# Patient Record
Sex: FEMALE | Race: WHITE | NOT HISPANIC OR LATINO | Employment: FULL TIME | URBAN - METROPOLITAN AREA
[De-identification: names, ages, dates, MRNs, and addresses within clinical notes are randomized per-mention and may not be internally consistent; named-entity substitution may affect disease eponyms.]

---

## 2017-01-16 ENCOUNTER — OFFICE VISIT (OUTPATIENT)
Dept: INTERNAL MEDICINE | Facility: CLINIC | Age: 52
End: 2017-01-16

## 2017-01-16 ENCOUNTER — TELEPHONE (OUTPATIENT)
Dept: INTERNAL MEDICINE | Facility: CLINIC | Age: 52
End: 2017-01-16

## 2017-01-16 VITALS
BODY MASS INDEX: 47.62 KG/M2 | WEIGHT: 293 LBS | DIASTOLIC BLOOD PRESSURE: 86 MMHG | SYSTOLIC BLOOD PRESSURE: 145 MMHG | HEART RATE: 83 BPM

## 2017-01-16 DIAGNOSIS — R20.0 LEFT FACIAL NUMBNESS: Primary | ICD-10-CM

## 2017-01-16 ASSESSMENT — PAIN SCALES - GENERAL: PAINLEVEL: NO PAIN (0)

## 2017-01-16 NOTE — MR AVS SNAPSHOT
After Visit Summary   1/16/2017    Elizabeth Rosenthal    MRN: [id number]           Patient Information     Date Of Birth          1965        Visit Information        Provider Department      1/16/2017 11:30 AM Katty Villa MD [facility]        Care Instructions    [facility] Care Center Medication Refill Request Information:  * Please contact your pharmacy regarding ANY request for medication refills.  ** [facility] Prescription Fax = [phone]  * Please allow 3 business days for routine medication refills.  * Please allow 5 business days for controlled substance medication refills.     [facility] Center Test Result notification information:  *You will be notified with in 7-10 days of your appointment day regarding the results of your test.  If you are on MyChart you will be notified as soon as the provider has reviewed the results and signed off on them.          Follow-ups after your visit        Your next 10 appointments already scheduled     Feb 22, 2017  6:30 AM   Lab with  LAB   [facility] ([facility])    [address]  [address] [facility] [address] [phone]            Feb 22, 2017  7:00 AM   (Arrive by 6:45 AM)   Return Visit with Tyson Ribeiro MD   [facility] Rheumatology ([facility])    [address]  [address] [facility] [address] [phone]              Who to contact     Please call your clinic at [phone] to:    Ask questions about your health    Make or cancel appointments    Discuss your medicines    Learn about your test results    Speak to your doctor   If you have compliments or concerns about an experience at your clinic, or if you wish to file a complaint, please contact [facility] Patient Relations at [phone] or email us at [email or web address]         Additional Information About Your Visit        MyChart Information      3Leaf is an electronic gateway that provides easy, online access to your medical records. With 3Leaf, you can request a clinic appointment, read your test results, renew a prescription or communicate with your care team.     To sign up for 3Leaf visit the website at [email or web address]   You will be asked to enter the access code listed below, as well as some personal information. Please follow the directions to create your username and password.     Your access code is: [email or web address]  Expires: 2017 12:03 PM     Your access code will  in 90 days. If you need help or a new code, please contact your [facility] or call [phone] for assistance.        Care EveryWhere ID     This is your Care EveryWhere ID. This could be used by other organizations to access your [place] medical records  [id number]        Your Vitals Were     Pulse Breastfeeding?                83 No           Blood Pressure from Last 3 Encounters:   17 145/86   16 138/90   16 139/82    Weight from Last 3 Encounters:   17 137.939 kg (304 lb 1.6 oz)   16 136.079 kg (300 lb)   16 133.811 kg (295 lb)              Today, you had the following     No orders found for display       Primary Care Provider Office Phone # Fax #    Eveline Berry [phone]        [facility] [address]  [facility] [address]        Thank you!     Thank you for choosing [facility]  for your care. Our goal is always to provide you with excellent care. Hearing back from our patients is one way we can continue to improve our services. Please take a few minutes to complete the written survey that you may receive in the mail after your visit with us. Thank you!             Your Updated Medication List - Protect others around you: Learn how to safely use, store and throw away your medicines at www.disposemymeds.org.          This list  is accurate as of: 1/16/17 12:03 PM.  Always use your most recent med list.                   Brand Name Dispense Instructions for use    albuterol 108 (90 BASE) MCG/ACT Inhaler    PROAIR HFA/PROVENTIL HFA/VENTOLIN HFA    3.7 g    Inhale 1 puff into the lungs every 6 hours as needed for shortness of breath / dyspnea or wheezing       buPROPion 150 MG 24 hr tablet    WELLBUTRIN XL    30 tablet    Take 1 tablet (150 mg) by mouth every morning       escitalopram 20 MG tablet    LEXAPRO    90 tablet    Take 1 tablet (20 mg) by mouth daily       fluticasone 110 MCG/ACT Inhaler    FLOVENT HFA    1 Inhaler    Inhale 2 puffs into the lungs 2 times daily       folic acid 1 MG tablet    FOLVITE    90 tablet    Take 1 tablet (1 mg) by mouth daily       gabapentin 300 MG capsule    NEURONTIN    90 capsule    Take 1 capsule (300 mg) by mouth 3 times daily Take 1 Cap by mouth three times a day.       lidocaine 5 % Patch    LIDODERM    30 patch    Place 1-3 patches onto the skin every 24 hours       lisinopril 10 MG tablet    PRINIVIL/ZESTRIL    90 tablet    Take 1 tablet (10 mg) by mouth daily       methotrexate 2.5 MG tablet CHEMO     30 tablet    Take 6 tablets (15 mg) by mouth once a week 6 tabs by mouth once a week       methylPREDNISolone 4 MG tablet    MEDROL    21 tablet    Take 1 tablet (4 mg) by mouth See Admin Instructions follow package directions

## 2017-01-16 NOTE — PATIENT INSTRUCTIONS
Primary Care Center Medication Refill Request Information:  * Please contact your pharmacy regarding ANY request for medication refills.  ** [facility] Prescription Fax = [phone]  * Please allow 3 business days for routine medication refills.  * Please allow 5 business days for controlled substance medication refills.     [facility] Test Result notification information:  *You will be notified with in 7-10 days of your appointment day regarding the results of your test.  If you are on MyChart you will be notified as soon as the provider has reviewed the results and signed off on them.

## 2017-01-16 NOTE — PROGRESS NOTES
S: Elizabeth is a 51 yoF with a PMHx of TIA, vertebral artery dissection (5 years ago), and a hx of migraines who is here with facial numbness. She has had more migraines in the past couple weeks b/c she bumped the back of her head. She had a migraine yesterday and treated it with her usual benadryl and rest. She has no headache now. However, she does have numbness on the L side of her face.  She has no changes in vision, no photo/phonophobia, no change in speech, no other numbness, No weakness. NEck is stiff and sore and head hurts a bit in the R frontal area. When she had the vertebral arterial dissection 5 years ago, her symptom was horrendous dizziness, which she does not have now. She was monitored, not treated with surgery. After 4 mos had a clear MRI. She also had a TIA 10 years ago.     PMHx, PRob list reviewed.     ROS as above.     /86 mmHg  Pulse 83  Wt 137.939 kg (304 lb 1.6 oz)  Breastfeeding? No  General: Pleasant female, in NAD  ENT: TMs normal, oropharynx clear, MMM, palate elevated bilaterally  Neck: no carotid bruits  Resp: lungs CAB  CV: Heart RRR, ,no MRG  Neuro: AOx3, CN II-XII intact, with perhaps slight decreased in sensation at V3 branch of CN V. No numbness on hands/legs. 5/5 strength in BUE/BLE. No pronator drift, normal rapid hand movements, normal finger-nose    A/P:   Elizabeth was seen today for numbness and headache.    Diagnoses and all orders for this visit:    Left facial numbness. Suspect benign in etiology. Normal neurological exam, so not concerning for dissection, stroke, atypical migraine, etc. Should resolve on own.   RTC if fails to improve or worsens.     Katty Kelley MD  01/16/2017

## 2017-01-16 NOTE — TELEPHONE ENCOUNTER
Call from patient. Yesterday she had a migraine, and today she is having numbness on the left side of her face. She has a history of TIA and vertebral artery dissection. Does not want to go to the ER. Appointment scheduled for patient to see Dr. Tuttle today.

## 2017-01-16 NOTE — NURSING NOTE
Chief Complaint   Patient presents with     Numbness     Patient here for numbness just today.      Headache     Patient here for headaches.       Leonela Olson CMA at 11:48 AM on 1/16/2017.

## 2017-01-19 ENCOUNTER — TELEPHONE (OUTPATIENT)
Dept: INTERNAL MEDICINE | Facility: CLINIC | Age: 52
End: 2017-01-19

## 2017-01-19 NOTE — TELEPHONE ENCOUNTER
Telephone Encounter    Caller: Patient.     Reason for Call/Caller's Request: 1) Patient wanted to know how long should she wait to follow-up with regards to the facial numbness. Still experiencing symptoms. And 2) should she be worried about the fact that moving her neck side to side triggers the symptoms.     Nursing Action or Plan: Routed to Dr. Tuttle who last saw the patient.

## 2017-01-20 NOTE — TELEPHONE ENCOUNTER
Perhaps she should come back and be seen. She may have a pinched nerve in her neck that's causing this. Perhaps PT is in order.     01/20/2017 2:05 PM  Message relayed.  Orin QUINTEROS

## 2017-04-03 DIAGNOSIS — I10 BENIGN ESSENTIAL HYPERTENSION: ICD-10-CM

## 2017-04-03 RX ORDER — LISINOPRIL 10 MG/1
10 TABLET ORAL DAILY
Qty: 90 TABLET | Refills: 0 | Status: SHIPPED | OUTPATIENT
Start: 2017-04-03 | End: 2017-06-01

## 2017-04-03 NOTE — TELEPHONE ENCOUNTER
lisinopril (PRINIVIL/ZESTRIL) 10 MG tablet      Last Written Prescription Date:  12/1/2016  Last Fill Quantity: 90,   # refills: 0  Last Office Visit : 1/16/2017  Future Office visit:  0    BP Readings from Last 1 Encounters:   01/16/17 145/86       Routing refill request to provider for review/approval because:  BP only slightly above parameters.  Per protocol, refill for 3 months and message routed to provider for follow up.    --------------------------  Message left for pt to schedule an appt for f/u BP .  Le Love RN

## 2017-04-05 DIAGNOSIS — M79.7 FIBROMYALGIA: ICD-10-CM

## 2017-04-05 RX ORDER — GABAPENTIN 300 MG/1
300 CAPSULE ORAL 3 TIMES DAILY
Qty: 90 CAPSULE | Refills: 11 | Status: SHIPPED | OUTPATIENT
Start: 2017-04-05 | End: 2017-12-07

## 2017-04-05 NOTE — TELEPHONE ENCOUNTER
gabapentin (NEURONTIN) 300 MG       Last Written Prescription Date:  5/25/16  Last Fill Quantity: 90,   # refills: 11  Last Office Visit : 11/23/16  Future Office visit:  none  Routing refill request to provider for review/approval because:  Drug not on  refill protocol

## 2017-05-17 DIAGNOSIS — F32.A DEPRESSION: ICD-10-CM

## 2017-05-17 RX ORDER — ESCITALOPRAM OXALATE 20 MG/1
20 TABLET ORAL DAILY
Qty: 90 TABLET | Refills: 2 | Status: SHIPPED | OUTPATIENT
Start: 2017-05-17 | End: 2018-02-19

## 2017-05-17 NOTE — TELEPHONE ENCOUNTER
escitalopram       Last Written Prescription Date:  3/22/16  Last Fill Quantity: 90,   # refills: 3  Last Office Visit : 1/16/17  Future Office visit:  NONE

## 2017-05-23 DIAGNOSIS — M06.09 RHEUMATOID ARTHRITIS OF MULTIPLE SITES WITHOUT RHEUMATOID FACTOR (H): ICD-10-CM

## 2017-05-23 DIAGNOSIS — Z79.899 ENCOUNTER FOR LONG-TERM (CURRENT) USE OF MEDICATIONS: ICD-10-CM

## 2017-05-23 LAB
ALBUMIN SERPL-MCNC: 3.2 G/DL (ref 3.4–5)
ALT SERPL W P-5'-P-CCNC: 25 U/L (ref 0–50)
AST SERPL W P-5'-P-CCNC: 17 U/L (ref 0–45)
CREAT SERPL-MCNC: 1.05 MG/DL (ref 0.52–1.04)
CRP SERPL-MCNC: 12.1 MG/L (ref 0–8)
ERYTHROCYTE [DISTWIDTH] IN BLOOD BY AUTOMATED COUNT: 14.1 % (ref 10–15)
GFR SERPL CREATININE-BSD FRML MDRD: 55 ML/MIN/1.7M2
HCT VFR BLD AUTO: 40 % (ref 35–47)
HGB BLD-MCNC: 13.4 G/DL (ref 11.7–15.7)
MCH RBC QN AUTO: 30.8 PG (ref 26.5–33)
MCHC RBC AUTO-ENTMCNC: 33.5 G/DL (ref 31.5–36.5)
MCV RBC AUTO: 92 FL (ref 78–100)
PLATELET # BLD AUTO: 308 10E9/L (ref 150–450)
RBC # BLD AUTO: 4.35 10E12/L (ref 3.8–5.2)
WBC # BLD AUTO: 6.8 10E9/L (ref 4–11)

## 2017-05-24 DIAGNOSIS — M06.09 RHEUMATOID ARTHRITIS OF MULTIPLE SITES WITHOUT RHEUMATOID FACTOR (H): ICD-10-CM

## 2017-05-24 NOTE — TELEPHONE ENCOUNTER
----- Message from Venita Walker LPN sent at 5/22/2017  1:08 PM CDT -----  Regarding: FW: Pt would like an extention on her methotrexate  Contact: [phone]      ----- Message -----     From: Kaila Eastman     Sent: 5/22/2017  10:23 AM       To: Adult Rheum Triage[facility]  Subject: Pt would like an extention on her methotrexa#    Pt is out of her medication methotrexate, and is requesting an extention be sent to her pharmacy on file to last until the time of her scheduled appt with Dr Ribeiro on 7/28/17. Please call pt to let her know if this will work, thanks! Kaila FERGUSON

## 2017-05-24 NOTE — TELEPHONE ENCOUNTER
Methotrexate      Last Written Prescription Date:  11-23-17  Last Fill Quantity: 30,   # refills: 5  Last Office Visit: 11-23-17  Future Office visit:  7-27-17    CBC RESULTS:   Recent Labs   Lab Test  05/23/17   0738   WBC  6.8   RBC  4.35   HGB  13.4   HCT  40.0   MCV  92   MCH  30.8   MCHC  33.5   RDW  14.1   PLT  308       Creatinine   Date Value Ref Range Status   05/23/2017 1.05 (H) 0.52 - 1.04 mg/dL Final   ]    Liver Function Studies -   Recent Labs   Lab Test  05/23/17   0738   03/22/16   0921   PROTTOTAL   --    --   7.3   ALBUMIN  3.2*   < >  3.5   BILITOTAL   --    --   0.4   ALKPHOS   --    --   78   AST  17   < >  11   ALT  25   < >  22    < > = values in this interval not displayed.       Kathleen M Doege RN

## 2017-06-01 ENCOUNTER — OFFICE VISIT (OUTPATIENT)
Dept: INTERNAL MEDICINE | Facility: CLINIC | Age: 52
End: 2017-06-01

## 2017-06-01 VITALS
WEIGHT: 293 LBS | DIASTOLIC BLOOD PRESSURE: 81 MMHG | RESPIRATION RATE: 16 BRPM | BODY MASS INDEX: 47.72 KG/M2 | HEART RATE: 70 BPM | SYSTOLIC BLOOD PRESSURE: 133 MMHG

## 2017-06-01 DIAGNOSIS — F33.0 MAJOR DEPRESSIVE DISORDER, RECURRENT EPISODE, MILD (H): ICD-10-CM

## 2017-06-01 DIAGNOSIS — Z12.31 ENCOUNTER FOR SCREENING MAMMOGRAM FOR BREAST CANCER: ICD-10-CM

## 2017-06-01 DIAGNOSIS — I10 BENIGN ESSENTIAL HYPERTENSION: ICD-10-CM

## 2017-06-01 DIAGNOSIS — J45.20 MILD INTERMITTENT ASTHMA WITHOUT COMPLICATION: ICD-10-CM

## 2017-06-01 DIAGNOSIS — E78.5 HYPERLIPIDEMIA, UNSPECIFIED HYPERLIPIDEMIA TYPE: ICD-10-CM

## 2017-06-01 DIAGNOSIS — E66.01 MORBID OBESITY, UNSPECIFIED OBESITY TYPE (H): ICD-10-CM

## 2017-06-01 DIAGNOSIS — Z12.11 SCREEN FOR COLON CANCER: ICD-10-CM

## 2017-06-01 DIAGNOSIS — Z12.4 SCREENING FOR MALIGNANT NEOPLASM OF CERVIX: ICD-10-CM

## 2017-06-01 DIAGNOSIS — G47.33 OSA (OBSTRUCTIVE SLEEP APNEA): ICD-10-CM

## 2017-06-01 DIAGNOSIS — Z00.00 ROUTINE GENERAL MEDICAL EXAMINATION AT A HEALTH CARE FACILITY: Primary | ICD-10-CM

## 2017-06-01 DIAGNOSIS — R73.9 HYPERGLYCEMIA: ICD-10-CM

## 2017-06-01 DIAGNOSIS — J45.901 ASTHMA EXACERBATION: ICD-10-CM

## 2017-06-01 RX ORDER — BUPROPION HYDROCHLORIDE 150 MG/1
150 TABLET ORAL EVERY MORNING
Qty: 90 TABLET | Refills: 3 | Status: SHIPPED | OUTPATIENT
Start: 2017-06-01 | End: 2018-02-19

## 2017-06-01 RX ORDER — LISINOPRIL 10 MG/1
10 TABLET ORAL DAILY
Qty: 90 TABLET | Refills: 3 | Status: SHIPPED | OUTPATIENT
Start: 2017-06-01 | End: 2018-02-19

## 2017-06-01 RX ORDER — ALBUTEROL SULFATE 90 UG/1
1 AEROSOL, METERED RESPIRATORY (INHALATION) EVERY 6 HOURS PRN
Qty: 3 INHALER | Refills: 3 | Status: SHIPPED | OUTPATIENT
Start: 2017-06-01 | End: 2019-09-16

## 2017-06-01 RX ORDER — FLUTICASONE PROPIONATE 110 UG/1
2 AEROSOL, METERED RESPIRATORY (INHALATION) 2 TIMES DAILY
Qty: 3 INHALER | Refills: 3 | Status: SHIPPED | OUTPATIENT
Start: 2017-06-01 | End: 2020-08-20

## 2017-06-01 ASSESSMENT — PAIN SCALES - GENERAL: PAINLEVEL: MODERATE PAIN (5)

## 2017-06-01 NOTE — PATIENT INSTRUCTIONS
"Primary Care Center Medication Refill Request Information:  * Please contact your pharmacy regarding ANY request for medication refills.  ** [facility] Fax = [phone]  * Please allow 3 business days for routine medication refills.  * Please allow 5 business days for controlled substance medication refills.     [facility] Test Result notification information:  *You will be notified with in 7-10 days of your appointment day regarding the results of your test.  If you are on MyChart you will be notified as soon as the provider has reviewed the results and signed off on them.    [facility] Center [phone]       Here are recommendations and tips regarding exercise:      ** Research has shown that exercise is as effective as medication for prevention of medical conditions such as diabetes and repeat myocardial infarctions (heart attacks).      Ultimately your goal is to exercise vigorously for for at least 30 minutes, 5 days a week   -  Examples of \"vigorous\" exercise are jogging, swimming and singles tennis.   -  Examples of \"moderate\" exercise are brisk walking, bicycle riding at less than 10 miles per hour and water aerobics.   -  If you have been very sedentary, start with 1 day per week.  One day is better than no days.   -  Gradually work up to 5 days a week (total 2.5 hours per week)   -  Try to reach your exercise goal over a period of 3 months.  You are likely to    maintain a habit of exercising if you can exercise regularly for at    about 6 weeks.   -  Exercise can be done for 10 minutes at a time.   -  Do what you enjoy doing that is physically active.     -  Exercise with a partner.   -  If you use a pedometer as a motivator, purchase a reliable one that is unlikely    to break   -  Being on your feet and walking a lot at work does not count as your Exercise program   -  Don't let weather be a barrier to exercise.  Try, for example, go to a [place] in [place]   -  If you feel like " "you don't have enough time to exercise, consider, forexample, exercising while you watch T.V.   -  If health conditions such as back, hip, knee and foot pain are a barrier, choose low impact activities such as swimming, water-based activities,     Pilates or yoga   -  Don't give up if you fall below your goal.  Try to get work outs in whenever you    have a chance.    Here are some additional recommendations and tips regarding WEIGHT LOSS:     -  For weight loss, your goal is to exercise at least 30 minutes vigorously on a daily basis. Examples of \"vigorous\" exercise are jogging, swimming and singles tennis.  See general exercise recommendations above.     -  Keep a log of what you eat and drink every day     -  Weigh yourself daily     -  Consume fewer calories than you think, e.g. 800-1000 calories daily     -  Consider a weight management group such Wellness Program that will assist you in understanding appropriate dietary choices and portion control.      -  Avoid foods and beverages that are high in calories and low in nutrition value.  Choose snacks high in fiber and low in calories.  Keep colorful vegetables on hand.     -  Avoid fast food restaurants.  Consider sharing restaurant entrees or ordering healthy appetizers at restaurants.  Order salads with fat free salads.     -  Overeaters Anonymous may help with compulsions that drive you to eat.       -  Here in the Primary Care Center we have Health Psychologists.  The [facility] also has a Weight Management Clinic.  Let us know if you would like a referral. Check with your insurance to see if the consultation would be covered.                  "

## 2017-06-01 NOTE — MR AVS SNAPSHOT
"              After Visit Summary   6/1/2017    Elizabeth Rosenthal    MRN: [id number]           Patient Information     Date Of Birth          1965        Visit Information        Provider Department      6/1/2017 7:20 AM Eveline Berry MD [facility]        Today's Diagnoses     Routine general medical examination at a health care facility    -  1    Screening for malignant neoplasm of cervix        Morbid obesity, unspecified obesity type (H)        Hyperlipidemia, unspecified hyperlipidemia type        Benign essential hypertension        Asthma exacerbation        Major depressive disorder, recurrent episode, mild (H)        Mild intermittent asthma without complication        Hyperglycemia        Encounter for screening mammogram for breast cancer        Screen for colon cancer        SHERIE (obstructive sleep apnea)          Care Instructions    Primary Care Center Medication Refill Request Information:  * Please contact your pharmacy regarding ANY request for medication refills.  ** [facility] Prescription Fax = [phone]  * Please allow 3 business days for routine medication refills.  * Please allow 5 business days for controlled substance medication refills.     Primary Care Center Test Result notification information:  *You will be notified with in 7-10 days of your appointment day regarding the results of your test.  If you are on MyChart you will be notified as soon as the provider has reviewed the results and signed off on them.    Primary Care Center [phone]       Here are recommendations and tips regarding exercise:      ** Research has shown that exercise is as effective as medication for prevention of medical conditions such as diabetes and repeat myocardial infarctions (heart attacks).      Ultimately your goal is to exercise vigorously for for at least 30 minutes, 5 days a week   -  Examples of \"vigorous\" exercise are jogging, swimming and singles tennis.   -  Examples of " "\"moderate\" exercise are brisk walking, bicycle riding at less than 10 miles per hour and water aerobics.   -  If you have been very sedentary, start with 1 day per week.  One day is better than no days.   -  Gradually work up to 5 days a week (total 2.5 hours per week)   -  Try to reach your exercise goal over a period of 3 months.  You are likely to    maintain a habit of exercising if you can exercise regularly for at    about 6 weeks.   -  Exercise can be done for 10 minutes at a time.   -  Do what you enjoy doing that is physically active.     -  Exercise with a partner.   -  If you use a pedometer as a motivator, purchase a reliable one that is unlikely    to break   -  Being on your feet and walking a lot at work does not count as your Exercise program   -  Don't let weather be a barrier to exercise.  Try, for example, go to a mall in the [place]   -  If you feel like you don't have enough time to exercise, consider, forexample, exercising while you watch T.V.   -  If health conditions such as back, hip, knee and foot pain are a barrier, choose low impact activities such as swimming, water-based activities,     Pilates or yoga   -  Don't give up if you fall below your goal.  Try to get work outs in whenever you    have a chance.    Here are some additional recommendations and tips regarding WEIGHT LOSS:     -  For weight loss, your goal is to exercise at least 30 minutes vigorously on a daily basis. Examples of \"vigorous\" exercise are jogging, swimming and singles tennis.  See general exercise recommendations above.     -  Keep a log of what you eat and drink every day     -  Weigh yourself daily     -  Consume fewer calories than you think, e.g. 800-1000 calories daily     -  Consider a weight management group such Wellness Program that will assist you in understanding appropriate dietary choices and portion control.      -  Avoid foods and beverages that are high in calories and low in nutrition value.  " Choose snacks high in fiber and low in calories.  Keep colorful vegetables on hand.     -  Avoid fast food restaurants.  Consider sharing restaurant entrees or ordering healthy appetizers at restaurants.  Order salads with fat free salads.     -  Overeaters Anonymous may help with compulsions that drive you to eat.       -  Here in the Primary Care Center we have Health Psychologists.  The [facility] also has a Weight Management Clinic.  Let us know if you would like a referral. Check with your insurance to see if the consultation would be covered.                          Follow-ups after your visit        Additional Services     GI Procedure Referral       Last Lab Result: Creatinine (mg/dL)       Date                     Value                 05/23/2017               1.05 (H)         ----------  Body mass index is 47.72 kg/(m^2).     Needed:  No  Language:  English    Patient will be contacted to schedule procedure.     Please be aware that coverage of these services is subject to the terms and limitations of your health insurance plan.  Call member services at your health plan with any benefit or coverage questions.  Any procedures must be performed at a [facility] facility OR coordinated by your clinic's referral office.    Please bring the following with you to your appointment:    (1) Any X-Rays, CTs or MRIs which have been performed.  Contact the facility where they were done to arrange for  prior to your scheduled appointment.    (2) List of current medications   (3) This referral request   (4) Any documents/labs given to you for this referral            SLEEP EVALUATION & MANAGEMENT REFERRAL - ADULT       Please be aware that coverage of these services is subject to the terms and limitations of your health insurance plan.  Call member services at your health plan with any benefit or coverage questions.      Please bring the following to your appointment:    >>   List of current  medications   >>   This referral request   >>   Any documents/labs given to you for this referral    [facility]  Ph [phone] (Age 15 and up)                  Follow-up notes from your care team     Return in about 6 months (around 12/1/2017) for Routine Visit.      Your next 10 appointments already scheduled     Jul 27, 2017  6:00 PM CDT   (Arrive by 5:45 PM)   Return Visit with Tyson Ribeiro MD   [facility] ([facility])    [address] [place] [address]  3rd [facility] [address] [phone]              Future tests that were ordered for you today     Open Future Orders        Priority Expected Expires Ordered    SLEEP EVALUATION & MANAGEMENT REFERRAL - ADULT Routine  6/1/2018 6/1/2017    GI Procedure Referral Routine  6/1/2018 6/1/2017    Hemoglobin A1c Routine 6/1/2017 6/15/2017 6/1/2017    Lipid Profile Routine 6/1/2017 6/1/2018 6/1/2017    Comprehensive metabolic panel Routine 6/1/2017 6/15/2017 6/1/2017    Mammogram, screening w je (3D) Routine  6/1/2018 6/1/2017            Who to contact     Please call your clinic at [phone] to:    Ask questions about your health    Make or cancel appointments    Discuss your medicines    Learn about your test results    Speak to your doctor   If you have compliments or concerns about an experience at your clinic, or if you wish to file a complaint, please contact [facility] Patient Relations at [phone] or email us at [email or web address]         Additional Information About Your Visit        [employer] Information     [employer] is an electronic gateway that provides easy, online access to your medical records. With [employer], you can request a clinic appointment, read your test results, renew a prescription or communicate with your care team.     To sign up for [employer] visit the website at [email or web address]   You will be asked to enter the access  code listed below, as well as some personal information. Please follow the directions to create your username and password.     Your access code is: [email or web address]  Expires: 2017  6:30 AM     Your access code will  in 90 days. If you need help or a new code, please contact your [facility] or call [phone] for assistance.        Care EveryWhere ID     This is your Care EveryWhere ID. This could be used by other organizations to access your [facility] medical records  [id number]        Your Vitals Were     Pulse Respirations BMI (Body Mass Index)             70 16 47.72 kg/m2          Blood Pressure from Last 3 Encounters:   17 133/81   17 145/86   16 138/90    Weight from Last 3 Encounters:   17 (!) 138.2 kg (304 lb 11.2 oz)   17 (!) 137.9 kg (304 lb 1.6 oz)   16 136.1 kg (300 lb)              We Performed the Following     Albumin Random Urine Quantitative     Asthma Action Plan (AAP)     HPV High Risk Types DNA Cervical     Pap imaged thin layer screen with HPV - recommended age 30 - 65 years (select HPV order below)          Today's Medication Changes          These changes are accurate as of: 17  8:07 AM.  If you have any questions, ask your nurse or doctor.               Stop taking these medicines if you haven't already. Please contact your care team if you have questions.     methylPREDNISolone 4 MG tablet   Commonly known as:  MEDROL                Where to get your medicines      These medications were sent to [facility]/pharmacy #[id number] - [place] [id number] AT [facility] OF [address] [place] [address]     Phone:  [phone]     albuterol 108 (90 BASE) MCG/ACT Inhaler    buPROPion 150 MG 24 hr tablet    fluticasone 110 MCG/ACT Inhaler    lisinopril 10 MG tablet                Primary Care Provider Office Phone # Fax #    Eveline Berry [phone]        PHYSICIANS [address] [place] [address]  [facility] [address]  [facility] [address]        Thank you!     Thank you for choosing [facility]  for your care. Our goal is always to provide you with excellent care. Hearing back from our patients is one way we can continue to improve our services. Please take a few minutes to complete the written survey that you may receive in the mail after your visit with us. Thank you!             Your Updated Medication List - Protect others around you: Learn how to safely use, store and throw away your medicines at www.disposemymeds.org.          This list is accurate as of: 6/1/17  8:07 AM.  Always use your most recent med list.                   Brand Name Dispense Instructions for use    albuterol 108 (90 BASE) MCG/ACT Inhaler    PROAIR HFA/PROVENTIL HFA/VENTOLIN HFA    3 Inhaler    Inhale 1 puff into the lungs every 6 hours as needed for shortness of breath / dyspnea or wheezing       buPROPion 150 MG 24 hr tablet    WELLBUTRIN XL    90 tablet    Take 1 tablet (150 mg) by mouth every morning       escitalopram 20 MG tablet    LEXAPRO    90 tablet    Take 1 tablet (20 mg) by mouth daily       fluticasone 110 MCG/ACT Inhaler    FLOVENT HFA    3 Inhaler    Inhale 2 puffs into the lungs 2 times daily       folic acid 1 MG tablet    FOLVITE    90 tablet    Take 1 tablet (1 mg) by mouth daily       gabapentin 300 MG capsule    NEURONTIN    90 capsule    Take 1 capsule (300 mg) by mouth 3 times daily       lidocaine 5 % Patch    LIDODERM    30 patch    Place 1-3 patches onto the skin every 24 hours       lisinopril 10 MG tablet    PRINIVIL/ZESTRIL    90 tablet    Take 1 tablet (10 mg) by mouth daily       methotrexate 2.5 MG tablet CHEMO     30 tablet    Take 6 tablets (15 mg) by mouth once a week 6 tabs by mouth once a week

## 2017-06-01 NOTE — LETTER
Patient:  Elizabeth Locke  :   1965  MRN:     [id number]        Ms. Elizabeth Locke  [address] [place] [address]        2017    Dear Ms. Locke,    Thank you for choosing the [facility] for your healthcare needs.  We appreciate the opportunity to serve you.    The following are your recent test results.     Your pap smear result is normal.      Results for orders placed or performed in visit on 17   Pap imaged thin layer screen with HPV - recommended age 30 - 65 years (select HPV order below)   Result Value Ref Range    PAP NIL     Copath Report         Patient Name: ELIZABETH LOCKE  MR#: [id number]  Specimen #: [id number]  Collected: 2017  Received: 2017  Reported: 2017 09:12  Ordering Phy(s): WALLACE VIGIL    For improved result formatting, select 'View Enhanced Report Format'  under Linked Documents section.    SPECIMEN/STAIN PROCESS:  Pap imaged thin layer prep screening (Surepath, FocalPoint with guided  screening)       Pap-Cyto x 1, HPV ordered x 1    SOURCE: Cervical, endocervical  ----------------------------------------------------------------   Pap imaged thin layer prep screening (Surepath, FocalPoint with guided  screening)  SPECIMEN ADEQUACY:  Satisfactory for evaluation.  -Transformation zone component absent.    CYTOLOGIC INTERPRETATION:    Negative for intraepithelial lesion or malignancy    Electronically signed out by:  YUMIKO Moore ( ASCP)    Processed and screened at [facility],  [facility]    CLINICAL HISTORY:    Post Menopausal,    Papanicolaou Carina t Limitations:  Cervical cytology is a screening test  with limited sensitivity; regular screening is critical for cancer  prevention; Pap tests are primarily effective for the  diagnosis/prevention of squamous cell carcinoma, not adenocarcinomas or  other cancers.    TESTING LAB LOCATION:  [facility]  [facility], [facility] [address]  [address] [place]  [address]  [phone]    COLLECTION SITE:  Client:  [facility]  Location: [facility] (B)         Please contact your provider if you have any questions or concerns.  We look forward to serving your needs in the future.      Sincerely,    Dr. Berry.

## 2017-06-01 NOTE — PROGRESS NOTES
CC:  Physical.    HPI:    Elizabeth is here for a routine physical.      The following health care maintenance topics were addressed:  Cancer screening  Blood pressure  Lifestyle habits including exercise  Depression screening   STI testing --declines  Hyperlipidemia screening  Immunizations  Routine eye and dental exams  Weight management  Diabetes screening  Skin Cancer prevention  Asthma Action Plan  SHERIE--has, on CPAP, needs equipment replacement  Hepatitis C screening    Personal and family health history was updated    Patient Active Problem List   Diagnosis     Iliotibial band syndrome     Right knee pain     Lumbar radicular pain     Polyarthritis     Depression     Benign essential hypertension     Mild intermittent asthma without complication     Morbid obesity (H)     Rheumatoid arthritis of multiple sites without rheumatoid factor (H)     Fibromyalgia     Encounter for long-term (current) use of medications     SHERIE (obstructive sleep apnea)     Morbid obesity with BMI of 45.0-49.9, adult (H)     Low back pain     Hyperlipidemia     Arthritis of knee, left     Past Medical History:   Diagnosis Date     Asthma      Chronic pelvic pain in female      Depression      Hyperlipidemia      Low back pain      Morbid obesity with BMI of 45.0-49.9, adult (H)      SHERIE (obstructive sleep apnea)     has cpap     Polyarthritis      TIA (transient ischemic attack)      Vertebral artery dissection (H)      Past Surgical History:   Procedure Laterality Date     ARTHROSCOPY KNEE BILATERAL       BIOPSY LYMPH NODE CERVICAL       TUBAL LIGATION       Family History   Problem Relation Age of Onset     Breast Cancer Mother      50s     HEART DISEASE Father      Social History     Social History     Marital status: Single     Spouse name: N/A     Number of children: N/A     Years of education: N/A     Occupational History     Not on file.     Social History Main Topics     Smoking status: Never Smoker     Smokeless tobacco: Never  Used     Alcohol use 0.0 oz/week     0 Standard drinks or equivalent per week      Comment: Occasional     Drug use: No     Sexual activity: Not on file     Other Topics Concern     Not on file     Social History Narrative     Current Outpatient Prescriptions   Medication Sig Dispense Refill     methotrexate 2.5 MG tablet CHEMO Take 6 tablets (15 mg) by mouth once a week 6 tabs by mouth once a week 30 tablet 1     escitalopram (LEXAPRO) 20 MG tablet Take 1 tablet (20 mg) by mouth daily 90 tablet 2     gabapentin (NEURONTIN) 300 MG capsule Take 1 capsule (300 mg) by mouth 3 times daily 90 capsule 11     lisinopril (PRINIVIL/ZESTRIL) 10 MG tablet Take 1 tablet (10 mg) by mouth daily 90 tablet 0     fluticasone (FLOVENT HFA) 110 MCG/ACT inhaler Inhale 2 puffs into the lungs 2 times daily 1 Inhaler 1     folic acid (FOLVITE) 1 MG tablet Take 1 tablet (1 mg) by mouth daily 90 tablet 3     buPROPion (WELLBUTRIN XL) 150 MG 24 hr tablet Take 1 tablet (150 mg) by mouth every morning 30 tablet 1     albuterol (PROAIR HFA, PROVENTIL HFA, VENTOLIN HFA) 108 (90 BASE) MCG/ACT inhaler Inhale 1 puff into the lungs every 6 hours as needed for shortness of breath / dyspnea or wheezing 3.7 g 2     lidocaine (LIDODERM) 5 % patch Place 1-3 patches onto the skin every 24 hours 30 patch 1     Allergies   Allergen Reactions     Nuts Itching     Barley Grass GI Disturbance     Codeine Itching     Contrast Dye Itching     Oat Other (See Comments) and Nausea and Vomiting     abdominal pain       Penicillins Itching     Shellfish-Derived Products Nausea and Vomiting     Yeast Cramps, Diarrhea, Itching and Nausea and Vomiting     There were no vitals taken for this visit.      Medications and need for refills reviewed.    ROS:  14 point ROS negative for new symptoms except for left shoulder pain and increasing limitation in ROM.  Would like to make sure it is not in the breast.  We discussed the effects that her morbid obesity is having on her  health including weight bearing joint pains, hypertension, hyperlipidemia, elevated glucose, etc.  Elizabeth reports that nothing has helped her with weight loss including diet, exercise, OA.  States she lost around 60 pounds on Weight Watchers but she gained it back.  States she does not have time to try to lose weight.  We discussed strategies.  She is not interested in a Weight Management consult.    /81 (BP Location: Right arm)  Pulse 70  Resp 16  Wt (!) 138.2 kg (304 lb 11.2 oz)  BMI 47.72 kg/m2      Physical Examination:  General:  Pleasant, alert, no acute distress.  Tearful when talking about weight loss strategies  Eyes:  Pupils 2-3 mm, sclera white, EOM's full.    Ears:  TM's normal, EAC's patent, clear of cerumen  Nose:  Nasal passages clear, turbinates not swollen, no polyps visualized.  Throat/Mouth:  No pharyngeal erythema or exudate, oral mucosa and tongue moist, no suspicious oral lesions  Neck:  Full AROM, supple, thyroid smooth, symmetric, not enlarge, no nodules  Lungs:  Clear to auscultation throughout, no wheezes, rhonchi or rales.  C/V:  Regular rate and rhythm, no murmurs, rubs or gallops.  No JVD, no carotid bruits.  No peripheral edema.    Breast exam:  No suspicious masses, skin changes, nipple discharge or retraction.  She has some tenderness more along the lateral chest wall not in breast tissue.  Abdomen:  Not distended.  Bowel sounds active.  No tenderness, no hepatosplenomegaly or masses.  No CVA tenderness or masses.  Pelvic:  No suspicious vulvar lesions or masses, periurethral and vaginal os without lesions or erythema, no apparent prolapse, perineal and perianal areas without lesions or rashes, no external hemorrhoids noted.  Vaginal mucosa pink, moist.  No lesions, no discharge.  Cervix including os without lesions, masses, polyps.  Bimanual exam reveals normal size, non tender uterus without masses.  No ovarian enlargement or masses, no ovarian tenderness.  Lymph:  No  cervical, axillary or inguinal lymphadenopathy  Neuro:   Face symmetric. No tremor.  Gait steady.   M/S:  No joint deformities, no joint swelling noted in hands or feet.  Skin:  No suspicious lesions noted on face, neck, trunk, abdomen, hands, feet.  No rashes or jaundice in same areas.  Normal moisture, good skin turgor.   Psych:  Broad range affect.  Not psychomotor slowed.  No signs of anxiety or agitation.      Elizabeth was seen today for physical, numbness and refill request.    Diagnoses and all orders for this visit:    Routine general medical examination at a health care facility    Screening for malignant neoplasm of cervix  -     Pap imaged thin layer screen with HPV - recommended age 30 - 65 years (select HPV order below)  -     HPV High Risk Types DNA Cervical    Morbid obesity, unspecified obesity type (H)  -     Hemoglobin A1c; Future  -     Lipid Profile; Future  -     Encouraged exercise, decrease caloric intake, reduce weight, overeaters anonymous, referral to weight management program.  See patient instructions    Hyperlipidemia, unspecified hyperlipidemia type    Benign essential hypertension  -    Refill  lisinopril (PRINIVIL/ZESTRIL) 10 MG tablet; Take 1 tablet (10 mg) by mouth daily  -     Comprehensive metabolic panel; Future  -     Albumin Random Urine Quantitative    Major depressive disorder, recurrent episode, mild (H)  -    Refill  buPROPion (WELLBUTRIN XL) 150 MG 24 hr tablet; Take 1 tablet (150 mg) by mouth every morning    Mild intermittent asthma without complication    Refill  fluticasone (FLOVENT HFA) 110 MCG/ACT Inhaler; Inhale 2 puffs into the lungs 2 times daily  -    Refill  albuterol (PROAIR HFA/PROVENTIL HFA/VENTOLIN HFA) 108 (90 BASE) MCG/ACT Inhaler; Inhale 1 puff into the lungs every 6 hours as needed for shortness of breath / dyspnea or wheezing    Encounter for screening mammogram for breast cancer  -     Mammogram, screening w je (3D); Future    Screen for colon cancer  -      GI Procedure Referral; Future    SHERIE (obstructive sleep apnea)  -     SLEEP EVALUATION & MANAGEMENT REFERRAL - ADULT; Future    Follow up in 6 months and as needed.    Eveline Berry M.D.  Internal Medicine  Primary Care Center   pager [phone]

## 2017-06-01 NOTE — LETTER
My Asthma Action Plan  Name: Elizabeth Rosenthal   YOB: 1965  Date: 6/1/2017   My doctor: Eveline Berry MD   My clinic: [facility]        My Control Medicine: Fluticasone 2 puffs twice daily  My Rescue Medicine: Albuterol (Proair/Ventolin/Proventil) inhaler 2 puffs every 4 hours as needed   My Asthma Severity: intermittent  Avoid your asthma triggers: per patient               GREEN ZONE     Good Control    I feel good    No cough or wheeze    Can work, sleep and play without asthma symptoms       Take your asthma control medicine every day.     1. If exercise triggers your asthma, take your rescue medication    15 minutes before exercise or sports, and    During exercise if you have asthma symptoms  2. Spacer to use with inhaler: If you have a spacer, make sure to use it with your inhaler             YELLOW ZONE     Getting Worse  I have ANY of these:    I do not feel good    Cough or wheeze    Chest feels tight    Wake up at night   1. Keep taking your Green Zone medications  2. Start taking your rescue medicine:    every 20 minutes for up to 1 hour. Then every 4 hours for 24-48 hours.  3. If you stay in the Yellow Zone for more than 12-24 hours, contact your doctor.  4. If you do not return to the Green Zone in 12-24 hours or you get worse, start taking your oral steroid medicine if prescribed by your provider.           RED ZONE     Medical Alert - Get Help  I have ANY of these:    I feel awful    Medicine is not helping    Breathing getting harder    Trouble walking or talking    Nose opens wide to breathe       1. Take your rescue medicine NOW  2. If your provider has prescribed an oral steroid medicine, start taking it NOW  3. Call your doctor NOW  4. If you are still in the Red Zone after 20 minutes and you have not reached your doctor:    Take your rescue medicine again and    Call 911 or go to the emergency room right away    See your regular doctor within 2 weeks of an  Emergency Room or Urgent Care visit for follow-up treatment.        Electronically signed by: Eveline Berry, June 1, 2017    Annual Reminders:  Meet with Asthma Educator,  Flu Shot in the Fall, consider Pneumonia Vaccination for patients with asthma (aged 19 and older).    Pharmacy:    [id number] - [place], [place] - [address] AT [facility] [address]  [place]IE [address] @ . [facility] [place] - [address]  [id number] - [place], [place] - [address] AT [address]                    Asthma Triggers  How To Control Things That Make Your Asthma Worse    Triggers are things that make your asthma worse.  Look at the list below to help you find your triggers and what you can do about them.  You can help prevent asthma flare-ups by staying away from your triggers.      Trigger                                                          What you can do   Cigarette Smoke  Tobacco smoke can make asthma worse. Do not allow smoking in your home, car or around you.  Be sure no one smokes at a child s day care or school.  If you smoke, ask your health care provider for ways to help you quit.  Ask family members to quit too.  Ask your health care provider for a referral to Quit Plan to help you quit smoking, or call 4-162-685-PLAN.     Colds, Flu, Bronchitis  These are common triggers of asthma. Wash your hands often.  Don t touch your eyes, nose or mouth.  Get a flu shot every year.     Dust Mites  These are tiny bugs that live in cloth or carpet. They are too small to see. Wash sheets and blankets in hot water every week.   Encase pillows and mattress in dust mite proof covers.  Avoid having carpet if you can. If you have carpet, vacuum weekly.   Use a dust mask and HEPA vacuum.   Pollen and Outdoor Mold  Some people are allergic to trees, grass, or weed pollen, or molds. Try to keep your windows closed.  Limit time out doors when pollen count is high.   Ask you  health care provider about taking medicine during allergy season.     Animal Dander  Some people are allergic to skin flakes, urine or saliva from pets with fur or feathers. Keep pets with fur or feathers out of your home.    If you can t keep the pet outdoors, then keep the pet out of your bedroom.  Keep the bedroom door closed.  Keep pets off cloth furniture and away from stuffed toys.     Mice, Rats, and Cockroaches  Some people are allergic to the waste from these pests.   Cover food and garbage.  Clean up spills and food crumbs.  Store grease in the refrigerator.   Keep food out of the bedroom.   Indoor Mold  This can be a trigger if your home has high moisture. Fix leaking faucets, pipes, or other sources of water.   Clean moldy surfaces.  Dehumidify basement if it is damp and smelly.   Smoke, Strong Odors, and Sprays  These can reduce air quality. Stay away from strong odors and sprays, such as perfume, powder, hair spray, paints, smoke incense, paint, cleaning products, candles and new carpet.   Exercise or Sports  Some people with asthma have this trigger. Be active!  Ask your doctor about taking medicine before sports or exercise to prevent symptoms.    Warm up for 5-10 minutes before and after sports or exercise.     Other Triggers of Asthma  Cold air:  Cover your nose and mouth with a scarf.  Sometimes laughing or crying can be a trigger.  Some medicines and food can trigger asthma.

## 2017-06-01 NOTE — LETTER
Patient:  Elizabeth Locke  :   1965  MRN:     [id number]        Ms. Elizabeth Locke  [address] [place] [address]        2017    Dear Ms. Locke,    Thank you for choosing the [facility] for your healthcare needs.  We appreciate the opportunity to serve you.    The following are your recent test results.     The HPV test on your pap smear is negative.  Dr. Vigil     Results for orders placed or performed in visit on 17   Pap imaged thin layer screen with HPV - recommended age 30 - 65 years (select HPV order below)   Result Value Ref Range    PAP NIL     Copath Report         Patient Name: ELIZABETH LOCKE  MR#: [id number]  Specimen #: [id number]  Collected: 2017  Received: 2017  Reported: 2017 09:12  Ordering Phy(s): WALLACE VIGIL    For improved result formatting, select 'View Enhanced Report Format'  under Linked Documents section.    SPECIMEN/STAIN PROCESS:  Pap imaged thin layer prep screening (Surepath, FocalPoint with guided  screening)       Pap-Cyto x 1, HPV ordered x 1    SOURCE: Cervical, endocervical  ----------------------------------------------------------------   Pap imaged thin layer prep screening (Surepath, FocalPoint with guided  screening)  SPECIMEN ADEQUACY:  Satisfactory for evaluation.  -Transformation zone component absent.    CYTOLOGIC INTERPRETATION:    Negative for intraepithelial lesion or malignancy    Electronically signed out by:  YUMIKO Moore ( ASCP)    Processed and screened at [facility],  [facility]    CLINICAL HISTORY:    Post Menopausal,    Papanicolaou Carina t Limitations:  Cervical cytology is a screening test  with limited sensitivity; regular screening is critical for cancer  prevention; Pap tests are primarily effective for the  diagnosis/prevention of squamous cell carcinoma, not adenocarcinomas or  other cancers.    TESTING LAB  LOCATION:  [facility], [facility] [address]  [address] [place] [address]  [id number] [phone]    COLLECTION SITE:  Client:  [facility]  Location: [facility] (B)     HPV High Risk Types DNA Cervical   Result Value Ref Range    HPV 16 DNA Negative NEG    HPV 18 DNA Negative NEG    Other HR HPV Negative NEG    Final Diagnosis       This patient's sample is negative for HPV DNA.  (Note)  METHODOLOGY:  The Roche sapphire 4800 system uses automated extraction,  simultaneous amplification of HPV (L1 region) and beta-globin,  followed by  real time detection of fluorescent labeled HPV and beta  globin using specific oligonucleotide probes . The test specifically  identifies types HPV 16 DNA and HPV 18 DNA while concurrently  detecting the rest of the high risk types (31, 33, 35, 39, 45, 51,  52, 56, 58, 59, 66 or 68).    COMMENTS:  This test is not intended for use as a screening device  for women under age 30 with normal cervical cytology.  Results should  be correlated with cytologic and histologic findings. Close clinical  followup is recommended.    This test was developed and its performance characteristics  determined by the [facility]. It has not been cleared or approved by the  FDA. The laboratory is regulated under CLIA as qualified to perform  high- complexity testing. This test is used for clinical purposes. It  should not be regarded as investigational or for research.        Specimen Description Cervical Cells  [id number]          Please contact your provider if you have any questions or concerns.  We look forward to serving your needs in the future.

## 2017-06-01 NOTE — NURSING NOTE
Chief Complaint   Patient presents with     Physical     patient states increased tenderness in left breast through arm     Numbness     patient states numbness in face x 5 months     Refill Request     patient states she needs a refill on medication     YISEL ZAMBRANO at 7:14 AM on 6/1/2017.

## 2017-06-05 LAB
COPATH REPORT: NORMAL
PAP: NORMAL

## 2017-06-06 LAB
FINAL DIAGNOSIS: NORMAL
HPV HR 12 DNA CVX QL NAA+PROBE: NEGATIVE
HPV16 DNA SPEC QL NAA+PROBE: NEGATIVE
HPV18 DNA SPEC QL NAA+PROBE: NEGATIVE
SPECIMEN DESCRIPTION: NORMAL

## 2017-06-16 DIAGNOSIS — E66.01 MORBID OBESITY, UNSPECIFIED OBESITY TYPE (H): ICD-10-CM

## 2017-06-16 DIAGNOSIS — I10 BENIGN ESSENTIAL HYPERTENSION: ICD-10-CM

## 2017-06-16 LAB
ALBUMIN SERPL-MCNC: 3.4 G/DL (ref 3.4–5)
ALP SERPL-CCNC: 85 U/L (ref 40–150)
ALT SERPL W P-5'-P-CCNC: 22 U/L (ref 0–50)
ANION GAP SERPL CALCULATED.3IONS-SCNC: 6 MMOL/L (ref 3–14)
AST SERPL W P-5'-P-CCNC: 14 U/L (ref 0–45)
BILIRUB SERPL-MCNC: 0.4 MG/DL (ref 0.2–1.3)
BUN SERPL-MCNC: 17 MG/DL (ref 7–30)
CALCIUM SERPL-MCNC: 8.1 MG/DL (ref 8.5–10.1)
CHLORIDE SERPL-SCNC: 108 MMOL/L (ref 94–109)
CHOLEST SERPL-MCNC: 208 MG/DL
CO2 SERPL-SCNC: 27 MMOL/L (ref 20–32)
CREAT SERPL-MCNC: 1.01 MG/DL (ref 0.52–1.04)
CREAT UR-MCNC: 199 MG/DL
GFR SERPL CREATININE-BSD FRML MDRD: 57 ML/MIN/1.7M2
GLUCOSE SERPL-MCNC: 95 MG/DL (ref 70–99)
HBA1C MFR BLD: 5.8 % (ref 4.3–6)
HDLC SERPL-MCNC: 62 MG/DL
LDLC SERPL CALC-MCNC: 132 MG/DL
MICROALBUMIN UR-MCNC: 24 MG/L
MICROALBUMIN/CREAT UR: 12.21 MG/G CR (ref 0–25)
NONHDLC SERPL-MCNC: 146 MG/DL
POTASSIUM SERPL-SCNC: 4 MMOL/L (ref 3.4–5.3)
PROT SERPL-MCNC: 6.8 G/DL (ref 6.8–8.8)
SODIUM SERPL-SCNC: 141 MMOL/L (ref 133–144)
TRIGL SERPL-MCNC: 70 MG/DL

## 2017-06-27 ENCOUNTER — OFFICE VISIT (OUTPATIENT)
Dept: SLEEP MEDICINE | Facility: CLINIC | Age: 52
End: 2017-06-27
Attending: INTERNAL MEDICINE
Payer: COMMERCIAL

## 2017-06-27 VITALS
SYSTOLIC BLOOD PRESSURE: 124 MMHG | HEIGHT: 66 IN | BODY MASS INDEX: 47.09 KG/M2 | TEMPERATURE: 98.2 F | WEIGHT: 293 LBS | HEART RATE: 93 BPM | OXYGEN SATURATION: 96 % | DIASTOLIC BLOOD PRESSURE: 75 MMHG

## 2017-06-27 DIAGNOSIS — G47.33 OSA (OBSTRUCTIVE SLEEP APNEA): Primary | ICD-10-CM

## 2017-06-27 PROCEDURE — 99204 OFFICE O/P NEW MOD 45 MIN: CPT | Mod: GC

## 2017-06-27 NOTE — MR AVS SNAPSHOT
After Visit Summary   6/27/2017    Elizabeth Rosenthal    MRN: [id number]           Patient Information     Date Of Birth          1965        Visit Information        Provider Department      6/27/2017 8:00 AM Neftali López MD [facility]        Today's Diagnoses     SHERIE (obstructive sleep apnea)    -  1      Care Instructions      Your BMI is Body mass index is 48.42 kg/(m^2).  Weight management is a personal decision.  If you are interested in exploring weight loss strategies, the following discussion covers the approaches that may be successful. Body mass index (BMI) is one way to tell whether you are at a healthy weight, overweight, or obese. It measures your weight in relation to your height.  A BMI of 18.5 to 24.9 is in the healthy range. A person with a BMI of 25 to 29.9 is considered overweight, and someone with a BMI of 30 or greater is considered obese. More than two-thirds of American adults are considered overweight or obese.  Being overweight or obese increases the risk for further weight gain. Excess weight may lead to heart disease and diabetes.  Creating and following plans for healthy eating and physical activity may help you improve your health.  Weight control is part of healthy lifestyle and includes exercise, emotional health, and healthy eating habits. Careful eating habits lifelong are the mainstay of weight control. Though there are significant health benefits from weight loss, long-term weight loss with diet alone may be very difficult to achieve- studies show long-term success with dietary management in less than 10% of people. Attaining a healthy weight may be especially difficult to achieve in those with severe obesity. In some cases, medications, devices and surgical management might be considered.  What can you do?  If you are overweight or obese and are interested in methods for weight loss, you should discuss this with your provider.     Consider  reducing daily calorie intake by 500 calories.     Keep a food journal.     Avoiding skipping meals, consider cutting portions instead.    Diet combined with exercise helps maintain muscle while optimizing fat loss. Strength training is particularly important for building and maintaining muscle mass. Exercise helps reduce stress, increase energy, and improves fitness. Increasing exercise without diet control, however, may not burn enough calories to loose weight.       Start walking three days a week 10-20 minutes at a time    Work towards walking thirty minutes five days a week     Eventually, increase the speed of your walking for 1-2 minutes at time    In addition, we recommend that you review healthy lifestyles and methods for weight loss available through the National Institutes of Health patient information sites:  http://win.niddk.nih.gov/publications/index.htm    And look into health and wellness programs that may be available through your health insurance provider, employer, local community center, or luis e club.    Weight management plan: Patient was referred to their PCP to discuss a diet and exercise plan.              Follow-ups after your visit        Your next 10 appointments already scheduled     Jul 27, 2017  6:00 PM CDT   (Arrive by 5:45 PM)   Return Visit with Tyson Ribeiro MD   [facility] ([facility])    [address] [place] [id number] [phone]              Who to contact     If you have questions or need follow up information about today's clinic visit or your schedule please contact [facility] directly at [phone].  Normal or non-critical lab and imaging results will be communicated to you by MyChart, letter or phone within 4 business days after the clinic has received the results. If you do not hear from us within 7 days, please contact the clinic through MyChart or phone. If you have a critical or  "abnormal lab result, we will notify you by phone as soon as possible.  Submit refill requests through [employer] or call your pharmacy and they will forward the refill request to us. Please allow 3 business days for your refill to be completed.          Additional Information About Your Visit        MyChart Information     [employer] lets you send messages to your doctor, view your test results, renew your prescriptions, schedule appointments and more. To sign up, go to [email or web address] . Click on \"Log in\" on the left side of the screen, which will take you to the Welcome page. Then click on \"Sign up Now\" on the right side of the page.     You will be asked to enter the access code listed below, as well as some personal information. Please follow the directions to create your username and password.     Your access code is: [email or web address]  Expires: 2017  6:30 AM     Your access code will  in 90 days. If you need help or a new code, please call your [place] clinic or [phone].        Care EveryWhere ID     This is your Care EveryWhere ID. This could be used by other organizations to access your [place] medical records  [id number]        Your Vitals Were     Pulse Temperature Height Pulse Oximetry BMI (Body Mass Index)       93 98.2  F (36.8  C) (Oral) 1.676 m (5' 6\") 96% 48.42 kg/m2        Blood Pressure from Last 3 Encounters:   17 124/75   17 133/81   17 145/86    Weight from Last 3 Encounters:   17 136.1 kg (300 lb)   17 (!) 138.2 kg (304 lb 11.2 oz)   17 (!) 137.9 kg (304 lb 1.6 oz)              We Performed the Following     Comprehensive DME     SLEEP EVALUATION & MANAGEMENT REFERRAL - ADULT        Primary Care Provider Office Phone # Fax #    Eveline Berry [phone]        PHYSICIANS [address] [facility] [address]  [facility] [address]        Equal Access to Services     TIAGO LOMAX AH: dipak Hogan qaybta " betty lucasandi bravo ah. So [facility] [phone].    ATENCIÓN: Si zara turk, tiene a hightower disposición servicios gratuitos de asistencia lingüística. Ene al [phone].    We comply with applicable federal civil rights laws and [place] laws. We do not discriminate on the basis of race, color, national origin, age, disability sex, sexual orientation or gender identity.            Thank you!     Thank you for choosing [facility]  for your care. Our goal is always to provide you with excellent care. Hearing back from our patients is one way we can continue to improve our services. Please take a few minutes to complete the written survey that you may receive in the mail after your visit with us. Thank you!             Your Updated Medication List - Protect others around you: Learn how to safely use, store and throw away your medicines at www.disposemymeds.org.          This list is accurate as of: 6/27/17 11:59 PM.  Always use your most recent med list.                   Brand Name Dispense Instructions for use Diagnosis    albuterol 108 (90 BASE) MCG/ACT Inhaler    PROAIR HFA/PROVENTIL HFA/VENTOLIN HFA    3 Inhaler    Inhale 1 puff into the lungs every 6 hours as needed for shortness of breath / dyspnea or wheezing    Mild intermittent asthma without complication       buPROPion 150 MG 24 hr tablet    WELLBUTRIN XL    90 tablet    Take 1 tablet (150 mg) by mouth every morning    Major depressive disorder, recurrent episode, mild (H)       escitalopram 20 MG tablet    LEXAPRO    90 tablet    Take 1 tablet (20 mg) by mouth daily    Depression       fluticasone 110 MCG/ACT Inhaler    FLOVENT HFA    3 Inhaler    Inhale 2 puffs into the lungs 2 times daily    Mild intermittent asthma without complication       folic acid 1 MG tablet    FOLVITE    90 tablet    Take 1 tablet (1 mg) by mouth daily    Rheumatoid arthritis of multiple sites without rheumatoid factor  (H), Encounter for long-term (current) use of medications       gabapentin 300 MG capsule    NEURONTIN    90 capsule    Take 1 capsule (300 mg) by mouth 3 times daily    Fibromyalgia       lidocaine 5 % Patch    LIDODERM    30 patch    Place 1-3 patches onto the skin every 24 hours    Polyarthritis, Bilateral low back pain without sciatica       lisinopril 10 MG tablet    PRINIVIL/ZESTRIL    90 tablet    Take 1 tablet (10 mg) by mouth daily    Benign essential hypertension, Mild intermittent asthma without complication       methotrexate 2.5 MG tablet CHEMO     30 tablet    Take 6 tablets (15 mg) by mouth once a week 6 tabs by mouth once a week    Rheumatoid arthritis of multiple sites without rheumatoid factor (H)

## 2017-06-27 NOTE — NURSING NOTE
"Chief Complaint   Patient presents with     Sleep Problem     Establish care, garret, needs new supplies       Initial /75  Pulse 93  Temp 98.2  F (36.8  C) (Oral)  Ht 1.676 m (5' 6\")  Wt 136.1 kg (300 lb)  SpO2 96%  BMI 48.42 kg/m2 Estimated body mass index is 48.42 kg/(m^2) as calculated from the following:    Height as of this encounter: 1.676 m (5' 6\").    Weight as of this encounter: 136.1 kg (300 lb).  Medication Reconciliation: complete   Neck 42cm  ESS 9/24  Asha Chawla MA      "

## 2017-06-27 NOTE — PROGRESS NOTES
Sleep Consultation Note:    Date on this visit: 6/27/2017    Elizabeth Rosenthal is sent by Eveline Berry for a sleep consultation regarding obstructive sleep apnea.    Primary Physician: Eveline Berry     CC:  Here to establish care for SHERIE.    Elizabeth Rosenthal 52 year old with PMH asthma, depression, HTN, HLD, rheumatoid arthritis, fibromyalgia who is transitioning all of her care to the [facility]. She has had a previous sleep studies:  4/2016 - AHI 38.6, RDI 49.1, given CPAP trial 4 cwp  5/8/2016 - titrated to CPAP 7 cwp    She received a machine in 2012, which she is using. Denies any machine issues. She is using a nasal pillows mask and it has torn. She has been using tape to keep it together. However, she does notice a leak and the PAP therapy is not as effective as it was prior to the tear.    Used the device 30/30 days  More than 4 hours of use: 100%  Average daily use on the days used was 8 hours  Pressure 7 cm H2O  Residual AHI 7.5  Leak 4 min 6 sec    Sleep Disordered Breathing  Elizabeth does not complain of snoring, snort arousals, choking/gasping for air, witnessed apneas, dry mouth, morning headaches while using her CPAP machine. When she does not use her CPAP machine, she will have these symptoms. Currently, she has been waking up with a sore throat and believes she could be opening her mouth at night. She also does have chronic fatigue but believes that her diagnosis of rheumatoid arthritis contributes to this.    Sleep Schedule/Sleep Complaints  She does not complain of difficulty with falling asleep. Elizabeth goes to bed at 9 PM, and it usually takes 15 minutes to fall asleep. Elizabeth does not complain of restlessness feelings in the legs. She does not complain of spontaneous leg movements/jerks in the middle of the night. Patient does use electronics in bed - phone, TV on phone. Patient does not have a regular bed partner. Patient sleeps on her back and side. Patient does have any pets in the bedroom at  night during sleep. She does not use a sleep aid. Does use antihistamines at night.    She does not complain of difficulty with staying asleep. She wakes up 0-1 times throughout the night. Night time awakenings occurs due to disruptions from the dog.    She wakes up at 5:30 AM, without an alarm.    On non-work days, She falls asleep at 9PM and gets up 6:30AM.    Sleep Behaviors  She denies any cataplexy, sleep paralysis, sleep hallucinations. She denies any night time behaviors - sleep walking, sleep eating. She does talk in her sleep sometimes.   She does not complain acting out dreams. Patient denies any injury to oneself or others while sleeping.    Daytime Functioning  Maret naps 1-2 times per week for  minutes, feels refreshed after naps. She does doze off during the day - sometimes at work. She denies closing eyes, dozing and falling asleep while driving. Patient's [place] Sleepiness score 9/24 consistent with no daytime sleepiness.      Social History  Elizabeth currently works as a [employer].  Work schedule is as follows:  Flexible: 7:30am to 4:30pm.  She does not do shift work currently or in the past.  She does drink caffeine, about 3+ drinks/day. Last caffeine intake is not within 6 hours of bed time. She does not drink alcohol. Patient is a never smoker. Denies any secondhand exposure. Patient does not use drugs. No future surgeries planned in the near future. She is possibly planning on having knee surgery for her arthritis.    Allergies:    Allergies   Allergen Reactions     Nuts Itching     Barley Grass GI Disturbance     Codeine Itching     Contrast Dye Itching     Oat Other (See Comments) and Nausea and Vomiting     abdominal pain       Penicillins Itching     Shellfish-Derived Products Nausea and Vomiting     Yeast Cramps, Diarrhea, Itching and Nausea and Vomiting       Medications:    Current Outpatient Prescriptions   Medication Sig Dispense Refill     lisinopril  (PRINIVIL/ZESTRIL) 10 MG tablet Take 1 tablet (10 mg) by mouth daily 90 tablet 3     fluticasone (FLOVENT HFA) 110 MCG/ACT Inhaler Inhale 2 puffs into the lungs 2 times daily 3 Inhaler 3     buPROPion (WELLBUTRIN XL) 150 MG 24 hr tablet Take 1 tablet (150 mg) by mouth every morning 90 tablet 3     albuterol (PROAIR HFA/PROVENTIL HFA/VENTOLIN HFA) 108 (90 BASE) MCG/ACT Inhaler Inhale 1 puff into the lungs every 6 hours as needed for shortness of breath / dyspnea or wheezing 3 Inhaler 3     methotrexate 2.5 MG tablet CHEMO Take 6 tablets (15 mg) by mouth once a week 6 tabs by mouth once a week 30 tablet 1     escitalopram (LEXAPRO) 20 MG tablet Take 1 tablet (20 mg) by mouth daily 90 tablet 2     gabapentin (NEURONTIN) 300 MG capsule Take 1 capsule (300 mg) by mouth 3 times daily 90 capsule 11     folic acid (FOLVITE) 1 MG tablet Take 1 tablet (1 mg) by mouth daily 90 tablet 3     lidocaine (LIDODERM) 5 % patch Place 1-3 patches onto the skin every 24 hours 30 patch 1       Problem List:  Patient Active Problem List    Diagnosis Date Noted     Arthritis of knee, left 07/21/2016     Priority: Medium     Hyperlipidemia      Priority: Medium     SHERIE (obstructive sleep apnea)      Priority: Medium     has cpap       Morbid obesity with BMI of 45.0-49.9, adult (H)      Priority: Medium     Low back pain      Priority: Medium     Rheumatoid arthritis of multiple sites without rheumatoid factor (H) 05/25/2016     Priority: Medium     Fibromyalgia 05/25/2016     Priority: Medium     Encounter for long-term (current) use of medications 05/25/2016     Priority: Medium     Polyarthritis 03/22/2016     Priority: Medium     Depression 03/22/2016     Priority: Medium     Benign essential hypertension 03/22/2016     Priority: Medium     Mild intermittent asthma without complication 03/22/2016     Priority: Medium     Morbid obesity (H) 03/22/2016     Priority: Medium     Iliotibial band syndrome 01/31/2011     Right knee pain  01/31/2011     Lumbar radicular pain 01/31/2011        Past Medical/Surgical History:  Past Medical History:   Diagnosis Date     Asthma      Chronic pelvic pain in female      Depression      Hyperlipidemia      Low back pain      Morbid obesity with BMI of 45.0-49.9, adult (H)      SHERIE (obstructive sleep apnea)     has cpap     Polyarthritis      TIA (transient ischemic attack)      Vertebral artery dissection (H)      Past Surgical History:   Procedure Laterality Date     ARTHROSCOPY KNEE BILATERAL       BIOPSY LYMPH NODE CERVICAL       TUBAL LIGATION         Social History:  Social History     Social History     Marital status: Single     Spouse name: N/A     Number of children: N/A     Years of education: N/A     Occupational History     Not on file.     Social History Main Topics     Smoking status: Never Smoker     Smokeless tobacco: Never Used     Alcohol use 0.0 oz/week     0 Standard drinks or equivalent per week      Comment: Occasional     Drug use: No     Sexual activity: Not on file     Other Topics Concern     Not on file     Social History Narrative       Family History:  Family History   Problem Relation Age of Onset     Breast Cancer Mother      50s     HEART DISEASE Father    Son - SHERIE    Review of Systems:  A complete review of systems reviewed by me is negative with the exeption of what has been mentioned in the history of present illness.  CONSTITUTIONAL: NEGATIVE for weight gain, fever, chills, sweats or night sweats, drug allergies.  EYES: NEGATIVE for changes in vision, blind spots, double vision POS macular degeneration  ENT: NEGATIVE for ear pain, sore throat, sinus pain, post-nasal drip, runny nose, bloody nose  CARDIAC: NEGATIVE for fast heartbeats or fluttering in chest, chest pain or pressure, breathlessness when lying flat, swollen legs or swollen feet.  NEUROLOGIC: POS headaches, weakness arms NEG numbness in the arms or legs.  DERMATOLOGIC: NEGATIVE for rashes, new moles or change  "in mole(s)  PULMONARY: NEGATIVE SOB at rest, SOB with activity, dry cough, productive cough, coughing up blood, wheezing or whistling when breathing.    GASTROINTESTINAL: NEGATIVE for nausea or vomitting, loose or watery stools, fat or grease in stools, constipation, bowel movements black in color or blood noted POS abdominal pain  GENITOURINARY: NEGATIVE for pain during urination, blood in urine, urinating more frequently than usual  MUSCULOSKELETAL: NEGATIVE for muscle pain, POS bone or joint pain, swollen joints.  ENDOCRINE: NEGATIVE for increased thirst or urination, diabetes.  LYMPHATIC: NEGATIVE for swollen lymph nodes, lumps or bumps in the breasts or nipple discharge.  PSYCHE: POS for depression, NEGATIVE  anxiety    Physical Examination:  Vitals: /75  Pulse 93  Temp 98.2  F (36.8  C) (Oral)  Ht 1.676 m (5' 6\")  Wt 136.1 kg (300 lb)  SpO2 96%  BMI 48.42 kg/m2  BMI= Body mass index is 48.42 kg/(m^2).    [place] Total Score 6/27/2017   Total score - [place] 9     General: No apparent distress, appropriately groomed  Head: Normocephalic, atraumatic  Eyes: no icterus, PERRL  Nose: Nares patent  Mouth: op pink and moist, teeth: overbite  Orophraynx: Opening is narrowed   Mallampati Class: III.   Tonsillar Stage: 2  visible at pillars.  Neck: Supple  Cardiac: Regular rate and rhythm  Chest: Symmetric air movement, lungs clear to auscultation bilaterally  GI: Abdomen soft, non-tender, non-distended  Musculoskeletal: non-pitting edema noted  Skin: Warm, dry, intact  Psych: Mood pleasant, affect congruent  Neuro:  Mental status: Awake, alert, attentive, oriented.      Impression/Plan:    Severe Obstructive sleep apnea    Patient is here to establish care for SHERIE management. She has been on CPAP therapy since 2012. Denies any major issues with CPAP use. She is in need of new equipment. She was given a printed prescription for new equipment today. Data from her machine shows 100% compliance, 8 hours use. " Residual AHI is 7.5/hr. Due to the elevated AHI, her machine was changed to Auto-CPAP 7- 12cwp in the office. After 2-3 months use, data from the machine will be reviewed and any adjustments can be made if needed. Diagnosis and treatment for SHERIE have been discussed. Complications of untreated SHERIE have also been discussed.    Patient is a life long non-smoker and has been encouraged to continue to not smoke. Encourage weight loss, healthy diet, and exercise.    Patient was strongly advised to avoid driving, operating any heavy machinery or other hazardous situations while drowsy or sleepy.  Patient was counseled on the importance of driving while alert, to pull over if drowsy, or nap before getting into the vehicle if sleepy.        Follow up 2-3 months      CC: Eveline Berry      Seen and examined with Dr. Mu López DO  Clinical Sleep Medicine Fellow  Pager #[phone]    Physician Attestation   I, Rodrigo Dobbins MD, saw this patient with the fellow and agree with the fellow s findings and plan of care as documented in the fellow s note.      I personally reviewed vital signs.      Rodrigo Dobbins MD  Date of Service (when I saw the patient): Jun 27, 2017

## 2017-06-27 NOTE — PATIENT INSTRUCTIONS

## 2017-06-28 ENCOUNTER — TELEPHONE (OUTPATIENT)
Dept: SLEEP MEDICINE | Facility: CLINIC | Age: 52
End: 2017-06-28

## 2017-07-10 ENCOUNTER — TELEPHONE (OUTPATIENT)
Dept: SLEEP MEDICINE | Facility: CLINIC | Age: 52
End: 2017-07-10

## 2017-07-11 ENCOUNTER — TELEPHONE (OUTPATIENT)
Dept: INTERNAL MEDICINE | Facility: CLINIC | Age: 52
End: 2017-07-11

## 2017-07-11 DIAGNOSIS — R10.84 ABDOMINAL PAIN, GENERALIZED: Primary | ICD-10-CM

## 2017-07-11 NOTE — TELEPHONE ENCOUNTER
----- Message from Alton Webster sent at 7/10/2017  3:42 PM CDT -----  Regarding: Adbominal pain/ Referral   Contact: [phone]  Patient reports she has had severe abdominal pain for the last 2 days, took gabapentin without relief.    She states this has happened before.     Wants to know what she should do. Patient also states she would need help with setting up her colonoscopy referral and getting that scheduled.     Pt called and she states she has severe abd pain. Has nausea but no vomiting. No fever. Has been seen for abd pain and found to have ulcers. PMH rheumatoid arthritis. Pt also has a sore throat.  No fever. Pt will go to ER if symptoms get worse.  Carina Celeste RN 11:44 AM on 7/11/2017.

## 2017-07-20 ENCOUNTER — TELEPHONE (OUTPATIENT)
Dept: GASTROENTEROLOGY | Facility: CLINIC | Age: 52
End: 2017-07-20

## 2017-07-20 DIAGNOSIS — Z12.11 ENCOUNTER FOR SCREENING COLONOSCOPY: Primary | ICD-10-CM

## 2017-07-20 NOTE — TELEPHONE ENCOUNTER
Patient scheduled for Colonoscopy    Indication for procedure. Screen for colon cancer    Referring Provider. Eveline Berry MD    ? Not Needed    Arrival time verified? Yes, 1200    Facility location verified? Yes, [address]    Instructions given regarding prep and procedure    Prep Type Golytely    Are you taking any anticoagulants or blood thinners? No    Instructions given? N/A    Electronic implanted devices? No    Pre procedure teaching completed? Yes    Transportation from procedure? Friend    H&P / Pre op physical completed? N/A

## 2017-07-26 ENCOUNTER — HOSPITAL ENCOUNTER (OUTPATIENT)
Facility: CLINIC | Age: 52
Discharge: HOME | End: 2017-07-26
Attending: INTERNAL MEDICINE | Admitting: INTERNAL MEDICINE
Payer: COMMERCIAL

## 2017-07-26 VITALS
DIASTOLIC BLOOD PRESSURE: 86 MMHG | BODY MASS INDEX: 47.09 KG/M2 | HEIGHT: 66 IN | OXYGEN SATURATION: 96 % | RESPIRATION RATE: 14 BRPM | SYSTOLIC BLOOD PRESSURE: 138 MMHG | WEIGHT: 293 LBS

## 2017-07-26 LAB — COLONOSCOPY: NORMAL

## 2017-07-26 PROCEDURE — 99152 MOD SED SAME PHYS/QHP 5/>YRS: CPT | Performed by: INTERNAL MEDICINE

## 2017-07-26 PROCEDURE — 45380 COLONOSCOPY AND BIOPSY: CPT | Mod: PT | Performed by: INTERNAL MEDICINE

## 2017-07-26 PROCEDURE — 25000128 H RX IP 250 OP 636: Performed by: INTERNAL MEDICINE

## 2017-07-26 PROCEDURE — 99153 MOD SED SAME PHYS/QHP EA: CPT | Performed by: INTERNAL MEDICINE

## 2017-07-26 RX ORDER — ONDANSETRON 2 MG/ML
4 INJECTION INTRAMUSCULAR; INTRAVENOUS
Status: DISCONTINUED | OUTPATIENT
Start: 2017-07-26 | End: 2017-07-26 | Stop reason: HOSPADM

## 2017-07-26 NOTE — OR NURSING
Procedure: Colonoscopy with polypectomy - via hot snare with no polyp retrieved.  Sedation: None  O2: 2L  Tolerated Procedure: Well  Patient returned to recovery room in stable condition with RN  Other: Repeat colon in 7 years.  Other: Iv top was not secured when RN tried to administer versed, IV reinforced by Charge RN, pt did not want any sedation and tolerated procedure well after iv was secured in place.  Rea Reveles RN

## 2017-07-26 NOTE — IP AVS SNAPSHOT
[facility], [place], Endoscopy    [address] [address]    Phone:  [phone]                                       After Visit Summary   7/26/2017    Elizabeth Rosenthal    MRN: [id number]           After Visit Summary Signature Page     I have received my discharge instructions, and my questions have been answered. I have discussed any challenges I see with this plan with the nurse or doctor.    ..........................................................................................................................................  Patient/Patient Representative Signature      ..........................................................................................................................................  Patient Representative Print Name and Relationship to Patient    ..................................................               ................................................  Date                                            Time    ..........................................................................................................................................  Reviewed by Signature/Title    ...................................................              ..............................................  Date                                                            Time

## 2017-07-26 NOTE — IP AVS SNAPSHOT
"                  MRN:[id number]                      After Visit Summary   7/26/2017    Elizabeth Rosenthal    MRN: [id number]           Thank you!     Thank you for choosing [facility] for your care. Our goal is always to provide you with excellent care. Hearing back from our patients is one way we can continue to improve our services. Please take a few minutes to complete the written survey that you may receive in the mail after you visit with us. Thank you!        Patient Information     Date Of Birth          1965        About your hospital stay     You were admitted on:  July 26, 2017 You last received care in the:  [facility], Endoscopy    You were discharged on:  July 26, 2017       Who to Call     For medical emergencies, please call 911.  For non-urgent questions about your medical care, please call your primary care provider or clinic, [phone]  For questions related to your surgery, please call your surgery clinic        Attending Provider     Provider Specialty    Thang Saleh MD Gastroenterology       Primary Care Provider Office Phone # Fax #    Eveline OLIVER Berry [phone]      Your next 10 appointments already scheduled     Jul 27, 2017  6:00 PM CDT   (Arrive by 5:45 PM)   Return Visit with Tyson Ribeiro MD   [facility] Rheumatology ([facility])    [address] [place] [id number] [phone]              Pending Results     No orders found from 7/24/2017 to 7/27/2017.            Admission Information     Date & Time Provider Department Dept. Phone    7/26/2017 Thang Saleh MD [facility], Endoscopy [phone]      Your Vitals Were     Blood Pressure Respirations Height Weight Pulse Oximetry BMI (Body Mass Index)    135/84 17 1.676 m (5' 6\") 136.1 kg (300 lb) 97% 48.42 kg/m2      MyChart Information     [employer] lets you send messages to your doctor, view your test results, renew your prescriptions, schedule " "appointments and more. To sign up, go to [email or web address] . Click on \"Log in\" on the left side of the screen, which will take you to the Welcome page. Then click on \"Sign up Now\" on the right side of the page.     You will be asked to enter the access code listed below, as well as some personal information. Please follow the directions to create your username and password.     Your access code is: [email or web address]  Expires: 2017  6:30 AM     Your access code will  in 90 days. If you need help or a new code, please call your [place] clinic or [phone].        Care EveryWhere ID     This is your Care EveryWhere ID. This could be used by other organizations to access your [facility] medical records  [id number]        Equal Access to Services     TIAGO LOMAX : Garcia Copeland, dipak clarke, carlos pinto, betty aponte. So [facility] [phone].    ATENCIÓN: Si habla español, tiene a hightower disposición servicios gratuitos de asistencia lingüística. Ene al [phone].    We comply with applicable federal civil rights laws and [place] laws. We do not discriminate on the basis of race, color, national origin, age, disability sex, sexual orientation or gender identity.               Review of your medicines      UNREVIEWED medicines. Ask your doctor about these medicines        Dose / Directions    albuterol 108 (90 BASE) MCG/ACT Inhaler   Commonly known as:  PROAIR HFA/PROVENTIL HFA/VENTOLIN HFA   Used for:  Mild intermittent asthma without complication        Dose:  1 puff   Inhale 1 puff into the lungs every 6 hours as needed for shortness of breath / dyspnea or wheezing   Quantity:  3 Inhaler   Refills:  3       buPROPion 150 MG 24 hr tablet   Commonly known as:  WELLBUTRIN XL   Used for:  Major depressive disorder, recurrent episode, mild (H)        Dose:  150 mg   Take 1 tablet (150 mg) by mouth every morning   Quantity:  90 tablet   Refills:  " 3       escitalopram 20 MG tablet   Commonly known as:  LEXAPRO   Used for:  Depression        Dose:  20 mg   Take 1 tablet (20 mg) by mouth daily   Quantity:  90 tablet   Refills:  2       fluticasone 110 MCG/ACT Inhaler   Commonly known as:  FLOVENT HFA   Used for:  Mild intermittent asthma without complication        Dose:  2 puff   Inhale 2 puffs into the lungs 2 times daily   Quantity:  3 Inhaler   Refills:  3       folic acid 1 MG tablet   Commonly known as:  FOLVITE   Used for:  Rheumatoid arthritis of multiple sites without rheumatoid factor (H), Encounter for long-term (current) use of medications        Dose:  1 mg   Take 1 tablet (1 mg) by mouth daily   Quantity:  90 tablet   Refills:  3       gabapentin 300 MG capsule   Commonly known as:  NEURONTIN   Used for:  Fibromyalgia        Dose:  300 mg   Take 1 capsule (300 mg) by mouth 3 times daily   Quantity:  90 capsule   Refills:  11       lidocaine 5 % Patch   Commonly known as:  LIDODERM   Used for:  Polyarthritis, Bilateral low back pain without sciatica        Dose:  1-3 patch   Place 1-3 patches onto the skin every 24 hours   Quantity:  30 patch   Refills:  1       lisinopril 10 MG tablet   Commonly known as:  PRINIVIL/ZESTRIL   Used for:  Benign essential hypertension, Mild intermittent asthma without complication        Dose:  10 mg   Take 1 tablet (10 mg) by mouth daily   Quantity:  90 tablet   Refills:  3       methotrexate 2.5 MG tablet CHEMO   Used for:  Rheumatoid arthritis of multiple sites without rheumatoid factor (H)        Dose:  15 mg   Take 6 tablets (15 mg) by mouth once a week 6 tabs by mouth once a week   Quantity:  30 tablet   Refills:  1                Protect others around you: Learn how to safely use, store and throw away your medicines at www.disposemymeds.org.             Medication List: This is a list of all your medications and when to take them. Check marks below indicate your daily home schedule. Keep this list as a  reference.      Medications           Morning Afternoon Evening Bedtime As Needed    albuterol 108 (90 BASE) MCG/ACT Inhaler   Commonly known as:  PROAIR HFA/PROVENTIL HFA/VENTOLIN HFA   Inhale 1 puff into the lungs every 6 hours as needed for shortness of breath / dyspnea or wheezing                                buPROPion 150 MG 24 hr tablet   Commonly known as:  WELLBUTRIN XL   Take 1 tablet (150 mg) by mouth every morning                                escitalopram 20 MG tablet   Commonly known as:  LEXAPRO   Take 1 tablet (20 mg) by mouth daily                                fluticasone 110 MCG/ACT Inhaler   Commonly known as:  FLOVENT HFA   Inhale 2 puffs into the lungs 2 times daily                                folic acid 1 MG tablet   Commonly known as:  FOLVITE   Take 1 tablet (1 mg) by mouth daily                                gabapentin 300 MG capsule   Commonly known as:  NEURONTIN   Take 1 capsule (300 mg) by mouth 3 times daily                                lidocaine 5 % Patch   Commonly known as:  LIDODERM   Place 1-3 patches onto the skin every 24 hours                                lisinopril 10 MG tablet   Commonly known as:  PRINIVIL/ZESTRIL   Take 1 tablet (10 mg) by mouth daily                                methotrexate 2.5 MG tablet CHEMO   Take 6 tablets (15 mg) by mouth once a week 6 tabs by mouth once a week

## 2017-07-27 ENCOUNTER — OFFICE VISIT (OUTPATIENT)
Dept: RHEUMATOLOGY | Facility: CLINIC | Age: 52
End: 2017-07-27
Attending: INTERNAL MEDICINE
Payer: COMMERCIAL

## 2017-07-27 VITALS
WEIGHT: 293 LBS | HEART RATE: 81 BPM | SYSTOLIC BLOOD PRESSURE: 126 MMHG | DIASTOLIC BLOOD PRESSURE: 80 MMHG | HEIGHT: 66 IN | TEMPERATURE: 98.4 F | OXYGEN SATURATION: 97 % | RESPIRATION RATE: 16 BRPM | BODY MASS INDEX: 47.09 KG/M2

## 2017-07-27 DIAGNOSIS — M54.50 CHRONIC BILATERAL LOW BACK PAIN WITHOUT SCIATICA: ICD-10-CM

## 2017-07-27 DIAGNOSIS — M13.0 POLYARTHRITIS: ICD-10-CM

## 2017-07-27 DIAGNOSIS — G89.29 CHRONIC BILATERAL LOW BACK PAIN WITHOUT SCIATICA: ICD-10-CM

## 2017-07-27 DIAGNOSIS — Z79.899 ENCOUNTER FOR LONG-TERM (CURRENT) USE OF MEDICATIONS: ICD-10-CM

## 2017-07-27 DIAGNOSIS — M06.09 RHEUMATOID ARTHRITIS OF MULTIPLE SITES WITHOUT RHEUMATOID FACTOR (H): ICD-10-CM

## 2017-07-27 PROCEDURE — 99212 OFFICE O/P EST SF 10 MIN: CPT | Mod: ZF

## 2017-07-27 RX ORDER — LIDOCAINE 50 MG/G
1-3 PATCH TOPICAL EVERY 24 HOURS
Qty: 30 PATCH | Refills: 1 | Status: SHIPPED | OUTPATIENT
Start: 2017-07-27 | End: 2018-02-16

## 2017-07-27 RX ORDER — FOLIC ACID 1 MG/1
1 TABLET ORAL DAILY
Qty: 90 TABLET | Refills: 3 | Status: SHIPPED | OUTPATIENT
Start: 2017-07-27 | End: 2018-02-16

## 2017-07-27 ASSESSMENT — PAIN SCALES - GENERAL: PAINLEVEL: MODERATE PAIN (4)

## 2017-07-27 NOTE — LETTER
7/27/2017      RE: Elizabeth Rosenthal  [address] [facility] [address]       Rheumatology Visit     Elizabeth Rosenthal MRN# [id number]   YOB: 1965 Age: 52 year old     Date of Visit: July 27, 2017  Primary care provider: Eveline Berry          Assessment and Plan:   53 yo female with history consistent with seronegative RA.  She also has a component of fibromyalgia that contributes to symptoms, and well as known DJD of knees and spine.     She overall has done well with Methotrexate therapy without problems.  It was not clear from prior symptoms and exam that additional medication was needed from an inflammatory arthritis standpoint.  However she still has impact on ADLs from symptoms that sound at least partially inflammatory and not due to LBP or knee damage or fibromyalgia.     She did not improve with a short time on Sulfasalazine.  Respiratory side effects would be unusual, but she does not wish to continue. She also is not interested now in an alternative biologic.     Her current symptoms also relate to soft tissue pain and LBP with known DJD.  She is benefitting from resuming Gabapentin at 900 mg per day.     PLAN: discussed in detail with the patient.     1.  Lab is current with open orders  2. After discussion of risks and benefits, we will continue on Methotrexate monotherapy.  3. I renewed Methotrexate and Folic Acid  4. Rx for Gabapentin is current. I renewed lidocaine patches at her request  5. Return in 5 months; lab in September 6. Flu shot is current  7. She chooses to make own ENT appt.     Tyson Ribeiro MD            History of Present Illness:   52 year old female who presents for followup of polyarthritis and fibromyalgia.  Previously followed by Dr. Stokes [id number], Dr. SANDEE Reis in 2015.  [facility] and [facility] records reviewed.  I saw her last in November 2016.      HISTORY CARRIED FORWARD:      She has a several year history of joint pain diagnosed as seronegative  polyarthritis/seronegative RA.  She has had swelling and pain in hands and feet.  No deformities or erosive change. Prior RF and CCP negative.  She has generally by records had normal ESR and CRP.  She had a Vectra DA by Dr. Reis last year that was high at 53.     She was begun on Methotrexate by Dr. Stokes in 2012. She has been on 15 mg weekly since that time without side effects or lab abnl.  She takes Folic acid OTC.       When she saw Dr. Reis last year he wanted to add an anti TNF. Remicade was denied. She took Humira for two months and could not tell a difference overall although her hands probably were better.     Her major symptoms at initial appt had to do with soft tissue pain with her dx of fibromyalgia.  She has pain that waxes and wanes but worse over past two years, primarily in upper back and neck radiating to arms.  R side most bothersome now for months. She has had prior PT and OT and pool therapy.  She uses Lidoderm patches. She was on Gabapentin but stopped two years ago as sx were minimal.     We continued Methotrexate but also added Sulfasalazine.  She had called with an intercurrent URI on [place] and her medications were held for a time.  She has seen her PCP and had another round of antibiotics. However she still seems to have recurrent cough.     She decided to stop the SSZ as it didn't seem to be helping and she wondered if it was causing respiratory sx.    INTERVAL HISTORY:       Overall her symptoms are little changed since last fall.  She missed a spring appointment.  She denies side effects on the medication.  Her labs in May and June were normal/stable.  She will notice more symptoms even if just one or two days late for Methotrexate.     She has had some biceps tendon discomfort on the left.     She has more symptoms with more activity.     She notes again that in retrospect her Humira trial did help her hands but not her back or knees, which we discussed would not be surprising as  these would likely be due to her OA and fibromyalgia.  She stopped it as effect she felt was not worth the cost.  She still feels this way.  We discussed the hope that biosimilar Adalimumab may be available later this year, hopefully at significantly lower costs.     She has minor AM stiffness at this time.  No specific joint swelling but hands are puffy.    She notes a persistent sore throat despite various maneuvers.      She has known DJD/disc bulge in L spine; she has had two prior L Knee arthroscopies.     Rheumatologic ROS otherwise negative.       Review of Systems:   Review Of Systems  Skin: negative  Eyes: negative  Ears/Nose/Throat: see HPI  Respiratory: No shortness of breath, dyspnea on exertion, cough, or hemoptysis  Cardiovascular: negative  Gastrointestinal: negative  Genitourinary: negative  Musculoskeletal: see HPI  Neurologic: negative  Psychiatric: negative  Hematologic/Lymphatic/Immunologic: negative  Endocrine: negative          Past Medical History:     Past Medical History:   Diagnosis Date     Asthma      Chronic pelvic pain in female      Depression      Hyperlipidemia      Low back pain      Morbid obesity with BMI of 45.0-49.9, adult (H)      SHERIE (obstructive sleep apnea)     has cpap     Polyarthritis      TIA (transient ischemic attack)      Vertebral artery dissection (H)        Patient Active Problem List    Diagnosis Date Noted     Arthritis of knee, left 07/21/2016     Priority: Medium     Hyperlipidemia      Priority: Medium     SHERIE (obstructive sleep apnea)      Priority: Medium     has cpap       Morbid obesity with BMI of 45.0-49.9, adult (H)      Priority: Medium     Low back pain      Priority: Medium     Rheumatoid arthritis of multiple sites without rheumatoid factor (H) 05/25/2016     Priority: Medium     Fibromyalgia 05/25/2016     Priority: Medium     Encounter for long-term (current) use of medications 05/25/2016     Priority: Medium     Polyarthritis 03/22/2016      Priority: Medium     Depression 03/22/2016     Priority: Medium     Benign essential hypertension 03/22/2016     Priority: Medium     Mild intermittent asthma without complication 03/22/2016     Priority: Medium     Morbid obesity (H) 03/22/2016     Priority: Medium     Iliotibial band syndrome 01/31/2011     Priority: Medium     Right knee pain 01/31/2011     Priority: Medium     Lumbar radicular pain 01/31/2011     Priority: Medium             Past Surgical History:     Past Surgical History:   Procedure Laterality Date     ARTHROSCOPY KNEE BILATERAL       BIOPSY LYMPH NODE CERVICAL       COLONOSCOPY N/A 7/26/2017    Procedure: COMBINED COLONOSCOPY, SINGLE OR MULTIPLE BIOPSY/POLYPECTOMY BY BIOPSY;  colonoscopy;  Surgeon: Thang Saleh MD;  Location: [facility]     TUBAL LIGATION               Social History:     Social History   Substance Use Topics     Smoking status: Never Smoker     Smokeless tobacco: Never Used     Alcohol use 0.0 oz/week     0 Standard drinks or equivalent per week      Comment: Occasional             Family History:     Family History   Problem Relation Age of Onset     Breast Cancer Mother      50s     HEART DISEASE Father             Allergies:     Allergies   Allergen Reactions     Nuts Itching     Barley Grass GI Disturbance     Codeine Itching     Contrast Dye Itching     Oat Other (See Comments) and Nausea and Vomiting     abdominal pain       Penicillins Itching     Shellfish-Derived Products Nausea and Vomiting     Yeast Cramps, Diarrhea, Itching and Nausea and Vomiting             Medications:     Current Outpatient Prescriptions   Medication Sig Dispense Refill     lisinopril (PRINIVIL/ZESTRIL) 10 MG tablet Take 1 tablet (10 mg) by mouth daily 90 tablet 3     fluticasone (FLOVENT HFA) 110 MCG/ACT Inhaler Inhale 2 puffs into the lungs 2 times daily (Patient taking differently: Inhale 2 puffs into the lungs 2 times daily as needed ) 3 Inhaler 3     buPROPion (WELLBUTRIN XL) 150 MG 24  "hr tablet Take 1 tablet (150 mg) by mouth every morning 90 tablet 3     albuterol (PROAIR HFA/PROVENTIL HFA/VENTOLIN HFA) 108 (90 BASE) MCG/ACT Inhaler Inhale 1 puff into the lungs every 6 hours as needed for shortness of breath / dyspnea or wheezing 3 Inhaler 3     methotrexate 2.5 MG tablet CHEMO Take 6 tablets (15 mg) by mouth once a week 6 tabs by mouth once a week 30 tablet 1     escitalopram (LEXAPRO) 20 MG tablet Take 1 tablet (20 mg) by mouth daily 90 tablet 2     gabapentin (NEURONTIN) 300 MG capsule Take 1 capsule (300 mg) by mouth 3 times daily 90 capsule 11     folic acid (FOLVITE) 1 MG tablet Take 1 tablet (1 mg) by mouth daily 90 tablet 3     lidocaine (LIDODERM) 5 % patch Place 1-3 patches onto the skin every 24 hours 30 patch 1            Physical Exam:   Blood pressure 126/80, pulse 81, temperature 98.4  F (36.9  C), temperature source Oral, resp. rate 16, height 1.676 m (5' 5.98\"), weight (!) 137.1 kg (302 lb 3.2 oz), SpO2 97 %, not currently breastfeeding.    Constitutional: WD-WN-WG cooperative  Eyes: nl conjunctiva, sclera  ENT: nl external ears, nose, throat  No mucous membrane lesions, normal saliva pool  Neck: no mass or thyroid enlargement  GI: no ABD mass or tenderness, no HSM  : not tested  Lymph: no cervical, supraclavicular, inguinal or epitrochlear nodes  MS: All TMJ, neck, shoulder, elbow, wrist, MCP/PIP/DIP, spine, hip, knee, ankle, and foot MTP/IP joints were examined and  found without definite active synovitis or deformity. Full ROM.  Normal  strength. Minimal discomfort with finger curl, palpation of knees L>R, and both ankles.  Multiple tender points especially R trapezius and rhomboids, epicondyles. No dactylitis,  tenosynovitis, enthesopathy    Skin: no nail pitting, alopecia, rash, nodules or lesions  Neuro: nl cranial nerves, strength  Psych: nl affect.       Data:     Lab Results   Component Value Date    WBC 6.8 05/23/2017    WBC 6.1 11/23/2016    WBC 6.2 08/24/2016 "    HGB 13.4 05/23/2017    HGB 12.6 11/23/2016    HGB 13.0 08/24/2016    HCT 40.0 05/23/2017    HCT 38.5 11/23/2016    HCT 39.0 08/24/2016    MCV 92 05/23/2017    MCV 93 11/23/2016    MCV 93 08/24/2016     05/23/2017     11/23/2016     08/24/2016     Lab Results   Component Value Date    BUN 17 06/16/2017    BUN 19 03/22/2016     No components found for: SEDRATE  No results found for: TSH  Lab Results   Component Value Date    AST 14 06/16/2017    AST 17 05/23/2017    AST 18 11/23/2016    ALT 22 06/16/2017    ALT 25 05/23/2017    ALT 26 11/23/2016    ALKPHOS 85 06/16/2017    ALKPHOS 78 03/22/2016     Reviewed Rheumatology lab flowsheet      Tyson Ribeiro MD

## 2017-07-27 NOTE — PROGRESS NOTES
Rheumatology Visit     Elizabeth Rosenthal MRN# [id number]   YOB: 1965 Age: 52 year old     Date of Visit: July 27, 2017  Primary care provider: Eveline Berry          Assessment and Plan:   51 yo female with history consistent with seronegative RA.  She also has a component of fibromyalgia that contributes to symptoms, and well as known DJD of knees and spine.     She overall has done well with Methotrexate therapy without problems.  It was not clear from prior symptoms and exam that additional medication was needed from an inflammatory arthritis standpoint.  However she still has impact on ADLs from symptoms that sound at least partially inflammatory and not due to LBP or knee damage or fibromyalgia.     She did not improve with a short time on Sulfasalazine.  Respiratory side effects would be unusual, but she does not wish to continue. She also is not interested now in an alternative biologic.     Her current symptoms also relate to soft tissue pain and LBP with known DJD.  She is benefitting from resuming Gabapentin at 900 mg per day.     PLAN: discussed in detail with the patient.     1.  Lab is current with open orders  2. After discussion of risks and benefits, we will continue on Methotrexate monotherapy.  3. I renewed Methotrexate and Folic Acid  4. Rx for Gabapentin is current. I renewed lidocaine patches at her request  5. Return in 5 months; lab in September 6. Flu shot is current  7. She chooses to make own ENT appt.     Tyson Ribeiro MD            History of Present Illness:   52 year old female who presents for followup of polyarthritis and fibromyalgia.  Previously followed by Dr. Stokes [id number], Dr. SANDEE Reis in 2015.  [facility] and [facility] records reviewed.  I saw her last in November 2016.      HISTORY CARRIED FORWARD:      She has a several year history of joint pain diagnosed as seronegative polyarthritis/seronegative RA.  She has had swelling and pain in hands and feet.  No  deformities or erosive change. Prior RF and CCP negative.  She has generally by records had normal ESR and CRP.  She had a Vectra DA by Dr. Reis last year that was high at 53.     She was begun on Methotrexate by Dr. Stokes in 2012. She has been on 15 mg weekly since that time without side effects or lab abnl.  She takes Folic acid OTC.       When she saw Dr. Reis last year he wanted to add an anti TNF. Remicade was denied. She took Humira for two months and could not tell a difference overall although her hands probably were better.     Her major symptoms at initial appt had to do with soft tissue pain with her dx of fibromyalgia.  She has pain that waxes and wanes but worse over past two years, primarily in upper back and neck radiating to arms.  R side most bothersome now for months. She has had prior PT and OT and pool therapy.  She uses Lidoderm patches. She was on Gabapentin but stopped two years ago as sx were minimal.     We continued Methotrexate but also added Sulfasalazine.  She had called with an intercurrent URI on [place] and her medications were held for a time.  She has seen her PCP and had another round of antibiotics. However she still seems to have recurrent cough.     She decided to stop the SSZ as it didn't seem to be helping and she wondered if it was causing respiratory sx.    INTERVAL HISTORY:       Overall her symptoms are little changed since last fall.  She missed a spring appointment.  She denies side effects on the medication.  Her labs in May and June were normal/stable.  She will notice more symptoms even if just one or two days late for Methotrexate.     She has had some biceps tendon discomfort on the left.     She has more symptoms with more activity.     She notes again that in retrospect her Humira trial did help her hands but not her back or knees, which we discussed would not be surprising as these would likely be due to her OA and fibromyalgia.  She stopped it as effect she  felt was not worth the cost.  She still feels this way.  We discussed the hope that biosimilar Adalimumab may be available later this year, hopefully at significantly lower costs.     She has minor AM stiffness at this time.  No specific joint swelling but hands are puffy.    She notes a persistent sore throat despite various maneuvers.      She has known DJD/disc bulge in L spine; she has had two prior L Knee arthroscopies.     Rheumatologic ROS otherwise negative.       Review of Systems:   Review Of Systems  Skin: negative  Eyes: negative  Ears/Nose/Throat: see HPI  Respiratory: No shortness of breath, dyspnea on exertion, cough, or hemoptysis  Cardiovascular: negative  Gastrointestinal: negative  Genitourinary: negative  Musculoskeletal: see HPI  Neurologic: negative  Psychiatric: negative  Hematologic/Lymphatic/Immunologic: negative  Endocrine: negative          Past Medical History:     Past Medical History:   Diagnosis Date     Asthma      Chronic pelvic pain in female      Depression      Hyperlipidemia      Low back pain      Morbid obesity with BMI of 45.0-49.9, adult (H)      SHERIE (obstructive sleep apnea)     has cpap     Polyarthritis      TIA (transient ischemic attack)      Vertebral artery dissection (H)        Patient Active Problem List    Diagnosis Date Noted     Arthritis of knee, left 07/21/2016     Priority: Medium     Hyperlipidemia      Priority: Medium     SHERIE (obstructive sleep apnea)      Priority: Medium     has cpap       Morbid obesity with BMI of 45.0-49.9, adult (H)      Priority: Medium     Low back pain      Priority: Medium     Rheumatoid arthritis of multiple sites without rheumatoid factor (H) 05/25/2016     Priority: Medium     Fibromyalgia 05/25/2016     Priority: Medium     Encounter for long-term (current) use of medications 05/25/2016     Priority: Medium     Polyarthritis 03/22/2016     Priority: Medium     Depression 03/22/2016     Priority: Medium     Benign essential  hypertension 03/22/2016     Priority: Medium     Mild intermittent asthma without complication 03/22/2016     Priority: Medium     Morbid obesity (H) 03/22/2016     Priority: Medium     Iliotibial band syndrome 01/31/2011     Priority: Medium     Right knee pain 01/31/2011     Priority: Medium     Lumbar radicular pain 01/31/2011     Priority: Medium             Past Surgical History:     Past Surgical History:   Procedure Laterality Date     ARTHROSCOPY KNEE BILATERAL       BIOPSY LYMPH NODE CERVICAL       COLONOSCOPY N/A 7/26/2017    Procedure: COMBINED COLONOSCOPY, SINGLE OR MULTIPLE BIOPSY/POLYPECTOMY BY BIOPSY;  colonoscopy;  Surgeon: Thang Saleh MD;  Location:  GI     TUBAL LIGATION               Social History:     Social History   Substance Use Topics     Smoking status: Never Smoker     Smokeless tobacco: Never Used     Alcohol use 0.0 oz/week     0 Standard drinks or equivalent per week      Comment: Occasional             Family History:     Family History   Problem Relation Age of Onset     Breast Cancer Mother      50s     HEART DISEASE Father             Allergies:     Allergies   Allergen Reactions     Nuts Itching     Barley Grass GI Disturbance     Codeine Itching     Contrast Dye Itching     Oat Other (See Comments) and Nausea and Vomiting     abdominal pain       Penicillins Itching     Shellfish-Derived Products Nausea and Vomiting     Yeast Cramps, Diarrhea, Itching and Nausea and Vomiting             Medications:     Current Outpatient Prescriptions   Medication Sig Dispense Refill     lisinopril (PRINIVIL/ZESTRIL) 10 MG tablet Take 1 tablet (10 mg) by mouth daily 90 tablet 3     fluticasone (FLOVENT HFA) 110 MCG/ACT Inhaler Inhale 2 puffs into the lungs 2 times daily (Patient taking differently: Inhale 2 puffs into the lungs 2 times daily as needed ) 3 Inhaler 3     buPROPion (WELLBUTRIN XL) 150 MG 24 hr tablet Take 1 tablet (150 mg) by mouth every morning 90 tablet 3     albuterol  "(PROAIR HFA/PROVENTIL HFA/VENTOLIN HFA) 108 (90 BASE) MCG/ACT Inhaler Inhale 1 puff into the lungs every 6 hours as needed for shortness of breath / dyspnea or wheezing 3 Inhaler 3     methotrexate 2.5 MG tablet CHEMO Take 6 tablets (15 mg) by mouth once a week 6 tabs by mouth once a week 30 tablet 1     escitalopram (LEXAPRO) 20 MG tablet Take 1 tablet (20 mg) by mouth daily 90 tablet 2     gabapentin (NEURONTIN) 300 MG capsule Take 1 capsule (300 mg) by mouth 3 times daily 90 capsule 11     folic acid (FOLVITE) 1 MG tablet Take 1 tablet (1 mg) by mouth daily 90 tablet 3     lidocaine (LIDODERM) 5 % patch Place 1-3 patches onto the skin every 24 hours 30 patch 1            Physical Exam:   Blood pressure 126/80, pulse 81, temperature 98.4  F (36.9  C), temperature source Oral, resp. rate 16, height 1.676 m (5' 5.98\"), weight (!) 137.1 kg (302 lb 3.2 oz), SpO2 97 %, not currently breastfeeding.    Constitutional: WD-WN-WG cooperative  Eyes: nl conjunctiva, sclera  ENT: nl external ears, nose, throat  No mucous membrane lesions, normal saliva pool  Neck: no mass or thyroid enlargement  GI: no ABD mass or tenderness, no HSM  : not tested  Lymph: no cervical, supraclavicular, inguinal or epitrochlear nodes  MS: All TMJ, neck, shoulder, elbow, wrist, MCP/PIP/DIP, spine, hip, knee, ankle, and foot MTP/IP joints were examined and  found without definite active synovitis or deformity. Full ROM.  Normal  strength. Minimal discomfort with finger curl, palpation of knees L>R, and both ankles.  Multiple tender points especially R trapezius and rhomboids, epicondyles. No dactylitis,  tenosynovitis, enthesopathy    Skin: no nail pitting, alopecia, rash, nodules or lesions  Neuro: nl cranial nerves, strength  Psych: nl affect.       Data:     Lab Results   Component Value Date    WBC 6.8 05/23/2017    WBC 6.1 11/23/2016    WBC 6.2 08/24/2016    HGB 13.4 05/23/2017    HGB 12.6 11/23/2016    HGB 13.0 08/24/2016    HCT 40.0 " 05/23/2017    HCT 38.5 11/23/2016    HCT 39.0 08/24/2016    MCV 92 05/23/2017    MCV 93 11/23/2016    MCV 93 08/24/2016     05/23/2017     11/23/2016     08/24/2016     Lab Results   Component Value Date    BUN 17 06/16/2017    BUN 19 03/22/2016     No components found for: SEDRATE  No results found for: TSH  Lab Results   Component Value Date    AST 14 06/16/2017    AST 17 05/23/2017    AST 18 11/23/2016    ALT 22 06/16/2017    ALT 25 05/23/2017    ALT 26 11/23/2016    ALKPHOS 85 06/16/2017    ALKPHOS 78 03/22/2016     Reviewed Rheumatology lab flowsheet    Tyson Ribeiro

## 2017-07-27 NOTE — MR AVS SNAPSHOT
After Visit Summary   7/27/2017    Elizabeth Rosenthal    MRN: [id number]           Patient Information     Date Of Birth          1965        Visit Information        Provider Department      7/27/2017 6:00 PM Tyson Ribeiro MD [facility] Rheumatology        Today's Diagnoses     Rheumatoid arthritis of multiple sites without rheumatoid factor (H)        Encounter for long-term (current) use of medications        Polyarthritis        Chronic bilateral low back pain without sciatica           Follow-ups after your visit        Follow-up notes from your care team     Return in about 5 months (around 12/27/2017).      Your next 10 appointments already scheduled     Aug 10, 2017  8:00 AM CDT   (Arrive by 7:45 AM)   New Patient Visit with Eagle Baptiste MD   [facility] ([facility])    [address] [place] [id number] [phone]              Who to contact     If you have questions or need follow up information about today's clinic visit or your schedule please contact [facility] RHEUMATOLOGY directly at [phone].  Normal or non-critical lab and imaging results will be communicated to you by MyChart, letter or phone within 4 business days after the clinic has received the results. If you do not hear from us within 7 days, please contact the clinic through MyChart or phone. If you have a critical or abnormal lab result, we will notify you by phone as soon as possible.  Submit refill requests through [employer] or call your pharmacy and they will forward the refill request to us. Please allow 3 business days for your refill to be completed.          Additional Information About Your Visit        Care EveryWhere ID     This is your Care EveryWhere ID. This could be used by other organizations to access your [facility] medical records  [id number]        Your Vitals Were     Pulse Temperature Respirations Height Pulse Oximetry BMI  "(Body Mass Index)    81 98.4  F (36.9  C) (Oral) 16 1.676 m (5' 5.98\") 97% 48.8 kg/m2       Blood Pressure from Last 3 Encounters:   07/27/17 126/80   07/26/17 138/86   06/27/17 124/75    Weight from Last 3 Encounters:   07/27/17 (!) 137.1 kg (302 lb 3.2 oz)   07/26/17 136.1 kg (300 lb)   06/27/17 136.1 kg (300 lb)              Today, you had the following     No orders found for display         Today's Medication Changes          These changes are accurate as of: 7/27/17  6:29 PM.  If you have any questions, ask your nurse or doctor.               These medicines have changed or have updated prescriptions.        Dose/Directions    fluticasone 110 MCG/ACT Inhaler   Commonly known as:  FLOVENT HFA   This may have changed:    - when to take this  - reasons to take this   Used for:  Mild intermittent asthma without complication        Dose:  2 puff   Inhale 2 puffs into the lungs 2 times daily   Quantity:  3 Inhaler   Refills:  3       lidocaine 5 % Patch   Commonly known as:  LIDODERM   This may have changed:  additional instructions   Used for:  Polyarthritis, Chronic bilateral low back pain without sciatica   Changed by:  Tyson Ribeiro MD        Dose:  1-3 patch   Place 1-3 patches onto the skin every 24 hours Patient will call to fill   Quantity:  30 patch   Refills:  1            Where to get your medicines      These medications were sent to [facility]/pharmacy #[id number] - [address] AT CORNER OF [address] [place] [id number]     Phone:  [phone]     folic acid 1 MG tablet    methotrexate 2.5 MG tablet CHEMO         Call your pharmacy to confirm that your medication is ready for pickup. It may take up to 24 hours for them to receive the prescription. If the prescription is not ready within 3 business days, please contact your clinic or your provider.     We will let you know when these medications are ready. If you don't hear back within 3 business days, please contact us.     " lidocaine 5 % Patch                Primary Care Provider Office Phone # Fax #    Eveline Berry [phone]        [facility] [address] [facility] [address]  [facility] [address]        Equal Access to Services     TIAGO LOMAX : Garcia dwayne seth chhayao Dinorah, waaxda luqadaha, qaybta kaalmada adeegyada, betty moran shelly aponte. So [facility] [phone].    ATENCIÓN: Si habla español, tiene a hightower disposición servicios gratuitos de asistencia lingüística. [facility] [phone].    We comply with applicable federal civil rights laws and [place] laws. We do not discriminate on the basis of race, color, national origin, age, disability sex, sexual orientation or gender identity.            Thank you!     Thank you for choosing [facility] RHEUMATOLOGY  for your care. Our goal is always to provide you with excellent care. Hearing back from our patients is one way we can continue to improve our services. Please take a few minutes to complete the written survey that you may receive in the mail after your visit with us. Thank you!             Your Updated Medication List - Protect others around you: Learn how to safely use, store and throw away your medicines at www.disposemymeds.org.          This list is accurate as of: 7/27/17  6:29 PM.  Always use your most recent med list.                   Brand Name Dispense Instructions for use Diagnosis    albuterol 108 (90 BASE) MCG/ACT Inhaler    PROAIR HFA/PROVENTIL HFA/VENTOLIN HFA    3 Inhaler    Inhale 1 puff into the lungs every 6 hours as needed for shortness of breath / dyspnea or wheezing    Mild intermittent asthma without complication       buPROPion 150 MG 24 hr tablet    WELLBUTRIN XL    90 tablet    Take 1 tablet (150 mg) by mouth every morning    Major depressive disorder, recurrent episode, mild (H)       escitalopram 20 MG tablet    LEXAPRO    90 tablet    Take 1 tablet (20 mg) by mouth daily    Depression       fluticasone 110 MCG/ACT  Inhaler    FLOVENT HFA    3 Inhaler    Inhale 2 puffs into the lungs 2 times daily    Mild intermittent asthma without complication       folic acid 1 MG tablet    FOLVITE    90 tablet    Take 1 tablet (1 mg) by mouth daily    Rheumatoid arthritis of multiple sites without rheumatoid factor (H), Encounter for long-term (current) use of medications       gabapentin 300 MG capsule    NEURONTIN    90 capsule    Take 1 capsule (300 mg) by mouth 3 times daily    Fibromyalgia       lidocaine 5 % Patch    LIDODERM    30 patch    Place 1-3 patches onto the skin every 24 hours Patient will call to fill    Polyarthritis, Chronic bilateral low back pain without sciatica       lisinopril 10 MG tablet    PRINIVIL/ZESTRIL    90 tablet    Take 1 tablet (10 mg) by mouth daily    Benign essential hypertension, Mild intermittent asthma without complication       methotrexate 2.5 MG tablet CHEMO     30 tablet    Take 6 tablets (15 mg) by mouth once a week 6 tabs by mouth once a week    Rheumatoid arthritis of multiple sites without rheumatoid factor (H)

## 2017-07-27 NOTE — NURSING NOTE
"Chief Complaint   Patient presents with     RECHECK     polyarthritis       Initial /80 (BP Location: Right arm, Patient Position: Sitting, Cuff Size: Adult Large)  Pulse 81  Temp 98.4  F (36.9  C) (Oral)  Resp 16  Ht 1.676 m (5' 5.98\")  Wt (!) 137.1 kg (302 lb 3.2 oz)  SpO2 97%  BMI 48.8 kg/m2 Estimated body mass index is 48.8 kg/(m^2) as calculated from the following:    Height as of this encounter: 1.676 m (5' 5.98\").    Weight as of this encounter: 137.1 kg (302 lb 3.2 oz).  Medication Reconciliation: meño Lucas [facility]  7/27/2017 5:57 PM        "

## 2017-07-31 ENCOUNTER — PRE VISIT (OUTPATIENT)
Dept: OTOLARYNGOLOGY | Facility: CLINIC | Age: 52
End: 2017-07-31

## 2017-07-31 NOTE — TELEPHONE ENCOUNTER
1.  Date/reason for appt: 8/10/17 - Scratchy/Sore Throat for 3 months  2.  Referring provider: self  3.  Call to patient (Yes / No - short description): No, per  who spoke with patient, no outside records  4.  Previous care at: In [facility]

## 2017-08-10 ENCOUNTER — OFFICE VISIT (OUTPATIENT)
Dept: OTOLARYNGOLOGY | Facility: CLINIC | Age: 52
End: 2017-08-10

## 2017-08-10 VITALS — HEIGHT: 67 IN | BODY MASS INDEX: 45.99 KG/M2 | WEIGHT: 293 LBS

## 2017-08-10 DIAGNOSIS — R07.0 THROAT PAIN: ICD-10-CM

## 2017-08-10 DIAGNOSIS — K21.9 GASTROESOPHAGEAL REFLUX DISEASE WITHOUT ESOPHAGITIS: Primary | ICD-10-CM

## 2017-08-10 DIAGNOSIS — J31.0 CHRONIC RHINITIS, UNSPECIFIED TYPE: ICD-10-CM

## 2017-08-10 RX ORDER — CLINDAMYCIN HCL 300 MG
300 CAPSULE ORAL 3 TIMES DAILY
Qty: 21 CAPSULE | Refills: 0 | Status: SHIPPED | OUTPATIENT
Start: 2017-08-10 | End: 2017-08-17

## 2017-08-10 ASSESSMENT — PAIN SCALES - GENERAL: PAINLEVEL: SEVERE PAIN (7)

## 2017-08-10 NOTE — PROGRESS NOTES
The patient presents with a history of throat soreness for the past three months. The patient reports a history of environmental and food allergies. She also reports gastroesophageal reflux. The patient reports that her throat symptoms began after a severe respiratory infection. The patient denies sinusitis, rhinitis, facial pain, nasal obstruction or purulent nasal discharge. The patient denies chronic or recurrent tonsillitis, chronic or recurrent pharyngitis. The patient denies otalgia, otorrhea, eustachian tube dysfunction, ear infections, dizziness or tinnitus.     This patient is seen in consultation as a self referral to my clinic.    All other systems were reviewed and they are either negative or they are not directly pertinent to this Otolaryngology examination.      Past Medical History:    Past Medical History:   Diagnosis Date     Asthma      Chronic pelvic pain in female      Depression      Hyperlipidemia      Low back pain      Morbid obesity with BMI of 45.0-49.9, adult (H)      SHERIE (obstructive sleep apnea)     has cpap     Polyarthritis      TIA (transient ischemic attack)      Vertebral artery dissection (H)        Past Surgical History:    Past Surgical History:   Procedure Laterality Date     ARTHROSCOPY KNEE BILATERAL       BIOPSY LYMPH NODE CERVICAL       COLONOSCOPY N/A 7/26/2017    Procedure: COMBINED COLONOSCOPY, SINGLE OR MULTIPLE BIOPSY/POLYPECTOMY BY BIOPSY;  colonoscopy;  Surgeon: Thang Saleh MD;  Location:  GI     TUBAL LIGATION         Medications:      Current Outpatient Prescriptions:      folic acid (FOLVITE) 1 MG tablet, Take 1 tablet (1 mg) by mouth daily, Disp: 90 tablet, Rfl: 3     methotrexate 2.5 MG tablet CHEMO, Take 6 tablets (15 mg) by mouth once a week 6 tabs by mouth once a week, Disp: 30 tablet, Rfl: 5     lidocaine (LIDODERM) 5 % Patch, Place 1-3 patches onto the skin every 24 hours Patient will call to fill, Disp: 30 patch, Rfl: 1     lisinopril  (PRINIVIL/ZESTRIL) 10 MG tablet, Take 1 tablet (10 mg) by mouth daily, Disp: 90 tablet, Rfl: 3     fluticasone (FLOVENT HFA) 110 MCG/ACT Inhaler, Inhale 2 puffs into the lungs 2 times daily (Patient taking differently: Inhale 2 puffs into the lungs 2 times daily as needed ), Disp: 3 Inhaler, Rfl: 3     buPROPion (WELLBUTRIN XL) 150 MG 24 hr tablet, Take 1 tablet (150 mg) by mouth every morning, Disp: 90 tablet, Rfl: 3     albuterol (PROAIR HFA/PROVENTIL HFA/VENTOLIN HFA) 108 (90 BASE) MCG/ACT Inhaler, Inhale 1 puff into the lungs every 6 hours as needed for shortness of breath / dyspnea or wheezing, Disp: 3 Inhaler, Rfl: 3     escitalopram (LEXAPRO) 20 MG tablet, Take 1 tablet (20 mg) by mouth daily, Disp: 90 tablet, Rfl: 2     gabapentin (NEURONTIN) 300 MG capsule, Take 1 capsule (300 mg) by mouth 3 times daily, Disp: 90 capsule, Rfl: 11    Allergies:    Nuts; Barley grass; Codeine; Contrast dye; Oat; Penicillins; Shellfish-derived products; and Yeast    Physical Examination:    The patient is a well developed, well nourished female in no apparent distress.  She is normocephalic, atraumatic with pupils equally round and reactive to light.    Oral Cavity Examination:  Normal mucosa with no masses or lesions  Nasal Examination: Normal mucosa with no masses or lesions  Ear Examination: Ear canals clear, tympanic membranes and middle ear spaces normal  Neurological Examination: Facial nerve function intact and symmetric  Integumentary Examination: No lesions on the skin of the head and neck  Neck Examination: No masses or lesions, no lymphadenopathy  Endocrine Examination: Normal thyroid examination  Flexible Fiberoptic Laryngoscopy:  Normal nasopharynx, pyriform sinuses, epiglottis, valleculae, false vocal cords, true vocal cords, and larynx. Whitish coating on the base of tongue and enlarged lingual and palatine tonsils. Normal motion of the vocal cords with no lesions, masses, nodules, or polyps bilaterally.      Assessment and Plan:    The patient presents with a history of throat soreness x 3 months that developed after a respiratory infection. She will be treated with Cleocin, CREST mouth rinse gargles and zantac. She will be referred to Dr. Keon Manley for an allergy consultation. She will be seen again after this evaluation is completed.

## 2017-08-10 NOTE — LETTER
8/10/2017     RE: Elizabeth Rosenthal  [address] [facility] [address]     Dear Colleague,    Thank you for referring your patient, Elizabeth Rosenthal, to the [facility] E[facility] NOSE AND [facility]AT at [facility]. Please see a copy of my visit note below.    The patient presents with a history of throat soreness for the past three months. The patient reports a history of environmental and food allergies. She also reports gastroesophageal reflux. The patient reports that her throat symptoms began after a severe respiratory infection. The patient denies sinusitis, rhinitis, facial pain, nasal obstruction or purulent nasal discharge. The patient denies chronic or recurrent tonsillitis, chronic or recurrent pharyngitis. The patient denies otalgia, otorrhea, eustachian tube dysfunction, ear infections, dizziness or tinnitus.     This patient is seen in consultation as a self referral to my clinic.    All other systems were reviewed and they are either negative or they are not directly pertinent to this Otolaryngology examination.      Past Medical History:    Past Medical History:   Diagnosis Date     Asthma      Chronic pelvic pain in female      Depression      Hyperlipidemia      Low back pain      Morbid obesity with BMI of 45.0-49.9, adult (H)      SHERIE (obstructive sleep apnea)     has cpap     Polyarthritis      TIA (transient ischemic attack)      Vertebral artery dissection (H)        Past Surgical History:    Past Surgical History:   Procedure Laterality Date     ARTHROSCOPY KNEE BILATERAL       BIOPSY LYMPH NODE CERVICAL       COLONOSCOPY N/A 7/26/2017    Procedure: COMBINED COLONOSCOPY, SINGLE OR MULTIPLE BIOPSY/POLYPECTOMY BY BIOPSY;  colonoscopy;  Surgeon: Thang Saleh MD;  Location: [facility] GI     TUBAL LIGATION         Medications:      Current Outpatient Prescriptions:      folic acid (FOLVITE) 1 MG tablet, Take 1 tablet (1 mg) by mouth daily, Disp: 90 tablet, Rfl: 3      methotrexate 2.5 MG tablet CHEMO, Take 6 tablets (15 mg) by mouth once a week 6 tabs by mouth once a week, Disp: 30 tablet, Rfl: 5     lidocaine (LIDODERM) 5 % Patch, Place 1-3 patches onto the skin every 24 hours Patient will call to fill, Disp: 30 patch, Rfl: 1     lisinopril (PRINIVIL/ZESTRIL) 10 MG tablet, Take 1 tablet (10 mg) by mouth daily, Disp: 90 tablet, Rfl: 3     fluticasone (FLOVENT HFA) 110 MCG/ACT Inhaler, Inhale 2 puffs into the lungs 2 times daily (Patient taking differently: Inhale 2 puffs into the lungs 2 times daily as needed ), Disp: 3 Inhaler, Rfl: 3     buPROPion (WELLBUTRIN XL) 150 MG 24 hr tablet, Take 1 tablet (150 mg) by mouth every morning, Disp: 90 tablet, Rfl: 3     albuterol (PROAIR HFA/PROVENTIL HFA/VENTOLIN HFA) 108 (90 BASE) MCG/ACT Inhaler, Inhale 1 puff into the lungs every 6 hours as needed for shortness of breath / dyspnea or wheezing, Disp: 3 Inhaler, Rfl: 3     escitalopram (LEXAPRO) 20 MG tablet, Take 1 tablet (20 mg) by mouth daily, Disp: 90 tablet, Rfl: 2     gabapentin (NEURONTIN) 300 MG capsule, Take 1 capsule (300 mg) by mouth 3 times daily, Disp: 90 capsule, Rfl: 11    Allergies:    Nuts; Barley grass; Codeine; Contrast dye; Oat; Penicillins; Shellfish-derived products; and Yeast    Physical Examination:    The patient is a well developed, well nourished female in no apparent distress.  She is normocephalic, atraumatic with pupils equally round and reactive to light.    Oral Cavity Examination:  Normal mucosa with no masses or lesions  Nasal Examination: Normal mucosa with no masses or lesions  Ear Examination: Ear canals clear, tympanic membranes and middle ear spaces normal  Neurological Examination: Facial nerve function intact and symmetric  Integumentary Examination: No lesions on the skin of the head and neck  Neck Examination: No masses or lesions, no lymphadenopathy  Endocrine Examination: Normal thyroid examination  Flexible Fiberoptic Laryngoscopy:  Normal  nasopharynx, pyriform sinuses, epiglottis, valleculae, false vocal cords, true vocal cords, and larynx. Whitish coating on the base of tongue and enlarged lingual and palatine tonsils. Normal motion of the vocal cords with no lesions, masses, nodules, or polyps bilaterally.     Assessment and Plan:    The patient presents with a history of throat soreness x 3 months that developed after a respiratory infection. She will be treated with Cleocin, CREST mouth rinse gargles and zantac. She will be referred to Dr. Keon Manley for an allergy consultation. She will be seen again after this evaluation is completed.     Again, thank you for allowing me to participate in the care of your patient.      Sincerely,    Eagle Baptiste MD

## 2017-08-10 NOTE — NURSING NOTE
Chief Complaint   Patient presents with     Consult     scratchy sore throat     Ladarius Pa LPN

## 2017-08-10 NOTE — MR AVS SNAPSHOT
After Visit Summary   8/10/2017    Elizabeth Rosenthal    MRN: [id number]           Patient Information     Date Of Birth          1965        Visit Information        Provider Department      8/10/2017 8:00 AM Eagle Baptiste MD [facility]        Today's Diagnoses     Gastroesophageal reflux disease without esophagitis    -  1    Throat pain        Chronic rhinitis, unspecified type          Care Instructions    The patient presents with a history of throat soreness x 3 months that developed after a respiratory infection. She will be treated with cleocin, CREST mouth rinse gargles and zantac. She will be referred to Dr. Keon Manley for an allergy consultation. She will be seen again after this evaluation is completed.           Follow-ups after your visit        Your next 10 appointments already scheduled     Oct 23, 2017  7:55 AM CDT   (Arrive by 7:40 AM)   New Allergy with Keon Manley MD   [facility] ([facility])    [address] [place] [id number] [phone]           Do not take anti-histamines or Zantac for seven day prior to your appointment.            Oct 26, 2017  7:45 AM CDT   (Arrive by 7:30 AM)   Return Visit with Eagle Baptiste MD   [facility] ([facility])    [address] [place] [id number] [phone]              Who to contact     Please call your clinic at [phone] to:    Ask questions about your health    Make or cancel appointments    Discuss your medicines    Learn about your test results    Speak to your doctor   If you have compliments or concerns about an experience at your clinic, or if you wish to file a complaint, please contact [facility] Patient Relations at [phone] or email us at [email or web address]          "Additional Information About Your Visit        Care EveryWhere ID     This is your Care EveryWhere ID. This could be used by other organizations to access your [place] medical records  [id number]        Your Vitals Were     Height BMI (Body Mass Index)                1.69 m (5' 6.53\") 47.96 kg/m2           Blood Pressure from Last 3 Encounters:   07/27/17 126/80   07/26/17 138/86   06/27/17 124/75    Weight from Last 3 Encounters:   08/10/17 (!) 137 kg (302 lb)   07/27/17 (!) 137.1 kg (302 lb 3.2 oz)   07/26/17 136.1 kg (300 lb)              We Performed the Following     LARYNGOSCOPY FLEX FIBEROPTIC, DIAGNOSTIC     OFFICE CONSULTATION,LEVEL III          Today's Medication Changes          These changes are accurate as of: 8/10/17  8:13 AM.  If you have any questions, ask your nurse or doctor.               Start taking these medicines.        Dose/Directions    clindamycin 300 MG capsule   Commonly known as:  CLEOCIN   Used for:  Throat pain        Dose:  300 mg   Take 1 capsule (300 mg) by mouth 3 times daily for 7 days   Quantity:  21 capsule   Refills:  0       ranitidine 150 MG tablet   Commonly known as:  ZANTAC   Used for:  Gastroesophageal reflux disease without esophagitis        Dose:  150 mg   Take 1 tablet (150 mg) by mouth 2 times daily Take one tablet twice daily with second dose just prior to the night time sleep period   Quantity:  120 tablet   Refills:  3         These medicines have changed or have updated prescriptions.        Dose/Directions    fluticasone 110 MCG/ACT Inhaler   Commonly known as:  FLOVENT HFA   This may have changed:    - when to take this  - reasons to take this   Used for:  Mild intermittent asthma without complication        Dose:  2 puff   Inhale 2 puffs into the lungs 2 times daily   Quantity:  3 Inhaler   Refills:  3            Where to get your medicines      These medications were sent to [facility]/pharmacy #[id number] - [place], [place] - [address] [place] [address] [address] " [facility] [address]     Phone:  [phone]     clindamycin 300 MG capsule    ranitidine 150 MG tablet                Primary Care Provider Office Phone # Fax #    Eveline Berry [phone]       [address] [place] [address]  [facility] [address]        Equal Access to Services     TIAGO LOMAX : Hadii aad ku hadasho Soomaali, waaxda luqadaha, qaybta kaalmada adeegyada, waxay idiin hayaan adeeg kharash la'duanen . So [facility] [phone].    ATENCIÓN: Si habla español, tiene a hightower disposición servicios gratuitos de asistencia lingüística. Llame al [phone].    We comply with applicable federal civil rights laws and [place] laws. We do not discriminate on the basis of race, color, national origin, age, disability sex, sexual orientation or gender identity.            Thank you!     Thank you for choosing [facility]  for your care. Our goal is always to provide you with excellent care. Hearing back from our patients is one way we can continue to improve our services. Please take a few minutes to complete the written survey that you may receive in the mail after your visit with us. Thank you!             Your Updated Medication List - Protect others around you: Learn how to safely use, store and throw away your medicines at www.disposemymeds.org.          This list is accurate as of: 8/10/17  8:13 AM.  Always use your most recent med list.                   Brand Name Dispense Instructions for use Diagnosis    albuterol 108 (90 BASE) MCG/ACT Inhaler    PROAIR HFA/PROVENTIL HFA/VENTOLIN HFA    3 Inhaler    Inhale 1 puff into the lungs every 6 hours as needed for shortness of breath / dyspnea or wheezing    Mild intermittent asthma without complication       buPROPion 150 MG 24 hr tablet    WELLBUTRIN XL    90 tablet    Take 1 tablet (150 mg) by mouth every morning    Major depressive disorder, recurrent episode, mild (H)       clindamycin 300 MG capsule    CLEOCIN    21  capsule    Take 1 capsule (300 mg) by mouth 3 times daily for 7 days    Throat pain       escitalopram 20 MG tablet    LEXAPRO    90 tablet    Take 1 tablet (20 mg) by mouth daily    Depression       fluticasone 110 MCG/ACT Inhaler    FLOVENT HFA    3 Inhaler    Inhale 2 puffs into the lungs 2 times daily    Mild intermittent asthma without complication       folic acid 1 MG tablet    FOLVITE    90 tablet    Take 1 tablet (1 mg) by mouth daily    Rheumatoid arthritis of multiple sites without rheumatoid factor (H), Encounter for long-term (current) use of medications       gabapentin 300 MG capsule    NEURONTIN    90 capsule    Take 1 capsule (300 mg) by mouth 3 times daily    Fibromyalgia       lidocaine 5 % Patch    LIDODERM    30 patch    Place 1-3 patches onto the skin every 24 hours Patient will call to fill    Polyarthritis, Chronic bilateral low back pain without sciatica       lisinopril 10 MG tablet    PRINIVIL/ZESTRIL    90 tablet    Take 1 tablet (10 mg) by mouth daily    Benign essential hypertension, Mild intermittent asthma without complication       methotrexate 2.5 MG tablet CHEMO     30 tablet    Take 6 tablets (15 mg) by mouth once a week 6 tabs by mouth once a week    Rheumatoid arthritis of multiple sites without rheumatoid factor (H)       ranitidine 150 MG tablet    ZANTAC    120 tablet    Take 1 tablet (150 mg) by mouth 2 times daily Take one tablet twice daily with second dose just prior to the night time sleep period    Gastroesophageal reflux disease without esophagitis

## 2017-08-10 NOTE — PATIENT INSTRUCTIONS
The patient presents with a history of throat soreness x 3 months that developed after a respiratory infection. She will be treated with cleocin, CREST mouth rinse gargles and zantac. She will be referred to Dr. Keon Manley for an allergy consultation. She will be seen again after this evaluation is completed.

## 2017-08-12 ENCOUNTER — NURSE TRIAGE (OUTPATIENT)
Dept: NURSING | Facility: CLINIC | Age: 52
End: 2017-08-12

## 2017-08-12 NOTE — TELEPHONE ENCOUNTER
Elizabeth is currently taking Clindamycin for sore throat and now feels she has strep and is wanting to know if Clindamycin can be used for Strep.  FNA advised to contact Pharmacist/call back if needs a different medication.

## 2017-10-19 NOTE — TELEPHONE ENCOUNTER
"APPT INFO    Date /Time: 10/23/17, 7:55am    Reason for Appt: Allergy    Ref Provider/Clinic: [facility]   Patient Contact (Y/N) & Call Details: No, referred    Action:      RECORDS CLINIC NAME  (\"None\" if no records ) RECEIVED RECS & IMG? Y/N   (may include other helpful notes)   Internal Clinics: ENT         External Clinics: None              "

## 2017-10-23 ENCOUNTER — OFFICE VISIT (OUTPATIENT)
Dept: PULMONOLOGY | Facility: CLINIC | Age: 52
End: 2017-10-23
Attending: ALLERGY & IMMUNOLOGY
Payer: COMMERCIAL

## 2017-10-23 ENCOUNTER — PRE VISIT (OUTPATIENT)
Dept: PULMONOLOGY | Facility: CLINIC | Age: 52
End: 2017-10-23

## 2017-10-23 VITALS
RESPIRATION RATE: 16 BRPM | HEART RATE: 88 BPM | DIASTOLIC BLOOD PRESSURE: 81 MMHG | SYSTOLIC BLOOD PRESSURE: 125 MMHG | OXYGEN SATURATION: 96 %

## 2017-10-23 DIAGNOSIS — J30.1 CHRONIC SEASONAL ALLERGIC RHINITIS DUE TO POLLEN: Primary | ICD-10-CM

## 2017-10-23 PROCEDURE — 99212 OFFICE O/P EST SF 10 MIN: CPT | Mod: 25,ZF

## 2017-10-23 PROCEDURE — 95004 PERQ TESTS W/ALRGNC XTRCS: CPT | Performed by: ALLERGY & IMMUNOLOGY

## 2017-10-23 ASSESSMENT — PAIN SCALES - GENERAL: PAINLEVEL: MODERATE PAIN (5)

## 2017-10-23 NOTE — PATIENT INSTRUCTIONS
Positive to birch, peter, orchard and ragweed.  I would avoid the fresh products listed below, can try to peal and slightly cook them.    For the possible EoE (eosinophilic esophgitis) trial off dairy in any undercooked for at least 6 weeks.                      Oral Allergy Syndrome (Pollen-Food Allergy Syndrome)    The oral allergy syndrome (OAS) is a relatively common form of food allergy, particularly in adults. It occurs in people who have pollen allergy, although not all patients have obvious hay-fever or seasonal allergy symptoms. Patients typically report itching and/or mild swelling of the mouth and throat immediately following ingestion of certain uncooked fruits (including nuts) or raw vegetables. The symptoms result from contact urticaria in the oropharynx caused by pollen-related proteins in these foods. Only a small proportion of affected individuals experience systemic allergic reactions, although the disorder must be differentiated from more serious forms of food allergy.     The prevalence of pollen-food allergy syndrome (PFAS) in the [facility] has not been reported, although it is probably the most common food allergy in adult subjects and there is a general impression that it has become more prevalent as respiratory allergy to pollen has increased over the past two decades.     CLINICAL MANIFESTATIONS--In most patients, symptoms are limited to the oropharynx. However, 2 to 10 percent may experience systemic symptoms, and patients with concomitant atopic dermatitis may notice a worsening of their cutaneous symptoms.    Oropharyngeal symptoms--The most common signs and symptoms are pruritus, tingling, mild erythema, and subtle angioedema of the lips, oral mucosa, palate, and throat; sometimes accompanied by a sensation of throat swelling. Symptoms occur while or shortly after (within 5-10 minutes of) ingesting the culprit fruit, nut or vegetable. Oral papules or blisters are occasionally  reported, although blisters or vesicles in isolation are not typical. Symptoms resolve promptly when the food is swallowed due to disruption of the structure of the allergen by gastric acid and proteolytic digestive enzymes. Data indicates about 1-2% of people will develop severe reactions to these foods so if symptoms worsen it is advised to stop eating the offending food.     Particularly with fruits, patients may report that one variety of the food is more troublesome than another, or that the symptoms do not develop after every exposure.     Seasonal variation--PFAS is an immunoglobulin E (IgE)-mediated reaction, and symptoms may increase during or following the pollen season because of seasonal boosting of pollen IgE levels.    Impact of cooking--In most cases, symptoms only develop in response to eating the raw, uncooked food. Some patients react predominantly to the peel of the raw fruit or vegetable and tolerate pulp. Patients usually tolerate the culprit food in various cooked forms. Cooking, baking, or even briefly microwaving raw fruits and vegetables is usually sufficient to alter the allergens that are responsible for PFAS. Tree nuts and peanuts are an important exception to this generalization, as roasted nuts can cause PFAS.    Caution with anti-ulcer treatments/GERD treatments--Anti-ulcer drugs increase gastric pH and may impair digestion of food proteins.     WHAT CAN I DO ABOUT THIS?: Some studies indicate there may be a benefit when doing allergy shots to the offending pollen however data is not strong. One study looking at slowly increasing the amount eaten over months and then regularly ingesting the food decreased overall symptoms. However, this way only one study of 40 individuals. Taking antihistamines such as Benadryl, Claritin or Zyrtec should also provide some benefit. If the reactions are severe seek medical care immediately and it is advised at that point to carry an epinephrine device.  "As stated earlier the risk of severe reactions are low.        *All information has been reviewed, updated and approved by:  Dr. Keon Manley-Byron for [facility] at [facility] updated: 11/2016           Pollen Control Measures      * Keep windows and doors shut and run air conditioner at all times. This keeps outdoor air outside.    * Keep windows closed while in the car and air conditioner on the \"re-circulate\" mode.    * Wash your hair before going to bed at night to reduce exposure during sleep.    * Keep pets outdoors to prevent the pollen from being brought in on their hair.    * Avoid hanging clothes and linens outside as pollens may collect on the items.     * Don't mow the lawn if you are allergic to grass or weeds. If you must mow the grass, wear a face mask.       Spring: Tree Pollen    Summer: Grass Pollen and Mold    Fall: Weed Pollen and Mold      *All information has been reviewed, updated and approved by:  Dr. Keon Manley-Byron for [facility] at [facility] updated: 11/2016                "

## 2017-10-23 NOTE — LETTER
10/23/2017       RE: Elizabeth Rosenthal  [address] [facility] [facility] [address]     Dear Colleague,    Thank you for referring your patient, Elizabeth Rosenthal, to the [facility] at [facility]. Please see a copy of my visit note below.    Reason for Visit  Elizabeth Rosenthal is a 52 year old female who is referred by Eveline Berry for sore throat.    Allergy HPI  She has a chronic sore throat but she thinks comes when she eats something. She reports she was allergy tested about 15 years of age and positive to everything and 5 years ago tested again.  She also is having bad abdominal pain she now has chronic pain in her left upper abdomen. She also gets rashes on arms. She saw ENT for the sore throat recommended increased Zantac and Zyrtec would help somewhat but not completely. She does have an immune system that is compromised because of her RA treatment.  She was on allergy shots when she was in college. She has asthma and the fall with ragweed. Grasses also bother her. She does choke a lot with periods foods and ENT noted esophagus is swollen. She has a lot of heartburn. She never used any nasal sprays.  She does now that wheat and soy seem to be an issue, rice dairy and eggs may also be an issue.    Social history: she has a dog she does not smoke.  Family history: daughter with anaphylaxis    The patient was seen and examined by Keon Carrillo MD   Current Outpatient Prescriptions   Medication     folic acid (FOLVITE) 1 MG tablet     methotrexate 2.5 MG tablet CHEMO     lisinopril (PRINIVIL/ZESTRIL) 10 MG tablet     fluticasone (FLOVENT HFA) 110 MCG/ACT Inhaler     buPROPion (WELLBUTRIN XL) 150 MG 24 hr tablet     albuterol (PROAIR HFA/PROVENTIL HFA/VENTOLIN HFA) 108 (90 BASE) MCG/ACT Inhaler     escitalopram (LEXAPRO) 20 MG tablet     gabapentin (NEURONTIN) 300 MG capsule     lidocaine (LIDODERM) 5 % Patch     No current  facility-administered medications for this visit.      Allergies   Allergen Reactions     Nuts Itching     Barley Grass GI Disturbance     Codeine Itching     Contrast Dye Itching     Oat Other (See Comments) and Nausea and Vomiting     abdominal pain       Penicillins Itching     Shellfish-Derived Products Nausea and Vomiting     Yeast Cramps, Diarrhea, Itching and Nausea and Vomiting     Social History     Social History     Marital status: Single     Spouse name: N/A     Number of children: N/A     Years of education: N/A     Occupational History     Not on file.     Social History Main Topics     Smoking status: Never Smoker     Smokeless tobacco: Never Used     Alcohol use 0.0 oz/week     0 Standard drinks or equivalent per week      Comment: Occasional     Drug use: No     Sexual activity: Not on file     Other Topics Concern     Not on file     Social History Narrative     Past Medical History:   Diagnosis Date     Asthma      Chronic pelvic pain in female      Depression      Hyperlipidemia      Low back pain      Morbid obesity with BMI of 45.0-49.9, adult (H)      SHERIE (obstructive sleep apnea)     has cpap     Polyarthritis      TIA (transient ischemic attack)      Vertebral artery dissection (H)      Past Surgical History:   Procedure Laterality Date     ARTHROSCOPY KNEE BILATERAL       BIOPSY LYMPH NODE CERVICAL       COLONOSCOPY N/A 7/26/2017    Procedure: COMBINED COLONOSCOPY, SINGLE OR MULTIPLE BIOPSY/POLYPECTOMY BY BIOPSY;  colonoscopy;  Surgeon: Thang Saleh MD;  Location:  GI     TUBAL LIGATION       Family History   Problem Relation Age of Onset     Breast Cancer Mother      50s     HEART DISEASE Father          ROS   A complete ROS was otherwise negative except as noted in the HPI and the end of the note.  /81 (BP Location: Right arm, Patient Position: Chair, Cuff Size: Adult Large)  Pulse 88  Resp 16  SpO2 96%  Exam:   GENERAL APPEARANCE: Well developed, well nourished, alert,  and in no apparent distress.  EYES: PERRL, EOMI, conjunctiva clear non-injected  HENT: Nasal mucosa with no edema and no discharge. No nasal polyps.  No facial tenderness.  EARS: Canals clear, TMs normal  MOUTH: Oral mucosa is moist, without any lesions, no tonsillar enlargement, no oropharyngeal exudate.  RESP: Good air flow throughout.  No crackles. No rhonchi. No wheezes.  CV: Normal S1, S2, regular rhythm, normal rate. No murmur.  No rub. No gallop. No LE edema.   MS: Extremities normal. No clubbing. No cyanosis.  SKIN: No rashes noted  NEURO: Speech normal, normal strength and tone, normal gait and stance  PSYCH: Normal mentation, orientation to person, place, and time.  Results: 13 percutaneous environmental allergen (and 2 controls) extracts placed by MA and read by MD.  Positive to birch, peter, orchard and short ragweed.      Assessment and plan: Positive to birch, peter, orchard and ragweed.  I would avoid the fresh products listed in Oral Allergy Syndrome that cross react with these pollens, can try to peal and slightly cook them as well. (list given)  For the possible EoE (eosinophilic esophgitis) trial off dairy in any undercooked form for at least 6 weeks.        Again, thank you for allowing me to participate in the care of your patient.      Sincerely,    Keon Carrillo MD

## 2017-10-23 NOTE — PROGRESS NOTES
Reason for Visit  Elizabeth Rosenthal is a 52 year old female who is referred by Evelien Berry for sore throat.    Allergy HPI  She has a chronic sore throat but she thinks comes when she eats something. She reports she was allergy tested about 15 years of age and positive to everything and 5 years ago tested again.  She also is having bad abdominal pain she now has chronic pain in her left upper abdomen. She also gets rashes on arms. She saw ENT for the sore throat recommended increased Zantac and Zyrtec would help somewhat but not completely. She does have an immune system that is compromised because of her RA treatment.  She was on allergy shots when she was in college. She has asthma and the fall with ragweed. Grasses also bother her. She does choke a lot with periods foods and ENT noted esophagus is swollen. She has a lot of heartburn. She never used any nasal sprays.  She does now that wheat and soy seem to be an issue, rice dairy and eggs may also be an issue.    Social history: she has a dog she does not smoke.  Family history: daughter with anaphylaxis    The patient was seen and examined by Keon Carrillo MD   Current Outpatient Prescriptions   Medication     folic acid (FOLVITE) 1 MG tablet     methotrexate 2.5 MG tablet CHEMO     lisinopril (PRINIVIL/ZESTRIL) 10 MG tablet     fluticasone (FLOVENT HFA) 110 MCG/ACT Inhaler     buPROPion (WELLBUTRIN XL) 150 MG 24 hr tablet     albuterol (PROAIR HFA/PROVENTIL HFA/VENTOLIN HFA) 108 (90 BASE) MCG/ACT Inhaler     escitalopram (LEXAPRO) 20 MG tablet     gabapentin (NEURONTIN) 300 MG capsule     lidocaine (LIDODERM) 5 % Patch     No current facility-administered medications for this visit.      Allergies   Allergen Reactions     Nuts Itching     Barley Grass GI Disturbance     Codeine Itching     Contrast Dye Itching     Oat Other (See Comments) and Nausea and Vomiting     abdominal pain       Penicillins Itching     Shellfish-Derived Products Nausea and  Vomiting     Yeast Cramps, Diarrhea, Itching and Nausea and Vomiting     Social History     Social History     Marital status: Single     Spouse name: N/A     Number of children: N/A     Years of education: N/A     Occupational History     Not on file.     Social History Main Topics     Smoking status: Never Smoker     Smokeless tobacco: Never Used     Alcohol use 0.0 oz/week     0 Standard drinks or equivalent per week      Comment: Occasional     Drug use: No     Sexual activity: Not on file     Other Topics Concern     Not on file     Social History Narrative     Past Medical History:   Diagnosis Date     Asthma      Chronic pelvic pain in female      Depression      Hyperlipidemia      Low back pain      Morbid obesity with BMI of 45.0-49.9, adult (H)      SHERIE (obstructive sleep apnea)     has cpap     Polyarthritis      TIA (transient ischemic attack)      Vertebral artery dissection (H)      Past Surgical History:   Procedure Laterality Date     ARTHROSCOPY KNEE BILATERAL       BIOPSY LYMPH NODE CERVICAL       COLONOSCOPY N/A 7/26/2017    Procedure: COMBINED COLONOSCOPY, SINGLE OR MULTIPLE BIOPSY/POLYPECTOMY BY BIOPSY;  colonoscopy;  Surgeon: Thang Saleh MD;  Location:  GI     TUBAL LIGATION       Family History   Problem Relation Age of Onset     Breast Cancer Mother      50s     HEART DISEASE Father          ROS   A complete ROS was otherwise negative except as noted in the HPI and the end of the note.  /81 (BP Location: Right arm, Patient Position: Chair, Cuff Size: Adult Large)  Pulse 88  Resp 16  SpO2 96%  Exam:   GENERAL APPEARANCE: Well developed, well nourished, alert, and in no apparent distress.  EYES: PERRL, EOMI, conjunctiva clear non-injected  HENT: Nasal mucosa with no edema and no discharge. No nasal polyps.  No facial tenderness.  EARS: Canals clear, TMs normal  MOUTH: Oral mucosa is moist, without any lesions, no tonsillar enlargement, no oropharyngeal exudate.  RESP: Good air  flow throughout.  No crackles. No rhonchi. No wheezes.  CV: Normal S1, S2, regular rhythm, normal rate. No murmur.  No rub. No gallop. No LE edema.   MS: Extremities normal. No clubbing. No cyanosis.  SKIN: No rashes noted  NEURO: Speech normal, normal strength and tone, normal gait and stance  PSYCH: Normal mentation, orientation to person, place, and time.  Results: 13 percutaneous environmental allergen (and 2 controls) extracts placed by MA and read by MD.  Positive to birch, peter, orchard and short ragweed.      Assessment and plan: Positive to birch, peter, orchard and ragweed.  I would avoid the fresh products listed in Oral Allergy Syndrome that cross react with these pollens, can try to peal and slightly cook them as well. (list given)  For the possible EoE (eosinophilic esophgitis) trial off dairy in any undercooked form for at least 6 weeks.

## 2017-10-23 NOTE — NURSING NOTE
Chief Complaint   Patient presents with     Consult     Patient is being seen for consultation of allergies      Kirsty Armijo CMA at 7:35 AM on 10/23/2017

## 2017-10-23 NOTE — MR AVS SNAPSHOT
After Visit Summary   10/23/2017    Elizabeth Rosenthal    MRN: [id number]           Patient Information     Date Of Birth          1965        Visit Information        Provider Department      10/23/2017 7:55 AM Keon Manley MD [facility]        Care Instructions    Positive to birch, peter, orchard and ragweed.  I would avoid the fresh products listed below, can try to peal and slightly cook them.    For the possible EoE (eosinophilic esophgitis) trial off dairy in any undercooked for at least 6 weeks.                      Oral Allergy Syndrome (Pollen-Food Allergy Syndrome)    The oral allergy syndrome (OAS) is a relatively common form of food allergy, particularly in adults. It occurs in people who have pollen allergy, although not all patients have obvious hay-fever or seasonal allergy symptoms. Patients typically report itching and/or mild swelling of the mouth and throat immediately following ingestion of certain uncooked fruits (including nuts) or raw vegetables. The symptoms result from contact urticaria in the oropharynx caused by pollen-related proteins in these foods. Only a small proportion of affected individuals experience systemic allergic reactions, although the disorder must be differentiated from more serious forms of food allergy.     The prevalence of pollen-food allergy syndrome (PFAS) in the [facility] has not been reported, although it is probably the most common food allergy in adult subjects and there is a general impression that it has become more prevalent as respiratory allergy to pollen has increased over the past two decades.     CLINICAL MANIFESTATIONS--In most patients, symptoms are limited to the oropharynx. However, 2 to 10 percent may experience systemic symptoms, and patients with concomitant atopic dermatitis may notice a worsening of their cutaneous symptoms.    Oropharyngeal symptoms--The most common signs and  symptoms are pruritus, tingling, mild erythema, and subtle angioedema of the lips, oral mucosa, palate, and throat; sometimes accompanied by a sensation of throat swelling. Symptoms occur while or shortly after (within 5-10 minutes of) ingesting the culprit fruit, nut or vegetable. Oral papules or blisters are occasionally reported, although blisters or vesicles in isolation are not typical. Symptoms resolve promptly when the food is swallowed due to disruption of the structure of the allergen by gastric acid and proteolytic digestive enzymes. Data indicates about 1-2% of people will develop severe reactions to these foods so if symptoms worsen it is advised to stop eating the offending food.     Particularly with fruits, patients may report that one variety of the food is more troublesome than another, or that the symptoms do not develop after every exposure.     Seasonal variation--PFAS is an immunoglobulin E (IgE)-mediated reaction, and symptoms may increase during or following the pollen season because of seasonal boosting of pollen IgE levels.    Impact of cooking--In most cases, symptoms only develop in response to eating the raw, uncooked food. Some patients react predominantly to the peel of the raw fruit or vegetable and tolerate pulp. Patients usually tolerate the culprit food in various cooked forms. Cooking, baking, or even briefly microwaving raw fruits and vegetables is usually sufficient to alter the allergens that are responsible for PFAS. Tree nuts and peanuts are an important exception to this generalization, as roasted nuts can cause PFAS.    Caution with anti-ulcer treatments/GERD treatments--Anti-ulcer drugs increase gastric pH and may impair digestion of food proteins.     WHAT CAN I DO ABOUT THIS?: Some studies indicate there may be a benefit when doing allergy shots to the offending pollen however data is not strong. One study looking at slowly increasing the amount eaten over months and then  "regularly ingesting the food decreased overall symptoms. However, this way only one study of 40 individuals. Taking antihistamines such as Benadryl, Claritin or Zyrtec should also provide some benefit. If the reactions are severe seek medical care immediately and it is advised at that point to carry an epinephrine device. As stated earlier the risk of severe reactions are low.        *All information has been reviewed, updated and approved by:  Dr. [facility] [facility] at [facility] updated: 11/2016           Pollen Control Measures      * Keep windows and doors shut and run air conditioner at all times. This keeps outdoor air outside.    * Keep windows closed while in the car and air conditioner on the \"re-circulate\" mode.    * Wash your hair before going to bed at night to reduce exposure during sleep.    * Keep pets outdoors to prevent the pollen from being brought in on their hair.    * Avoid hanging clothes and linens outside as pollens may collect on the items.     * Don't mow the lawn if you are allergic to grass or weeds. If you must mow the grass, wear a face mask.       Spring: Tree Pollen    Summer: Grass Pollen and Mold    Fall: Weed Pollen and Mold      *All information has been reviewed, updated and approved by:  Dr. Keon [facility] [facility] at [facility] updated: 11/2016                        Follow-ups after your visit        Follow-up notes from your care team     Return if symptoms worsen or fail to improve.      Your next 10 appointments already scheduled     Oct 26, 2017  7:45 AM CDT   (Arrive by 7:30 AM)   Return Visit with Eagle Baptiste MD   [facility] Ear Nose and Throat ([facility])    [address] [place] [address] [phone]              Who to contact     If you have questions or need follow up information about today's clinic visit or your schedule please contact [facility] directly at [phone].  Normal or non-critical lab and imaging results will be communicated to you by MyChart, letter or phone within 4 business days after the clinic has received the results. If you do not hear from us within 7 days, please contact the clinic through MyChart or phone. If you have a critical or abnormal lab result, we will notify you by phone as soon as possible.  Submit refill requests through Dashbook or call your pharmacy and they will forward the refill request to us. Please allow 3 business days for your refill to be completed.          Additional Information About Your Visit        Care EveryWhere ID     This is your Care EveryWhere ID. This could be used by other organizations to access your [facility] medical records  [id number]        Your Vitals Were     Pulse Respirations Pulse Oximetry             88 16 96%          Blood Pressure from Last 3 Encounters:   10/23/17 125/81   07/27/17 126/80   07/26/17 138/86    Weight from Last 3 Encounters:   08/10/17 (!) 137 kg (302 lb)   07/27/17 (!) 137.1 kg (302 lb 3.2 oz)   07/26/17 136.1 kg (300 lb)              Today, you had the following     No orders found for display         Today's Medication Changes          These changes are accurate as of: 10/23/17  8:38 AM.  If you have any questions, ask your nurse or doctor.               These medicines have changed or have updated prescriptions.        Dose/Directions    fluticasone 110 MCG/ACT Inhaler   Commonly known as:  FLOVENT HFA   This may have changed:    - when to take this  - reasons to take this   Used for:  Mild intermittent asthma without complication        Dose:  2 puff   Inhale 2 puffs into the lungs 2 times daily   Quantity:  3 Inhaler   Refills:  3                Primary Care Provider Office Phone # Fax #    Eveline Berry [phone]       [address] [place] [address]  [facility] [address]        Equal Access to Services     TIAGO  GAAR : Hadii aad ku hadshahriar Paytonali, waaxda luqadaha, qaybta kaalmada adeoriana, betty elenain hayaadewayne chilel yaofina bravo . So [facility] [phone].    ATENCIÓN: Si habla español, tiene a hightower disposición servicios gratuitos de asistencia lingüística. Llame al [phone].    We comply with applicable federal civil rights laws and [place] laws. We do not discriminate on the basis of race, color, national origin, age, disability, sex, sexual orientation, or gender identity.            Thank you!     Thank you for choosing [facility]  for your care. Our goal is always to provide you with excellent care. Hearing back from our patients is one way we can continue to improve our services. Please take a few minutes to complete the written survey that you may receive in the mail after your visit with us. Thank you!             Your Updated Medication List - Protect others around you: Learn how to safely use, store and throw away your medicines at www.disposemymeds.org.          This list is accurate as of: 10/23/17  8:38 AM.  Always use your most recent med list.                   Brand Name Dispense Instructions for use Diagnosis    albuterol 108 (90 BASE) MCG/ACT Inhaler    PROAIR HFA/PROVENTIL HFA/VENTOLIN HFA    3 Inhaler    Inhale 1 puff into the lungs every 6 hours as needed for shortness of breath / dyspnea or wheezing    Mild intermittent asthma without complication       buPROPion 150 MG 24 hr tablet    WELLBUTRIN XL    90 tablet    Take 1 tablet (150 mg) by mouth every morning    Major depressive disorder, recurrent episode, mild (H)       escitalopram 20 MG tablet    LEXAPRO    90 tablet    Take 1 tablet (20 mg) by mouth daily    Depression       fluticasone 110 MCG/ACT Inhaler    FLOVENT HFA    3 Inhaler    Inhale 2 puffs into the lungs 2 times daily    Mild intermittent asthma without complication       folic acid 1 MG tablet    FOLVITE    90 tablet    Take 1 tablet (1 mg) by  mouth daily    Rheumatoid arthritis of multiple sites without rheumatoid factor (H), Encounter for long-term (current) use of medications       gabapentin 300 MG capsule    NEURONTIN    90 capsule    Take 1 capsule (300 mg) by mouth 3 times daily    Fibromyalgia       lidocaine 5 % Patch    LIDODERM    30 patch    Place 1-3 patches onto the skin every 24 hours Patient will call to fill    Polyarthritis, Chronic bilateral low back pain without sciatica       lisinopril 10 MG tablet    PRINIVIL/ZESTRIL    90 tablet    Take 1 tablet (10 mg) by mouth daily    Benign essential hypertension, Mild intermittent asthma without complication       methotrexate 2.5 MG tablet CHEMO     30 tablet    Take 6 tablets (15 mg) by mouth once a week 6 tabs by mouth once a week    Rheumatoid arthritis of multiple sites without rheumatoid factor (H)

## 2017-10-24 ENCOUNTER — OFFICE VISIT (OUTPATIENT)
Dept: INTERNAL MEDICINE | Facility: CLINIC | Age: 52
End: 2017-10-24

## 2017-10-24 VITALS
OXYGEN SATURATION: 97 % | BODY MASS INDEX: 46.26 KG/M2 | RESPIRATION RATE: 20 BRPM | HEART RATE: 80 BPM | SYSTOLIC BLOOD PRESSURE: 111 MMHG | DIASTOLIC BLOOD PRESSURE: 55 MMHG | WEIGHT: 291.3 LBS

## 2017-10-24 DIAGNOSIS — R10.12 ABDOMINAL PAIN, LEFT UPPER QUADRANT: ICD-10-CM

## 2017-10-24 DIAGNOSIS — Z23 NEED FOR PROPHYLACTIC VACCINATION AND INOCULATION AGAINST INFLUENZA: Primary | ICD-10-CM

## 2017-10-24 ASSESSMENT — PAIN SCALES - GENERAL: PAINLEVEL: MODERATE PAIN (4)

## 2017-10-24 NOTE — MR AVS SNAPSHOT
After Visit Summary   10/24/2017    Elizabeth Rosenthal    MRN: [id number]           Patient Information     Date Of Birth          1965        Visit Information        Provider Department      10/24/2017 2:05 PM Lizbeth Leal MD [facility]        Today's Diagnoses     Need for prophylactic vaccination and inoculation against influenza    -  1    Abdominal pain, left upper quadrant          Care Instructions    Primary Care Center: [phone]     Primary Care Center Medication Refill Request Information:  * Please contact your pharmacy regarding ANY request for medication refills.  ** [facility] Prescription Fax = [phone]  * Please allow 3 business days for routine medication refills.  * Please allow 5 business days for controlled substance medication refills.     Primary Care Center Test Result notification information:  *You will be notified with in 7-10 days of your appointment day regarding the results of your test.  If you are on MyChart you will be notified as soon as the provider has reviewed the results and signed off on them.          Follow-ups after your visit        Your next 10 appointments already scheduled     Dec 07, 2017 10:00 AM CST   (Arrive by 9:45 AM)   New Patient Visit with Ivania Esquivel MD   [facility] Ear Nose and Throat ([facility])    [address] [place] [address]  4th [facility] [address] [phone]            Dec 07, 2017  4:00 PM CST   (Arrive by 3:45 PM)   Return Visit with Tyson Ribeiro MD   [facility] Rheumatology ([facility])    [address] [place] [address] [phone]              Who to contact     Please call your clinic at [phone] to:    Ask questions about your health    Make or cancel appointments    Discuss your medicines    Learn about your test results    Speak to your doctor   If you have compliments or concerns about an  experience at your clinic, or if you wish to file a complaint, please contact [facility] Patient Relations at [phone] or email us at [email or web address]         Additional Information About Your Visit        SinglePlatformharPicplum Information     [employer] is an electronic gateway that provides easy, online access to your medical records. With [employer], you can request a clinic appointment, read your test results, renew a prescription or communicate with your care team.     To sign up for [employer] visit the website at [email or web address]   You will be asked to enter the access code listed below, as well as some personal information. Please follow the directions to create your username and password.     Your access code is: [email or web address]  Expires: 2018  7:46 AM     Your access code will  in 90 days. If you need help or a new code, please contact your [facility] or call [phone] for assistance.        Care EveryWhere ID     This is your Care EveryWhere ID. This could be used by other organizations to access your [facility] medical records  [id number]        Your Vitals Were     Pulse Respirations Pulse Oximetry BMI (Body Mass Index)          80 20 97% 46.26 kg/m2         Blood Pressure from Last 3 Encounters:   10/26/17 124/75   10/24/17 111/55   10/23/17 125/81    Weight from Last 3 Encounters:   10/24/17 132.1 kg (291 lb 4.8 oz)   08/10/17 (!) 137 kg (302 lb)   17 (!) 137.1 kg (302 lb 3.2 oz)              Today, you had the following     No orders found for display         Today's Medication Changes          These changes are accurate as of: 10/24/17 11:59 PM.  If you have any questions, ask your nurse or doctor.               These medicines have changed or have updated prescriptions.        Dose/Directions    fluticasone 110 MCG/ACT Inhaler   Commonly known as:  FLOVENT HFA   This may have changed:    - when to take this  - reasons  to take this   Used for:  Mild intermittent asthma without complication        Dose:  2 puff   Inhale 2 puffs into the lungs 2 times daily   Quantity:  3 Inhaler   Refills:  3                Primary Care Provider Office Phone # Fax #    Eveline Berry [phone]       [address] [place] [address]  [facility] [address]        Equal Access to Services     [facility]: Hadii aad ku hadasho Soomaali, waaxda luqadaha, qaybta kaalmada adeegyada, waxay idiin hayaan adeeg khcoreensh laxochitln . So [facility] [phone].    ATENCIÓN: Si habla español, tiene a hightower disposición servicios gratuitos de asistencia lingüística. Lonnieame al [phone].    We comply with applicable federal civil rights laws and [place] laws. We do not discriminate on the basis of race, color, national origin, age, disability, sex, sexual orientation, or gender identity.            Thank you!     Thank you for choosing [facility]  for your care. Our goal is always to provide you with excellent care. Hearing back from our patients is one way we can continue to improve our services. Please take a few minutes to complete the written survey that you may receive in the mail after your visit with us. Thank you!             Your Updated Medication List - Protect others around you: Learn how to safely use, store and throw away your medicines at www.disposemymeds.org.          This list is accurate as of: 10/24/17 11:59 PM.  Always use your most recent med list.                   Brand Name Dispense Instructions for use Diagnosis    albuterol 108 (90 BASE) MCG/ACT Inhaler    PROAIR HFA/PROVENTIL HFA/VENTOLIN HFA    3 Inhaler    Inhale 1 puff into the lungs every 6 hours as needed for shortness of breath / dyspnea or wheezing    Mild intermittent asthma without complication       buPROPion 150 MG 24 hr tablet    WELLBUTRIN XL    90 tablet    Take 1 tablet (150 mg) by mouth every morning    Major depressive disorder, recurrent episode,  mild (H)       escitalopram 20 MG tablet    LEXAPRO    90 tablet    Take 1 tablet (20 mg) by mouth daily    Depression       fluticasone 110 MCG/ACT Inhaler    FLOVENT HFA    3 Inhaler    Inhale 2 puffs into the lungs 2 times daily    Mild intermittent asthma without complication       folic acid 1 MG tablet    FOLVITE    90 tablet    Take 1 tablet (1 mg) by mouth daily    Rheumatoid arthritis of multiple sites without rheumatoid factor (H), Encounter for long-term (current) use of medications       gabapentin 300 MG capsule    NEURONTIN    90 capsule    Take 1 capsule (300 mg) by mouth 3 times daily    Fibromyalgia       lidocaine 5 % Patch    LIDODERM    30 patch    Place 1-3 patches onto the skin every 24 hours Patient will call to fill    Polyarthritis, Chronic bilateral low back pain without sciatica       lisinopril 10 MG tablet    PRINIVIL/ZESTRIL    90 tablet    Take 1 tablet (10 mg) by mouth daily    Benign essential hypertension, Mild intermittent asthma without complication       methotrexate 2.5 MG tablet CHEMO     30 tablet    Take 6 tablets (15 mg) by mouth once a week 6 tabs by mouth once a week    Rheumatoid arthritis of multiple sites without rheumatoid factor (H)

## 2017-10-24 NOTE — PROGRESS NOTES
"                     PRIMARY CARE CENTER       SUBJECTIVE:  Elizabeth Rosenthal is a 52 year old female with a PMHx of RA on methotrexate who presents with multiple issues, the most notable of which are left upper quadrant pain and a bump on her head.     The left upper quadrant pain has been ongoing for the past 6 or so months, she is unsure of when exactly the pain started. She has a lot of \"aches and pain\" from her RA, however she gets worried about new pains that don't go away, not because the pain is bothering her- she states the pain is not that concerning- but that she is worried about the pain representing something larger like a cancer- in the case of the LUQ pain, she is worried about lymphoma.     The pain is dull, constant, not changing with fod or bowel movemetns, in the same place, not changing with inspiration, not better or worse with any positioning. She is also having some back pain for the past week which seems to be in the same dermatomal distribution as the LUQ pain.     She has had a colonoscopy done which was normal, she gets blood work every coup[le of months because she is on methotrexate and requires blood work for it, and it has all been normal.     She also has a bump on her head which is small, and she wanted to get it checked out because she was worried about a non-pigmented melanoma. The bump is not painful, not pigmented, and very small. It also has been present for the past couple of months, and has not grown or changed in size in that time. It does not have a nidus and does not produce pus.     She has not had any symptoms such as unintentional weight loss, fevers chills, or other B symptoms concerning for malignancy. Overall she has not had any medication changes recently and follows most closely with her rheumatologist.     She also has had a sore throat for the past 6 months which she has been followed for by ENT and allergy clinic, but no clear etiology has been found as of yet. "       Medications and allergies reviewed by me today.     ROS:   Negative 10 point ROS except as listed in the HPI.     OBJECTIVE:    /55 (BP Location: Right arm, Patient Position: Chair, Cuff Size: Adult Large)  Pulse 80  Resp 20  Wt 132.1 kg (291 lb 4.8 oz)  SpO2 97%  BMI 46.26 kg/m2   Wt Readings from Last 1 Encounters:   10/24/17 132.1 kg (291 lb 4.8 oz)       GENERAL APPEARANCE: obese, alert and no distress, well groomed     EYES: EOMI, PERRL     NECK: no adenopathy, no asymmetry, masses, or scars and thyroid normal to palpation     RESP: lungs clear to auscultation - no rales, rhonchi or wheezes. No pain in ribs.      CV: regular rates and rhythm, normal S1 S2, no S3 or S4 and no murmur, click or rub     ABDOMEN:  Obese, soft, some tenderness in the left middle abdomen, with deep palpation. No tenderness any where else in abdomen. Spleen tip not felt.      MS: extremities normal- no gross deformities noted, no evidence of inflammation in joints, FROM in all extremities.     SKIN: no suspicious lesions or rashes     NEURO: Normal strength and tone, sensory exam grossly normal, mentation intact and speech normal     PSYCH: mentation appears normal. and affect normal/bright     ASSESSMENT/PLAN:    Elizabeth was seen today for mass, pain, fatigue, numbness and throat problem.    Diagnoses and all orders for this visit:    Need for prophylactic vaccination and inoculation against influenza    Abdominal pain, left upper quadrant    Other orders  -     Cancel: FLU VACCINE, 3 YRS +, IM  [[id number]]     #abdominal pain- lab results that she has done every couple of months have been normal. She also has more labs doen this Thursday for her rheumatologist, so we don't need to do any labs today. Due to no other systemic symptoms other than this pain, which on exam does not seem to be related to the spleen, as it is lower in the body, there is no concern that this is related to malignancy. Due to it not being related to  food or bowel movements, and her colonoscopy being clear, there is very low suspicion that this is related to the GI tract. Most likely this is related to her chronic RA. She denied options for treating the pain, preferring to just be reassured that this is not anything to be concerned about.   - Follow up with Rheumatology both in clinic and lab work.      #Lump on head- this seems to be a small cyst, not pigmented, no nidus, no tenderness on palpation. It is too small to excise at this point, and she is reassured that since it's not getting bigger and not showing any signs of ulceration that there is nothing to do but monitor, and reassurance that this is a benign cyst.   - Monitor.       Pt should return to clinic for f/u with me as needed.     Lizbeth Leal MD  Oct 24, 2017    Pt was seen and plan of care discussed with Dr. Estes.     Attending Addendum:  Patient seen and examined with resident in clinic today.  Pertinent portions of history and exam were independently verified by myself.  I agree with the exam and plan as outlined above with the following modifications: Agree with above, no red flags for abdominal pain.  Likely musculoskeletal as she reports some radiation from the thoracic back on the L and some pain with movement.  Advised stretching, ice/heat, continued monitoring and to let us know if changes/worsens. Patient overall very concerned about symptoms and worried about cancer. Given no red flags or consistent signs/symptoms, reassured her. Advised PCP f/u to review ongoing health concerns.  Janny Estes MD  Internal Medicine

## 2017-10-24 NOTE — PATIENT INSTRUCTIONS
[facility]: [phone]     [facility] Medication Refill Request Information:  * Please contact your pharmacy regarding ANY request for medication refills.  ** [facility] Prescription Fax = [phone]  * Please allow 3 business days for routine medication refills.  * Please allow 5 business days for controlled substance medication refills.     [facility] Test Result notification information:  *You will be notified with in 7-10 days of your appointment day regarding the results of your test.  If you are on MyChart you will be notified as soon as the provider has reviewed the results and signed off on them.

## 2017-10-24 NOTE — NURSING NOTE
"Chief Complaint   Patient presents with     Mass     bump on top of head     Pain     left upper quadrant (had months)     Fatigue     Tired all the time, \" but it's getting worse\"     Numbness     numbness in left side of jaw     Throat Problem     on going sore throat   Nita Stevens LPN 2:07 PM on 10/24/2017  "

## 2017-10-26 ENCOUNTER — OFFICE VISIT (OUTPATIENT)
Dept: OTOLARYNGOLOGY | Facility: CLINIC | Age: 52
End: 2017-10-26

## 2017-10-26 VITALS — DIASTOLIC BLOOD PRESSURE: 75 MMHG | SYSTOLIC BLOOD PRESSURE: 124 MMHG

## 2017-10-26 DIAGNOSIS — J35.01 CHRONIC TONSILLITIS: Primary | ICD-10-CM

## 2017-10-26 DIAGNOSIS — M06.09 RHEUMATOID ARTHRITIS OF MULTIPLE SITES WITHOUT RHEUMATOID FACTOR (H): ICD-10-CM

## 2017-10-26 DIAGNOSIS — T78.1XXD POLLEN-FOOD ALLERGY, SUBSEQUENT ENCOUNTER: ICD-10-CM

## 2017-10-26 DIAGNOSIS — Z79.899 ENCOUNTER FOR LONG-TERM (CURRENT) USE OF MEDICATIONS: ICD-10-CM

## 2017-10-26 LAB
ALBUMIN SERPL-MCNC: 3.2 G/DL (ref 3.4–5)
ALT SERPL W P-5'-P-CCNC: 21 U/L (ref 0–50)
AST SERPL W P-5'-P-CCNC: 10 U/L (ref 0–45)
CREAT SERPL-MCNC: 1.18 MG/DL (ref 0.52–1.04)
CRP SERPL-MCNC: 13.4 MG/L (ref 0–8)
ERYTHROCYTE [DISTWIDTH] IN BLOOD BY AUTOMATED COUNT: 13.9 % (ref 10–15)
GFR SERPL CREATININE-BSD FRML MDRD: 48 ML/MIN/1.7M2
HCT VFR BLD AUTO: 38.1 % (ref 35–47)
HGB BLD-MCNC: 12.3 G/DL (ref 11.7–15.7)
MCH RBC QN AUTO: 31.2 PG (ref 26.5–33)
MCHC RBC AUTO-ENTMCNC: 32.3 G/DL (ref 31.5–36.5)
MCV RBC AUTO: 97 FL (ref 78–100)
PLATELET # BLD AUTO: 316 10E9/L (ref 150–450)
RBC # BLD AUTO: 3.94 10E12/L (ref 3.8–5.2)
WBC # BLD AUTO: 5.6 10E9/L (ref 4–11)

## 2017-10-26 ASSESSMENT — PAIN SCALES - GENERAL: PAINLEVEL: MODERATE PAIN (5)

## 2017-10-26 NOTE — PATIENT INSTRUCTIONS
The patient presents with a history of throat soreness, environmental allergies and oral allergy syndrome. The patient will be referred for a dietician consultation due to her multiple food intolerances and allergies. She will be referred to Dr. Ivania Esquivel to further discuss the pros and cons of a tonsillectomy procedure.

## 2017-10-26 NOTE — NURSING NOTE
Chief Complaint   Patient presents with     RECHECK     chronic sore throat follow up      Blood pressure 124/75, not currently breastfeeding.    Quintin Chavis

## 2017-10-26 NOTE — LETTER
10/26/2017     RE: Elizabeth Rosenthal  [address] [address]     Dear Colleague,    Thank you for referring your patient, Elizabeth Rosenthal, to the [facility] EAR NOSE AND THROAT at [facility]. Please see a copy of my visit note below.    The patient presents with a history of throat soreness for the past three months. The patient reports a history of environmental and food allergies. Her allergy testing was positive and it is believed that she has oral allergy syndrome as well. The patient is having multiple food intolerances, particularly with grains. She would like to have her tonsils removed as they are recurrently becoming infected.     All other systems were reviewed and they are either negative or they are not directly pertinent to this Otolaryngology examination.      Past Medical History:    Past Medical History:   Diagnosis Date     Asthma      Chronic pelvic pain in female      Depression      Hyperlipidemia      Low back pain      Morbid obesity with BMI of 45.0-49.9, adult (H)      SHERIE (obstructive sleep apnea)     has cpap     Polyarthritis      TIA (transient ischemic attack)      Vertebral artery dissection (H)        Past Surgical History:    Past Surgical History:   Procedure Laterality Date     ARTHROSCOPY KNEE BILATERAL       BIOPSY LYMPH NODE CERVICAL       COLONOSCOPY N/A 7/26/2017    Procedure: COMBINED COLONOSCOPY, SINGLE OR MULTIPLE BIOPSY/POLYPECTOMY BY BIOPSY;  colonoscopy;  Surgeon: Thang Saleh MD;  Location: [facility]     TUBAL LIGATION         Medications:      Current Outpatient Prescriptions:      folic acid (FOLVITE) 1 MG tablet, Take 1 tablet (1 mg) by mouth daily, Disp: 90 tablet, Rfl: 3     methotrexate 2.5 MG tablet CHEMO, Take 6 tablets (15 mg) by mouth once a week 6 tabs by mouth once a week, Disp: 30 tablet, Rfl: 5     lidocaine (LIDODERM) 5 % Patch, Place 1-3 patches onto the skin every 24 hours Patient will call to fill,  Disp: 30 patch, Rfl: 1     lisinopril (PRINIVIL/ZESTRIL) 10 MG tablet, Take 1 tablet (10 mg) by mouth daily, Disp: 90 tablet, Rfl: 3     fluticasone (FLOVENT HFA) 110 MCG/ACT Inhaler, Inhale 2 puffs into the lungs 2 times daily (Patient taking differently: Inhale 2 puffs into the lungs 2 times daily as needed ), Disp: 3 Inhaler, Rfl: 3     buPROPion (WELLBUTRIN XL) 150 MG 24 hr tablet, Take 1 tablet (150 mg) by mouth every morning, Disp: 90 tablet, Rfl: 3     albuterol (PROAIR HFA/PROVENTIL HFA/VENTOLIN HFA) 108 (90 BASE) MCG/ACT Inhaler, Inhale 1 puff into the lungs every 6 hours as needed for shortness of breath / dyspnea or wheezing, Disp: 3 Inhaler, Rfl: 3     escitalopram (LEXAPRO) 20 MG tablet, Take 1 tablet (20 mg) by mouth daily, Disp: 90 tablet, Rfl: 2     gabapentin (NEURONTIN) 300 MG capsule, Take 1 capsule (300 mg) by mouth 3 times daily, Disp: 90 capsule, Rfl: 11    Allergies:    Nuts; Barley grass; Codeine; Contrast dye; Oat; Penicillins; Shellfish-derived products; and Yeast    Physical Examination:    The patient is a well developed, well nourished female in no apparent distress.  She is normocephalic, atraumatic with pupils equally round and reactive to light.    Oral Cavity Examination:  Normal mucosa with no masses or lesions, 3+ tonsil hypertrophy not infected.   Nasal Examination: Normal mucosa with no masses or lesions  Neurological Examination: Facial nerve function intact and symmetric  Integumentary Examination: No lesions on the skin of the head and neck      Assessment and Plan:    The patient presents with a history of throat soreness, environmental allergies and oral allergy syndrome. The patient will be referred for a dietician consultation due to her multiple food intolerances and allergies. She will be referred to Dr. Ivnaia Esquivel to further discuss the pros and cons of a tonsillectomy procedure.     Again, thank you for allowing me to participate in the care of your patient.       Sincerely,    Eagle Baptiste MD

## 2017-10-26 NOTE — MR AVS SNAPSHOT
After Visit Summary   10/26/2017    Elizabeth Rosenthal    MRN: [id number]           Patient Information     Date Of Birth          1965        Visit Information        Provider Department      10/26/2017 7:45 AM Eagle Baptiste MD [facility] Ear Nose and Throat        Today's Diagnoses     Chronic tonsillitis    -  1    Pollen-food allergy, subsequent encounter          Care Instructions    The patient presents with a history of throat soreness, environmental allergies and oral allergy syndrome. The patient will be referred for a dietician consultation due to her multiple food intolerances and allergies. She will be referred to Dr. Ivania Esquivel to further discuss the pros and cons of a tonsillectomy procedure.             Follow-ups after your visit        Additional Services     WEIGHT MANAGEMENT/ [facility] LIFESTYLE PROGRAM REFERRAL       Consult for multiple food allergies and proper nutrician while avoiding foods not tolerated                  Your next 10 appointments already scheduled     Oct 27, 2017 10:00 AM CDT   (Arrive by 9:45 AM)   NUTRITION VISIT with Pratima Gary RD   [facility] Surgical Weight Management ([facility] [place])    [address] [place] [address] [phone]            Dec 07, 2017 10:00 AM CST   (Arrive by 9:45 AM)   New Patient Visit with Ivania Esquivel MD   [facility] Ear Nose and Throat ([facility])    [address] [place] [address] [phone]              Future tests that were ordered for you today     Open Future Orders        Priority Expected Expires Ordered    WEIGHT MANAGEMENT/ [facility] LIFESTYLE PROGRAM REFERRAL Routine 11/2/2017 10/26/2018 10/26/2017            Who to contact     Please call your clinic at [phone] to:    Ask questions about your health    Make or cancel appointments    Discuss your medicines    Learn about your test  results    Speak to your doctor   If you have compliments or concerns about an experience at your clinic, or if you wish to file a complaint, please contact [facility] Patient Relations at [phone] or email us at [email or web address]         Additional Information About Your Visit        [employer] Information     Solidarium[employer] is an electronic gateway that provides easy, online access to your medical records. With ServiceBench, you can request a clinic appointment, read your test results, renew a prescription or communicate with your care team.     To sign up for ServiceBench visit the website at [email or web address]   You will be asked to enter the access code listed below, as well as some personal information. Please follow the directions to create your username and password.     Your access code is: [email or web address]  Expires: 2018  7:46 AM     Your access code will  in 90 days. If you need help or a new code, please contact your [facility] or call [phone] for assistance.        Care EveryWhere ID     This is your Care EveryWhere ID. This could be used by other organizations to access your [place] medical records  [id number]         Blood Pressure from Last 3 Encounters:   10/26/17 124/75   10/24/17 111/55   10/23/17 125/81    Weight from Last 3 Encounters:   10/24/17 132.1 kg (291 lb 4.8 oz)   08/10/17 (!) 137 kg (302 lb)   17 (!) 137.1 kg (302 lb 3.2 oz)                 Today's Medication Changes          These changes are accurate as of: 10/26/17  7:58 AM.  If you have any questions, ask your nurse or doctor.               These medicines have changed or have updated prescriptions.        Dose/Directions    fluticasone 110 MCG/ACT Inhaler   Commonly known as:  FLOVENT HFA   This may have changed:    - when to take this  - reasons to take this   Used for:  Mild intermittent asthma without complication        Dose:  2 puff   Inhale  2 puffs into the lungs 2 times daily   Quantity:  3 Inhaler   Refills:  3                Primary Care Provider Office Phone # Fax #    Eveline Berry [phone]       [address] [place] [address]        Equal Access to Services     SUKHWINDERLISA DAMI : Hadii dwayne ku hadrissao Soomaali, waaxda luqadaha, qaybta kaalmada adeegyada, waxay elenain adityan adeandi luisa shelly aponte. So [facility] [phone].    ATENCIÓN: Si habla español, tiene a hightower disposición servicios gratuitos de asistencia lingüística. Llame al [phone].    We comply with applicable federal civil rights laws and [place] laws. We do not discriminate on the basis of race, color, national origin, age, disability, sex, sexual orientation, or gender identity.            Thank you!     Thank you for choosing [facility] EAR NOSE AND THROAT  for your care. Our goal is always to provide you with excellent care. Hearing back from our patients is one way we can continue to improve our services. Please take a few minutes to complete the written survey that you may receive in the mail after your visit with us. Thank you!             Your Updated Medication List - Protect others around you: Learn how to safely use, store and throw away your medicines at www.disposemymeds.org.          This list is accurate as of: 10/26/17  7:58 AM.  Always use your most recent med list.                   Brand Name Dispense Instructions for use Diagnosis    albuterol 108 (90 BASE) MCG/ACT Inhaler    PROAIR HFA/PROVENTIL HFA/VENTOLIN HFA    3 Inhaler    Inhale 1 puff into the lungs every 6 hours as needed for shortness of breath / dyspnea or wheezing    Mild intermittent asthma without complication       buPROPion 150 MG 24 hr tablet    WELLBUTRIN XL    90 tablet    Take 1 tablet (150 mg) by mouth every morning    Major depressive disorder, recurrent episode, mild (H)       escitalopram 20 MG tablet    LEXAPRO    90 tablet    Take 1 tablet (20 mg) by mouth  daily    Depression       fluticasone 110 MCG/ACT Inhaler    FLOVENT HFA    3 Inhaler    Inhale 2 puffs into the lungs 2 times daily    Mild intermittent asthma without complication       folic acid 1 MG tablet    FOLVITE    90 tablet    Take 1 tablet (1 mg) by mouth daily    Rheumatoid arthritis of multiple sites without rheumatoid factor (H), Encounter for long-term (current) use of medications       gabapentin 300 MG capsule    NEURONTIN    90 capsule    Take 1 capsule (300 mg) by mouth 3 times daily    Fibromyalgia       lidocaine 5 % Patch    LIDODERM    30 patch    Place 1-3 patches onto the skin every 24 hours Patient will call to fill    Polyarthritis, Chronic bilateral low back pain without sciatica       lisinopril 10 MG tablet    PRINIVIL/ZESTRIL    90 tablet    Take 1 tablet (10 mg) by mouth daily    Benign essential hypertension, Mild intermittent asthma without complication       methotrexate 2.5 MG tablet CHEMO     30 tablet    Take 6 tablets (15 mg) by mouth once a week 6 tabs by mouth once a week    Rheumatoid arthritis of multiple sites without rheumatoid factor (H)

## 2017-10-26 NOTE — PROGRESS NOTES
The patient presents with a history of throat soreness for the past three months. The patient reports a history of environmental and food allergies. Her allergy testing was positive and it is believed that she has oral allergy syndrome as well. The patient is having multiple food intolerances, particularly with grains. She would like to have her tonsils removed as they are recurrently becoming infected.     All other systems were reviewed and they are either negative or they are not directly pertinent to this Otolaryngology examination.      Past Medical History:    Past Medical History:   Diagnosis Date     Asthma      Chronic pelvic pain in female      Depression      Hyperlipidemia      Low back pain      Morbid obesity with BMI of 45.0-49.9, adult (H)      SHERIE (obstructive sleep apnea)     has cpap     Polyarthritis      TIA (transient ischemic attack)      Vertebral artery dissection (H)        Past Surgical History:    Past Surgical History:   Procedure Laterality Date     ARTHROSCOPY KNEE BILATERAL       BIOPSY LYMPH NODE CERVICAL       COLONOSCOPY N/A 7/26/2017    Procedure: COMBINED COLONOSCOPY, SINGLE OR MULTIPLE BIOPSY/POLYPECTOMY BY BIOPSY;  colonoscopy;  Surgeon: Thang Saleh MD;  Location:  GI     TUBAL LIGATION         Medications:      Current Outpatient Prescriptions:      folic acid (FOLVITE) 1 MG tablet, Take 1 tablet (1 mg) by mouth daily, Disp: 90 tablet, Rfl: 3     methotrexate 2.5 MG tablet CHEMO, Take 6 tablets (15 mg) by mouth once a week 6 tabs by mouth once a week, Disp: 30 tablet, Rfl: 5     lidocaine (LIDODERM) 5 % Patch, Place 1-3 patches onto the skin every 24 hours Patient will call to fill, Disp: 30 patch, Rfl: 1     lisinopril (PRINIVIL/ZESTRIL) 10 MG tablet, Take 1 tablet (10 mg) by mouth daily, Disp: 90 tablet, Rfl: 3     fluticasone (FLOVENT HFA) 110 MCG/ACT Inhaler, Inhale 2 puffs into the lungs 2 times daily (Patient taking differently: Inhale 2 puffs into the lungs 2  times daily as needed ), Disp: 3 Inhaler, Rfl: 3     buPROPion (WELLBUTRIN XL) 150 MG 24 hr tablet, Take 1 tablet (150 mg) by mouth every morning, Disp: 90 tablet, Rfl: 3     albuterol (PROAIR HFA/PROVENTIL HFA/VENTOLIN HFA) 108 (90 BASE) MCG/ACT Inhaler, Inhale 1 puff into the lungs every 6 hours as needed for shortness of breath / dyspnea or wheezing, Disp: 3 Inhaler, Rfl: 3     escitalopram (LEXAPRO) 20 MG tablet, Take 1 tablet (20 mg) by mouth daily, Disp: 90 tablet, Rfl: 2     gabapentin (NEURONTIN) 300 MG capsule, Take 1 capsule (300 mg) by mouth 3 times daily, Disp: 90 capsule, Rfl: 11    Allergies:    Nuts; Barley grass; Codeine; Contrast dye; Oat; Penicillins; Shellfish-derived products; and Yeast    Physical Examination:    The patient is a well developed, well nourished female in no apparent distress.  She is normocephalic, atraumatic with pupils equally round and reactive to light.    Oral Cavity Examination:  Normal mucosa with no masses or lesions, 3+ tonsil hypertrophy not infected.   Nasal Examination: Normal mucosa with no masses or lesions  Neurological Examination: Facial nerve function intact and symmetric  Integumentary Examination: No lesions on the skin of the head and neck      Assessment and Plan:    The patient presents with a history of throat soreness, environmental allergies and oral allergy syndrome. The patient will be referred for a dietician consultation due to her multiple food intolerances and allergies. She will be referred to Dr. Ivania Esquivel to further discuss the pros and cons of a tonsillectomy procedure.

## 2017-10-27 ENCOUNTER — ALLIED HEALTH/NURSE VISIT (OUTPATIENT)
Dept: SURGERY | Facility: CLINIC | Age: 52
End: 2017-10-27

## 2017-10-27 DIAGNOSIS — T78.1XXD POLLEN-FOOD ALLERGY, SUBSEQUENT ENCOUNTER: ICD-10-CM

## 2017-10-27 NOTE — MR AVS SNAPSHOT
MRN:[id number]                      After Visit Summary   10/27/2017    Elizabeth Rosenthal    MRN: [id number]           Visit Information        Provider Department      10/27/2017 10:00 AM Pratima Gary RD [facility] Surgical Weight Management        Your next 10 appointments already scheduled     Dec 07, 2017 10:00 AM CST   (Arrive by 9:45 AM)   New Patient Visit with Ivania Esquivel MD   [facility] Ear Nose and Throat ([facility] [place])    [address] [place] [address]  [facility] [id number] [phone]            Dec 07, 2017  4:00 PM CST   (Arrive by 3:45 PM)   Return Visit with Tyson Ribeiro MD   [facility] Rheumatology ([facility])    [address] [place] [id number] [phone]              MyChart Information     [employer] is an electronic gateway that provides easy, online access to your medical records. With StyleChat by [employer] Mobile, you can request a clinic appointment, read your test results, renew a prescription or communicate with your care team.     To sign up for [employer] visit the website at [email or web address]   You will be asked to enter the access code listed below, as well as some personal information. Please follow the directions to create your username and password.     Your access code is: [email or web address]  Expires: 2018  7:46 AM     Your access code will  in 90 days. If you need help or a new code, please contact your [facility] or call [phone] for assistance.        Care EveryWhere ID     This is your Care EveryWhere ID. This could be used by other organizations to access your [place] medical records  [id number]        Equal Access to Services     TIAGO LOMAX : Garcia Copeland, dipak clarke, betty salavdor. So [facility] [phone].    ATENCIÓN: Si tomy brito hightower  disposición servicios gratuitos de asistencia lingüística. Llame al [phone].    We comply with applicable federal civil rights laws and [place] laws. We do not discriminate on the basis of race, color, national origin, age, disability, sex, sexual orientation, or gender identity.

## 2017-10-27 NOTE — PROGRESS NOTES
Reason for Assessment:  Nutrition education regarding food allergies with cross reactivity,  Diet History: Patient reports a history of multiple food and environmental allergies and intolerances, with recent increase in mostly food related intolerances. Pt has had sore throat, itchy eyes after eating certain foods such as rice and cucumber.  Recently she met with allergy specialist and Her allergy testing was positive and it is believed that she has oral allergy syndrome per MD.  The patient is having multiple food intolerances, particularly with grains, nuts, seeds, certain fruits and vegetables.   - Unable to tolerate potatoes, yeast, oat, barley, Cantaloupe, lettuce, celery, sesame, tree nuts, peanuts, shellfish, tuna to mention some.   - Patient loves to make muffins. Hard time finding tolerable formulations.   Nutrition Diagnosis:  Food- and nutrition-related knowledge deficit r/t difficulties with diet /  lifestyle changes due to allergies as evidence by patients report of needing guidance.   Interventions:  Provided instruction on Gluten free diet, provided list of Gluten containing foods, reviewed fish / shellfish, nuts containing foods. Pt to skin and cook all her fruits and vegetables in boiling water due to oral allergy syndrome. Avoid foods that cause severe allergies such as gluten and fish/shellfish/nuts. Increase intake of legumes and lean meat with sweet potatoes /corn.   Provided handouts on the above diet   Goals:   Patient will verbalize understanding of the above diet  Return demonstration  Follow-up:    Patient to ask any further nutrition-related questions before discharge.  In addition, pt may request outpatient RD appointment.    Time spent: 45 minutes.   Anthony Wilson RD/LD  Pager [phone]  Pratima Gary, MS, RDN, LDN, CLT  Pager: [phone]

## 2017-12-07 ENCOUNTER — OFFICE VISIT (OUTPATIENT)
Dept: OTOLARYNGOLOGY | Facility: CLINIC | Age: 52
End: 2017-12-07

## 2017-12-07 ENCOUNTER — TELEPHONE (OUTPATIENT)
Dept: OTOLARYNGOLOGY | Facility: CLINIC | Age: 52
End: 2017-12-07

## 2017-12-07 ENCOUNTER — OFFICE VISIT (OUTPATIENT)
Dept: RHEUMATOLOGY | Facility: CLINIC | Age: 52
End: 2017-12-07
Attending: INTERNAL MEDICINE
Payer: COMMERCIAL

## 2017-12-07 ENCOUNTER — OFFICE VISIT (OUTPATIENT)
Dept: INTERNAL MEDICINE | Facility: CLINIC | Age: 52
End: 2017-12-07

## 2017-12-07 VITALS
WEIGHT: 293 LBS | SYSTOLIC BLOOD PRESSURE: 123 MMHG | RESPIRATION RATE: 16 BRPM | HEART RATE: 94 BPM | DIASTOLIC BLOOD PRESSURE: 74 MMHG | BODY MASS INDEX: 47.19 KG/M2

## 2017-12-07 VITALS
WEIGHT: 293 LBS | TEMPERATURE: 98.8 F | HEART RATE: 94 BPM | SYSTOLIC BLOOD PRESSURE: 123 MMHG | DIASTOLIC BLOOD PRESSURE: 74 MMHG | BODY MASS INDEX: 47.17 KG/M2

## 2017-12-07 DIAGNOSIS — R10.84 ABDOMINAL PAIN, GENERALIZED: Primary | ICD-10-CM

## 2017-12-07 DIAGNOSIS — J35.01 CHRONIC TONSILLITIS: Primary | ICD-10-CM

## 2017-12-07 DIAGNOSIS — M06.09 RHEUMATOID ARTHRITIS OF MULTIPLE SITES WITHOUT RHEUMATOID FACTOR (H): Primary | ICD-10-CM

## 2017-12-07 DIAGNOSIS — Z79.899 ENCOUNTER FOR LONG-TERM (CURRENT) USE OF MEDICATIONS: ICD-10-CM

## 2017-12-07 DIAGNOSIS — M79.7 FIBROMYALGIA: ICD-10-CM

## 2017-12-07 DIAGNOSIS — F32.0 MILD MAJOR DEPRESSION (H): ICD-10-CM

## 2017-12-07 DIAGNOSIS — R10.84 ABDOMINAL PAIN, GENERALIZED: ICD-10-CM

## 2017-12-07 LAB
ALBUMIN UR-MCNC: NEGATIVE MG/DL
APPEARANCE UR: CLEAR
BILIRUB UR QL STRIP: NEGATIVE
COLOR UR AUTO: ABNORMAL
CREAT SERPL-MCNC: 1.21 MG/DL (ref 0.52–1.04)
CREAT UR-MCNC: 28 MG/DL
GFR SERPL CREATININE-BSD FRML MDRD: 46 ML/MIN/1.7M2
GLUCOSE UR STRIP-MCNC: NEGATIVE MG/DL
HGB UR QL STRIP: NEGATIVE
KETONES UR STRIP-MCNC: NEGATIVE MG/DL
LEUKOCYTE ESTERASE UR QL STRIP: NEGATIVE
MUCOUS THREADS #/AREA URNS LPF: PRESENT /LPF
NITRATE UR QL: NEGATIVE
PH UR STRIP: 7 PH (ref 5–7)
PROT UR-MCNC: <0.05 G/L
PROT/CREAT 24H UR: NORMAL G/G CR (ref 0–0.2)
RBC #/AREA URNS AUTO: 1 /HPF (ref 0–2)
SOURCE: ABNORMAL
SP GR UR STRIP: 1 (ref 1–1.03)
SQUAMOUS #/AREA URNS AUTO: <1 /HPF (ref 0–1)
UROBILINOGEN UR STRIP-MCNC: 0 MG/DL (ref 0–2)
WBC #/AREA URNS AUTO: 1 /HPF (ref 0–2)

## 2017-12-07 PROCEDURE — 99212 OFFICE O/P EST SF 10 MIN: CPT | Mod: ZF

## 2017-12-07 RX ORDER — GABAPENTIN 300 MG/1
300 CAPSULE ORAL 3 TIMES DAILY
Qty: 90 CAPSULE | Refills: 11 | Status: SHIPPED | OUTPATIENT
Start: 2017-12-07 | End: 2018-02-16

## 2017-12-07 RX ORDER — PANTOPRAZOLE SODIUM 20 MG/1
20 TABLET, DELAYED RELEASE ORAL 2 TIMES DAILY
Qty: 180 TABLET | Refills: 3 | Status: SHIPPED | OUTPATIENT
Start: 2017-12-07 | End: 2018-02-19

## 2017-12-07 ASSESSMENT — PAIN SCALES - GENERAL
PAINLEVEL: MODERATE PAIN (4)
PAINLEVEL: MODERATE PAIN (4)

## 2017-12-07 NOTE — NURSING NOTE
Chief Complaint   Patient presents with     RECHECK     Polyarthritis   Pt roomed, vitals, meds, and allergies reviewed with pt. Pt ready for provider.  Jose Rosenthal, CMA

## 2017-12-07 NOTE — PROGRESS NOTES
CC:  Multiple including bdominal pain    S  Sore throat since May  ENT, allergy specialist, back to ENT, planning removal tonsils, has appt tomorrow   Chronic headaches (bilateral temporal) but no migraines, may be clenching more.  Suggested massage rx.  Also c/o fatigue, throat pains, states chronic, persistent stomach pain, intermittent, sharp, bilateral upper abd, wrapping around to back  Seeing rheumatology this afternoon  Has had blood work, has not had imaging.  Has had her abd symptoms in the past, had work up with gyn, GI, has been biopsied for celiac, has colon polyps, has had polyp removal around appendix in past, has hx diverticulosis, has hx colon ulcers (deemed due to NSAIDS), has had EGD in the past, had cyst on fallopian and uterine polyp.  Still has appendix and gallbladder still   Wonder is anything could be hiding in her abdomen and if she needs imaging  No alcohol nor NSAID, drinks one coffee latte daily (tolerates), more than that irritates stomach  ENT ranitine 150 mg bid x 4-5 months w/o relief, was on PPI for one month prior q day  States the only thing that helped lower abd pain in the past was prednisone  Her psychiatrist retired and she hasn't seen one in a long while.  After discussion, she was eventually agreeable to see both health psychology and consult with psychiatry for medication review and advice.  She is not open at this time to lifestyle changes to help with weight loss and for her chronic medical problems   No fevers, chills, weight loss, vomiting.  Tends to have loose stools (her baseline), reports eating mainly one small meal towards the end of the daily, no jaundice, generalized pruritis    Patient Active Problem List   Diagnosis     Iliotibial band syndrome     Right knee pain     Lumbar radicular pain     Polyarthritis     Depression     Benign essential hypertension     Mild intermittent asthma without complication     Morbid obesity (H)     Rheumatoid arthritis of  multiple sites without rheumatoid factor (H)     Fibromyalgia     Encounter for long-term (current) use of medications     SHERIE (obstructive sleep apnea)     Morbid obesity with BMI of 45.0-49.9, adult (H)     Low back pain     Hyperlipidemia     Arthritis of knee, left     Past Surgical History:   Procedure Laterality Date     ARTHROSCOPY KNEE BILATERAL       BIOPSY LYMPH NODE CERVICAL       COLONOSCOPY N/A 7/26/2017    Procedure: COMBINED COLONOSCOPY, SINGLE OR MULTIPLE BIOPSY/POLYPECTOMY BY BIOPSY;  colonoscopy;  Surgeon: Thang Saleh MD;  Location:  GI     ENT SURGERY  lymph node biopsy 2010     TUBAL LIGATION       Current Outpatient Prescriptions   Medication Sig Dispense Refill     folic acid (FOLVITE) 1 MG tablet Take 1 tablet (1 mg) by mouth daily 90 tablet 3     methotrexate 2.5 MG tablet CHEMO Take 6 tablets (15 mg) by mouth once a week 6 tabs by mouth once a week 30 tablet 5     lidocaine (LIDODERM) 5 % Patch Place 1-3 patches onto the skin every 24 hours Patient will call to fill 30 patch 1     lisinopril (PRINIVIL/ZESTRIL) 10 MG tablet Take 1 tablet (10 mg) by mouth daily 90 tablet 3     fluticasone (FLOVENT HFA) 110 MCG/ACT Inhaler Inhale 2 puffs into the lungs 2 times daily (Patient taking differently: Inhale 2 puffs into the lungs 2 times daily as needed ) 3 Inhaler 3     buPROPion (WELLBUTRIN XL) 150 MG 24 hr tablet Take 1 tablet (150 mg) by mouth every morning 90 tablet 3     albuterol (PROAIR HFA/PROVENTIL HFA/VENTOLIN HFA) 108 (90 BASE) MCG/ACT Inhaler Inhale 1 puff into the lungs every 6 hours as needed for shortness of breath / dyspnea or wheezing 3 Inhaler 3     escitalopram (LEXAPRO) 20 MG tablet Take 1 tablet (20 mg) by mouth daily 90 tablet 2     gabapentin (NEURONTIN) 300 MG capsule Take 1 capsule (300 mg) by mouth 3 times daily 90 capsule 11     Allergies   Allergen Reactions     Nuts Itching     Barley Grass GI Disturbance     Codeine Itching     Contrast Dye Itching     Oat Other  (See Comments) and Nausea and Vomiting     abdominal pain       Penicillins Itching     Shellfish-Derived Products Nausea and Vomiting     Yeast Cramps, Diarrhea, Itching and Nausea and Vomiting     /74  Pulse 94  Resp 16  Wt 134.8 kg (297 lb 1.6 oz)  Breastfeeding? No  BMI 47.19 kg/m2  Physical Examination:  General:  Pleasant, alert, no acute distress, obese  Eyes:  Pupils 2-3 mm, sclera white, EOM's full.    Lungs:  Clear to auscultation throughout, no wheezes, rhonchi or rales.  C/V:  Regular rate and rhythm, no murmurs, rubs or gallops.  No JVD, no carotid bruits.  No peripheral edema.   Abdomen:  Normal BS's, not distended. Diffuse tenderness including along the lower ribs (anterior and posterior on left, anterior on right), LUQ, LLQ, less so right side.  No rebound or guarding.  No obvious HSM or masses (body habitus may obscure abnormal findings)  Neuro:  Alert and oriented, face symmetric. No tremor.  Gait steady.    Skin:   No rashes or jaundice.  Normal moisture, good skin turgor.  Psych:  Somewhat anxious and depressed appearing, intermittently tearful    Component      Latest Ref Rng & Units 6/16/2017 10/26/2017   Sodium      133 - 144 mmol/L 141    Potassium      3.4 - 5.3 mmol/L 4.0    Chloride      94 - 109 mmol/L 108    Carbon Dioxide      20 - 32 mmol/L 27    Anion Gap      3 - 14 mmol/L 6    Glucose      70 - 99 mg/dL 95    Urea Nitrogen      7 - 30 mg/dL 17    Creatinine      0.52 - 1.04 mg/dL 1.01 1.18 (H)   GFR Estimate      >60 mL/min/1.7m2 57 (L) 48 (L)   GFR Estimate If Black      >60 mL/min/1.7m2 69 58 (L)   Calcium      8.5 - 10.1 mg/dL 8.1 (L)    Bilirubin Total      0.2 - 1.3 mg/dL 0.4    Albumin      3.4 - 5.0 g/dL 3.4 3.2 (L)   Protein Total      6.8 - 8.8 g/dL 6.8    Alkaline Phosphatase      40 - 150 U/L 85    ALT      0 - 50 U/L 22 21   AST      0 - 45 U/L 14 10   WBC      4.0 - 11.0 10e9/L  5.6   RBC Count      3.8 - 5.2 10e12/L  3.94   Hemoglobin      11.7 - 15.7 g/dL   12.3   Hematocrit      35.0 - 47.0 %  38.1   MCV      78 - 100 fl  97   MCH      26.5 - 33.0 pg  31.2   MCHC      31.5 - 36.5 g/dL  32.3   RDW      10.0 - 15.0 %  13.9   Platelet Count      150 - 450 10e9/L  316   HPV 16 DNA      NEG     HPV 18 DNA      NEG     Other HR HPV      NEG     Final Diagnosis           Specimen Description           Cholesterol      <200 mg/dL 208 (H)    Triglycerides      <150 mg/dL 70    HDL Cholesterol      >49 mg/dL 62    LDL Cholesterol Calculated      <100 mg/dL 132 (H)    Non HDL Cholesterol      <130 mg/dL 146 (H)    Creatinine Urine      mg/dL 199    Albumin Urine mg/L      mg/L 24    Albumin Urine mg/g Cr      0 - 25 mg/g Cr 12.21    PAP           Copath Report           CRP Inflammation      0.0 - 8.0 mg/L  13.4 (H)   Hemoglobin A1C      4.3 - 6.0 % 5.8      Elizabeth was seen today for abdominal pain.    Diagnoses and all orders for this visit:    Abdominal pain, generalized  -     UA with Micro reflex to Culture; Future  -     H Pylori antigen stool; Future  -     CT Abdomen/Pelvis w/o & w contrast; Future.  Hx itching only, with contrast dye.  Controlled with benadryl and prednisone in the past.  Will use per radiology protocol.  -     pantoprazole (PROTONIX) 20 MG EC tablet; Take 1 tablet (20 mg) by mouth 2 times daily x 8 weeks  -     D/C ranitidine  -     Reflux precautions discussed, including stopping caffeine and weight loss, avoid aggravating foods.    Mild major depression (H), recurrent  -     PSYCHIATRY REFERRAL [facility]; Future  -     PSYCHOLOGY ([facility]) REFERRAL; Future    Weight loss.  Resistant to interventions and lifestyle changes at this time.      F/U in 6-8 weeks    Total time spent 40 minutes.  More than 50% of the time spent with Ms. Rosenthal on counseling / coordinating her care      Eveline Berry M.D.  Internal Medicine  [facility]   pager [phone]

## 2017-12-07 NOTE — NURSING NOTE
Relevant Diagnosis: chronic tonsillitis  Teaching Topic: pre op teaching for tonsillectomy    Person(s) involved in teaching:  Patient     Teaching Concerns Addressed:  Pre op teaching included the need for an H&P, NPO status pre op, hospital routines, expected recovery, activity  restrictions, antimicrobial scrub, s/s of infection, pain control methods and the importance of follow up appointments.  The patient voiced an understanding of all instructions and will call with questions.     Motivation Level:  Asks Questions:   Yes  Eager to Learn:   Yes  Cooperative:   Yes  Receptive (willing/able to accept information):   Yes     Patient  demonstrates understanding of the following:  Reason for the appointment, diagnosis and treatment plan:   Yes  Knowledge of proper use of medications and conditions for which they are ordered (with special attention to potential side effects or drug interactions):   Yes  Which situations necessitate calling provider and whom to contact:   Yes        Proper use and care of  (medical equip, care aids, etc.):   NA  Nutritional needs and diet plan:   Yes  Pain management techniques:   Yes  Patient instructed on hand hygiene:  Yes  How and/when to access community resources:   NA     Infection Prevention:  Patient   demonstrates understanding of the following:  Surgical procedure site care taught   Signs and symptoms of infection taught Yes  Wound care taught Yes     Instructional Materials Used/Given: Pre op booklet.

## 2017-12-07 NOTE — MR AVS SNAPSHOT
After Visit Summary   12/7/2017    Elizabeth Rosenthal    MRN: [id number]           Patient Information     Date Of Birth          1965        Visit Information        Provider Department      12/7/2017 9:00 AM Eveline Berry MD [facility]        Today's Diagnoses     Abdominal pain, generalized    -  1    Mild major depression (H)          Care Instructions    Main Scheduling line  [phone]  Psychiatry [phone] ([address])     Discontinue caffeine  Take prescription pantoprazole as directed on label  Manage weight loss  Consider referral to Weight Management or Sports ortho clinic for lifetyle strategies  Consider referral to our Health Psychologist and psychiatrist for medication advice            Lifestyle Changes for Controlling GERD  When you have GERD, stomach acid feels as if it s backing up toward your mouth. Whether or not you take medicine to control your GERD, your symptoms can often be improved with lifestyle changes. Talk to your healthcare provider about the following suggestions. These suggestions may help you get relief from your symptoms.      Raise your head  Reflux is more likely to strike when you re lying down flat, because stomach fluid can flow backward more easily. Raising the head of your bed 4 to 6 inches can help. To do this:    Slide blocks or books under the legs at the head of your bed. Or, place a wedge under the mattress. [employer] can make a suitable wedge for you. The wedge should run from your waist to the top of your head.    Don t just prop your head on several pillows. This increases pressure on your stomach. It can make GERD worse.  Watch your eating habits  Certain foods may increase the acid in your stomach or relax the lower esophageal sphincter. This makes GERD more likely. It s best to avoid the following if they cause you symptoms:    Coffee, tea, and carbonated drinks (with and without caffeine)    Fatty,  fried, or spicy food    Mint, chocolate, onions, and tomatoes    Peppermint    Any other foods that seem to irritate your stomach or cause you pain  Relieve the pressure  Tips include the following:    Eat smaller meals, even if you have to eat more often.    Don t lie down right after you eat. Wait a few hours for your stomach to empty.    Avoid tight belts and tight-fitting clothes.    Lose excess weight.  Tobacco and alcohol  Avoid smoking tobacco and drinking alcohol. They can make GERD symptoms worse.  Date Last Reviewed: 7/1/2016 2000-2017 [employer]. [address] [place] [address]. All rights reserved. This information is not intended as a substitute for professional medical care. Always follow your healthcare professional's instructions.                Follow-ups after your visit        Additional Services     PSYCHIATRY REFERRAL [facility]       Your provider has referred you to: [facility] for a Primary Care Consultation. Please call [phone] to make an appointment.    Clinic is located at:  [facility], 2nd Floor  [address], [address]  [place] [id number]    Please complete the following questions:    Reason for Referral: depression    What specific question is it most critical for you and the patient to have answered? Medication management    You have requested a one-time CONSULTATION visit. This means that although we will make recommendations and provide a multi-step plan where appropriate, the Psychiatry Clinic will not provide ongoing care. We expect that as the referring provider, you will continue to see and manage the patient's care primarily, and we will remain available via Epic for any ongoing questions that arise after the initial visit. In rare and unusually complex cases, we may schedule a follow up visit to complete the assessment, but this should also not be seen as taking over the case.    Is this  acceptable to you? Yes            PSYCHOLOGY ([facility]) REFERRAL                 Follow-up notes from your care team     Return in about 6 weeks (around 1/18/2018) for RTC 4-6 weeks test results, etc..      Your next 10 appointments already scheduled     Dec 07, 2017 10:00 AM CST   (Arrive by 9:45 AM)   New Patient Visit with Ivania Esquivel MD   [facility] ([facility])    [address]  4th Floor  [facility] [address] [phone]            Dec 07, 2017  4:00 PM CST   (Arrive by 3:45 PM)   Return Visit with Tyson Ribeiro MD   [facility] Rheumatology ([facility])    [address]  3rd Floor  [facility] [address] [phone]              Future tests that were ordered for you today     Open Future Orders        Priority Expected Expires Ordered    UA with Micro reflex to Culture Routine 12/7/2017 12/7/2018 12/7/2017    H Pylori antigen stool Routine 12/7/2017 12/7/2018 12/7/2017    CT Abdomen/Pelvis w/o & w contrast Routine  12/7/2018 12/7/2017    PSYCHIATRY REFERRAL [facility] Routine  12/7/2018 12/7/2017    PSYCHOLOGY ([facility] PSYCHOLOGY) REFERRAL Routine  12/7/2018 12/7/2017            Who to contact     Please call your clinic at [phone] to:    Ask questions about your health    Make or cancel appointments    Discuss your medicines    Learn about your test results    Speak to your doctor   If you have compliments or concerns about an experience at your clinic, or if you wish to file a complaint, please contact [facility] Patient Relations at [phone] or email us at [email or web address]         Additional Information About Your Visit        MyChart Information     [employer] gives you secure access to your electronic health record. If you see a primary care provider, you can also send messages to your care team and make appointments. If you have questions,  please call your primary care clinic.  If you do not have a primary care provider, please call [phone] and they will assist you.      [employer] is an electronic gateway that provides easy, online access to your medical records. With [employer], you can request a clinic appointment, read your test results, renew a prescription or communicate with your care team.     To access your existing account, please contact your [facility] or call [phone] for assistance.        Care EveryWhere ID     This is your Care EveryWhere ID. This could be used by other organizations to access your [place] medical records  [id number]        Your Vitals Were     Pulse Respirations Breastfeeding? BMI (Body Mass Index)          94 16 No 47.19 kg/m2         Blood Pressure from Last 3 Encounters:   12/07/17 123/74   10/26/17 124/75   10/24/17 111/55    Weight from Last 3 Encounters:   12/07/17 134.8 kg (297 lb 1.6 oz)   10/24/17 132.1 kg (291 lb 4.8 oz)   08/10/17 (!) 137 kg (302 lb)                 Today's Medication Changes          These changes are accurate as of: 12/7/17  9:43 AM.  If you have any questions, ask your nurse or doctor.               Start taking these medicines.        Dose/Directions    pantoprazole 20 MG EC tablet   Commonly known as:  PROTONIX   Used for:  Abdominal pain, generalized   Started by:  Eveline Berry MD        Dose:  20 mg   Take 1 tablet (20 mg) by mouth 2 times daily   Quantity:  180 tablet   Refills:  3         These medicines have changed or have updated prescriptions.        Dose/Directions    fluticasone 110 MCG/ACT Inhaler   Commonly known as:  FLOVENT HFA   This may have changed:    - when to take this  - reasons to take this   Used for:  Mild intermittent asthma without complication        Dose:  2 puff   Inhale 2 puffs into the lungs 2 times daily   Quantity:  3 Inhaler   Refills:  3            Where to get your medicines      These medications were sent  to [facility]/pharmacy #[id number] - [place], [place] - [address], [facility] [address]     Phone:  [phone]     pantoprazole 20 MG EC tablet                Primary Care Provider Office Phone # Fax #    Eveline Berry [phone]       [address] [place] [address] [address]  [facility] [address]        Equal Access to Services     TIAGO LOMAX : Hadii aad ku hadasho Soomaali, waaxda luqadaha, qaybta kaalmada adeegyada, waxay idiin hayaan adeeg dannycoreenfina lamalachi . So [facility] [phone].    ATENCIÓN: Si habla espjosué, tiene a hightower disposición servicios gratuitos de asistencia lingüística. Llame al [phone].    We comply with applicable federal civil rights laws and [place] laws. We do not discriminate on the basis of race, color, national origin, age, disability, sex, sexual orientation, or gender identity.            Thank you!     Thank you for choosing [facility]  for your care. Our goal is always to provide you with excellent care. Hearing back from our patients is one way we can continue to improve our services. Please take a few minutes to complete the written survey that you may receive in the mail after your visit with us. Thank you!             Your Updated Medication List - Protect others around you: Learn how to safely use, store and throw away your medicines at www.disposemymeds.org.          This list is accurate as of: 12/7/17  9:43 AM.  Always use your most recent med list.                   Brand Name Dispense Instructions for use Diagnosis    albuterol 108 (90 BASE) MCG/ACT Inhaler    PROAIR HFA/PROVENTIL HFA/VENTOLIN HFA    3 Inhaler    Inhale 1 puff into the lungs every 6 hours as needed for shortness of breath / dyspnea or wheezing    Mild intermittent asthma without complication       buPROPion 150 MG 24 hr tablet    WELLBUTRIN XL    90 tablet    Take 1 tablet (150 mg) by mouth every morning    Major depressive disorder, recurrent episode,  mild (H)       escitalopram 20 MG tablet    LEXAPRO    90 tablet    Take 1 tablet (20 mg) by mouth daily    Depression       fluticasone 110 MCG/ACT Inhaler    FLOVENT HFA    3 Inhaler    Inhale 2 puffs into the lungs 2 times daily    Mild intermittent asthma without complication       folic acid 1 MG tablet    FOLVITE    90 tablet    Take 1 tablet (1 mg) by mouth daily    Rheumatoid arthritis of multiple sites without rheumatoid factor (H), Encounter for long-term (current) use of medications       gabapentin 300 MG capsule    NEURONTIN    90 capsule    Take 1 capsule (300 mg) by mouth 3 times daily    Fibromyalgia       lidocaine 5 % Patch    LIDODERM    30 patch    Place 1-3 patches onto the skin every 24 hours Patient will call to fill    Polyarthritis, Chronic bilateral low back pain without sciatica       lisinopril 10 MG tablet    PRINIVIL/ZESTRIL    90 tablet    Take 1 tablet (10 mg) by mouth daily    Benign essential hypertension, Mild intermittent asthma without complication       methotrexate 2.5 MG tablet CHEMO     30 tablet    Take 6 tablets (15 mg) by mouth once a week 6 tabs by mouth once a week    Rheumatoid arthritis of multiple sites without rheumatoid factor (H)       pantoprazole 20 MG EC tablet    PROTONIX    180 tablet    Take 1 tablet (20 mg) by mouth 2 times daily    Abdominal pain, generalized

## 2017-12-07 NOTE — NURSING NOTE
Chief Complaint   Patient presents with     Abdominal Pain     pt is here to discuss on going abdmonial pain       Radha Monge CMA at 8:45 AM on 12/7/2017

## 2017-12-07 NOTE — MR AVS SNAPSHOT
After Visit Summary   12/7/2017    Elizabeth Rosenthal    MRN: [id number]           Patient Information     Date Of Birth          1965        Visit Information        Provider Department      12/7/2017 4:00 PM Tyson Ribeiro MD [facility] Rheumatology        Today's Diagnoses     Rheumatoid arthritis of multiple sites without rheumatoid factor (H)    -  1    Encounter for long-term (current) use of medications        Fibromyalgia           Follow-ups after your visit        Follow-up notes from your care team     Return in about 6 months (around 6/7/2018).      Your next 10 appointments already scheduled     Dec 11, 2017  6:00 PM CST   (Arrive by 5:45 PM)   CT ABDOMEN PELVIS W/O & W CONTRAST with UCCT1   [facility] [place] [facility] ([facility])    [address] [address] [phone]           Please bring any scans or X-rays taken at other hospitals, if similar tests were done. Also bring a list of your medicines, including vitamins, minerals and over-the-counter drugs. It is safest to leave personal items at home.  Be sure to tell your doctor:   If you have any allergies.   If there s any chance you are pregnant.   If you are breastfeeding.   If you have any special needs.  You may have contrast for this exam. To prepare:   Do not eat or drink for 2 hours before your exam. If you need to take medicine, you may take it with small sips of water. (We may ask you to take liquid medicine as well.)   The day before your exam, drink extra fluids at least six 8-ounce glasses (unless your doctor tells you to restrict your fluids).  Patients over 70 or patients with diabetes or kidney problems:   If you haven t had a blood test (creatinine test) within the last 30 days, go to your clinic or Diagnostic Imaging Department for this test.  If you have diabetes:   If your kidney function is normal, continue taking your metformin (Avandamet, Glucophage,  Glucovance, Metaglip) on the day of your exam.   If your kidney function is abnormal, wait 48 hours before restarting this medicine.  You will have oral contrast for this exam:   You will drink the contrast at home. Get this from your clinic or Diagnostic Imaging Department. Please follow the directions given.  Please wear loose clothing, such as a sweat suit or jogging clothes. Avoid snaps, zippers and other metal. We may ask you to undress and put on a hospital gown.  If you have any questions, please call the Imaging Department where you will have your exam.            Jun 14, 2018  6:00 PM CDT   (Arrive by 5:45 PM)   Return Visit with Tyson Ribeiro MD   [facility] Rheumatology ([facility])    [address] [place] [address] [phone]              Future tests that were ordered for you today     Open Future Orders        Priority Expected Expires Ordered    CT Abdomen/Pelvis w/o & w contrast Routine  12/7/2018 12/7/2017    PSYCHIATRY REFERRAL UMP Routine  12/7/2018 12/7/2017    PSYCHOLOGY ([facility]) REFERRAL Routine  12/7/2018 12/7/2017            Who to contact     If you have questions or need follow up information about today's clinic visit or your schedule please contact [facility] RHEUMATOLOGY directly at [phone].  Normal or non-critical lab and imaging results will be communicated to you by MyChart, letter or phone within 4 business days after the clinic has received the results. If you do not hear from us within 7 days, please contact the clinic through [employer] or phone. If you have a critical or abnormal lab result, we will notify you by phone as soon as possible.  Submit refill requests through [employer] or call your pharmacy and they will forward the refill request to us. Please allow 3 business days for your refill to be completed.          Additional Information About Your Visit        [employer] Information     [employer] gives you  secure access to your electronic health record. If you see a primary care provider, you can also send messages to your care team and make appointments. If you have questions, please call your primary care clinic.  If you do not have a primary care provider, please call [phone] and they will assist you.        Care EveryWhere ID     This is your Care EveryWhere ID. This could be used by other organizations to access your [place] medical records  [id number]        Your Vitals Were     Pulse Temperature BMI (Body Mass Index)             94 98.8  F (37.1  C) (Oral) 47.17 kg/m2          Blood Pressure from Last 3 Encounters:   12/07/17 123/74   12/07/17 123/74   10/26/17 124/75    Weight from Last 3 Encounters:   12/07/17 134.7 kg (297 lb)   12/07/17 134.8 kg (297 lb 1.6 oz)   10/24/17 132.1 kg (291 lb 4.8 oz)              Today, you had the following     No orders found for display         Today's Medication Changes          These changes are accurate as of: 12/7/17  4:38 PM.  If you have any questions, ask your nurse or doctor.               Start taking these medicines.        Dose/Directions    pantoprazole 20 MG EC tablet   Commonly known as:  PROTONIX   Used for:  Abdominal pain, generalized   Started by:  Eveline Berry MD        Dose:  20 mg   Take 1 tablet (20 mg) by mouth 2 times daily   Quantity:  180 tablet   Refills:  3         These medicines have changed or have updated prescriptions.        Dose/Directions    fluticasone 110 MCG/ACT Inhaler   Commonly known as:  FLOVENT HFA   This may have changed:    - when to take this  - reasons to take this   Used for:  Mild intermittent asthma without complication        Dose:  2 puff   Inhale 2 puffs into the lungs 2 times daily   Quantity:  3 Inhaler   Refills:  3            Where to get your medicines      These medications were sent to [id number] - [place], [place] - [address]  [address] [facility] [address]      Phone:  [phone]     gabapentin 300 MG capsule    methotrexate 2.5 MG tablet CHEMO    pantoprazole 20 MG EC tablet                Primary Care Provider Office Phone # Fax #    Eveline Berry [phone]       [address] [place] [address] [address]  [facility] [address]        Equal Access to Services     TIAGO LOMAX : Hadii aad ku hadasho Soomaali, waaxda luqadaha, qaybta kaalmada adeegyada, betty calderón adityan adeandi samayoafina bravo . So [facility] [phone].    ATENCIÓN: Si habla español, tiene a hightower disposición servicios gratuitos de asistencia lingüística. [facility] [phone].    We comply with applicable federal civil rights laws and [place] laws. We do not discriminate on the basis of race, color, national origin, age, disability, sex, sexual orientation, or gender identity.            Thank you!     Thank you for choosing [facility] RHEUMATOLOGY  for your care. Our goal is always to provide you with excellent care. Hearing back from our patients is one way we can continue to improve our services. Please take a few minutes to complete the written survey that you may receive in the mail after your visit with us. Thank you!             Your Updated Medication List - Protect others around you: Learn how to safely use, store and throw away your medicines at www.disposemymeds.org.          This list is accurate as of: 12/7/17  4:38 PM.  Always use your most recent med list.                   Brand Name Dispense Instructions for use Diagnosis    albuterol 108 (90 BASE) MCG/ACT Inhaler    PROAIR HFA/PROVENTIL HFA/VENTOLIN HFA    3 Inhaler    Inhale 1 puff into the lungs every 6 hours as needed for shortness of breath / dyspnea or wheezing    Mild intermittent asthma without complication       buPROPion 150 MG 24 hr tablet    WELLBUTRIN XL    90 tablet    Take 1 tablet (150 mg) by mouth every morning    Major depressive disorder, recurrent episode, mild (H)       escitalopram 20 MG tablet    LEXAPRO    90 tablet     Take 1 tablet (20 mg) by mouth daily    Depression       fluticasone 110 MCG/ACT Inhaler    FLOVENT HFA    3 Inhaler    Inhale 2 puffs into the lungs 2 times daily    Mild intermittent asthma without complication       folic acid 1 MG tablet    FOLVITE    90 tablet    Take 1 tablet (1 mg) by mouth daily    Rheumatoid arthritis of multiple sites without rheumatoid factor (H), Encounter for long-term (current) use of medications       gabapentin 300 MG capsule    NEURONTIN    90 capsule    Take 1 capsule (300 mg) by mouth 3 times daily    Fibromyalgia       lidocaine 5 % Patch    LIDODERM    30 patch    Place 1-3 patches onto the skin every 24 hours Patient will call to fill    Polyarthritis, Chronic bilateral low back pain without sciatica       lisinopril 10 MG tablet    PRINIVIL/ZESTRIL    90 tablet    Take 1 tablet (10 mg) by mouth daily    Benign essential hypertension, Mild intermittent asthma without complication       methotrexate 2.5 MG tablet CHEMO     30 tablet    Take 6 tablets (15 mg) by mouth once a week 6 tabs by mouth once a week    Rheumatoid arthritis of multiple sites without rheumatoid factor (H)       pantoprazole 20 MG EC tablet    PROTONIX    180 tablet    Take 1 tablet (20 mg) by mouth 2 times daily    Abdominal pain, generalized

## 2017-12-07 NOTE — PROGRESS NOTES
Rheumatology Visit     Elizabeth Rosenthal MRN# [id number]   YOB: 1965 Age: 52 year old     Date of Visit: December 7, 2017  Primary care provider: Eveline Berry          Assessment and Plan:   51 yo female with history consistent with seronegative RA.  She also has a component of fibromyalgia that contributes to symptoms, and well as known DJD of knees and spine.      She overall has done well with Methotrexate therapy without problems.  It was not clear from prior symptoms and exam that additional medication was needed from an inflammatory arthritis standpoint.  However she still has impact on ADLs from symptoms that sound at least partially inflammatory and not due to LBP or knee damage or fibromyalgia.      She did not improve with a short time on Sulfasalazine.  Respiratory side effects would be unusual, but she does not wish to continue. She also is not interested now in an alternative biologic.      Her current symptoms also relate to soft tissue pain and LBP with known DJD.  She is benefitting from resuming Gabapentin at 900 mg per day.      PLAN: discussed in detail with the patient.      1.  Lab is current with open orders  2. After discussion of risks and benefits, we will continue on Methotrexate monotherapy.  3. I renewed Methotrexate and Folic Acid  4. Rx for Gabapentin is current. I renewed lidocaine patches at her request  5. Return in 5 months  6. Flu shot is current  7. She would not need to necessarily hold Methotrexate for upcoming elective ENT surgery      Tyson Ribeiro MD            History of Present Illness:    52 year old female who presents for followup of polyarthritis and fibromyalgia.  Previously followed by Dr. Stokes [phone], Dr. SANDEE Reis in 2015.  [facility] and [facility] reviewed.  I saw her last in July 2017.       HISTORY CARRIED FORWARD:       She has a several year history of joint pain diagnosed as seronegative polyarthritis/seronegative RA.  She has had  swelling and pain in hands and feet.  No deformities or erosive change. Prior RF and CCP negative.  She has generally by records had normal ESR and CRP.  She had a Vectra DA by Dr. Reis last year that was high at 53.      She was begun on Methotrexate by Dr. Stokes in 2012. She has been on 15 mg weekly since that time without side effects or lab abnl.  She takes Folic acid OTC.        When she saw Dr. Reis last year he wanted to add an anti TNF. Remicade was denied. She took Humira for two months and could not tell a difference overall although her hands probably were better.      Her major symptoms at initial appt had to do with soft tissue pain with her dx of fibromyalgia.  She has pain that waxes and wanes but worse over past two years, primarily in upper back and neck radiating to arms.  R side most bothersome now for months. She has had prior PT and OT and pool therapy.  She uses Lidoderm patches. She was on Gabapentin but stopped two years ago as sx were minimal.      We continued Methotrexate but also added Sulfasalazine.  She had called with an intercurrent URI on [place] and her medications were held for a time.  She has seen her PCP and had another round of antibiotics. However she still seems to have recurrent cough.      She decided to stop the SSZ as it didn't seem to be helping and she wondered if it was causing respiratory sx.     INTERVAL HISTORY:        Overall her symptoms are little changed since last visit in July.  She denies side effects on the medication.  Her labs in October were normal/stable.  She will notice more symptoms even if just one or two days late for Methotrexate.      She has more symptoms with more activity.      She notes again that in retrospect her Humira trial did help her hands but not her back or knees, which we discussed would not be surprising as these would likely be due to her OA and fibromyalgia.  She stopped it as effect she felt was not worth the cost.  She still  feels this way.  We discussed the hope that biosimilar Adalimumab may be available later this year, hopefully at significantly lower costs.      She has minor AM stiffness at this time.  No specific joint swelling but hands are puffy.     She notes a persistent sore throat despite various maneuvers. She is scheduled for upcoming tonsillectomy.      She has known DJD/disc bulge in L spine; she has had two prior L Knee arthroscopies.      Rheumatologic ROS otherwise negative.       Review of Systems:   Review Of Systems  Skin: negative  Eyes: negative  Ears/Nose/Throat: see HPI  Respiratory: No shortness of breath, dyspnea on exertion, cough, or hemoptysis  Cardiovascular: negative  Gastrointestinal: negative  Genitourinary: negative  Musculoskeletal:see HPI  Neurologic: negative  Psychiatric: negative  Hematologic/Lymphatic/Immunologic: negative  Endocrine: negative          Past Medical History:     Past Medical History:   Diagnosis Date     Allergic rhinitis      Arthritis      Asthma      Autoimmune disease (H) 2011     Chronic pelvic pain in female      Depression      Depressive disorder      Gastroesophageal reflux disease      Hyperlipidemia      Hypertension      Immunosuppression (H)      Low back pain      Migraines      Morbid obesity with BMI of 45.0-49.9, adult (H)      Obstructive sleep apnea 2012     SHERIE (obstructive sleep apnea)     has cpap     Polyarthritis      Reduced vision 2012     TIA (transient ischemic attack)      Vertebral artery dissection (H)        Patient Active Problem List    Diagnosis Date Noted     Arthritis of knee, left 07/21/2016     Priority: Medium     Hyperlipidemia      Priority: Medium     SHREIE (obstructive sleep apnea)      Priority: Medium     has cpap       Morbid obesity with BMI of 45.0-49.9, adult (H)      Priority: Medium     Low back pain      Priority: Medium     Rheumatoid arthritis of multiple sites without rheumatoid factor (H) 05/25/2016     Priority: Medium      Fibromyalgia 05/25/2016     Priority: Medium     Encounter for long-term (current) use of medications 05/25/2016     Priority: Medium     Polyarthritis 03/22/2016     Priority: Medium     Depression 03/22/2016     Priority: Medium     Benign essential hypertension 03/22/2016     Priority: Medium     Mild intermittent asthma without complication 03/22/2016     Priority: Medium     Morbid obesity (H) 03/22/2016     Priority: Medium     Iliotibial band syndrome 01/31/2011     Priority: Medium     Right knee pain 01/31/2011     Priority: Medium     Lumbar radicular pain 01/31/2011     Priority: Medium             Past Surgical History:     Past Surgical History:   Procedure Laterality Date     ARTHROSCOPY KNEE BILATERAL       BIOPSY LYMPH NODE CERVICAL       COLONOSCOPY N/A 7/26/2017    Procedure: COMBINED COLONOSCOPY, SINGLE OR MULTIPLE BIOPSY/POLYPECTOMY BY BIOPSY;  colonoscopy;  Surgeon: Thang Saleh MD;  Location:  GI     ENT SURGERY  lymph node biopsy 2010     TUBAL LIGATION               Social History:     Social History   Substance Use Topics     Smoking status: Never Smoker     Smokeless tobacco: Never Used     Alcohol use 0.0 oz/week     0 Standard drinks or equivalent per week      Comment: Occasional             Family History:     Family History   Problem Relation Age of Onset     Breast Cancer Mother      50s     CANCER Mother      HEART DISEASE Father      Substance Abuse Father      MENTAL ILLNESS Father      Asthma Paternal Grandmother      CEREBROVASCULAR DISEASE Paternal Grandmother      Migraines Son      Migraines Daughter             Allergies:     Allergies   Allergen Reactions     Nuts Itching     Barley Grass GI Disturbance     Codeine Itching     Contrast Dye Itching     Oat Other (See Comments) and Nausea and Vomiting     abdominal pain       Penicillins Itching     Shellfish-Derived Products Nausea and Vomiting     Yeast Cramps, Diarrhea, Itching and Nausea and Vomiting              Medications:     Current Outpatient Prescriptions   Medication Sig Dispense Refill     pantoprazole (PROTONIX) 20 MG EC tablet Take 1 tablet (20 mg) by mouth 2 times daily 180 tablet 3     folic acid (FOLVITE) 1 MG tablet Take 1 tablet (1 mg) by mouth daily 90 tablet 3     methotrexate 2.5 MG tablet CHEMO Take 6 tablets (15 mg) by mouth once a week 6 tabs by mouth once a week 30 tablet 5     lidocaine (LIDODERM) 5 % Patch Place 1-3 patches onto the skin every 24 hours Patient will call to fill 30 patch 1     lisinopril (PRINIVIL/ZESTRIL) 10 MG tablet Take 1 tablet (10 mg) by mouth daily 90 tablet 3     fluticasone (FLOVENT HFA) 110 MCG/ACT Inhaler Inhale 2 puffs into the lungs 2 times daily (Patient taking differently: Inhale 2 puffs into the lungs 2 times daily as needed ) 3 Inhaler 3     buPROPion (WELLBUTRIN XL) 150 MG 24 hr tablet Take 1 tablet (150 mg) by mouth every morning 90 tablet 3     albuterol (PROAIR HFA/PROVENTIL HFA/VENTOLIN HFA) 108 (90 BASE) MCG/ACT Inhaler Inhale 1 puff into the lungs every 6 hours as needed for shortness of breath / dyspnea or wheezing 3 Inhaler 3     escitalopram (LEXAPRO) 20 MG tablet Take 1 tablet (20 mg) by mouth daily 90 tablet 2     gabapentin (NEURONTIN) 300 MG capsule Take 1 capsule (300 mg) by mouth 3 times daily 90 capsule 11            Physical Exam:   Blood pressure 123/74, pulse 94, temperature 98.8  F (37.1  C), temperature source Oral, weight 134.7 kg (297 lb), not currently breastfeeding.  Constitutional: [id number]-WN-[id number] cooperative  Eyes: nl conjunctiva, sclera  ENT: nl external ears, nose, throat  No mucous membrane lesions, normal saliva pool  Neck: no mass or thyroid enlargement  GI: no ABD mass or tenderness, no HSM  : not tested  Lymph: no cervical, supraclavicular, inguinal or epitrochlear nodes  MS: All TMJ, neck, shoulder, elbow, wrist, MCP/PIP/DIP, spine, hip, knee, ankle, and foot MTP/IP joints were examined and  found without definite active synovitis or  deformity. Full ROM.  Normal  strength. Minimal discomfort with finger curl, palpation of knees L>R, and both ankles.  Multiple tender points especially R trapezius and rhomboids, epicondyles. No dactylitis,  tenosynovitis, enthesopathy    Skin: no nail pitting, alopecia, rash, nodules or lesions  Neuro: nl cranial nerves, strength  Psych: nl affect.       Data:     Lab Results   Component Value Date    WBC 5.6 10/26/2017    WBC 6.8 05/23/2017    WBC 6.1 11/23/2016    HGB 12.3 10/26/2017    HGB 13.4 05/23/2017    HGB 12.6 11/23/2016    HCT 38.1 10/26/2017    HCT 40.0 05/23/2017    HCT 38.5 11/23/2016    MCV 97 10/26/2017    MCV 92 05/23/2017    MCV 93 11/23/2016     10/26/2017     05/23/2017     11/23/2016     Lab Results   Component Value Date    BUN 17 06/16/2017    BUN 19 03/22/2016     No components found for: SEDRATE  No results found for: TSH  Lab Results   Component Value Date    AST 10 10/26/2017    AST 14 06/16/2017    AST 17 05/23/2017    ALT 21 10/26/2017    ALT 22 06/16/2017    ALT 25 05/23/2017    ALKPHOS 85 06/16/2017    ALKPHOS 78 03/22/2016     Reviewed Rheumatology lab flowsheet    Tyson Ribeiro

## 2017-12-07 NOTE — NURSING NOTE
Chief Complaint   Patient presents with     Consult     Discuss tonsillectomy. Has had sore throat for roughly 6 months.     Quintin Chavis

## 2017-12-07 NOTE — LETTER
12/7/2017      RE: Elizabeth Rosenthal  [address] [facility] [address]       Rheumatology Visit     Elizabeth Rosenthal MRN# [id number]   YOB: 1965 Age: 52 year old     Date of Visit: December 7, 2017  Primary care provider: Eveline Berry          Assessment and Plan:   53 yo female with history consistent with seronegative RA.  She also has a component of fibromyalgia that contributes to symptoms, and well as known DJD of knees and spine.      She overall has done well with Methotrexate therapy without problems.  It was not clear from prior symptoms and exam that additional medication was needed from an inflammatory arthritis standpoint.  However she still has impact on ADLs from symptoms that sound at least partially inflammatory and not due to LBP or knee damage or fibromyalgia.      She did not improve with a short time on Sulfasalazine.  Respiratory side effects would be unusual, but she does not wish to continue. She also is not interested now in an alternative biologic.      Her current symptoms also relate to soft tissue pain and LBP with known DJD.  She is benefitting from resuming Gabapentin at 900 mg per day.      PLAN: discussed in detail with the patient.      1.  Lab is current with open orders  2. After discussion of risks and benefits, we will continue on Methotrexate monotherapy.  3. I renewed Methotrexate and Folic Acid  4. Rx for Gabapentin is current. I renewed lidocaine patches at her request  5. Return in 5 months  6. Flu shot is current  7. She would not need to necessarily hold Methotrexate for upcoming elective ENT surgery      Tyson Ribeiro MD            History of Present Illness:    52 year old female who presents for followup of polyarthritis and fibromyalgia.  Previously followed by Dr. Stokes [phone], Dr. SANDEE Reis in 2015.  [facility] and [facility] records reviewed.  I saw her last in July 2017.       HISTORY CARRIED FORWARD:       She has a several year history  of joint pain diagnosed as seronegative polyarthritis/seronegative RA.  She has had swelling and pain in hands and feet.  No deformities or erosive change. Prior RF and CCP negative.  She has generally by records had normal ESR and CRP.  She had a Vectra DA by Dr. Reis last year that was high at 53.      She was begun on Methotrexate by Dr. Stokes in 2012. She has been on 15 mg weekly since that time without side effects or lab abnl.  She takes Folic acid OTC.        When she saw Dr. Reis last year he wanted to add an anti TNF. Remicade was denied. She took Humira for two months and could not tell a difference overall although her hands probably were better.      Her major symptoms at initial appt had to do with soft tissue pain with her dx of fibromyalgia.  She has pain that waxes and wanes but worse over past two years, primarily in upper back and neck radiating to arms.  R side most bothersome now for months. She has had prior PT and OT and pool therapy.  She uses Lidoderm patches. She was on Gabapentin but stopped two years ago as sx were minimal.      We continued Methotrexate but also added Sulfasalazine.  She had called with an intercurrent URI on [place] and her medications were held for a time.  She has seen her PCP and had another round of antibiotics. However she still seems to have recurrent cough.      She decided to stop the SSZ as it didn't seem to be helping and she wondered if it was causing respiratory sx.     INTERVAL HISTORY:        Overall her symptoms are little changed since last visit in July.  She denies side effects on the medication.  Her labs in October were normal/stable.  She will notice more symptoms even if just one or two days late for Methotrexate.      She has more symptoms with more activity.      She notes again that in retrospect her Humira trial did help her hands but not her back or knees, which we discussed would not be surprising as these would likely be due to her OA and  fibromyalgia.  She stopped it as effect she felt was not worth the cost.  She still feels this way.  We discussed the hope that biosimilar Adalimumab may be available later this year, hopefully at significantly lower costs.      She has minor AM stiffness at this time.  No specific joint swelling but hands are puffy.     She notes a persistent sore throat despite various maneuvers. She is scheduled for upcoming tonsillectomy.      She has known DJD/disc bulge in L spine; she has had two prior L Knee arthroscopies.      Rheumatologic ROS otherwise negative.       Review of Systems:   Review Of Systems  Skin: negative  Eyes: negative  Ears/Nose/Throat: see HPI  Respiratory: No shortness of breath, dyspnea on exertion, cough, or hemoptysis  Cardiovascular: negative  Gastrointestinal: negative  Genitourinary: negative  Musculoskeletal:see HPI  Neurologic: negative  Psychiatric: negative  Hematologic/Lymphatic/Immunologic: negative  Endocrine: negative          Past Medical History:     Past Medical History:   Diagnosis Date     Allergic rhinitis      Arthritis      Asthma      Autoimmune disease (H) 2011     Chronic pelvic pain in female      Depression      Depressive disorder      Gastroesophageal reflux disease      Hyperlipidemia      Hypertension      Immunosuppression (H)      Low back pain      Migraines      Morbid obesity with BMI of 45.0-49.9, adult (H)      Obstructive sleep apnea 2012     SHERIE (obstructive sleep apnea)     has cpap     Polyarthritis      Reduced vision 2012     TIA (transient ischemic attack)      Vertebral artery dissection (H)        Patient Active Problem List    Diagnosis Date Noted     Arthritis of knee, left 07/21/2016     Priority: Medium     Hyperlipidemia      Priority: Medium     SHERIE (obstructive sleep apnea)      Priority: Medium     has cpap       Morbid obesity with BMI of 45.0-49.9, adult (H)      Priority: Medium     Low back pain      Priority: Medium     Rheumatoid arthritis  of multiple sites without rheumatoid factor (H) 05/25/2016     Priority: Medium     Fibromyalgia 05/25/2016     Priority: Medium     Encounter for long-term (current) use of medications 05/25/2016     Priority: Medium     Polyarthritis 03/22/2016     Priority: Medium     Depression 03/22/2016     Priority: Medium     Benign essential hypertension 03/22/2016     Priority: Medium     Mild intermittent asthma without complication 03/22/2016     Priority: Medium     Morbid obesity (H) 03/22/2016     Priority: Medium     Iliotibial band syndrome 01/31/2011     Priority: Medium     Right knee pain 01/31/2011     Priority: Medium     Lumbar radicular pain 01/31/2011     Priority: Medium             Past Surgical History:     Past Surgical History:   Procedure Laterality Date     ARTHROSCOPY KNEE BILATERAL       BIOPSY LYMPH NODE CERVICAL       COLONOSCOPY N/A 7/26/2017    Procedure: COMBINED COLONOSCOPY, SINGLE OR MULTIPLE BIOPSY/POLYPECTOMY BY BIOPSY;  colonoscopy;  Surgeon: Thang Saleh MD;  Location:  GI     ENT SURGERY  lymph node biopsy 2010     TUBAL LIGATION               Social History:     Social History   Substance Use Topics     Smoking status: Never Smoker     Smokeless tobacco: Never Used     Alcohol use 0.0 oz/week     0 Standard drinks or equivalent per week      Comment: Occasional          Family History:     Family History   Problem Relation Age of Onset     Breast Cancer Mother      50s     CANCER Mother      HEART DISEASE Father      Substance Abuse Father      MENTAL ILLNESS Father      Asthma Paternal Grandmother      CEREBROVASCULAR DISEASE Paternal Grandmother      Migraines Son      Migraines Daughter             Allergies:     Allergies   Allergen Reactions     Nuts Itching     Barley Grass GI Disturbance     Codeine Itching     Contrast Dye Itching     Oat Other (See Comments) and Nausea and Vomiting     abdominal pain       Penicillins Itching     Shellfish-Derived Products Nausea and  Vomiting     Yeast Cramps, Diarrhea, Itching and Nausea and Vomiting             Medications:     Current Outpatient Prescriptions   Medication Sig Dispense Refill     pantoprazole (PROTONIX) 20 MG EC tablet Take 1 tablet (20 mg) by mouth 2 times daily 180 tablet 3     folic acid (FOLVITE) 1 MG tablet Take 1 tablet (1 mg) by mouth daily 90 tablet 3     methotrexate 2.5 MG tablet CHEMO Take 6 tablets (15 mg) by mouth once a week 6 tabs by mouth once a week 30 tablet 5     lidocaine (LIDODERM) 5 % Patch Place 1-3 patches onto the skin every 24 hours Patient will call to fill 30 patch 1     lisinopril (PRINIVIL/ZESTRIL) 10 MG tablet Take 1 tablet (10 mg) by mouth daily 90 tablet 3     fluticasone (FLOVENT HFA) 110 MCG/ACT Inhaler Inhale 2 puffs into the lungs 2 times daily (Patient taking differently: Inhale 2 puffs into the lungs 2 times daily as needed ) 3 Inhaler 3     buPROPion (WELLBUTRIN XL) 150 MG 24 hr tablet Take 1 tablet (150 mg) by mouth every morning 90 tablet 3     albuterol (PROAIR HFA/PROVENTIL HFA/VENTOLIN HFA) 108 (90 BASE) MCG/ACT Inhaler Inhale 1 puff into the lungs every 6 hours as needed for shortness of breath / dyspnea or wheezing 3 Inhaler 3     escitalopram (LEXAPRO) 20 MG tablet Take 1 tablet (20 mg) by mouth daily 90 tablet 2     gabapentin (NEURONTIN) 300 MG capsule Take 1 capsule (300 mg) by mouth 3 times daily 90 capsule 11          Physical Exam:   Blood pressure 123/74, pulse 94, temperature 98.8  F (37.1  C), temperature source Oral, weight 134.7 kg (297 lb), not currently breastfeeding.  Constitutional: [id number] cooperative  Eyes: nl conjunctiva, sclera  ENT: nl external ears, nose, throat  No mucous membrane lesions, normal saliva pool  Neck: no mass or thyroid enlargement  GI: no ABD mass or tenderness, no HSM  : not tested  Lymph: no cervical, supraclavicular, inguinal or epitrochlear nodes  MS: All TMJ, neck, shoulder, elbow, wrist, MCP/PIP/DIP, spine, hip, knee, ankle, and foot  MTP/IP joints were examined and  found without definite active synovitis or deformity. Full ROM.  Normal  strength. Minimal discomfort with finger curl, palpation of knees L>R, and both ankles.  Multiple tender points especially R trapezius and rhomboids, epicondyles. No dactylitis,  tenosynovitis, enthesopathy    Skin: no nail pitting, alopecia, rash, nodules or lesions  Neuro: nl cranial nerves, strength  Psych: nl affect.       Data:     Lab Results   Component Value Date    WBC 5.6 10/26/2017    WBC 6.8 05/23/2017    WBC 6.1 11/23/2016    HGB 12.3 10/26/2017    HGB 13.4 05/23/2017    HGB 12.6 11/23/2016    HCT 38.1 10/26/2017    HCT 40.0 05/23/2017    HCT 38.5 11/23/2016    MCV 97 10/26/2017    MCV 92 05/23/2017    MCV 93 11/23/2016     10/26/2017     05/23/2017     11/23/2016     Lab Results   Component Value Date    BUN 17 06/16/2017    BUN 19 03/22/2016     No components found for: SEDRATE  No results found for: TSH  Lab Results   Component Value Date    AST 10 10/26/2017    AST 14 06/16/2017    AST 17 05/23/2017    ALT 21 10/26/2017    ALT 22 06/16/2017    ALT 25 05/23/2017    ALKPHOS 85 06/16/2017    ALKPHOS 78 03/22/2016     Reviewed Rheumatology lab flowsheet    Tyson Ribeiro

## 2017-12-07 NOTE — PATIENT INSTRUCTIONS
1.  You were seen in the ENT Clinic today by Dr. Esquivel.  If you have any questions or concerns after your appointment, please call [phone].  Press option #1 for scheduling related needs.  Press option #3 for Nurse advice.  2.  Plan is to move forward with a tonsillectomy.  3.  Will need to return to clinic 3 weeks after surgery for a recheck  4.  Please see the information below for expectations after surgery.  5. Forms for surgery were sent home with you today.    Caring for Yourself after Tonsillectomy / Adenoidectomy     What to expect after surgery:   A low fever (below 101 F or 38.3 C, taken under the tongue).   A sore throat that lasts 7 to 10 days, or as long as 14 days.   Ear, jaw or neck pain. This may hurt the most about a week after surgery.   Yellow or white-gray tissue where the tonsils were removed.   A white film on the tongue. This will go away within 10 to 14 days.   Bad breath for many days as the throat heals. Gentle tooth brushing is allowed. Do not have  gargle.   A change in the voice. This will go away in about three weeks.   Snoring. This will usually improve over time.   Stuffy nose: This is normal.     Care after surgery:   You  may want to avoid solid food for the first week. Offer very soft, bland foods until you  feels better (macaroni, eggs, mashed potatoes, applesauce, cooked cereal, etc).    Avoid rough or crunchy foods for at least 7 days.   Encourage plenty of fluids- at least 24 to 64 ounces per day. Cool or lukewarm liquids may feel better at first. Sports drinks are a good choice. Avoid orange juice (which may burn).   Popsicles, smoothies, ICEES- no straw as this creates a bit of suction which is contraindicated.     Chewing gum may help increase saliva and ease pain.     Things to Avoid:   Do not   gargle.   Avoid rough or crunchy foods for at least 7 days.     Activity:   You  should avoid heavy or strenuous activity for one week.   Keep   home from work for at least   2  weeks. You  may not return if you are still taking prescribed narcotic pain medicine.    .     Pain:   Pain may start to get better and then get worse again, often peaking 3 to 7 days after surgery. This is common.   It will hurt to swallow at first. The more you  can swallow, the less it will hurt.   You may take prescribed pain medicine as needed. We will tell you how much to give and how often. Expect to take pain medications for at elast 7-10 days   After two days, you may replace some or all of the prescribed medicine with liquid Tylenol.   Talk to your doctor before giving ibuprofen (Motrin, Advil) or other medicines within 10 days following surgery. Some medicines will increase the risk of bleeding.   A humidifier may help ease a sore throat. You might also try an ice pack on the throat for 20 minutes. (Place a cloth between the skin and the ice pack.)     Follow up:    3 weeks after surgery    When to call us:   Bleeding: if you  have any bleeding, call your clinic right away. The risk for bleeding increases approximately 10 days after surgery when the surgery site eschar- gray patches where tonsils were removed( like a wet scab) sloughs off.    If it is after business hours, go  to the Emergency Room). Bleeding may occur up to 2 weeks after surgery. Most people will spit out the blood. Some will swallow the blood and then vomit.   Fever over 101 F (38.3 C), taken under the tongue, if the fever lasts more than 48 hours.   Nausea, vomiting or constipation, if symptoms last longer than 48 hours.    .   Breathing problems (more severe than a stuffy nose): Call or go to the Emergency Room either the [facility] or the [facility] one on [facility], or [facility]/ [facility]     Important Phone Numbers:     During office hours: [phone] (choose option 3)   Emergency/ After hours: [phone] (ask to page the ENT resident who is on-call)   [facility]/ [facility] Emergency room, or  closest emergency room for bleeding, airway concerns.     What to bring to ER- yourself, family member, insurance cards.    Please call/contact with further questions/concerns.

## 2017-12-07 NOTE — PROGRESS NOTES
Rooming Note    Health Maintenance  Health Maintenance Due   Topic Date Due     MEHUL QUESTIONNAIRE 1 YEAR  03/17/1982     PHQ-9 Q1YR  03/17/1982     INFLUENZA VACCINE (SYSTEM ASSIGNED)  09/01/2017   The following immunizations were discussed, but NOT ordered:   - Influenza (flu shot)  The orders were not placed because: patient declined    Radha Monge CMA at 8:46 AM on 12/7/2017

## 2017-12-07 NOTE — LETTER
December 7, 2017      TO: Elizabeth Rosenthal  [address] [facility] [address]         To whom it may concern,    Elizabeth Rosenthal is under the medical care of Dr. Ivania Esquivel.  She will be scheduled to undergo a tonsillectomy in the near future for the treatment of her chronic tonsillitis.  She will need 14 days of off work for her recovery.  Will return to work after that without restrictions. If there are additional forms to be filled out, please fax them to [phone]-[facility] Ivania.          Sincerely,        Ivania Hopkins R.N., B.S.N.  Nurse Coordinator Head & Neck Surgery  [phone]  12/7/2017 11:16 AM

## 2017-12-07 NOTE — LETTER
Patient:  Elizabeth Rosenthal  :   1965  MRN:     [id number]        Ms. Elizabeth Rosenthal  [address] [facility] [facility] [address]        2017    Dear Ms. Rosenthal,    Thank you for choosing the [facility] for your healthcare needs.  We appreciate the opportunity to serve you.    The following are your recent test results.     Results for orders placed or performed in visit on 17   Protein  random urine with Creat Ratio   Result Value Ref Range    Protein Random Urine <0.05 g/L    Protein Total Urine g/gr Creatinine Unable to calculate due to low value 0 - 0.2 g/g Cr   Creatinine   Result Value Ref Range    Creatinine 1.21 (H) 0.52 - 1.04 mg/dL    GFR Estimate 46 (L) >60 mL/min/1.7m2    GFR Estimate If Black 56 (L) >60 mL/min/1.7m2   UA with Microscopic reflex to Culture   Result Value Ref Range    Color Urine Straw     Appearance Urine Clear     Glucose Urine Negative NEG^Negative mg/dL    Bilirubin Urine Negative NEG^Negative    Ketones Urine Negative NEG^Negative mg/dL    Specific Gravity Urine 1.004 1.003 - 1.035    Blood Urine Negative NEG^Negative    pH Urine 7.0 5.0 - 7.0 pH    Protein Albumin Urine Negative NEG^Negative mg/dL    Urobilinogen mg/dL 0.0 0.0 - 2.0 mg/dL    Nitrite Urine Negative NEG^Negative    Leukocyte Esterase Urine Negative NEG^Negative    Source Midstream Urine     WBC Urine 1 0 - 2 /HPF    RBC Urine 1 0 - 2 /HPF    Squamous Epithelial /HPF Urine <1 0 - 1 /HPF    Mucous Urine Present (A) NEG^Negative /LPF   H Pylori antigen stool   Result Value Ref Range    Specimen Description Feces     H Pylori Antigen       Negative for Helicobacter pylori antigen by enzyme immunoassay. A negative result   indicates the absence of H. pylori antigen or that the level of antigen is below the level   of detection.     Creatinine urine calculation only   Result Value Ref Range    Creatinine Urine 28 mg/dL       Your H Pylori test result is  negative.    As we discussed, your kidney function test result is abnormal.  We can review this in more detail at our next visit.  Your urine protein level is normal.      Please contact us if you have any questions or concerns.  We look forward to serving your needs in the future.      Sincerely,    Dr. Berry/ky

## 2017-12-07 NOTE — MR AVS SNAPSHOT
After Visit Summary   12/7/2017    Elizabeth Rosenthal    MRN: [id number]           Patient Information     Date Of Birth          1965        Visit Information        Provider Department      12/7/2017 10:00 AM Ivania Esquivel MD [facility] [place] [facility] Nose [facility] Th[facility]        Today's Diagnoses     Chronic tonsillitis    -  1      Care Instructions    1.  You were seen in the ENT Clinic today by Dr. sEquivel.  If you have any questions or concerns after your appointment, please call [phone].  Press option #1 for scheduling related needs.  Press option #3 for Nurse advice.  2.  Plan is to move forward with a tonsillectomy.  3.  Will need to return to clinic 3 weeks after surgery for a recheck  4.  Please see the information below for expectations after surgery.  5. Forms for surgery were sent home with you today.    Caring for Yourself after Tonsillectomy / Adenoidectomy     What to expect after surgery:   A low fever (below 101 F or 38.3 C, taken under the tongue).   A sore throat that lasts 7 to 10 days, or as long as 14 days.   Ear, jaw or neck pain. This may hurt the most about a week after surgery.   Yellow or white-gray tissue where the tonsils were removed.   A white film on the tongue. This will go away within 10 to 14 days.   Bad breath for many days as the throat heals. Gentle tooth brushing is allowed. Do not have  gargle.   A change in the voice. This will go away in about three weeks.   Snoring. This will usually improve over time.   Stuffy nose: This is normal.     Care after surgery:   You  may want to avoid solid food for the first week. Offer very soft, bland foods until you  feels better (macaroni, eggs, mashed potatoes, applesauce, cooked cereal, etc).    Avoid rough or crunchy foods for at least 7 days.   Encourage plenty of fluids- at least 24 to 64 ounces per day. Cool or lukewarm liquids may feel better at first. Sports drinks are a good choice. Avoid orange juice (which may burn).    Popsicles, smoothies, ICEES- no straw as this creates a bit of suction which is contraindicated.     Chewing gum may help increase saliva and ease pain.     Things to Avoid:   Do not   gargle.   Avoid rough or crunchy foods for at least 7 days.     Activity:   You  should avoid heavy or strenuous activity for one week.   Keep   home from work for at least   2 weeks. You  may not return if you are still taking prescribed narcotic pain medicine.    .     Pain:   Pain may start to get better and then get worse again, often peaking 3 to 7 days after surgery. This is common.   It will hurt to swallow at first. The more you  can swallow, the less it will hurt.   You may take prescribed pain medicine as needed. We will tell you how much to give and how often. Expect to take pain medications for at elast 7-10 days   After two days, you may replace some or all of the prescribed medicine with liquid Tylenol.   Talk to your doctor before giving ibuprofen (Motrin, Advil) or other medicines within 10 days following surgery. Some medicines will increase the risk of bleeding.   A humidifier may help ease a sore throat. You might also try an ice pack on the throat for 20 minutes. (Place a cloth between the skin and the ice pack.)     Follow up:    3 weeks after surgery    When to call us:   Bleeding: if you  have any bleeding, call your clinic right away. The risk for bleeding increases approximately 10 days after surgery when the surgery site eschar- gray patches where tonsils were removed( like a wet scab) sloughs off.    If it is after business hours, go  to the Emergency Room). Bleeding may occur up to 2 weeks after surgery. Most people will spit out the blood. Some will swallow the blood and then vomit.   Fever over 101 F (38.3 C), taken under the tongue, if the fever lasts more than 48 hours.   Nausea, vomiting or constipation, if symptoms last longer than 48 hours.    .   Breathing problems (more severe than a stuffy  nose): Call or go to the Emergency Room either the [place] ER or the [facility] one on [facility], or closest ER[place] [facility]     Important Phone Numbers:     During office hours: [phone] (choose option 3)   Emergency/ After hours: [phone] (ask to page the ENT resident who is on-call)   [facility] Emergency room, or closest emergency room for bleeding, airway concerns.     What to bring to ER- yourself, family member, insurance cards.    Please call/contact with further questions/concerns.                  Follow-ups after your visit        Your next 10 appointments already scheduled     Dec 07, 2017  4:00 PM CST   (Arrive by 3:45 PM)   Return Visit with Tsyon Ribeiro MD   [facility] Rheumatology ([facility])    [address] [place] [id number] [phone]            Dec 11, 2017  6:00 PM CST   (Arrive by 5:45 PM)   CT ABDOMEN PELVIS W/O & W CONTRAST with UCCT1   [facility] [place] [facility] ([facility])    [address] [place] [id number] [phone]           Please bring any scans or X-rays taken at other hospitals, if similar tests were done. Also bring a list of your medicines, including vitamins, minerals and over-the-counter drugs. It is safest to leave personal items at home.  Be sure to tell your doctor:   If you have any allergies.   If there s any chance you are pregnant.   If you are breastfeeding.   If you have any special needs.  You may have contrast for this exam. To prepare:   Do not eat or drink for 2 hours before your exam. If you need to take medicine, you may take it with small sips of water. (We may ask you to take liquid medicine as well.)   The day before your exam, drink extra fluids at least six 8-ounce glasses (unless your doctor tells you to restrict your fluids).  Patients over 70 or patients with diabetes or kidney problems:   If you  haven t had a blood test (creatinine test) within the last 30 days, go to your clinic or Diagnostic Imaging Department for this test.  If you have diabetes:   If your kidney function is normal, continue taking your metformin (Avandamet, Glucophage, Glucovance, Metaglip) on the day of your exam.   If your kidney function is abnormal, wait 48 hours before restarting this medicine.  You will have oral contrast for this exam:   You will drink the contrast at home. Get this from your clinic or Diagnostic Imaging Department. Please follow the directions given.  Please wear loose clothing, such as a sweat suit or jogging clothes. Avoid snaps, zippers and other metal. We may ask you to undress and put on a hospital gown.  If you have any questions, please call the Imaging Department where you will have your exam.              Future tests that were ordered for you today     Open Future Orders        Priority Expected Expires Ordered    Creatinine Routine 12/7/2017 12/7/2018 12/7/2017    Protein  random urine with Creat Ratio Routine 12/7/2017 12/7/2018 12/7/2017    UA with Micro reflex to Culture Routine 12/7/2017 12/7/2018 12/7/2017    H Pylori antigen stool Routine 12/7/2017 12/7/2018 12/7/2017    CT Abdomen/Pelvis w/o & w contrast Routine  12/7/2018 12/7/2017    PSYCHIATRY REFERRAL [facility] Routine  12/7/2018 12/7/2017    PSYCHOLOGY ([facility] HEALTH PSYCHOLOGY) REFERRAL Routine  12/7/2018 12/7/2017            Who to contact     Please call your clinic at [phone] to:    Ask questions about your health    Make or cancel appointments    Discuss your medicines    Learn about your test results    Speak to your doctor   If you have compliments or concerns about an experience at your clinic, or if you wish to file a complaint, please contact [facility] Patient Relations at [phone] or email us at [email or web address]         Additional Information About Your Visit        MyChart Information      [employer] gives you secure access to your electronic health record. If you see a primary care provider, you can also send messages to your care team and make appointments. If you have questions, please call your primary care clinic.  If you do not have a primary care provider, please call [phone] and they will assist you.      [employer] is an electronic gateway that provides easy, online access to your medical records. With [employer], you can request a clinic appointment, read your test results, renew a prescription or communicate with your care team.     To access your existing account, please contact your [facility] or call [phone] for assistance.        Care EveryWhere ID     This is your Care EveryWhere ID. This could be used by other organizations to access your [facility] medical records  [id number]         Blood Pressure from Last 3 Encounters:   12/07/17 123/74   10/26/17 124/75   10/24/17 111/55    Weight from Last 3 Encounters:   12/07/17 134.8 kg (297 lb 1.6 oz)   10/24/17 132.1 kg (291 lb 4.8 oz)   08/10/17 (!) 137 kg (302 lb)              We Performed the Following     [facility]-Operative Worksheet (ENT Adult Default Surgery Request)          Today's Medication Changes          These changes are accurate as of: 12/7/17 11:00 AM.  If you have any questions, ask your nurse or doctor.               Start taking these medicines.        Dose/Directions    pantoprazole 20 MG EC tablet   Commonly known as:  PROTONIX   Used for:  Abdominal pain, generalized   Started by:  Eveline Berry MD        Dose:  20 mg   Take 1 tablet (20 mg) by mouth 2 times daily   Quantity:  180 tablet   Refills:  3         These medicines have changed or have updated prescriptions.        Dose/Directions    fluticasone 110 MCG/ACT Inhaler   Commonly known as:  FLOVENT HFA   This may have changed:    - when to take this  - reasons to take this   Used for:  Mild intermittent asthma without  complication        Dose:  2 puff   Inhale 2 puffs into the lungs 2 times daily   Quantity:  3 Inhaler   Refills:  3            Where to get your medicines      These medications were sent to [id number] - [facility] [address] [facility] [address]     Phone:  [phone]     pantoprazole 20 MG EC tablet                Primary Care Provider Office Phone # Fax #    Eveline Berry [phone]       [address] [place] [address] [address]  [facility] [address]        Equal Access to Services     [facility]: Hadii aad ku hadasho Soomaali, waaxda luqadaha, qaybta kaalmada adeoriana, betty bravo . So [facility] [phone].    ATENCIÓN: Si habla español, tiene a hightowre disposición servicios gratuitos de asistencia lingüística. Ll[facility] [phone].    We comply with applicable federal civil rights laws and [place] laws. We do not discriminate on the basis of race, color, national origin, age, disability, sex, sexual orientation, or gender identity.            Thank you!     Thank you for choosing [facility] EAR NOSE AND THROAT  for your care. Our goal is always to provide you with excellent care. Hearing back from our patients is one way we can continue to improve our services. Please take a few minutes to complete the written survey that you may receive in the mail after your visit with us. Thank you!             Your Updated Medication List - Protect others around you: Learn how to safely use, store and throw away your medicines at www.disposemymeds.org.          This list is accurate as of: 12/7/17 11:00 AM.  Always use your most recent med list.                   Brand Name Dispense Instructions for use Diagnosis    albuterol 108 (90 BASE) MCG/ACT Inhaler    PROAIR HFA/PROVENTIL HFA/VENTOLIN HFA    3 Inhaler    Inhale 1 puff into the lungs every 6 hours as needed for shortness of breath / dyspnea or wheezing    Mild intermittent  asthma without complication       buPROPion 150 MG 24 hr tablet    WELLBUTRIN XL    90 tablet    Take 1 tablet (150 mg) by mouth every morning    Major depressive disorder, recurrent episode, mild (H)       escitalopram 20 MG tablet    LEXAPRO    90 tablet    Take 1 tablet (20 mg) by mouth daily    Depression       fluticasone 110 MCG/ACT Inhaler    FLOVENT HFA    3 Inhaler    Inhale 2 puffs into the lungs 2 times daily    Mild intermittent asthma without complication       folic acid 1 MG tablet    FOLVITE    90 tablet    Take 1 tablet (1 mg) by mouth daily    Rheumatoid arthritis of multiple sites without rheumatoid factor (H), Encounter for long-term (current) use of medications       gabapentin 300 MG capsule    NEURONTIN    90 capsule    Take 1 capsule (300 mg) by mouth 3 times daily    Fibromyalgia       lidocaine 5 % Patch    LIDODERM    30 patch    Place 1-3 patches onto the skin every 24 hours Patient will call to fill    Polyarthritis, Chronic bilateral low back pain without sciatica       lisinopril 10 MG tablet    PRINIVIL/ZESTRIL    90 tablet    Take 1 tablet (10 mg) by mouth daily    Benign essential hypertension, Mild intermittent asthma without complication       methotrexate 2.5 MG tablet CHEMO     30 tablet    Take 6 tablets (15 mg) by mouth once a week 6 tabs by mouth once a week    Rheumatoid arthritis of multiple sites without rheumatoid factor (H)       pantoprazole 20 MG EC tablet    PROTONIX    180 tablet    Take 1 tablet (20 mg) by mouth 2 times daily    Abdominal pain, generalized

## 2017-12-07 NOTE — LETTER
2017       RE: Elizabeth Rosenthal  [address] [facility] [address]     Dear Colleague,    Thank you for referring your patient, Elizabeth Rosenthal, to the [facility] EAR NOSE AND TH[facility]AT at [facility]. Please see a copy of my visit note below.    Otolaryngology Adult Consultation    Patient: Elizabeth Rosenthal  : 1965      HPI:  Elizabeth Rosenthal is a 52 year old female seen today in the Otolaryngology Clinic for possible tonsillectomy. The patient has had a sore throat nearly every day for the past six months.  She does not recall a particular precipitating or inciting event such as an upper respiratory infection, but almost every day she has a discomfort that is fairly intense in the upper part of her neck bilaterally.  Sometimes it is there in the morning.  Sometimes it does not get really bad until about 10:00.  She does not talk a lot for her job, but she does have an extensive past vocal history.  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  She has used to be a .  She feels like this sore throat is very different than her previous episodes of vocal strain and vocal stress.  She has also had workup for heartburn and is currently on heartburn medications.  She is on a PPI.  She has also been evaluated for allergies.  She does have some oral allergy symptoms and has drastically reduced the things that aggravate her.  Despite both dietary, behavioral, and medical changes, she really has not had any improvement in her sore throat.  She has had two episodes of strep throat in the last year.  These were characterized by swollen tonsils, very sharp pain, and tonsillar exudates.  Typically, on a day to day basis, she does not have these.  She also deals with chronic tonsil stone formation that has been going on for 20 years.          Medications:  Current Outpatient Rx   Medication Sig Dispense Refill     pantoprazole (PROTONIX) 20 MG EC tablet  Take 1 tablet (20 mg) by mouth 2 times daily 180 tablet 3     folic acid (FOLVITE) 1 MG tablet Take 1 tablet (1 mg) by mouth daily 90 tablet 3     methotrexate 2.5 MG tablet CHEMO Take 6 tablets (15 mg) by mouth once a week 6 tabs by mouth once a week 30 tablet 5     lidocaine (LIDODERM) 5 % Patch Place 1-3 patches onto the skin every 24 hours Patient will call to fill 30 patch 1     lisinopril (PRINIVIL/ZESTRIL) 10 MG tablet Take 1 tablet (10 mg) by mouth daily 90 tablet 3     fluticasone (FLOVENT HFA) 110 MCG/ACT Inhaler Inhale 2 puffs into the lungs 2 times daily (Patient taking differently: Inhale 2 puffs into the lungs 2 times daily as needed ) 3 Inhaler 3     buPROPion (WELLBUTRIN XL) 150 MG 24 hr tablet Take 1 tablet (150 mg) by mouth every morning 90 tablet 3     albuterol (PROAIR HFA/PROVENTIL HFA/VENTOLIN HFA) 108 (90 BASE) MCG/ACT Inhaler Inhale 1 puff into the lungs every 6 hours as needed for shortness of breath / dyspnea or wheezing 3 Inhaler 3     escitalopram (LEXAPRO) 20 MG tablet Take 1 tablet (20 mg) by mouth daily 90 tablet 2     gabapentin (NEURONTIN) 300 MG capsule Take 1 capsule (300 mg) by mouth 3 times daily 90 capsule 11       Allergies: Nuts; Barley grass; Codeine; Contrast dye; Oat; Penicillins; Shellfish-derived products; and Yeast     PMH:  Past Medical History:   Diagnosis Date     Allergic rhinitis      Arthritis      Asthma      Autoimmune disease (H) 2011     Chronic pelvic pain in female      Depression      Depressive disorder      Gastroesophageal reflux disease      Hyperlipidemia      Hypertension      Immunosuppression (H)      Low back pain      Migraines      Morbid obesity with BMI of 45.0-49.9, adult (H)      Obstructive sleep apnea 2012     SHERIE (obstructive sleep apnea)     has cpap     Polyarthritis      Reduced vision 2012     TIA (transient ischemic attack)      Vertebral artery dissection (H)        PSH:  Past Surgical History:   Procedure Laterality Date      ARTHROSCOPY KNEE BILATERAL       BIOPSY LYMPH NODE CERVICAL       COLONOSCOPY N/A 7/26/2017    Procedure: COMBINED COLONOSCOPY, SINGLE OR MULTIPLE BIOPSY/POLYPECTOMY BY BIOPSY;  colonoscopy;  Surgeon: Thang Saleh MD;  Location:  GI     ENT SURGERY  lymph node biopsy 2010     TUBAL LIGATION         FH:  Family History   Problem Relation Age of Onset     Breast Cancer Mother      50s     CANCER Mother      HEART DISEASE Father      Substance Abuse Father      MENTAL ILLNESS Father      Asthma Paternal Grandmother      CEREBROVASCULAR DISEASE Paternal Grandmother      Migraines Son      Migraines Daughter         SH:  Social History   Substance Use Topics     Smoking status: Never Smoker     Smokeless tobacco: Never Used     Alcohol use 0.0 oz/week     0 Standard drinks or equivalent per week      Comment: Occasional       Review of Systems  [facility] ROS 12/7/2017   Constitutional Unexplained fatigue   Neurology Headache   Ears, Nose, Throat Nasal congestion or drainage, Sore throat   Cardiopulmonary Cough   Gastrointestinal/Genitourinary Unexplained nausea/vomiting, Diarrhea   Musculoskeletal Sore or stiff joints, Back pain   Allergy/Immunology Allergies or hay fever       Physical Exam:    BMI 47.9  GEN:  The patient is alert, oriented and in no acute distress.  HEAD:  Head, face scalp is grossly normal.  EARS:  Ears demonstrate normal canals, auricles and tympanic membranes.  NOSE:  External nose is straight, skin is normal.                Intranasal exam (anterior rhinoscopy) reveals normal moist mucosa, turbinate                  tissue without edema, erythema or crusting.  Septum mainly in the midline.  ORAL:  Oral cavity shows healthy mucosa with out ulceration, masses or other lesions                involving the tongue, palate, buccal mucosa, floor of mouth or gingiva.  Tonsils 2-3+, symmetric.             NECK:   Neck is without adenopathy, thyroid or salivary gland masses.  PUL: normal work of  breathing; no stridor  CV: RRR; no peripheral edema  M/S: normal muscle tone     Assessment/Plan:  The patient presents with chronic sore throat of six months' duration.  She has had an extensive medical workup, none of which has improved her symptoms.  It is difficult for me to know if her sore throat is coming from her tonsils such as chronic inflammation from the tonsil stones or if there is another area that is causing the sore throat such as a muscle tension issue.  I had a long discussion with the patient that if we perform a tonsillectomy, I do not know if this will actually improve her sore throat, and there is a potentially very good chance that it may not.  I think in this instance I can say that a tonsillectomy would improve the tonsil stones.  We also discussed recovery and risk of bleeding.  The patient is very aware and vocalizes her understanding that I cannot guarantee that her sore throat will improve with a tonsillectomy.  However, she is willing to take that risk.  At the very least, she would be happy to not have to deal with her tonsil stones and halitosis that comes from it.  Therefore, I think it is reasonable to proceed since she understands the risks of the procedure as well as outcomes.  Given her BMI, we will have to do her surgery at the main OR.  She is otherwise healthy from a heart and lung standpoint.  She does not take any blood thinning medications.   We will see if we can convert her MTX into liquid form.  She does only take it once a week.      I spent a total of 35 minutes face-to-face with Elizabeth Rosenthal during today's office visit.  Over 50% of this time was spent counseling the patient on and/or coordinating care as documented in my assessment and plan.        Again, thank you for allowing me to participate in the care of your patient.      Sincerely,    Ivania Esquivel MD

## 2017-12-07 NOTE — LETTER
12/7/2017       RE: Elizabeth Rosenthal  [address] [facility] [address]     Dear Colleague,    Thank you for referring your patient, Elizabeth Rosenthal, to the [facility] RHEUMATOLOGY at [facility]. Please see a copy of my visit note below.    Rheumatology Visit     Elizabeth Rosenthal MRN# [id number]   YOB: 1965 Age: 52 year old     Date of Visit: December 7, 2017  Primary care provider: Eveline Berry          Assessment and Plan:   51 yo female with history consistent with seronegative RA.  She also has a component of fibromyalgia that contributes to symptoms, and well as known DJD of knees and spine.      She overall has done well with Methotrexate therapy without problems.  It was not clear from prior symptoms and exam that additional medication was needed from an inflammatory arthritis standpoint.  However she still has impact on ADLs from symptoms that sound at least partially inflammatory and not due to LBP or knee damage or fibromyalgia.      She did not improve with a short time on Sulfasalazine.  Respiratory side effects would be unusual, but she does not wish to continue. She also is not interested now in an alternative biologic.      Her current symptoms also relate to soft tissue pain and LBP with known DJD.  She is benefitting from resuming Gabapentin at 900 mg per day.      PLAN: discussed in detail with the patient.      1.  Lab is current with open orders  2. After discussion of risks and benefits, we will continue on Methotrexate monotherapy.  3. I renewed Methotrexate and Folic Acid  4. Rx for Gabapentin is current. I renewed lidocaine patches at her request  5. Return in 5 months  6. Flu shot is current  7. She would not need to necessarily hold Methotrexate for upcoming elective ENT surgery      Tyson Ribeiro MD            History of Present Illness:    52 year old female who presents for followup of polyarthritis and fibromyalgia.  Previously  followed by Dr. Stokes [phone], Dr. SANDEE Reis in 2015.  [facility] and [facility] records reviewed.  I saw her last in July 2017.       HISTORY CARRIED FORWARD:       She has a several year history of joint pain diagnosed as seronegative polyarthritis/seronegative RA.  She has had swelling and pain in hands and feet.  No deformities or erosive change. Prior RF and CCP negative.  She has generally by records had normal ESR and CRP.  She had a Vectra DA by Dr. Reis last year that was high at 53.      She was begun on Methotrexate by Dr. Stokes in 2012. She has been on 15 mg weekly since that time without side effects or lab abnl.  She takes Folic acid OTC.        When she saw Dr. Reis last year he wanted to add an anti TNF. Remicade was denied. She took Humira for two months and could not tell a difference overall although her hands probably were better.      Her major symptoms at initial appt had to do with soft tissue pain with her dx of fibromyalgia.  She has pain that waxes and wanes but worse over past two years, primarily in upper back and neck radiating to arms.  R side most bothersome now for months. She has had prior PT and OT and pool therapy.  She uses Lidoderm patches. She was on Gabapentin but stopped two years ago as sx were minimal.      We continued Methotrexate but also added Sulfasalazine.  She had called with an intercurrent URI on [place] and her medications were held for a time.  She has seen her PCP and had another round of antibiotics. However she still seems to have recurrent cough.      She decided to stop the SSZ as it didn't seem to be helping and she wondered if it was causing respiratory sx.     INTERVAL HISTORY:        Overall her symptoms are little changed since last visit in July.  She denies side effects on the medication.  Her labs in October were normal/stable.  She will notice more symptoms even if just one or two days late for Methotrexate.      She has more symptoms with more  activity.      She notes again that in retrospect her Humira trial did help her hands but not her back or knees, which we discussed would not be surprising as these would likely be due to her OA and fibromyalgia.  She stopped it as effect she felt was not worth the cost.  She still feels this way.  We discussed the hope that biosimilar Adalimumab may be available later this year, hopefully at significantly lower costs.      She has minor AM stiffness at this time.  No specific joint swelling but hands are puffy.     She notes a persistent sore throat despite various maneuvers. She is scheduled for upcoming tonsillectomy.      She has known DJD/disc bulge in L spine; she has had two prior L Knee arthroscopies.      Rheumatologic ROS otherwise negative.       Review of Systems:   Review Of Systems  Skin: negative  Eyes: negative  Ears/Nose/Throat: see HPI  Respiratory: No shortness of breath, dyspnea on exertion, cough, or hemoptysis  Cardiovascular: negative  Gastrointestinal: negative  Genitourinary: negative  Musculoskeletal:see HPI  Neurologic: negative  Psychiatric: negative  Hematologic/Lymphatic/Immunologic: negative  Endocrine: negative          Past Medical History:     Past Medical History:   Diagnosis Date     Allergic rhinitis      Arthritis      Asthma      Autoimmune disease (H) 2011     Chronic pelvic pain in female      Depression      Depressive disorder      Gastroesophageal reflux disease      Hyperlipidemia      Hypertension      Immunosuppression (H)      Low back pain      Migraines      Morbid obesity with BMI of 45.0-49.9, adult (H)      Obstructive sleep apnea 2012     SHERIE (obstructive sleep apnea)     has cpap     Polyarthritis      Reduced vision 2012     TIA (transient ischemic attack)      Vertebral artery dissection (H)        Patient Active Problem List    Diagnosis Date Noted     Arthritis of knee, left 07/21/2016     Priority: Medium     Hyperlipidemia      Priority: Medium     SHERIE  (obstructive sleep apnea)      Priority: Medium     has cpap       Morbid obesity with BMI of 45.0-49.9, adult (H)      Priority: Medium     Low back pain      Priority: Medium     Rheumatoid arthritis of multiple sites without rheumatoid factor (H) 05/25/2016     Priority: Medium     Fibromyalgia 05/25/2016     Priority: Medium     Encounter for long-term (current) use of medications 05/25/2016     Priority: Medium     Polyarthritis 03/22/2016     Priority: Medium     Depression 03/22/2016     Priority: Medium     Benign essential hypertension 03/22/2016     Priority: Medium     Mild intermittent asthma without complication 03/22/2016     Priority: Medium     Morbid obesity (H) 03/22/2016     Priority: Medium     Iliotibial band syndrome 01/31/2011     Priority: Medium     Right knee pain 01/31/2011     Priority: Medium     Lumbar radicular pain 01/31/2011     Priority: Medium             Past Surgical History:     Past Surgical History:   Procedure Laterality Date     ARTHROSCOPY KNEE BILATERAL       BIOPSY LYMPH NODE CERVICAL       COLONOSCOPY N/A 7/26/2017    Procedure: COMBINED COLONOSCOPY, SINGLE OR MULTIPLE BIOPSY/POLYPECTOMY BY BIOPSY;  colonoscopy;  Surgeon: Thang Saleh MD;  Location:  GI     ENT SURGERY  lymph node biopsy 2010     TUBAL LIGATION               Social History:     Social History   Substance Use Topics     Smoking status: Never Smoker     Smokeless tobacco: Never Used     Alcohol use 0.0 oz/week     0 Standard drinks or equivalent per week      Comment: Occasional             Family History:     Family History   Problem Relation Age of Onset     Breast Cancer Mother      50s     CANCER Mother      HEART DISEASE Father      Substance Abuse Father      MENTAL ILLNESS Father      Asthma Paternal Grandmother      CEREBROVASCULAR DISEASE Paternal Grandmother      Migraines Son      Migraines Daughter             Allergies:     Allergies   Allergen Reactions     Nuts Itching     Barley  Grass GI Disturbance     Codeine Itching     Contrast Dye Itching     Oat Other (See Comments) and Nausea and Vomiting     abdominal pain       Penicillins Itching     Shellfish-Derived Products Nausea and Vomiting     Yeast Cramps, Diarrhea, Itching and Nausea and Vomiting             Medications:     Current Outpatient Prescriptions   Medication Sig Dispense Refill     pantoprazole (PROTONIX) 20 MG EC tablet Take 1 tablet (20 mg) by mouth 2 times daily 180 tablet 3     folic acid (FOLVITE) 1 MG tablet Take 1 tablet (1 mg) by mouth daily 90 tablet 3     methotrexate 2.5 MG tablet CHEMO Take 6 tablets (15 mg) by mouth once a week 6 tabs by mouth once a week 30 tablet 5     lidocaine (LIDODERM) 5 % Patch Place 1-3 patches onto the skin every 24 hours Patient will call to fill 30 patch 1     lisinopril (PRINIVIL/ZESTRIL) 10 MG tablet Take 1 tablet (10 mg) by mouth daily 90 tablet 3     fluticasone (FLOVENT HFA) 110 MCG/ACT Inhaler Inhale 2 puffs into the lungs 2 times daily (Patient taking differently: Inhale 2 puffs into the lungs 2 times daily as needed ) 3 Inhaler 3     buPROPion (WELLBUTRIN XL) 150 MG 24 hr tablet Take 1 tablet (150 mg) by mouth every morning 90 tablet 3     albuterol (PROAIR HFA/PROVENTIL HFA/VENTOLIN HFA) 108 (90 BASE) MCG/ACT Inhaler Inhale 1 puff into the lungs every 6 hours as needed for shortness of breath / dyspnea or wheezing 3 Inhaler 3     escitalopram (LEXAPRO) 20 MG tablet Take 1 tablet (20 mg) by mouth daily 90 tablet 2     gabapentin (NEURONTIN) 300 MG capsule Take 1 capsule (300 mg) by mouth 3 times daily 90 capsule 11            Physical Exam:   Blood pressure 123/74, pulse 94, temperature 98.8  F (37.1  C), temperature source Oral, weight 134.7 kg (297 lb), not currently breastfeeding.  Constitutional: [id number] cooperative  Eyes: nl conjunctiva, sclera  ENT: nl external ears, nose, throat  No mucous membrane lesions, normal saliva pool  Neck: no mass or thyroid enlargement  GI: no  ABD mass or tenderness, no HSM  : not tested  Lymph: no cervical, supraclavicular, inguinal or epitrochlear nodes  MS: All TMJ, neck, shoulder, elbow, wrist, MCP/PIP/DIP, spine, hip, knee, ankle, and foot MTP/IP joints were examined and  found without definite active synovitis or deformity. Full ROM.  Normal  strength. Minimal discomfort with finger curl, palpation of knees L>R, and both ankles.  Multiple tender points especially R trapezius and rhomboids, epicondyles. No dactylitis,  tenosynovitis, enthesopathy    Skin: no nail pitting, alopecia, rash, nodules or lesions  Neuro: nl cranial nerves, strength  Psych: nl affect.       Data:     Lab Results   Component Value Date    WBC 5.6 10/26/2017    WBC 6.8 05/23/2017    WBC 6.1 11/23/2016    HGB 12.3 10/26/2017    HGB 13.4 05/23/2017    HGB 12.6 11/23/2016    HCT 38.1 10/26/2017    HCT 40.0 05/23/2017    HCT 38.5 11/23/2016    MCV 97 10/26/2017    MCV 92 05/23/2017    MCV 93 11/23/2016     10/26/2017     05/23/2017     11/23/2016     Lab Results   Component Value Date    BUN 17 06/16/2017    BUN 19 03/22/2016     No components found for: SEDRATE  No results found for: TSH  Lab Results   Component Value Date    AST 10 10/26/2017    AST 14 06/16/2017    AST 17 05/23/2017    ALT 21 10/26/2017    ALT 22 06/16/2017    ALT 25 05/23/2017    ALKPHOS 85 06/16/2017    ALKPHOS 78 03/22/2016     Reviewed Rheumatology lab flowsheet    Tyson Ribeiro

## 2017-12-07 NOTE — PROGRESS NOTES
Otolaryngology Adult Consultation    Patient: Elizabeth Rosenthal  : 1965      HPI:  Elizabeth Rosenthal is a 52 year old female seen today in the Otolaryngology Clinic for possible tonsillectomy. The patient has had a sore throat nearly every day for the past six months.  She does not recall a particular precipitating or inciting event such as an upper respiratory infection, but almost every day she has a discomfort that is fairly intense in the upper part of her neck bilaterally.  Sometimes it is there in the morning.  Sometimes it does not get really bad until about 10:00.  She does not talk a lot for her job, but she does have an extensive past vocal history.  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  She has used to be a .  She feels like this sore throat is very different than her previous episodes of vocal strain and vocal stress.  She has also had workup for heartburn and is currently on heartburn medications.  She is on a PPI.  She has also been evaluated for allergies.  She does have some oral allergy symptoms and has drastically reduced the things that aggravate her.  Despite both dietary, behavioral, and medical changes, she really has not had any improvement in her sore throat.  She has had two episodes of strep throat in the last year.  These were characterized by swollen tonsils, very sharp pain, and tonsillar exudates.  Typically, on a day to day basis, she does not have these.  She also deals with chronic tonsil stone formation that has been going on for 20 years.          Medications:  Current Outpatient Rx   Medication Sig Dispense Refill     pantoprazole (PROTONIX) 20 MG EC tablet Take 1 tablet (20 mg) by mouth 2 times daily 180 tablet 3     folic acid (FOLVITE) 1 MG tablet Take 1 tablet (1 mg) by mouth daily 90 tablet 3     methotrexate 2.5 MG tablet CHEMO Take 6 tablets (15 mg) by mouth once a week 6 tabs by mouth once a week 30 tablet 5     lidocaine (LIDODERM)  5 % Patch Place 1-3 patches onto the skin every 24 hours Patient will call to fill 30 patch 1     lisinopril (PRINIVIL/ZESTRIL) 10 MG tablet Take 1 tablet (10 mg) by mouth daily 90 tablet 3     fluticasone (FLOVENT HFA) 110 MCG/ACT Inhaler Inhale 2 puffs into the lungs 2 times daily (Patient taking differently: Inhale 2 puffs into the lungs 2 times daily as needed ) 3 Inhaler 3     buPROPion (WELLBUTRIN XL) 150 MG 24 hr tablet Take 1 tablet (150 mg) by mouth every morning 90 tablet 3     albuterol (PROAIR HFA/PROVENTIL HFA/VENTOLIN HFA) 108 (90 BASE) MCG/ACT Inhaler Inhale 1 puff into the lungs every 6 hours as needed for shortness of breath / dyspnea or wheezing 3 Inhaler 3     escitalopram (LEXAPRO) 20 MG tablet Take 1 tablet (20 mg) by mouth daily 90 tablet 2     gabapentin (NEURONTIN) 300 MG capsule Take 1 capsule (300 mg) by mouth 3 times daily 90 capsule 11       Allergies: Nuts; Barley grass; Codeine; Contrast dye; Oat; Penicillins; Shellfish-derived products; and Yeast     PMH:  Past Medical History:   Diagnosis Date     Allergic rhinitis      Arthritis      Asthma      Autoimmune disease (H) 2011     Chronic pelvic pain in female      Depression      Depressive disorder      Gastroesophageal reflux disease      Hyperlipidemia      Hypertension      Immunosuppression (H)      Low back pain      Migraines      Morbid obesity with BMI of 45.0-49.9, adult (H)      Obstructive sleep apnea 2012     SHERIE (obstructive sleep apnea)     has cpap     Polyarthritis      Reduced vision 2012     TIA (transient ischemic attack)      Vertebral artery dissection (H)        PSH:  Past Surgical History:   Procedure Laterality Date     ARTHROSCOPY KNEE BILATERAL       BIOPSY LYMPH NODE CERVICAL       COLONOSCOPY N/A 7/26/2017    Procedure: COMBINED COLONOSCOPY, SINGLE OR MULTIPLE BIOPSY/POLYPECTOMY BY BIOPSY;  colonoscopy;  Surgeon: Thang Saleh MD;  Location:  GI     ENT SURGERY  lymph node biopsy 2010     TUBAL  LIGATION         FH:  Family History   Problem Relation Age of Onset     Breast Cancer Mother      50s     CANCER Mother      HEART DISEASE Father      Substance Abuse Father      MENTAL ILLNESS Father      Asthma Paternal Grandmother      CEREBROVASCULAR DISEASE Paternal Grandmother      Migraines Son      Migraines Daughter         SH:  Social History   Substance Use Topics     Smoking status: Never Smoker     Smokeless tobacco: Never Used     Alcohol use 0.0 oz/week     0 Standard drinks or equivalent per week      Comment: Occasional       Review of Systems  [facility] ENT ROS 12/7/2017   Constitutional Unexplained fatigue   Neurology Headache   Ears, Nose, Throat Nasal congestion or drainage, Sore throat   Cardiopulmonary Cough   Gastrointestinal/Genitourinary Unexplained nausea/vomiting, Diarrhea   Musculoskeletal Sore or stiff joints, Back pain   Allergy/Immunology Allergies or hay fever       Physical Exam:    BMI 47.9  GEN:  The patient is alert, oriented and in no acute distress.  HEAD:  Head, face scalp is grossly normal.  EARS:  Ears demonstrate normal canals, auricles and tympanic membranes.  NOSE:  External nose is straight, skin is normal.                Intranasal exam (anterior rhinoscopy) reveals normal moist mucosa, turbinate                  tissue without edema, erythema or crusting.  Septum mainly in the midline.  ORAL:  Oral cavity shows healthy mucosa with out ulceration, masses or other lesions                involving the tongue, palate, buccal mucosa, floor of mouth or gingiva.  Tonsils 2-3+, symmetric.             NECK:   Neck is without adenopathy, thyroid or salivary gland masses.  PUL: normal work of breathing; no stridor  CV: RRR; no peripheral edema  M/S: normal muscle tone     Assessment/Plan:  The patient presents with chronic sore throat of six months' duration.  She has had an extensive medical workup, none of which has improved her symptoms.  It is difficult for me to know if her sore  throat is coming from her tonsils such as chronic inflammation from the tonsil stones or if there is another area that is causing the sore throat such as a muscle tension issue.  I had a long discussion with the patient that if we perform a tonsillectomy, I do not know if this will actually improve her sore throat, and there is a potentially very good chance that it may not.  I think in this instance I can say that a tonsillectomy would improve the tonsil stones.  We also discussed recovery and risk of bleeding.  The patient is very aware and vocalizes her understanding that I cannot guarantee that her sore throat will improve with a tonsillectomy.  However, she is willing to take that risk.  At the very least, she would be happy to not have to deal with her tonsil stones and halitosis that comes from it.  Therefore, I think it is reasonable to proceed since she understands the risks of the procedure as well as outcomes.  Given her BMI, we will have to do her surgery at the main OR.  She is otherwise healthy from a heart and lung standpoint.  She does not take any blood thinning medications.   We will see if we can convert her MTX into liquid form.  She does only take it once a week.      I spent a total of 35 minutes face-to-face with Elizabeth Rosenthal during today's office visit.  Over 50% of this time was spent counseling the patient on and/or coordinating care as documented in my assessment and plan.

## 2017-12-07 NOTE — TELEPHONE ENCOUNTER
12/7/17  Left [facility] to call Blanca in surgery scheduling with Dr Esquivel      Surgery scheduled for 1/3/18 at [facility] with Dr Esquivel.    Packet and teaching done in clinic by Lisa HURST Clinic Nurse Coordinator    Post op scheduled for 1/23/18         Blanca Osborn   ENT Va-Op Coordinator  [phone]

## 2017-12-07 NOTE — PATIENT INSTRUCTIONS
Main Scheduling line  [phone]  Psychiatry [phone] ([address])     Discontinue caffeine  Take prescription pantoprazole as directed on label  Manage weight loss  Consider referral to Weight Management or Sports ortho clinic for lifetyle strategies  Consider referral to our Health Psychologist and psychiatrist for medication advice      Lifestyle Changes for Controlling GERD  When you have GERD, stomach acid feels as if it s backing up toward your mouth. Whether or not you take medicine to control your GERD, your symptoms can often be improved with lifestyle changes. Talk to your healthcare provider about the following suggestions. These suggestions may help you get relief from your symptoms.      Raise your head  Reflux is more likely to strike when you re lying down flat, because stomach fluid can flow backward more easily. Raising the head of your bed 4 to 6 inches can help. To do this:    Slide blocks or books under the legs at the head of your bed. Or, place a wedge under the mattress. Many [employer] can make a suitable wedge for you. The wedge should run from your waist to the top of your head.    Don t just prop your head on several pillows. This increases pressure on your stomach. It can make GERD worse.  Watch your eating habits  Certain foods may increase the acid in your stomach or relax the lower esophageal sphincter. This makes GERD more likely. It s best to avoid the following if they cause you symptoms:    Coffee, tea, and carbonated drinks (with and without caffeine)    Fatty, fried, or spicy food    Mint, chocolate, onions, and tomatoes    Peppermint    Any other foods that seem to irritate your stomach or cause you pain  Relieve the pressure  Tips include the following:    Eat smaller meals, even if you have to eat more often.    Don t lie down right after you eat. Wait a few hours for your stomach to empty.    Avoid tight belts and tight-fitting clothes.    Lose excess  weight.  Tobacco and alcohol  Avoid smoking tobacco and drinking alcohol. They can make GERD symptoms worse.  Date Last Reviewed: 7/1/2016 2000-2017 [employer] [address] [place] [address], [place] [address]. All rights reserved. This information is not intended as a substitute for professional medical care. Always follow your healthcare professional's instructions.

## 2017-12-08 ENCOUNTER — MYC MEDICAL ADVICE (OUTPATIENT)
Dept: RHEUMATOLOGY | Facility: CLINIC | Age: 52
End: 2017-12-08

## 2017-12-08 ENCOUNTER — TELEPHONE (OUTPATIENT)
Dept: INTERNAL MEDICINE | Facility: CLINIC | Age: 52
End: 2017-12-08

## 2017-12-08 LAB
H PYLORI AG STL QL IA: NORMAL
SPECIMEN SOURCE: NORMAL

## 2017-12-08 RX ORDER — METHYLPREDNISOLONE 8 MG/1
TABLET ORAL
Qty: 8 TABLET | Refills: 0 | Status: SHIPPED | OUTPATIENT
Start: 2017-12-08 | End: 2017-12-08

## 2017-12-08 NOTE — TELEPHONE ENCOUNTER
Ana,        I sent the methylprednisolone to her pharmacy.  She can take 50 mg of over the counter benadryl 12 hours and again 2 hours prior to the scan as well.  Must have  if taking benadryl.  Please call her.  Thanks. SB              Above message left for pt also.  Le Love RN  -----------------    spoke with pt, order changed to CT w/o contrast .  Pharmacy called and cancelled the Rx.  CT radiology dept notified about the order change.  Le Love RN      Rx sent to pt's pharmacy, detailed message left for pt, advised to call back with questions.  Le Love RN       Please send in a Rx for upcoming CT on Monday.  Thanks,  Le Love RN      ----- Message from Hannah Kay CMA sent at 12/8/2017 12:12 PM CST -----  Regarding: FW: CT Scan   Contact: [phone]      ----- Message -----     From: Alton Webster     Sent: 12/8/2017  11:39 AM       To: [facility] Nursing Staff-  Subject: CT Scan                                          Eliana with CT dept     They need pre-medication ordered before they do the patient's CT scan. Pt is having CT scan on Monday, wondering if this can be done today.

## 2017-12-11 ENCOUNTER — OFFICE VISIT (OUTPATIENT)
Dept: INTERNAL MEDICINE | Facility: CLINIC | Age: 52
End: 2017-12-11

## 2017-12-11 VITALS
WEIGHT: 293 LBS | RESPIRATION RATE: 20 BRPM | HEART RATE: 78 BPM | DIASTOLIC BLOOD PRESSURE: 70 MMHG | BODY MASS INDEX: 47.17 KG/M2 | OXYGEN SATURATION: 96 % | SYSTOLIC BLOOD PRESSURE: 113 MMHG

## 2017-12-11 DIAGNOSIS — J35.01 CHRONIC TONSILLITIS: ICD-10-CM

## 2017-12-11 DIAGNOSIS — Z01.818 PREOP GENERAL PHYSICAL EXAM: Primary | ICD-10-CM

## 2017-12-11 DIAGNOSIS — R79.89 ELEVATED SERUM CREATININE: ICD-10-CM

## 2017-12-11 ASSESSMENT — ANXIETY QUESTIONNAIRES
3. WORRYING TOO MUCH ABOUT DIFFERENT THINGS: NOT AT ALL
GAD7 TOTAL SCORE: 0
5. BEING SO RESTLESS THAT IT IS HARD TO SIT STILL: NOT AT ALL
7. FEELING AFRAID AS IF SOMETHING AWFUL MIGHT HAPPEN: NOT AT ALL
2. NOT BEING ABLE TO STOP OR CONTROL WORRYING: NOT AT ALL
6. BECOMING EASILY ANNOYED OR IRRITABLE: NOT AT ALL
1. FEELING NERVOUS, ANXIOUS, OR ON EDGE: NOT AT ALL

## 2017-12-11 ASSESSMENT — PATIENT HEALTH QUESTIONNAIRE - PHQ9
SUM OF ALL RESPONSES TO PHQ QUESTIONS 1-9: 7
5. POOR APPETITE OR OVEREATING: NOT AT ALL

## 2017-12-11 ASSESSMENT — PAIN SCALES - GENERAL: PAINLEVEL: MILD PAIN (3)

## 2017-12-11 NOTE — PROGRESS NOTES
"[facility]  [address]  [address] [facility] [id number] [phone]    PRE-OP EVALUATION:  Today's date: 2017    Elizabeth Rosenthal (: 1965) presents for pre-operative evaluation assessment as requested by Dr. Esquivel.  She requires evaluation and anesthesia risk assessment prior to undergoing surgery/procedure for treatment of chronic sore throat .  Proposed procedure: Tonsillectomy    Date of Surgery/ Procedure: 1/3/18   Time of Surgery/ Procedure: 12:25 PM   Primary Physician: Eveline Berry  Type of Anesthesia Anticipated: General    Patient has a Health Care Directive or Living Will:  NO      HPI:                                                      Brief HPI related to upcoming procedure: Elizabeth Rosenthal is a 52 year old female with a PMH of HTN, rheumatoid arthritis, SHERIE and obesity presenting for preoperative evaluation for tonsillectomy. Patient has a >6 month history of sore throat which has been unchanged with medical management. She has generally been feeling well, states \"I am getting over a cold\" but otherwise feeling well. Has had general anesthesia in the past, most recently in  when she underwent a tubal ligation. No known history of reaction of general anaesthesia in the past. No known family history of reactions to general anaesthesia. She is able to walk her dog 1-2 times daily, generally for about 10 minutes. Denies chest pain, shortness of breath or syncope with exertion. Able to climb a flight of stairs without symptoms. Father  in his late 50s suddenly, was attributed to an MI. No other family history of heart failure, CAD or sudden cardiovascular events. She denies fever, chills, chest pain, cough, shortness of breath, lower extremity edema, weakness, numbness or tingling. Denies constipation or diarrhea. She has chronic abdominal pain which is unchanged today from her baseline, she is scheduled for a non-contrast CT today.     She does " report having a TIA in 2005, had sudden onset numbness on her right side. Workup was negative at that time. In 2013 she was found to have a right vertebral dissection which was managed with observation only, MRA in 2014 showed resolution of the dissection.       See problem list for active medical problems.  Problems all longstanding and stable, except as noted/documented.  See ROS for pertinent symptoms related to these conditions.                                                                                                  .    MEDICAL HISTORY:                                                      Patient Active Problem List    Diagnosis Date Noted     Arthritis of knee, left 07/21/2016     Priority: Medium     Hyperlipidemia      Priority: Medium     SHERIE (obstructive sleep apnea)      Priority: Medium     has cpap       Morbid obesity with BMI of 45.0-49.9, adult (H)      Priority: Medium     Low back pain      Priority: Medium     Rheumatoid arthritis of multiple sites without rheumatoid factor (H) 05/25/2016     Priority: Medium     Fibromyalgia 05/25/2016     Priority: Medium     Encounter for long-term (current) use of medications 05/25/2016     Priority: Medium     Polyarthritis 03/22/2016     Priority: Medium     Depression 03/22/2016     Priority: Medium     Benign essential hypertension 03/22/2016     Priority: Medium     Mild intermittent asthma without complication 03/22/2016     Priority: Medium     Morbid obesity (H) 03/22/2016     Priority: Medium     Iliotibial band syndrome 01/31/2011     Priority: Medium     Right knee pain 01/31/2011     Priority: Medium     Lumbar radicular pain 01/31/2011     Priority: Medium      Past Medical History:   Diagnosis Date     Allergic rhinitis      Arthritis      Asthma      Autoimmune disease (H) 2011     Chronic pelvic pain in female      Depression      Depressive disorder      Gastroesophageal reflux disease      Hyperlipidemia      Hypertension       Immunosuppression (H)      Low back pain      Migraines      Morbid obesity with BMI of 45.0-49.9, adult (H)      Obstructive sleep apnea 2012     SHERIE (obstructive sleep apnea)     has cpap     Polyarthritis      Reduced vision 2012     TIA (transient ischemic attack)      Vertebral artery dissection (H)      Past Surgical History:   Procedure Laterality Date     ARTHROSCOPY KNEE BILATERAL       BIOPSY LYMPH NODE CERVICAL       COLONOSCOPY N/A 7/26/2017    Procedure: COMBINED COLONOSCOPY, SINGLE OR MULTIPLE BIOPSY/POLYPECTOMY BY BIOPSY;  colonoscopy;  Surgeon: Thang Saleh MD;  Location:  GI     ENT SURGERY  lymph node biopsy 2010     HYSTEROSCOPY       TUBAL LIGATION       Current Outpatient Prescriptions   Medication Sig Dispense Refill     pantoprazole (PROTONIX) 20 MG EC tablet Take 1 tablet (20 mg) by mouth 2 times daily 180 tablet 3     methotrexate 2.5 MG tablet CHEMO Take 6 tablets (15 mg) by mouth once a week 6 tabs by mouth once a week 30 tablet 5     gabapentin (NEURONTIN) 300 MG capsule Take 1 capsule (300 mg) by mouth 3 times daily 90 capsule 11     folic acid (FOLVITE) 1 MG tablet Take 1 tablet (1 mg) by mouth daily 90 tablet 3     lidocaine (LIDODERM) 5 % Patch Place 1-3 patches onto the skin every 24 hours Patient will call to fill 30 patch 1     lisinopril (PRINIVIL/ZESTRIL) 10 MG tablet Take 1 tablet (10 mg) by mouth daily 90 tablet 3     fluticasone (FLOVENT HFA) 110 MCG/ACT Inhaler Inhale 2 puffs into the lungs 2 times daily (Patient taking differently: Inhale 2 puffs into the lungs 2 times daily as needed ) 3 Inhaler 3     buPROPion (WELLBUTRIN XL) 150 MG 24 hr tablet Take 1 tablet (150 mg) by mouth every morning 90 tablet 3     albuterol (PROAIR HFA/PROVENTIL HFA/VENTOLIN HFA) 108 (90 BASE) MCG/ACT Inhaler Inhale 1 puff into the lungs every 6 hours as needed for shortness of breath / dyspnea or wheezing 3 Inhaler 3     escitalopram (LEXAPRO) 20 MG tablet Take 1 tablet (20 mg) by mouth  daily 90 tablet 2     OTC products: None, except as noted above    Allergies   Allergen Reactions     Nuts Itching     Barley Grass GI Disturbance     Codeine Itching     Contrast Dye Itching     Oat Other (See Comments) and Nausea and Vomiting     abdominal pain       Penicillins Itching     Shellfish-Derived Products Nausea and Vomiting     Yeast Cramps, Diarrhea, Itching and Nausea and Vomiting      Latex Allergy: NO    Social History   Substance Use Topics     Smoking status: Never Smoker     Smokeless tobacco: Never Used     Alcohol use 0.0 oz/week     0 Standard drinks or equivalent per week      Comment: Occasional     History   Drug Use No       REVIEW OF SYSTEMS:                                                    Constitutional, neuro, ENT, endocrine, pulmonary, cardiac, gastrointestinal, genitourinary, musculoskeletal, integument and psychiatric systems are negative, except as otherwise noted.      EXAM:                                                    /70 (BP Location: Right arm, Patient Position: Sitting, Cuff Size: Adult Large)  Pulse 78  Resp 20  Wt 134.7 kg (297 lb)  SpO2 96%  BMI 47.17 kg/m2    GENERAL APPEARANCE: healthy, alert and no distress     EYES: PEERL. No scleral icterus or erythema.      HENT: Enlarged tonsils bilaterally without exudate or erythema.      NECK: no adenopathy, no asymmetry, masses     RESP: lungs clear to auscultation - no rales, rhonchi or wheezes     CV: regular rates and rhythm, normal S1 S2, no S3 or S4 and no murmur, click or rub. RP 2+ bilaterally. DP 2+ bilaterally.      ABDOMEN:  Soft, diffusely tender to palpation, no rebound tenderness. Bowel sounds present.      MS: extremities normal- no gross deformities noted. No edema, warm, normal peripheral pulses.      SKIN: no suspicious lesions or rashes     NEURO: Strength 5/5 bilaterally in all extremities, CN II-XII intact.      PSYCH: mentation appears normal. and affect normal/bright    DIAGNOSTICS:                                                     Renal function checked last week, no further laboratory testing today. Will recheck renal function in 1-2 months.     Recent Labs   Lab Test  12/07/17   1552  10/26/17   0713  06/16/17   0719  05/23/17   0738   03/22/16   0921   HGB   --   12.3   --   13.4   < >  13.8   PLT   --   316   --   308   < >  328   NA   --    --   141   --    --   143   POTASSIUM   --    --   4.0   --    --   4.3   CR  1.21*  1.18*  1.01  1.05*   < >  0.91   A1C   --    --   5.8   --    --   5.9    < > = values in this interval not displayed.        IMPRESSION:                                                    Reason for surgery/procedure: Chronic sore throat  Diagnosis/reason for consult: Scheduled tonsillectomy     The proposed surgical procedure is considered INTERMEDIATE risk.    REVISED CARDIAC RISK INDEX  The patient has the following serious cardiovascular risks for perioperative complications such as (MI, PE, VFib and 3  AV Block):  Cerebrovascular Disease (TIA or CVA)  INTERPRETATION: 1 risks: Class II (low risk - 0.9% complication rate)    The patient has the following additional risks for perioperative complications: Obesity, SHERIE, asthma, serum albumin <3.5   -Recommend close availability of RT given additional pulmonary risk factors       ICD-10-CM    1. Preop general physical exam Z01.818    2. Elevated serum creatinine R79.89 Creatinine       RECOMMENDATIONS:                                                        Pulmonary Risk  Incentive spirometry post op  Respiratory Therapy (Respiratory Care IP Consult)  post op      --Patient advised to avoid all medications morning of surgery and then resume normal schedule postoperatively     APPROVAL GIVEN to proceed with proposed procedure, without further diagnostic evaluation       Signed Electronically by: David Corona MD     Copy of this evaluation report is provided to requesting physician.    [facility] Preop Guidelines    Patient  was seen and discussed with Dr. Eveline Berry who agrees with the above stated assessment and plan.     Teaching Physician Note:  I was present during the visit and the patient was seen and examined by me.   I discussed the case with the resident and agree with the note as documented by the resident with the following exceptions:  None.    Eveline Berry M.D.  Internal Medicine   pager [phone]

## 2017-12-11 NOTE — PATIENT INSTRUCTIONS
[facility]: [phone]     [facility] Medication Refill Request Information:  * Please contact your pharmacy regarding ANY request for medication refills.  ** [facility] Prescription Fax = [phone]  * Please allow 3 business days for routine medication refills.  * Please allow 5 business days for controlled substance medication refills.     [facility] Test Result notification information:  *You will be notified with in 7-10 days of your appointment day regarding the results of your test.  If you are on MyChart you will be notified as soon as the provider has reviewed the results and signed off on them.  Before Your Surgery      Call your surgeon if there is any change in your health. This includes signs of a cold or flu (such as a sore throat, runny nose, cough, rash or fever).    Do not smoke, drink alcohol or take over the counter medicine (unless your surgeon or primary care doctor tells you to) for the 24 hours before and after surgery.    If you take prescribed drugs: Follow your doctor s orders about which medicines to take and which to stop until after surgery.    Eating and drinking prior to surgery: follow the instructions from your surgeon    Take a shower or bath the night before surgery. Use the soap your surgeon gave you to gently clean your skin. If you do not have soap from your surgeon, use your regular soap. Do not shave or scrub the surgery site.  Wear clean pajamas and have clean sheets on your bed.

## 2017-12-11 NOTE — NURSING NOTE
Chief Complaint   Patient presents with     Pre-Op Exam     pre op tonsillectomy-1/3/18   Nita Stevens LPN 1:01 PM on 12/11/2017

## 2017-12-11 NOTE — NURSING NOTE
Rooming Note    Health Maintenance  Health Maintenance Due   Topic Date Due     MEHUL QUESTIONNAIRE 1 YEAR  03/17/1982     DEPRESSION ACTION PLAN Q1 YR  03/17/1982     PHQ-9 Q6 MONTHS  03/17/1982     INFLUENZA VACCINE (SYSTEM ASSIGNED)  09/01/2017   All health maintenance items discussed and pended.

## 2017-12-11 NOTE — MR AVS SNAPSHOT
After Visit Summary   12/11/2017    Elizabeth Rosenthal    MRN: [id number]           Patient Information     Date Of Birth          1965        Visit Information        Provider Department      12/11/2017 1:20 PM Eveline Berry MD [facility]        Care Instructions    Primary Care Center: [phone]     [facility] Medication Refill Request Information:  * Please contact your pharmacy regarding ANY request for medication refills.  ** [facility] Prescription Fax = [phone]  * Please allow 3 business days for routine medication refills.  * Please allow 5 business days for controlled substance medication refills.     Primary Care Center Test Result notification information:  *You will be notified with in 7-10 days of your appointment day regarding the results of your test.  If you are on MyChart you will be notified as soon as the provider has reviewed the results and signed off on them.          Follow-ups after your visit        Your next 10 appointments already scheduled     Dec 11, 2017  6:00 PM CST   (Arrive by 5:45 PM)   CT ABDOMEN PELVIS W/O & W CONTRAST with UCCT1   [facility] [place] [facility] ([facility])    [address] [place] [id number] [phone]           Please bring any scans or X-rays taken at other hospitals, if similar tests were done. Also bring a list of your medicines, including vitamins, minerals and over-the-counter drugs. It is safest to leave personal items at home.  Be sure to tell your doctor:   If you have any allergies.   If there s any chance you are pregnant.   If you are breastfeeding.   If you have any special needs.  You may have contrast for this exam. To prepare:   Do not eat or drink for 2 hours before your exam. If you need to take medicine, you may take it with small sips of water. (We may ask you to take liquid medicine as well.)   The day before your exam, drink extra fluids at  least six 8-ounce glasses (unless your doctor tells you to restrict your fluids).  Patients over 70 or patients with diabetes or kidney problems:   If you haven t had a blood test (creatinine test) within the last 30 days, go to your clinic or Diagnostic Imaging Department for this test.  If you have diabetes:   If your kidney function is normal, continue taking your metformin (Avandamet, Glucophage, Glucovance, Metaglip) on the day of your exam.   If your kidney function is abnormal, wait 48 hours before restarting this medicine.  You will have oral contrast for this exam:   You will drink the contrast at home. Get this from your clinic or Diagnostic Imaging Department. Please follow the directions given.  Please wear loose clothing, such as a sweat suit or jogging clothes. Avoid snaps, zippers and other metal. We may ask you to undress and put on a hospital gown.  If you have any questions, please call the Imaging Department where you will have your exam.            Dec 21, 2017  7:40 AM CST   (Arrive by 7:25 AM)   Return Visit with Eveline Berry MD   [facility] Primary [facility] Clinic ([facility])    [address] [place] [id number] [phone]            Jan 03, 2018   Procedure with Ivania Esquivel MD   [facility] [place], Same Day Surgery (--)    [address] [facility] [facility] [address] [address]   [phone]            Jan 23, 2018  3:30 PM CST   (Arrive by 3:15 PM)   Return Visit with Ivania Esquivel MD   [facility] Ear Nose and Throat ([facility])    [address] [place] [id number] [phone]            Jun 14, 2018  6:00 PM CDT   (Arrive by 5:45 PM)   Return Visit with Tyson Ribeiro MD   [facility] Rheumatology ([facility])    [address] [place] [id number] [phone]              Who to contact     Please call your clinic at [phone]  to:    Ask questions about your health    Make or cancel appointments    Discuss your medicines    Learn about your test results    Speak to your doctor   If you have compliments or concerns about an experience at your clinic, or if you wish to file a complaint, please contact [facility] Patient Relations at [phone] or email us at [email or web address]         Additional Information About Your Visit        [employer] Information     [employer] is an electronic gateway that provides easy, online access to your medical records. With [employer], you can request a clinic appointment, read your test results, renew a prescription or communicate with your care team.     To sign up for [employer] visit the website at [email or web address]   You will be asked to enter the access code listed below, as well as some personal information. Please follow the directions to create your username and password.     Your access code is: [email or web address]  Expires: 3/11/2018  6:30 AM     Your access code will  in 90 days. If you need help or a new code, please contact your [facility] or call [phone] for assistance.        Care EveryWhere ID     This is your Care EveryWhere ID. This could be used by other organizations to access your [facility] medical records  [id number]        Your Vitals Were     Pulse Respirations Pulse Oximetry BMI (Body Mass Index)          78 20 96% 47.17 kg/m2         Blood Pressure from Last 3 Encounters:   17 113/70   17 123/74   17 123/74    Weight from Last 3 Encounters:   17 134.7 kg (297 lb)   17 134.7 kg (297 lb)   17 134.8 kg (297 lb 1.6 oz)              Today, you had the following     No orders found for display         Today's Medication Changes          These changes are accurate as of: 17  1:44 PM.  If you have any questions, ask your nurse or doctor.               These medicines have  changed or have updated prescriptions.        Dose/Directions    fluticasone 110 MCG/ACT Inhaler   Commonly known as:  FLOVENT HFA   This may have changed:    - when to take this  - reasons to take this   Used for:  Mild intermittent asthma without complication        Dose:  2 puff   Inhale 2 puffs into the lungs 2 times daily   Quantity:  3 Inhaler   Refills:  3                Primary Care Provider Office Phone # Fax #    Eveline Berry [phone]       [address] [place] [address] [address] [place] [phone]        Equal Access to Services     [facility] : Hadii aad ku hadasho Soomaali, waaxda luqadaha, qaybta kaalmada adeegyada, waxay elenain hayaan getachew bravo . So [facility] [phone].    ATENCIÓN: Si habla español, tiene a hightower disposición servicios gratuitos de asistencia lingüística. [facility] [phone].    We comply with applicable federal civil rights laws and [place] laws. We do not discriminate on the basis of race, color, national origin, age, disability, sex, sexual orientation, or gender identity.            Thank you!     Thank you for choosing [facility] PRIMARY [facility]ARE CLINIC  for your care. Our goal is always to provide you with excellent care. Hearing back from our patients is one way we can continue to improve our services. Please take a few minutes to complete the written survey that you may receive in the mail after your visit with us. Thank you!             Your Updated Medication List - Protect others around you: Learn how to safely use, store and throw away your medicines at www.disposemymeds.org.          This list is accurate as of: 12/11/17  1:44 PM.  Always use your most recent med list.                   Brand Name Dispense Instructions for use Diagnosis    albuterol 108 (90 BASE) MCG/ACT Inhaler    PROAIR HFA/PROVENTIL HFA/VENTOLIN HFA    3 Inhaler    Inhale 1 puff into the lungs every 6 hours as needed for shortness of breath / dyspnea or wheezing    Mild intermittent  asthma without complication       buPROPion 150 MG 24 hr tablet    WELLBUTRIN XL    90 tablet    Take 1 tablet (150 mg) by mouth every morning    Major depressive disorder, recurrent episode, mild (H)       escitalopram 20 MG tablet    LEXAPRO    90 tablet    Take 1 tablet (20 mg) by mouth daily    Depression       fluticasone 110 MCG/ACT Inhaler    FLOVENT HFA    3 Inhaler    Inhale 2 puffs into the lungs 2 times daily    Mild intermittent asthma without complication       folic acid 1 MG tablet    FOLVITE    90 tablet    Take 1 tablet (1 mg) by mouth daily    Rheumatoid arthritis of multiple sites without rheumatoid factor (H), Encounter for long-term (current) use of medications       gabapentin 300 MG capsule    NEURONTIN    90 capsule    Take 1 capsule (300 mg) by mouth 3 times daily    Fibromyalgia       lidocaine 5 % Patch    LIDODERM    30 patch    Place 1-3 patches onto the skin every 24 hours Patient will call to fill    Polyarthritis, Chronic bilateral low back pain without sciatica       lisinopril 10 MG tablet    PRINIVIL/ZESTRIL    90 tablet    Take 1 tablet (10 mg) by mouth daily    Benign essential hypertension, Mild intermittent asthma without complication       methotrexate 2.5 MG tablet CHEMO     30 tablet    Take 6 tablets (15 mg) by mouth once a week 6 tabs by mouth once a week    Rheumatoid arthritis of multiple sites without rheumatoid factor (H)       pantoprazole 20 MG EC tablet    PROTONIX    180 tablet    Take 1 tablet (20 mg) by mouth 2 times daily    Abdominal pain, generalized

## 2017-12-12 ASSESSMENT — ANXIETY QUESTIONNAIRES: GAD7 TOTAL SCORE: 0

## 2017-12-13 NOTE — TELEPHONE ENCOUNTER
Updated pt with Dr. Ribeiro response.  No further questions.    Charline Avilez RN  Rheumatology Clinic

## 2017-12-14 ENCOUNTER — TRANSFERRED RECORDS (OUTPATIENT)
Dept: HEALTH INFORMATION MANAGEMENT | Facility: CLINIC | Age: 52
End: 2017-12-14

## 2017-12-21 ENCOUNTER — OFFICE VISIT (OUTPATIENT)
Dept: INTERNAL MEDICINE | Facility: CLINIC | Age: 52
End: 2017-12-21
Payer: COMMERCIAL

## 2017-12-21 VITALS — SYSTOLIC BLOOD PRESSURE: 121 MMHG | DIASTOLIC BLOOD PRESSURE: 80 MMHG | HEART RATE: 79 BPM

## 2017-12-21 DIAGNOSIS — R10.9 CHRONIC ABDOMINAL PAIN: Primary | ICD-10-CM

## 2017-12-21 DIAGNOSIS — I10 BENIGN ESSENTIAL HYPERTENSION: ICD-10-CM

## 2017-12-21 DIAGNOSIS — G89.29 CHRONIC ABDOMINAL PAIN: Primary | ICD-10-CM

## 2017-12-21 DIAGNOSIS — J31.2 CHRONIC SORE THROAT: ICD-10-CM

## 2017-12-21 LAB
HGB BLD-MCNC: 12.8 G/DL (ref 11.7–15.7)
POTASSIUM SERPL-SCNC: 4.4 MMOL/L (ref 3.4–5.3)
SODIUM SERPL-SCNC: 141 MMOL/L (ref 133–144)

## 2017-12-21 ASSESSMENT — PAIN SCALES - GENERAL: PAINLEVEL: NO PAIN (0)

## 2017-12-21 NOTE — PROGRESS NOTES
Also c/o fatigue, throat pains, states chronic, persistent stomach pain, intermittent, sharp, bilateral upper abd, wrapping around to back  Seeing rheumatology this afternoon  Has had blood work, has not had imaging.  Has had her abd symptoms in the past, had work up with gyn, GI, has been biopsied for celiac, has colon polyps, has had polyp removal around appendix in past, has hx diverticulosis, has hx colon ulcers (deemed due to NSAIDS), has had EGD in the past, had cyst on fallopian and uterine polyp.  Still has appendix and gallbladder still   Wonder is anything could be hiding in her abdomen and if she needs imaging  No alcohol nor NSAID, drinks one coffee latte daily (tolerates), more than that irritates stomach  ENT ranitine 150 mg bid x 4-5 months w/o relief, was on PPI for one month prior q day  States the only thing that helped lower abd pain in the past was prednisone  Her psychiatrist retired and she hasn't seen one in a long while.  After discussion, she was eventually agreeable to see both health psychology and consult with psychiatry for medication review and advice.  She is not open at this time to lifestyle changes to help with weight loss and for her chronic medical problems   No fevers, chills, weight loss, vomiting.  Tends to have loose stools (her baseline), reports eating mainly one small meal towards the end of the daily, no jaundice, generalized pruritis  Abdominal pain, generalized  -     UA with Micro reflex to Culture; Future  -     H Pylori antigen stool; Future  -     CT Abdomen/Pelvis w/o & w contrast; Future.  Hx itching only, with contrast dye.  Controlled with benadryl and prednisone in the past.  Will use per radiology protocol.  -     pantoprazole (PROTONIX) 20 MG EC tablet; Take 1 tablet (20 mg) by mouth 2 times daily x 8 weeks  -     D/C ranitidine  -     Reflux precautions discussed, including stopping caffeine and weight loss, avoid aggravating foods.    EXAMINATION: CT  ABDOMEN PELVIS W/O CONTRAST  12/11/2017 5:42 PM       CLINICAL HISTORY: chronic esclating  generalized abd pain; Abdominal  pain, generalized     COMPARISON: None available     PROCEDURE COMMENTS: CT of the abdomen was performed without contrast.  Coronal and sagittal reformatted images were obtained.     FINDINGS:  Lower thorax:   Linear atelectasis in the middle lobe and lingula.     Abdomen and pelvis:  Scattered small calcifications in the liver and spleen are compatible  with calcified granulomas. Focal pancreatic fat deposition most  pronounced at the head and uncinate process. Otherwise the liver and  biliary system, gallbladder, spleen, and pancreas are normal in  appearance. The adrenal glands and kidneys are normal in appearance.     There are no abnormally dilated or thickened loops of small bowel or  colon. There is no free air or free fluid. There are no abnormally  sized lymph nodes. There are phleboliths in the pelvis. There is a  periumbilical hernia containing noninflamed fat.     Osseous structures:   Chronic degenerative changes in the spine. No acute fracture or  suspicious bone lesion.         IMPRESSION:  No cause identified to explain the patient's chronic abdominal pain.     Component      Latest Ref Rng & Units 12/7/2017   Color Urine       Straw   Appearance Urine       Clear   Glucose Urine      NEG:Negative mg/dL Negative   Bilirubin Urine      NEG:Negative Negative   Ketones Urine      NEG:Negative mg/dL Negative   Specific Gravity Urine      1.003 - 1.035 1.004   Blood Urine      NEG:Negative Negative   pH Urine      5.0 - 7.0 pH 7.0   Protein Albumin Urine      NEG:Negative mg/dL Negative   Urobilinogen mg/dL      0.0 - 2.0 mg/dL 0.0   Nitrite Urine      NEG:Negative Negative   Leukocyte Esterase Urine      NEG:Negative Negative   Source       Midstream Urine   WBC Urine      0 - 2 /HPF 1   RBC Urine      0 - 2 /HPF 1   Squamous Epithelial /HPF Urine      0 - 1 /HPF <1   Mucous Urine       NEG:Negative /LPF Present (A)   Creatinine      0.52 - 1.04 mg/dL 1.21 (H)   GFR Estimate      >60 mL/min/1.7m2 46 (L)   GFR Estimate If Black      >60 mL/min/1.7m2 56 (L)   Protein Random Urine      g/L <0.05   Protein Total Urine g/gr Creatinine      0 - 0.2 g/g Cr Unable to calculate due to low value   Specimen Description       Feces   H Pylori Antigen       Negative for Helicobacter pylori antigen by enzyme immunoassay. A negative result . . .   Creatinine Urine      mg/dL 28       Component      Latest Ref Rng & Units 3/22/2016 5/25/2016 8/24/2016 11/23/2016   Creatinine      0.52 - 1.04 mg/dL 0.91 0.93 0.97 1.01     Component      Latest Ref Rng & Units 5/23/2017 6/16/2017 10/26/2017 12/7/2017   Creatinine      0.52 - 1.04 mg/dL 1.05 (H) 1.01 1.18 (H) 1.21 (H)

## 2017-12-21 NOTE — PROGRESS NOTES
CC:  FU test results    S:  Elizabeth is here to follow-up on her last office visit.  At that time she had a number of complaints including chronic abdominal pain with an extensive negative workup to date.  I ordered a CAT scan of her abdomen, urinalysis, H pylori stool antigen.  We also discussed reflux precautions, weight loss and started pantoprazole 20 mg twice daily for 8 weeks.  Results of CAT scan and laboratory work reviewed with Elizabeth today.  CAT scan was negative.  Labs were negative except for an elevated creatinine.  Reviewed prior creatinines.  I recommend a nephrology consultation and the patient will follow through on this.     We discussed the possibility of a diagnosis of chronic abdominal wall pain.  I printed out information from the Internet for her to ponder.  At this point I let her know that the cause is likely benign, and is to be observed from here on out.  She has a tonsillectomy pending for January and I see no contraindications to that.    Patient Active Problem List   Diagnosis     Iliotibial band syndrome     Right knee pain     Lumbar radicular pain     Polyarthritis     Depression     Benign essential hypertension     Mild intermittent asthma without complication     Morbid obesity (H)     Rheumatoid arthritis of multiple sites without rheumatoid factor (H)     Fibromyalgia     Encounter for long-term (current) use of medications     SHERIE (obstructive sleep apnea)     Morbid obesity with BMI of 45.0-49.9, adult (H)     Low back pain     Hyperlipidemia     Arthritis of knee, left     Creatinine elevation     Past Surgical History:   Procedure Laterality Date     ARTHROSCOPY KNEE BILATERAL       BIOPSY LYMPH NODE CERVICAL       COLONOSCOPY N/A 7/26/2017    Procedure: COMBINED COLONOSCOPY, SINGLE OR MULTIPLE BIOPSY/POLYPECTOMY BY BIOPSY;  colonoscopy;  Surgeon: Thang Saleh MD;  Location:  GI     ENT SURGERY  lymph node biopsy 2010     HYSTEROSCOPY       TUBAL LIGATION        .a    EXAMINATION: CT ABDOMEN PELVIS W/O CONTRAST  12/11/2017 5:42 PM       CLINICAL HISTORY: chronic esclating  generalized abd pain; Abdominal  pain, generalized     COMPARISON: None available     PROCEDURE COMMENTS: CT of the abdomen was performed without contrast.  Coronal and sagittal reformatted images were obtained.     FINDINGS:  Lower thorax:   Linear atelectasis in the middle lobe and lingula.     Abdomen and pelvis:  Scattered small calcifications in the liver and spleen are compatible  with calcified granulomas. Focal pancreatic fat deposition most  pronounced at the head and uncinate process. Otherwise the liver and  biliary system, gallbladder, spleen, and pancreas are normal in  appearance. The adrenal glands and kidneys are normal in appearance.     There are no abnormally dilated or thickened loops of small bowel or  colon. There is no free air or free fluid. There are no abnormally  sized lymph nodes. There are phleboliths in the pelvis. There is a  periumbilical hernia containing noninflamed fat.     Osseous structures:   Chronic degenerative changes in the spine. No acute fracture or  suspicious bone lesion.         IMPRESSION:  No cause identified to explain the patient's chronic abdominal pain.     Component      Latest Ref Rng & Units 12/7/2017   Color Urine       Straw   Appearance Urine       Clear   Glucose Urine      NEG:Negative mg/dL Negative   Bilirubin Urine      NEG:Negative Negative   Ketones Urine      NEG:Negative mg/dL Negative   Specific Gravity Urine      1.003 - 1.035 1.004   Blood Urine      NEG:Negative Negative   pH Urine      5.0 - 7.0 pH 7.0   Protein Albumin Urine      NEG:Negative mg/dL Negative   Urobilinogen mg/dL      0.0 - 2.0 mg/dL 0.0   Nitrite Urine      NEG:Negative Negative   Leukocyte Esterase Urine      NEG:Negative Negative   Source       Midstream Urine   WBC Urine      0 - 2 /HPF 1   RBC Urine      0 - 2 /HPF 1   Squamous Epithelial /HPF Urine      0 -  1 /HPF <1   Mucous Urine      NEG:Negative /LPF Present (A)   Creatinine      0.52 - 1.04 mg/dL 1.21 (H)   GFR Estimate      >60 mL/min/1.7m2 46 (L)   GFR Estimate If Black      >60 mL/min/1.7m2 56 (L)   Protein Random Urine      g/L <0.05   Protein Total Urine g/gr Creatinine      0 - 0.2 g/g Cr Unable to calculate due to low value   Specimen Description       Feces   H Pylori Antigen       Negative for Helicobacter pylori antigen by enzyme immunoassay. A negative result . . .   Creatinine Urine      mg/dL 28       Component      Latest Ref Rng & Units 3/22/2016 5/25/2016 8/24/2016 11/23/2016   Creatinine      0.52 - 1.04 mg/dL 0.91 0.93 0.97 1.01     Component      Latest Ref Rng & Units 5/23/2017 6/16/2017 10/26/2017 12/7/2017   Creatinine      0.52 - 1.04 mg/dL 1.05 (H) 1.01 1.18 (H) 1.21 (H)       Elizabeth was seen today for results.    Diagnoses and all orders for this visit:    Chronic abdominal pain, see HPI. No further workup necessary at this time.    Creatinine elevation  -     NEPHROLOGY ADULT REFERRAL    Benign essential hypertension  -     Hemoglobin  -     Sodium  -     Potassium    Chronic sore throat  -    Pending tonsillectomy    Total time spent 25 minutes.  More than 50% of the time spent with Ms. Rosenthal on counseling / coordinating her care    Routine f/u    Eveline Berry M.D.  Internal Medicine  [facility] Center   pager [phone]

## 2017-12-21 NOTE — MR AVS SNAPSHOT
After Visit Summary   12/21/2017    Elizabeth Rosenthal    MRN: [id number]           Patient Information     Date Of Birth          1965        Visit Information        Provider Department      12/21/2017 7:40 AM Eveline Berry MD [facility]        Today's Diagnoses     Chronic sore throat    -  1    Creatinine elevation        Benign essential hypertension        Chronic abdominal pain          Care Instructions    Primary Care Center: [phone]     Primary Care Center Medication Refill Request Information:  * Please contact your pharmacy regarding ANY request for medication refills.  ** [facility] Prescription Fax = [phone]  * Please allow 3 business days for routine medication refills.  * Please allow 5 business days for controlled substance medication refills.     [facility] Center Test Result notification information:  *You will be notified with in 7-10 days of your appointment day regarding the results of your test.  If you are on MyChart you will be notified as soon as the provider has reviewed the results and signed off on them.    Nephrology [phone] (3rd [facility]           Follow-ups after your visit        Additional Services     NEPHROLOGY ADULT REFERRAL       Your provider has referred you to: nephrology P    Please be aware that coverage of these services is subject to the terms and limitations of your health insurance plan.  Call member services at your health plan with any benefit or coverage questions.      Reason for referral:  Chronic, slowly risking CREATINE  Please bring the following to your appointment:    >>   Any x-rays, CTs or MRIs which have been performed.  Contact the facility where they were done to arrange for  prior to your scheduled appointment.   >>   List of current medications   >>   This referral request   >>   Any documents/labs given to you for this referral                  Follow-up notes from your care team      Return for Physical Exam, patient will schedule when it is due.      Your next 10 appointments already scheduled     Dec 21, 2017  8:45 AM CST   LAB with  LAB   [facility] Lab ([facility])    [address] [place] [address]  [address] [facility] [id number] [phone]           Please do not eat 10-12 hours before your appointment if you are coming in fasting for labs on lipids, cholesterol, or glucose (sugar). This does not apply to pregnant women. Water, hot tea and black coffee (with nothing added) are okay. Do not drink other fluids, diet soda or chew gum.            Jan 03, 2018   Procedure with Ivania Esquivel MD   [facility] [place], Same Day Surgery (--)    [address] [facility] [id number] [phone]            Jan 23, 2018  3:30 PM CST   (Arrive by 3:15 PM)   Return Visit with Ivania Esquivel MD   [facility] Ear Nose and Throat ([facility])    [address] [place] [address]  [address] [facility] [id number] [phone]            Jun 14, 2018  6:00 PM CDT   (Arrive by 5:45 PM)   Return Visit with Tyson Ribeiro MD   [facility] Rheumatology ([facility])    [address] [place] [address]  [address]rd [facility] [id number] [phone]              Future tests that were ordered for you today     Open Future Orders        Priority Expected Expires Ordered    Hemoglobin Routine 12/21/2017 12/21/2018 12/21/2017    Potassium Routine 12/21/2017 1/4/2018 12/21/2017            Who to contact     Please call your clinic at [phone] to:    Ask questions about your health    Make or cancel appointments    Discuss your medicines    Learn about your test results    Speak to your doctor   If you have compliments or concerns about an experience at your clinic, or if you wish to file a complaint, please contact [facility] Patient Relations at [phone] or email us at [email or web address]          Additional Information About Your Visit        [employer] Information     [employer] gives you secure access to your electronic health record. If you see a primary care provider, you can also send messages to your care team and make appointments. If you have questions, please call your primary care clinic.  If you do not have a primary care provider, please call [phone] and they will assist you.      [employer] is an electronic gateway that provides easy, online access to your medical records. With [employer], you can request a clinic appointment, read your test results, renew a prescription or communicate with your care team.     To access your existing account, please contact your [facility] or call [phone] for assistance.        Care EveryWhere ID     This is your Care EveryWhere ID. This could be used by other organizations to access your [place] medical records  [id number]        Your Vitals Were     Pulse                   79            Blood Pressure from Last 3 Encounters:   12/21/17 121/80   12/11/17 113/70   12/07/17 123/74    Weight from Last 3 Encounters:   12/11/17 134.7 kg (297 lb)   12/07/17 134.7 kg (297 lb)   12/07/17 134.8 kg (297 lb 1.6 oz)              We Performed the Following     NEPHROLOGY ADULT REFERRAL     Sodium          Today's Medication Changes          These changes are accurate as of: 12/21/17  8:39 AM.  If you have any questions, ask your nurse or doctor.               These medicines have changed or have updated prescriptions.        Dose/Directions    fluticasone 110 MCG/ACT Inhaler   Commonly known as:  FLOVENT HFA   This may have changed:    - when to take this  - reasons to take this   Used for:  Mild intermittent asthma without complication        Dose:  2 puff   Inhale 2 puffs into the lungs 2 times daily   Quantity:  3 Inhaler   Refills:  3                Primary Care Provider Office Phone # Fax #    Eveline Berry [phone]  [phone]       [address] [place] [address] [address]  [facility] [address]        Equal Access to Services     TIAGO LOMAX : Hadii aad ku hadrissakayleigh Soroberto, waaxda luqadaha, qaybta kaalmada blair, betyt elenain hayaadewayne jerryandi samayoafina aponte. So [facility] [phone].    ATENCIÓN: Si habla español, tiene a hightower disposición servicios gratuitos de asistencia lingüística. Lonnieame al [phone].    We comply with applicable federal civil rights laws and [place] laws. We do not discriminate on the basis of race, color, national origin, age, disability, sex, sexual orientation, or gender identity.            Thank you!     Thank you for choosing [facility]  for your care. Our goal is always to provide you with excellent care. Hearing back from our patients is one way we can continue to improve our services. Please take a few minutes to complete the written survey that you may receive in the mail after your visit with us. Thank you!             Your Updated Medication List - Protect others around you: Learn how to safely use, store and throw away your medicines at www.disposemymeds.org.          This list is accurate as of: 12/21/17  8:39 AM.  Always use your most recent med list.                   Brand Name Dispense Instructions for use Diagnosis    albuterol 108 (90 BASE) MCG/ACT Inhaler    PROAIR HFA/PROVENTIL HFA/VENTOLIN HFA    3 Inhaler    Inhale 1 puff into the lungs every 6 hours as needed for shortness of breath / dyspnea or wheezing    Mild intermittent asthma without complication       buPROPion 150 MG 24 hr tablet    WELLBUTRIN XL    90 tablet    Take 1 tablet (150 mg) by mouth every morning    Major depressive disorder, recurrent episode, mild (H)       escitalopram 20 MG tablet    LEXAPRO    90 tablet    Take 1 tablet (20 mg) by mouth daily    Depression       fluticasone 110 MCG/ACT Inhaler    FLOVENT HFA    3 Inhaler    Inhale 2 puffs into the lungs 2 times daily    Mild intermittent asthma without  complication       folic acid 1 MG tablet    FOLVITE    90 tablet    Take 1 tablet (1 mg) by mouth daily    Rheumatoid arthritis of multiple sites without rheumatoid factor (H), Encounter for long-term (current) use of medications       gabapentin 300 MG capsule    NEURONTIN    90 capsule    Take 1 capsule (300 mg) by mouth 3 times daily    Fibromyalgia       lidocaine 5 % Patch    LIDODERM    30 patch    Place 1-3 patches onto the skin every 24 hours Patient will call to fill    Polyarthritis, Chronic bilateral low back pain without sciatica       lisinopril 10 MG tablet    PRINIVIL/ZESTRIL    90 tablet    Take 1 tablet (10 mg) by mouth daily    Benign essential hypertension, Mild intermittent asthma without complication       methotrexate 2.5 MG tablet CHEMO     30 tablet    Take 6 tablets (15 mg) by mouth once a week 6 tabs by mouth once a week    Rheumatoid arthritis of multiple sites without rheumatoid factor (H)       pantoprazole 20 MG EC tablet    PROTONIX    180 tablet    Take 1 tablet (20 mg) by mouth 2 times daily    Abdominal pain, generalized

## 2017-12-21 NOTE — PATIENT INSTRUCTIONS
Primary Care Center: [phone]     Primary [facility] Medication Refill Request Information:  * Please contact your pharmacy regarding ANY request for medication refills.  ** [facility] Prescription Fax = [phone]  * Please allow 3 business days for routine medication refills.  * Please allow 5 business days for controlled substance medication refills.     Primary Care Center Test Result notification information:  *You will be notified with in 7-10 days of your appointment day regarding the results of your test.  If you are on MyChart you will be notified as soon as the provider has reviewed the results and signed off on them.    Nephrology [phone] (3rd Floor [facility])

## 2017-12-21 NOTE — PROGRESS NOTES
Rooming Note  Health Maintenance   Health Maintenance Due   Topic Date Due     DEPRESSION ACTION PLAN Q1 YR  03/17/1982     INFLUENZA VACCINE (SYSTEM ASSIGNED)  09/01/2017    The following immunizations were discussed, but NOT ordered:   - Influenza (flu shot)  The orders were not placed because: patient declined

## 2018-01-02 ENCOUNTER — ANESTHESIA EVENT (OUTPATIENT)
Dept: SURGERY | Facility: CLINIC | Age: 53
End: 2018-01-02
Payer: COMMERCIAL

## 2018-01-03 ENCOUNTER — HOSPITAL ENCOUNTER (OUTPATIENT)
Facility: CLINIC | Age: 53
Discharge: HOME | End: 2018-01-03
Attending: OTOLARYNGOLOGY | Admitting: OTOLARYNGOLOGY
Payer: COMMERCIAL

## 2018-01-03 ENCOUNTER — ANESTHESIA (OUTPATIENT)
Dept: SURGERY | Facility: CLINIC | Age: 53
End: 2018-01-03
Payer: COMMERCIAL

## 2018-01-03 ENCOUNTER — SURGERY (OUTPATIENT)
Age: 53
End: 2018-01-03

## 2018-01-03 VITALS
WEIGHT: 293 LBS | HEIGHT: 67 IN | OXYGEN SATURATION: 92 % | BODY MASS INDEX: 45.99 KG/M2 | HEART RATE: 74 BPM | RESPIRATION RATE: 16 BRPM | SYSTOLIC BLOOD PRESSURE: 145 MMHG | TEMPERATURE: 98.2 F | DIASTOLIC BLOOD PRESSURE: 79 MMHG

## 2018-01-03 DIAGNOSIS — J35.8 TONSIL STONE: Primary | ICD-10-CM

## 2018-01-03 LAB — GLUCOSE BLDC GLUCOMTR-MCNC: 108 MG/DL (ref 70–99)

## 2018-01-03 PROCEDURE — C9399 UNCLASSIFIED DRUGS OR BIOLOG: HCPCS | Performed by: NURSE ANESTHETIST, CERTIFIED REGISTERED

## 2018-01-03 PROCEDURE — 25000125 ZZHC RX 250: Performed by: OTOLARYNGOLOGY

## 2018-01-03 PROCEDURE — 37000008 ZZH ANESTHESIA TECHNICAL FEE, 1ST 30 MIN: Performed by: OTOLARYNGOLOGY

## 2018-01-03 PROCEDURE — 36000053 ZZH SURGERY LEVEL 2 EA 15 ADDTL MIN - UMMC: Performed by: OTOLARYNGOLOGY

## 2018-01-03 PROCEDURE — 25000128 H RX IP 250 OP 636: Performed by: ANESTHESIOLOGY

## 2018-01-03 PROCEDURE — 82962 GLUCOSE BLOOD TEST: CPT

## 2018-01-03 PROCEDURE — 88304 TISSUE EXAM BY PATHOLOGIST: CPT | Performed by: OTOLARYNGOLOGY

## 2018-01-03 PROCEDURE — 25000128 H RX IP 250 OP 636: Performed by: NURSE ANESTHETIST, CERTIFIED REGISTERED

## 2018-01-03 PROCEDURE — 25000132 ZZH RX MED GY IP 250 OP 250 PS 637: Performed by: ANESTHESIOLOGY

## 2018-01-03 PROCEDURE — 36000051 ZZH SURGERY LEVEL 2 1ST 30 MIN - UMMC: Performed by: OTOLARYNGOLOGY

## 2018-01-03 PROCEDURE — 40000170 ZZH STATISTIC PRE-PROCEDURE ASSESSMENT II: Performed by: OTOLARYNGOLOGY

## 2018-01-03 PROCEDURE — 27210794 ZZH OR GENERAL SUPPLY STERILE: Performed by: OTOLARYNGOLOGY

## 2018-01-03 PROCEDURE — 71000015 ZZH RECOVERY PHASE 1 LEVEL 2 EA ADDTL HR: Performed by: OTOLARYNGOLOGY

## 2018-01-03 PROCEDURE — 71000014 ZZH RECOVERY PHASE 1 LEVEL 2 FIRST HR: Performed by: OTOLARYNGOLOGY

## 2018-01-03 PROCEDURE — 25000125 ZZHC RX 250: Performed by: NURSE ANESTHETIST, CERTIFIED REGISTERED

## 2018-01-03 PROCEDURE — 37000009 ZZH ANESTHESIA TECHNICAL FEE, EACH ADDTL 15 MIN: Performed by: OTOLARYNGOLOGY

## 2018-01-03 PROCEDURE — 25000566 ZZH SEVOFLURANE, EA 15 MIN: Performed by: OTOLARYNGOLOGY

## 2018-01-03 PROCEDURE — 71000027 ZZH RECOVERY PHASE 2 EACH 15 MINS: Performed by: OTOLARYNGOLOGY

## 2018-01-03 RX ORDER — ONDANSETRON 4 MG/1
4 TABLET, ORALLY DISINTEGRATING ORAL EVERY 30 MIN PRN
Status: DISCONTINUED | OUTPATIENT
Start: 2018-01-03 | End: 2018-01-03 | Stop reason: HOSPADM

## 2018-01-03 RX ORDER — OXYCODONE HCL 5 MG/5 ML
5 SOLUTION, ORAL ORAL EVERY 4 HOURS PRN
Status: DISCONTINUED | OUTPATIENT
Start: 2018-01-03 | End: 2018-01-03 | Stop reason: HOSPADM

## 2018-01-03 RX ORDER — PROPOFOL 10 MG/ML
INJECTION, EMULSION INTRAVENOUS PRN
Status: DISCONTINUED | OUTPATIENT
Start: 2018-01-03 | End: 2018-01-03

## 2018-01-03 RX ORDER — SODIUM CHLORIDE, SODIUM LACTATE, POTASSIUM CHLORIDE, CALCIUM CHLORIDE 600; 310; 30; 20 MG/100ML; MG/100ML; MG/100ML; MG/100ML
INJECTION, SOLUTION INTRAVENOUS CONTINUOUS PRN
Status: DISCONTINUED | OUTPATIENT
Start: 2018-01-03 | End: 2018-01-03

## 2018-01-03 RX ORDER — FENTANYL CITRATE 50 UG/ML
INJECTION, SOLUTION INTRAMUSCULAR; INTRAVENOUS PRN
Status: DISCONTINUED | OUTPATIENT
Start: 2018-01-03 | End: 2018-01-03

## 2018-01-03 RX ORDER — MEPERIDINE HYDROCHLORIDE 50 MG/ML
12.5 INJECTION INTRAMUSCULAR; INTRAVENOUS; SUBCUTANEOUS
Status: DISCONTINUED | OUTPATIENT
Start: 2018-01-03 | End: 2018-01-03 | Stop reason: HOSPADM

## 2018-01-03 RX ORDER — PREDNISONE 20 MG/1
20 TABLET ORAL DAILY
Qty: 4 TABLET | Refills: 0 | Status: SHIPPED | OUTPATIENT
Start: 2018-01-06 | End: 2018-01-10

## 2018-01-03 RX ORDER — BUPIVACAINE HYDROCHLORIDE AND EPINEPHRINE 5; 5 MG/ML; UG/ML
INJECTION, SOLUTION PERINEURAL PRN
Status: DISCONTINUED | OUTPATIENT
Start: 2018-01-03 | End: 2018-01-03 | Stop reason: HOSPADM

## 2018-01-03 RX ORDER — FENTANYL CITRATE 50 UG/ML
25-50 INJECTION, SOLUTION INTRAMUSCULAR; INTRAVENOUS
Status: DISCONTINUED | OUTPATIENT
Start: 2018-01-03 | End: 2018-01-03 | Stop reason: HOSPADM

## 2018-01-03 RX ORDER — LIDOCAINE HYDROCHLORIDE 20 MG/ML
INJECTION, SOLUTION INFILTRATION; PERINEURAL PRN
Status: DISCONTINUED | OUTPATIENT
Start: 2018-01-03 | End: 2018-01-03

## 2018-01-03 RX ORDER — NALOXONE HYDROCHLORIDE 0.4 MG/ML
.1-.4 INJECTION, SOLUTION INTRAMUSCULAR; INTRAVENOUS; SUBCUTANEOUS
Status: DISCONTINUED | OUTPATIENT
Start: 2018-01-03 | End: 2018-01-03 | Stop reason: HOSPADM

## 2018-01-03 RX ORDER — CLINDAMYCIN PHOSPHATE 600 MG/50ML
600 INJECTION, SOLUTION INTRAVENOUS
Status: COMPLETED | OUTPATIENT
Start: 2018-01-03 | End: 2018-01-03

## 2018-01-03 RX ORDER — OXYCODONE HCL 5 MG/5 ML
5-10 SOLUTION, ORAL ORAL EVERY 4 HOURS PRN
Qty: 750 ML | Refills: 0 | Status: SHIPPED | OUTPATIENT
Start: 2018-01-03 | End: 2018-03-20

## 2018-01-03 RX ORDER — ONDANSETRON 4 MG/1
4-8 TABLET, FILM COATED ORAL EVERY 8 HOURS PRN
Qty: 12 TABLET | Refills: 0 | Status: SHIPPED | OUTPATIENT
Start: 2018-01-03 | End: 2018-03-20

## 2018-01-03 RX ORDER — ONDANSETRON 2 MG/ML
4 INJECTION INTRAMUSCULAR; INTRAVENOUS EVERY 30 MIN PRN
Status: DISCONTINUED | OUTPATIENT
Start: 2018-01-03 | End: 2018-01-03 | Stop reason: HOSPADM

## 2018-01-03 RX ORDER — ONDANSETRON 2 MG/ML
INJECTION INTRAMUSCULAR; INTRAVENOUS PRN
Status: DISCONTINUED | OUTPATIENT
Start: 2018-01-03 | End: 2018-01-03

## 2018-01-03 RX ORDER — SODIUM CHLORIDE, SODIUM LACTATE, POTASSIUM CHLORIDE, CALCIUM CHLORIDE 600; 310; 30; 20 MG/100ML; MG/100ML; MG/100ML; MG/100ML
INJECTION, SOLUTION INTRAVENOUS CONTINUOUS
Status: DISCONTINUED | OUTPATIENT
Start: 2018-01-03 | End: 2018-01-03 | Stop reason: HOSPADM

## 2018-01-03 RX ORDER — CETIRIZINE HYDROCHLORIDE 10 MG/1
10 TABLET ORAL DAILY
COMMUNITY

## 2018-01-03 RX ADMIN — LIDOCAINE HYDROCHLORIDE 80 MG: 20 INJECTION, SOLUTION INFILTRATION; PERINEURAL at 12:03

## 2018-01-03 RX ADMIN — FENTANYL CITRATE 100 MCG: 50 INJECTION, SOLUTION INTRAMUSCULAR; INTRAVENOUS at 12:03

## 2018-01-03 RX ADMIN — FENTANYL CITRATE 50 MCG: 50 INJECTION INTRAMUSCULAR; INTRAVENOUS at 14:53

## 2018-01-03 RX ADMIN — FENTANYL CITRATE 25 MCG: 50 INJECTION INTRAMUSCULAR; INTRAVENOUS at 14:23

## 2018-01-03 RX ADMIN — FENTANYL CITRATE 50 MCG: 50 INJECTION, SOLUTION INTRAMUSCULAR; INTRAVENOUS at 12:40

## 2018-01-03 RX ADMIN — BUPIVACAINE HYDROCHLORIDE AND EPINEPHRINE BITARTRATE 3 ML: 5; .005 INJECTION, SOLUTION EPIDURAL; INTRACAUDAL; PERINEURAL at 12:25

## 2018-01-03 RX ADMIN — SUGAMMADEX 200 MG: 100 INJECTION, SOLUTION INTRAVENOUS at 12:59

## 2018-01-03 RX ADMIN — FENTANYL CITRATE 50 MCG: 50 INJECTION, SOLUTION INTRAMUSCULAR; INTRAVENOUS at 12:19

## 2018-01-03 RX ADMIN — FENTANYL CITRATE 25 MCG: 50 INJECTION INTRAMUSCULAR; INTRAVENOUS at 13:30

## 2018-01-03 RX ADMIN — CLINDAMYCIN IN 5 PERCENT DEXTROSE 600 MG: 12 INJECTION, SOLUTION INTRAVENOUS at 12:08

## 2018-01-03 RX ADMIN — OXYCODONE HYDROCHLORIDE 5 MG: 5 SOLUTION ORAL at 15:10

## 2018-01-03 RX ADMIN — ROCURONIUM BROMIDE 50 MG: 10 INJECTION INTRAVENOUS at 12:03

## 2018-01-03 RX ADMIN — ONDANSETRON 4 MG: 2 INJECTION INTRAMUSCULAR; INTRAVENOUS at 12:54

## 2018-01-03 RX ADMIN — FENTANYL CITRATE 25 MCG: 50 INJECTION INTRAMUSCULAR; INTRAVENOUS at 14:00

## 2018-01-03 RX ADMIN — MIDAZOLAM 2 MG: 1 INJECTION INTRAMUSCULAR; INTRAVENOUS at 11:55

## 2018-01-03 RX ADMIN — PROPOFOL 150 MG: 10 INJECTION, EMULSION INTRAVENOUS at 12:03

## 2018-01-03 RX ADMIN — SODIUM CHLORIDE, POTASSIUM CHLORIDE, SODIUM LACTATE AND CALCIUM CHLORIDE: 600; 310; 30; 20 INJECTION, SOLUTION INTRAVENOUS at 11:49

## 2018-01-03 RX ADMIN — FENTANYL CITRATE 50 MCG: 50 INJECTION INTRAMUSCULAR; INTRAVENOUS at 13:45

## 2018-01-03 NOTE — ANESTHESIA POSTPROCEDURE EVALUATION
Patient: Elizabeth Rosenthal    Procedure(s):  Bilateral Tonsillectomy - Wound Class: II-Clean Contaminated    Diagnosis:Chronic Tonsillitis   Diagnosis Additional Information: No value filed.    Anesthesia Type:  General, ETT    Note:  Anesthesia Post Evaluation    Patient location during evaluation: PACU  Patient participation: Able to fully participate in evaluation  Level of consciousness: awake and alert  Pain management: adequate  Airway patency: patent  Cardiovascular status: hemodynamically stable  Respiratory status: acceptable  Hydration status: stable  PONV: none     Anesthetic complications: None          Last vitals:  Vitals:    01/03/18 1015   BP: 111/87   Pulse: 74   Resp: 14   Temp: 36.8  C (98.3  F)   SpO2: 97%         Electronically Signed By: Brett Jiménez MD  January 3, 2018  1:37 PM

## 2018-01-03 NOTE — IP AVS SNAPSHOT
MRN:[id number]                      After Visit Summary   1/3/2018    Elizabeth Rosenthal    MRN: [id number]           Thank you!     Thank you for choosing [facility] for your care. Our goal is always to provide you with excellent care. Hearing back from our patients is one way we can continue to improve our services. Please take a few minutes to complete the written survey that you may receive in the mail after you visit with us. Thank you!        Patient Information     Date Of Birth          1965        About your hospital stay     You were admitted on:  January 3, 2018 You last received care in the:  Post Anesthesia Care Unit [facility]    You were discharged on:  January 3, 2018       Who to Call     For medical emergencies, please call 911.  For non-urgent questions about your medical care, please call your primary care provider or clinic, [phone]  For questions related to your surgery, please call your surgery clinic        Attending Provider     Provider Ivania Nelson MD Otolaryngology       Primary Care Provider Office Phone # Fax #    Eveline Berry [phone]      After Care Instructions     Diet Instructions       Soft diet x 2 weeks. No crunchy or sharp foods. Avoid spicy or citrus foods.            Discharge Instructions - Lifting restrictions       Lifting Restrictions 10 pounds x 2 weeks            No Alcohol       For 24 hours following procedure  or while taking narcotic pain medication            No driving or operating machinery       until the day after procedure or while taking narcotic pain medication            Notify Physician        Please notify your doctor if you experience wound breakdown, sustained bleeding from the mouth, or increasing difficulty breathing, chest pain or shortness of breath. If you feel it is acute, please return to the emergency department pf call 911. If you have questions or concerns during the  "day please call ENT clinic at [phone]. If at night you can call [facility] at [phone] and ask for the \"ENT resident on call\".                  Your next 10 appointments already scheduled     Jan 23, 2018  3:30 PM CST   (Arrive by 3:15 PM)   Return Visit with Ivania Esquivel MD   [facility] Nose [facility]ro[facility] ([facility])    [address]  4th Floor  [facility] [id number] [phone]            Feb 09, 2018  9:30 AM CST   Lab with  LAB   [facility] Lab ([facility])    [address]  1st Floor  [facility] [id number] [phone]            Feb 09, 2018 10:30 AM CST   (Arrive by 10:00 AM)   New Patient Visit with Paul Bashir MD   [facility] Nephrology ([facility])    [address]  [address]  [facility] [id number] [phone]            Jun 14, 2018  6:00 PM CDT   (Arrive by 5:45 PM)   Return Visit with Tyson Ribeiro MD   [facility] Rheumatology ([facility])    [address]  [address]  [facility] [id number] [phone]              Further instructions from your care team       [facility]   Adult Discharge Orders & Instructions     For 24 hours after surgery    1. Get plenty of rest.  A responsible adult must stay with you for at least 24 hours after you leave the hospital.   2. Do not drive or use heavy equipment.  If you have weakness or tingling, don't drive or use heavy equipment until this feeling goes away.  3. Do not drink alcohol.  4. Avoid strenuous or risky activities.  Ask for help when climbing stairs.   5. You may feel lightheaded.  IF so, sit for a few minutes before standing.  Have someone help you get up.   6. If you have nausea (feel sick to your stomach): Drink only clear liquids such as apple juice, ginger ale, broth or 7-Up.  Rest may also help.  Be sure " "to drink enough fluids.  Move to a regular diet as you feel able.  7. You may have a slight fever. Call the doctor if your fever is over 100 F (37.7 C) (taken under the tongue) or lasts longer than 24 hours.  8. You may have a dry mouth, a sore throat, muscle aches or trouble sleeping.  These should go away after 24 hours.  9. Do not make important or legal decisions.   Call your doctor for any of the followin.  Signs of infection (fever, growing tenderness at the surgery site, a large amount of drainage or bleeding, severe pain, foul-smelling drainage, redness, swelling).    2. It has been over 8 to 10 hours since surgery and you are still not able to urinate (pass water).    3.  Headache for over 24 hours.      To contact a doctor, call Dr Esquivel's office at [phone] (ENT clinic) or [phone] (Sleep Clinic) or:    X   [phone] and ask for the resident on call for ENT (answered 24 hours a day)  X   Emergency Department:    [facility]: [phone]       (TTY for hearing impaired: [phone])              Additional Information     If you use hormonal birth control (such as the pill, patch, ring or implants): You'll need a second form of birth control for 7 days (condoms, a diaphragm or contraceptive foam). While in the hospital, you received a medicine called Bridion. Your normal birth control will not work as well for a week after taking this medicine.          Pending Results     Date and Time Order Name Status Description    1/3/2018 1225 Surgical pathology exam In process             Admission Information     Date & Time Provider Department Dept. Phone    1/3/2018 Ivania Esquivel MD Post Anesthesia Care Unit [facility] [phone]      Your Vitals Were     Blood Pressure Pulse Temperature Respirations Height Weight    120/72 74 98  F (36.7  C) (Oral) 14 1.702 m (5' 7\") 133.9 kg (295 lb 3.1 oz)    Pulse Oximetry BMI (Body Mass Index)                95% 46.23 kg/m2        "   Virtual Web Information     Virtual Web gives you secure access to your electronic health record. If you see a primary care provider, you can also send messages to your care team and make appointments. If you have questions, please call your primary care clinic.  If you do not have a primary care provider, please call [phone] and they will assist you.        Care EveryWhere ID     This is your Care EveryWhere ID. This could be used by other organizations to access your [place] medical records  [id number]        Equal Access to Services     TIAGO LOMAX : Hadii dwayne ku hadasho Soomaali, waaxda luqadaha, qaybta kaalmada adeegyada, betty aponte. So [facility] [place] [phone].    ATENCIÓN: Si habla español, tiene a hightower disposición servicios gratuitos de asistencia lingüística. [facility] [phone].    We comply with applicable federal civil rights laws and [place] laws. We do not discriminate on the basis of race, color, national origin, age, disability, sex, sexual orientation, or gender identity.               Review of your medicines      START taking        Dose / Directions    ondansetron 4 MG tablet   Commonly known as:  ZOFRAN   Used for:  Tonsil stone        Dose:  4-8 mg   Take 1-2 tablets (4-8 mg) by mouth every 8 hours as needed for nausea   Quantity:  12 tablet   Refills:  0       oxyCODONE 5 MG/5ML solution   Commonly known as:  ROXICODONE   Used for:  Tonsil stone        Dose:  5-10 mg   Take 5-10 mLs (5-10 mg) by mouth every 4 hours as needed for pain   Quantity:  750 mL   Refills:  0       predniSONE 20 MG tablet   Commonly known as:  DELTASONE   Used for:  Tonsil stone        Dose:  20 mg   Start taking on:  1/6/2018   Take 1 tablet (20 mg) by mouth daily for 4 days   Quantity:  4 tablet   Refills:  0         CONTINUE these medicines which may have CHANGED, or have new prescriptions. If we are uncertain of the size of tablets/capsules you have at home, strength may be listed as  something that might have changed.        Dose / Directions    fluticasone 110 MCG/ACT Inhaler   Commonly known as:  FLOVENT HFA   This may have changed:    - when to take this  - reasons to take this   Used for:  Mild intermittent asthma without complication        Dose:  2 puff   Inhale 2 puffs into the lungs 2 times daily   Quantity:  3 Inhaler   Refills:  3         CONTINUE these medicines which have NOT CHANGED        Dose / Directions    albuterol 108 (90 BASE) MCG/ACT Inhaler   Commonly known as:  PROAIR HFA/PROVENTIL HFA/VENTOLIN HFA   Used for:  Mild intermittent asthma without complication        Dose:  1 puff   Inhale 1 puff into the lungs every 6 hours as needed for shortness of breath / dyspnea or wheezing   Quantity:  3 Inhaler   Refills:  3       buPROPion 150 MG 24 hr tablet   Commonly known as:  WELLBUTRIN XL   Used for:  Major depressive disorder, recurrent episode, mild (H)        Dose:  150 mg   Take 1 tablet (150 mg) by mouth every morning   Quantity:  90 tablet   Refills:  3       cetirizine 10 MG tablet   Commonly known as:  zyrTEC        Dose:  10 mg   Take 10 mg by mouth daily   Refills:  0       escitalopram 20 MG tablet   Commonly known as:  LEXAPRO   Used for:  Depression        Dose:  20 mg   Take 1 tablet (20 mg) by mouth daily   Quantity:  90 tablet   Refills:  2       folic acid 1 MG tablet   Commonly known as:  FOLVITE   Used for:  Rheumatoid arthritis of multiple sites without rheumatoid factor (H), Encounter for long-term (current) use of medications        Dose:  1 mg   Take 1 tablet (1 mg) by mouth daily   Quantity:  90 tablet   Refills:  3       gabapentin 300 MG capsule   Commonly known as:  NEURONTIN   Used for:  Fibromyalgia        Dose:  300 mg   Take 1 capsule (300 mg) by mouth 3 times daily   Quantity:  90 capsule   Refills:  11       lidocaine 5 % Patch   Commonly known as:  LIDODERM   Used for:  Polyarthritis, Chronic bilateral low back pain without sciatica        Dose:   1-3 patch   Place 1-3 patches onto the skin every 24 hours Patient will call to fill   Quantity:  30 patch   Refills:  1       lisinopril 10 MG tablet   Commonly known as:  PRINIVIL/ZESTRIL   Used for:  Benign essential hypertension, Mild intermittent asthma without complication        Dose:  10 mg   Take 1 tablet (10 mg) by mouth daily   Quantity:  90 tablet   Refills:  3       methotrexate 2.5 MG tablet CHEMO   Used for:  Rheumatoid arthritis of multiple sites without rheumatoid factor (H)        Dose:  15 mg   Take 6 tablets (15 mg) by mouth once a week 6 tabs by mouth once a week   Quantity:  30 tablet   Refills:  5       pantoprazole 20 MG EC tablet   Commonly known as:  PROTONIX   Used for:  Abdominal pain, generalized        Dose:  20 mg   Take 1 tablet (20 mg) by mouth 2 times daily   Quantity:  180 tablet   Refills:  3            Where to get your medicines      These medications were sent to [facility] [place], [place] - [address] [address]     Phone:  [phone]     ondansetron 4 MG tablet    predniSONE 20 MG tablet         Some of these will need a paper prescription and others can be bought over the counter. Ask your nurse if you have questions.     Bring a paper prescription for each of these medications     oxyCODONE 5 MG/5ML solution                Protect others around you: Learn how to safely use, store and throw away your medicines at www.disposemymeds.org.             Medication List: This is a list of all your medications and when to take them. Check marks below indicate your daily home schedule. Keep this list as a reference.      Medications           Morning Afternoon Evening Bedtime As Needed    albuterol 108 (90 BASE) MCG/ACT Inhaler   Commonly known as:  PROAIR HFA/PROVENTIL HFA/VENTOLIN HFA   Inhale 1 puff into the lungs every 6 hours as needed for shortness of breath / dyspnea or wheezing                                 buPROPion 150 MG 24 hr tablet   Commonly known as:  WELLBUTRIN XL   Take 1 tablet (150 mg) by mouth every morning                                cetirizine 10 MG tablet   Commonly known as:  zyrTEC   Take 10 mg by mouth daily                                escitalopram 20 MG tablet   Commonly known as:  LEXAPRO   Take 1 tablet (20 mg) by mouth daily                                fluticasone 110 MCG/ACT Inhaler   Commonly known as:  FLOVENT HFA   Inhale 2 puffs into the lungs 2 times daily                                folic acid 1 MG tablet   Commonly known as:  FOLVITE   Take 1 tablet (1 mg) by mouth daily                                gabapentin 300 MG capsule   Commonly known as:  NEURONTIN   Take 1 capsule (300 mg) by mouth 3 times daily                                lidocaine 5 % Patch   Commonly known as:  LIDODERM   Place 1-3 patches onto the skin every 24 hours Patient will call to fill                                lisinopril 10 MG tablet   Commonly known as:  PRINIVIL/ZESTRIL   Take 1 tablet (10 mg) by mouth daily                                methotrexate 2.5 MG tablet CHEMO   Take 6 tablets (15 mg) by mouth once a week 6 tabs by mouth once a week                                ondansetron 4 MG tablet   Commonly known as:  ZOFRAN   Take 1-2 tablets (4-8 mg) by mouth every 8 hours as needed for nausea                                oxyCODONE 5 MG/5ML solution   Commonly known as:  ROXICODONE   Take 5-10 mLs (5-10 mg) by mouth every 4 hours as needed for pain   Last time this was given:  5 mg on 1/3/2018  3:10 PM                                pantoprazole 20 MG EC tablet   Commonly known as:  PROTONIX   Take 1 tablet (20 mg) by mouth 2 times daily                                predniSONE 20 MG tablet   Commonly known as:  DELTASONE   Take 1 tablet (20 mg) by mouth daily for 4 days   Start taking on:  1/6/2018                                          More Information        Tonsillectomy, Post-Op  Bleeding  You have had surgery to remove your tonsils. Bleeding at the surgery site can happen after surgery. The doctor can often control it using direct pressure or applying medicine to shrink the blood vessels. If this fails, you will need to return to the operating room for further treatment. Notify your doctor at once or return to this hospital if there are signs of any further bleeding.  Home Care    Your throat will be sore. Throat pain after surgery improves over the first 3-5 days. It is normal to have more pain at the end of the first week before it finally goes away.    Soft foods will be easier to swallow.    Drink plenty of fluids to prevent dehydration. You must continue to drink even though your throat hurts.    For pain relief, suck on ice cubes or frozen fruit pops, eat ice cream or sherbet, and drink cold liquids. You may also use liquid acetaminophen. Avoid taking ibuprofen or aspirin. These may increase bleeding risk. If your doctor has prescribed pain medication, take this as directed.     If antibiotics were prescribed, take these as directed until they are gone.    Do not overheat your home since this dries the air and may irritate throat tissues, especially at night. A cool-mist humidifier in the bedroom may help.    Avoid exposure to people with colds or the flu during the recovery period. You may be more likely to get ill during this time.    Smoking irritates the surgery site. If you smoke, this is a good time to stop.    Avoid any heavy exercise, lifting, or straining for the next 10 days.  Follow-up care  Follow up with your doctor or this facility as advised.  When to see medical advice  Call your doctor right away if any of the following occur:    Trouble speaking, swallowing, or breathing    Nausea and vomiting    Bleeding from the mouth    Fever over 100.4 F (38.3 C)    Increasing pain not controlled by pain medications    Signs of dehydration: Very dark urine, less urine, dry mouth,  weakness  Date Last Reviewed: 4/20/2015 2000-2017 [employer]. [address] [place] [address], [place] [address]. All rights reserved. This information is not intended as a substitute for professional medical care. Always follow your healthcare professional's instructions.                Adult Tonsillectomy  The tonsils are 2 small masses of tissue at the back of the throat. They are part of the body s immune system. This helps the body fight disease. In some people, the tonsils become infected or enlarged. This can cause severe sore throats, snoring, or other problems. Tonsillectomy is surgery to remove the tonsils. Tonsillectomy may be recommended if you have obstruction causing sleep apnea, or recurring, chronic, or severe infections.     Preparing for surgery  Prepare as you have been told. Tell your healthcare provider about all medicine you take. This includes over-the-counter drugs. It also includes herbs and other supplements. You may need to stop taking some or all of them before surgery as directed by your healthcare provider. Also, follow any directions you re given for not eating or drinking before surgery.  The day of surgery  The surgery takes about 60 minutes. You will likely go home on the same day.  Before the surgery  Here is what to expect before the surgery begins:    An IV line is put into a vein in your arm or hand. This line supplies fluids and medicines.    To keep you free of pain during the surgery, you re given general anesthesia. This medicine puts you into a state like deep sleep through the surgery.  During the surgery  Here is what to expect during the surgery:    A tube will be placed in your throat to keep your airway open.    A special device is used to keep the mouth open.    Other tools are used to remove the tonsils or part of the tonsils  from the back of the throat. The tissue is taken out through the mouth.    The device holding the mouth open and the tube are then  removed.  After the surgery  You will be taken to a recovery room. Healthcare staff will make sure you can drink some liquids. They will also make sure your pain is being managed. When you are ready to leave the hospital, you will need to be driven home by an adult family member or friend.  Recovering at home  It will likely take about 2 weeks to heal from the surgery. During your recovery:    Expect to have throat pain. You may also feel pain in your ears. This is  referred  pain from the throat, and is normal. Your post-surgery pain may come and go. It may be worse on the 1st or 2nd day after surgery.    Talk as little as possible, if it is painful.    Take pain medicine as directed.    Do not drive while you are on opioid or narcotic pain medicine. Expect to feel sleepy or dizzy while you are taking this medicine.    Do not use ibuprofen or aspirin for 14 days after surgery unless your healthcare provider says it s OK. You may use acetaminophen as directed.    Use 2 or 3 pillows under your head while resting. This will help keep swelling down.    Drink plenty of chilled liquids. Water, noncitrus juices, and frozen juice bars are good choices.    Eat cold foods and soft foods, which are easiest to swallow. Try foods like ice cream, gelatin, scrambled eggs, pasta, and mashed potatoes.    Avoid foods that require a lot of chewing. Also avoid foods that may scratch the throat, like toast or potato chips. Avoid hot, spicy, or acidic foods.    Avoid strenuous activity and heavy lifting for 2 weeks after surgery.    Be aware that white patches will form in the throat during healing. These are scabs and are not a sign of infection. The patches will come off in  6 to 9 days and may cause a small amount of  bleeding. To minimize bleeding, drink plenty of fluids. Gargling with cold water can help.  Call the healthcare provider  Call your healthcare provider if you have any of the following:    Chest pain or trouble  breathing (call 911)    Fever of 100.4 F (38 C) or higher, or as directed by your healthcare provider    Bright red bleeding from the mouth or nose    Severe pain not relieved by medicine    Signs of dehydration (dark urine, urinating less often)    Heavy or persistent bleeding in the throat at any time    Other signs or symptoms as indicated by your healthcare provider   Follow-up  Schedule a follow-up visit with your healthcare provider as advised. During this visit, the healthcare provider will make sure you are healing well. Ask any questions you have about the surgery or your recovery.  Risks and possible complications   Risks of this surgery include:    Infection    Bleeding    Lung problems    Nausea and vomiting    Injury to the lips, teeth, or jaw    Painful swallowing during recovery    Voice changes    The need for a second surgery    Risks of anesthesia    Date Last Reviewed: 6/1/2017 2000-2017 [employer]. [address] [place] [address], [place] [address]. All rights reserved. This information is not intended as a substitute for professional medical care. Always follow your healthcare professional's instructions.

## 2018-01-03 NOTE — DISCHARGE INSTRUCTIONS
[facility]  Same-Day Surgery   Adult Discharge Orders & Instructions     For 24 hours after surgery    1. Get plenty of rest.  A responsible adult must stay with you for at least 24 hours after you leave the hospital.   2. Do not drive or use heavy equipment.  If you have weakness or tingling, don't drive or use heavy equipment until this feeling goes away.  3. Do not drink alcohol.  4. Avoid strenuous or risky activities.  Ask for help when climbing stairs.   5. You may feel lightheaded.  IF so, sit for a few minutes before standing.  Have someone help you get up.   6. If you have nausea (feel sick to your stomach): Drink only clear liquids such as apple juice, ginger ale, broth or 7-Up.  Rest may also help.  Be sure to drink enough fluids.  Move to a regular diet as you feel able.  7. You may have a slight fever. Call the doctor if your fever is over 100 F (37.7 C) (taken under the tongue) or lasts longer than 24 hours.  8. You may have a dry mouth, a sore throat, muscle aches or trouble sleeping.  These should go away after 24 hours.  9. Do not make important or legal decisions.   Call your doctor for any of the followin.  Signs of infection (fever, growing tenderness at the surgery site, a large amount of drainage or bleeding, severe pain, foul-smelling drainage, redness, swelling).    2. It has been over 8 to 10 hours since surgery and you are still not able to urinate (pass water).    3.  Headache for over 24 hours.      To contact a doctor, call Dr Esquivel's office at [phone] (ENT clinic) or [phone] (Sleep Clinic) or:    X   [phone] and ask for the resident on call for ENT (answered 24 hours a day)  X   Emergency Department:    [facility]: [phone]       (TTY for hearing impaired: [phone])

## 2018-01-03 NOTE — OR NURSING
Pt initially quite anxious c/o thoat being numb et unable to swallow.. VSS relatively stable... Fentanyl helping with pain et anxiety. Report to MADISON Anderson RN

## 2018-01-03 NOTE — IP AVS SNAPSHOT
Post Anesthesia Care Unit [address] [address]    Phone:  [phone]                                       After Visit Summary   1/3/2018    Elizabeth Rosenthal    MRN: [id number]           After Visit Summary Signature Page     I have received my discharge instructions, and my questions have been answered. I have discussed any challenges I see with this plan with the nurse or doctor.    ..........................................................................................................................................  Patient/Patient Representative Signature      ..........................................................................................................................................  Patient Representative Print Name and Relationship to Patient    ..................................................               ................................................  Date                                            Time    ..........................................................................................................................................  Reviewed by Signature/Title    ...................................................              ..............................................  Date                                                            Time

## 2018-01-03 NOTE — OP NOTE
Pre-operative diagnosis: Chronic tonsillitis and recurrent tonsil stones    Post-operative procedure: same    Procedure: bilateral tonsillectomy    Surgeons: Ivania Esquivel MD    Assistants: Audrey Trejo MD, Dc Owusu MD    Anesthesia: general    EBL: 50 ml    Specimens: right and left tonsils    Complications: none    Indications: 52 year old feamle with history of recurrent tonsilliths and chronic tonsillitis.      Findings: 2+ tonsils with tonsil stones present     Description: Patient was brought into the operating room and placed on the operating table.  A member of the department of anesthesia was present and intubated the patient without difficult.  A time-out was taken to identify the procedure and patient. The table was turned 90 degrees and a shoulder roll was placed.  A McIvor mouth retractor was placed and used to expose the oropharynx.  3 ml of 0.5% bupivicaine with epi was injected into the soft palate.  The left tonsil was grasped with an allis clamp and medialized.  An incision was made along the anterior tonsillar pillar and the tonsillar capsule was identified.  The tonsil was dissected away from the underlying musculature using bovie cautery.  The left tonsil was passed off the field.  The right tonsil was removed in a similar fashion.      Patient tolerated the procedure well and was transferred to the PACU in good condition.

## 2018-01-03 NOTE — ANESTHESIA PREPROCEDURE EVALUATION
Anesthesia Evaluation     .             ROS/MED HX    ENT/Pulmonary:     (+)sleep apnea, Intermittent asthma Treatment: Inhaler prn,  uses CPAP , . .    Neurologic:  - neg neurologic ROS     Cardiovascular:     (+) hypertension----. : . . . :. .       METS/Exercise Tolerance:     Hematologic:  - neg hematologic  ROS       Musculoskeletal:  - neg musculoskeletal ROS       GI/Hepatic:     (+) GERD Asymptomatic on medication,       Renal/Genitourinary:  - ROS Renal section negative       Endo:     (+) Obesity, .      Psychiatric:  - neg psychiatric ROS       Infectious Disease:  - neg infectious disease ROS       Malignancy:      - no malignancy   Other:    - neg other ROS                 Physical Exam  Normal systems: cardiovascular, pulmonary and dental    Airway   Mallampati: I  TM distance: >3 FB  Neck ROM: full    Dental     Cardiovascular   Rhythm and rate: regular and normal      Pulmonary    breath sounds clear to auscultation                    Anesthesia Plan      History & Physical Review  History and physical reviewed and following examination; no interval change.    ASA Status:  3 .    NPO Status:  > 8 hours    Plan for General and ETT with Intravenous and Propofol induction. Maintenance will be Balanced.    PONV prophylaxis:  Ondansetron (or other 5HT-3) and Dexamethasone or Solumedrol       Postoperative Care  Postoperative pain management:  IV analgesics.      Consents  Anesthetic plan, risks, benefits and alternatives discussed with:  Patient..                          .

## 2018-01-03 NOTE — OR NURSING
Assumed care of patient from NELI Suresh in PACU. Pt reports throat pain and difficult swallowing. Pt states she is unable to swallow anything, but handling secretions without difficulty.

## 2018-01-03 NOTE — ANESTHESIA CARE TRANSFER NOTE
"Patient: Elizabeth Rosenthal    Procedure(s):  Bilateral Tonsillectomy - Wound Class: II-Clean Contaminated    Diagnosis: Chronic Tonsillitis   Diagnosis Additional Information: No value filed.    Anesthesia Type:   General, ETT     Note:  Airway :Face Mask  Patient transferred to:PACU  Handoff Report: Identifed the Patient, Identified the Reponsible Provider, Reviewed the pertinent medical history, Discussed the surgical course, Reviewed Intra-OP anesthesia mangement and issues during anesthesia, Set expectations for post-procedure period and Allowed opportunity for questions and acknowledgement of understanding  Pt. Awake & alert, C/O \"something in the back of my throat\",  \"hard to breathe\", \"feels like something is running down my throat & I can't stop it\", Sats 100% on 6L Fm, Pt keeps removing mask, Sats remain in the upper 90's.  Yankaur suction given with a lot of precaution given regarding gentle suctioning in the mouth, not to go in the back of the throat, especially on the sides.  VSS, No airway, RN at bedside      Vitals: (Last set prior to Anesthesia Care Transfer)    CRNA VITALS  1/3/2018 1243 - 1/3/2018 1318      1/3/2018             Pulse: 84    SpO2: 100 %    Resp Rate (observed): 12                Electronically Signed By: Leon Loza  January 3, 2018  1:18 PM  "

## 2018-01-05 LAB — COPATH REPORT: NORMAL

## 2018-01-16 ENCOUNTER — TELEPHONE (OUTPATIENT)
Dept: INTERNAL MEDICINE | Facility: CLINIC | Age: 53
End: 2018-01-16

## 2018-01-16 NOTE — TELEPHONE ENCOUNTER
Late Entry for 1/12/18    Pt calling, states that she has been having cough and not feeling well after tonsillectomy on 1/3.  She will contact ENT for post tonsillectomy concerns.

## 2018-01-18 ENCOUNTER — DOCUMENTATION ONLY (OUTPATIENT)
Dept: OTHER | Facility: CLINIC | Age: 53
End: 2018-01-18

## 2018-01-18 PROBLEM — Z71.89 ACP (ADVANCE CARE PLANNING): Chronic | Status: ACTIVE | Noted: 2018-01-18

## 2018-01-23 ENCOUNTER — OFFICE VISIT (OUTPATIENT)
Dept: OTOLARYNGOLOGY | Facility: CLINIC | Age: 53
End: 2018-01-23
Payer: COMMERCIAL

## 2018-01-23 DIAGNOSIS — Z90.89 S/P TONSILLECTOMY: Primary | ICD-10-CM

## 2018-01-23 NOTE — PROGRESS NOTES
CHIEF COMPLAINT:  Status post bilateral tonsillectomy on January 3, 2018.      HISTORY OF PRESENT ILLNESS:  The patient returns for her first postoperative visit after surgery.  Overall, she is doing well.  She still has tenderness of her throat but is able to eat a regular diet.  She has not had any issues with bleeding.  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      PHYSICAL EXAMINATION:   GENERAL:  No acute distress.   ORAL CAVITY:  Tonsillar fossae are well-healed.  Palate is symmetric.      ASSESSMENT AND PLAN:  The patient is doing well after surgery.  She is healing as expected.  She also had some complaints of tongue numbness which is slowly improving.  I expect this to continue to improve.  We discussed that this was likely due to a side effect of the retractor.  I also discussed with her about actinomyces.  It is not uncommon to see this in patients who have tonsil stones.  Typically, it is not necessary to place people on antibiotics afterwards.  Removal of tonsil is the treatment.  She may follow up with me as needed.

## 2018-01-23 NOTE — LETTER
1/23/2018       RE: Elizabeth Rosenthal  [address] [address]     Dear Colleague,    Thank you for referring your patient, Elizabeth Rosenthal, to the [facility] EAR NOSE AND THROAT at [facility]. Please see a copy of my visit note below.    CHIEF COMPLAINT:  Status post bilateral tonsillectomy on January 3, 2018.      HISTORY OF PRESENT ILLNESS:  The patient returns for her first postoperative visit after surgery.  Overall, she is doing well.  She still has tenderness of her throat but is able to eat a regular diet.  She has not had any issues with bleeding.  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      PHYSICAL EXAMINATION:   GENERAL:  No acute distress.   ORAL CAVITY:  Tonsillar fossae are well-healed.  Palate is symmetric.      ASSESSMENT AND PLAN:  The patient is doing well after surgery.  She is healing as expected.  She also had some complaints of tongue numbness which is slowly improving.  I expect this to continue to improve.  We discussed that this was likely due to a side effect of the retractor.  I also discussed with her about actinomyces.  It is not uncommon to see this in patients who have tonsil stones.  Typically, it is not necessary to place people on antibiotics afterwards.  Removal of tonsil is the treatment.  She may follow up with me as needed.         Again, thank you for allowing me to participate in the care of your patient.      Sincerely,    Ivania Esquivel MD

## 2018-01-23 NOTE — PATIENT INSTRUCTIONS
1.  You were seen in the ENT Clinic today by Dr. Esquivel.  If you have any questions or concerns after your appointment, please call [phone].  Press option #1 for scheduling related needs.  Press option #3 for Nurse advice.  2.  Plan is to return to clinic on an as needed basis.

## 2018-01-23 NOTE — MR AVS SNAPSHOT
After Visit Summary   1/23/2018    Elizabeth Rosenthal    MRN: [id number]           Patient Information     Date Of Birth          1965        Visit Information        Provider Department      1/23/2018 3:30 PM Ivania Esquivel MD [facility] Ear Nose and Throat        Today's Diagnoses     S/P tonsillectomy    -  1      Care Instructions    1.  You were seen in the ENT Clinic today by Dr. Esquivel.  If you have any questions or concerns after your appointment, please call [phone].  Press option #1 for scheduling related needs.  Press option #3 for Nurse advice.  2.  Plan is to return to clinic on an as needed basis.              Follow-ups after your visit        Your next 10 appointments already scheduled     Feb 09, 2018  9:30 AM CST   Lab with  LAB   [facility] Lab ([facility])    [address] [place] [address]  1st Floor  [facility] [address] [phone]            Feb 09, 2018 10:30 AM CST   (Arrive by 10:00 AM)   New Patient Visit with Paul Bashir MD   [facility] Nephrology ([facility])    [address] [place] [address]  [address]  [facility] [address] [phone]            Jun 14, 2018  6:00 PM CDT   (Arrive by 5:45 PM)   Return Visit with Tyson Ribeiro MD   [facility] Rheumatology ([facility])    [address] [place] [address]  [address]  [facility] [address] [phone]              Who to contact     Please call your clinic at [phone] to:    Ask questions about your health    Make or cancel appointments    Discuss your medicines    Learn about your test results    Speak to your doctor   If you have compliments or concerns about an experience at your clinic, or if you wish to file a complaint, please contact [facility] Patient Relations at [phone] or email us at [email or web address]         Additional Information About Your Visit        MyChart Information      [employer] gives you secure access to your electronic health record. If you see a primary care provider, you can also send messages to your care team and make appointments. If you have questions, please call your primary care clinic.  If you do not have a primary care provider, please call [phone] and they will assist you.      [employer] is an electronic gateway that provides easy, online access to your medical records. With [employer], you can request a clinic appointment, read your test results, renew a prescription or communicate with your care team.     To access your existing account, please contact your [facility] or call [phone] for assistance.        Care EveryWhere ID     This is your Care EveryWhere ID. This could be used by other organizations to access your [facility] medical records  [id number]         Blood Pressure from Last 3 Encounters:   01/03/18 145/79   12/21/17 121/80   12/11/17 113/70    Weight from Last 3 Encounters:   01/03/18 133.9 kg (295 lb 3.1 oz)   12/11/17 134.7 kg (297 lb)   12/07/17 134.7 kg (297 lb)              Today, you had the following     No orders found for display         Today's Medication Changes          These changes are accurate as of 1/23/18 11:59 PM.  If you have any questions, ask your nurse or doctor.               These medicines have changed or have updated prescriptions.        Dose/Directions    fluticasone 110 MCG/ACT Inhaler   Commonly known as:  FLOVENT HFA   This may have changed:    - when to take this  - reasons to take this   Used for:  Mild intermittent asthma without complication        Dose:  2 puff   Inhale 2 puffs into the lungs 2 times daily   Quantity:  3 Inhaler   Refills:  3                Primary Care Provider Office Phone # Fax #    Eveline Berry [phone]       [address] [place] [address] [address]  [facility] [address]        Equal Access to Services     TIAGO UREÑA: Garcia Copeland,  warogerda kileyjamil, qaybta kajusten pinto, betty rojasaadewayne ah. So [facility] [phone].    ATENCIÓN: Si zara turk, tiene a hightower disposición servicios gratuitos de asistencia lingüística. Ene al [phone].    We comply with applicable federal civil rights laws and [place] laws. We do not discriminate on the basis of race, color, national origin, age, disability, sex, sexual orientation, or gender identity.            Thank you!     Thank you for choosing [facility] EAR NOSE AND THROAT  for your care. Our goal is always to provide you with excellent care. Hearing back from our patients is one way we can continue to improve our services. Please take a few minutes to complete the written survey that you may receive in the mail after your visit with us. Thank you!             Your Updated Medication List - Protect others around you: Learn how to safely use, store and throw away your medicines at www.disposemymeds.org.          This list is accurate as of 1/23/18 11:59 PM.  Always use your most recent med list.                   Brand Name Dispense Instructions for use Diagnosis    albuterol 108 (90 BASE) MCG/ACT Inhaler    PROAIR HFA/PROVENTIL HFA/VENTOLIN HFA    3 Inhaler    Inhale 1 puff into the lungs every 6 hours as needed for shortness of breath / dyspnea or wheezing    Mild intermittent asthma without complication       buPROPion 150 MG 24 hr tablet    WELLBUTRIN XL    90 tablet    Take 1 tablet (150 mg) by mouth every morning    Major depressive disorder, recurrent episode, mild (H)       cetirizine 10 MG tablet    zyrTEC     Take 10 mg by mouth daily        escitalopram 20 MG tablet    LEXAPRO    90 tablet    Take 1 tablet (20 mg) by mouth daily    Depression       fluticasone 110 MCG/ACT Inhaler    FLOVENT HFA    3 Inhaler    Inhale 2 puffs into the lungs 2 times daily    Mild intermittent asthma without complication       folic acid 1 MG tablet    FOLVITE    90 tablet    Take 1  tablet (1 mg) by mouth daily    Rheumatoid arthritis of multiple sites without rheumatoid factor (H), Encounter for long-term (current) use of medications       gabapentin 300 MG capsule    NEURONTIN    90 capsule    Take 1 capsule (300 mg) by mouth 3 times daily    Fibromyalgia       lidocaine 5 % Patch    LIDODERM    30 patch    Place 1-3 patches onto the skin every 24 hours Patient will call to fill    Polyarthritis, Chronic bilateral low back pain without sciatica       lisinopril 10 MG tablet    PRINIVIL/ZESTRIL    90 tablet    Take 1 tablet (10 mg) by mouth daily    Benign essential hypertension, Mild intermittent asthma without complication       methotrexate 2.5 MG tablet CHEMO     30 tablet    Take 6 tablets (15 mg) by mouth once a week 6 tabs by mouth once a week    Rheumatoid arthritis of multiple sites without rheumatoid factor (H)       ondansetron 4 MG tablet    ZOFRAN    12 tablet    Take 1-2 tablets (4-8 mg) by mouth every 8 hours as needed for nausea    Tonsil stone       oxyCODONE 5 MG/5ML solution    ROXICODONE    750 mL    Take 5-10 mLs (5-10 mg) by mouth every 4 hours as needed for pain    Tonsil stone       pantoprazole 20 MG EC tablet    PROTONIX    180 tablet    Take 1 tablet (20 mg) by mouth 2 times daily    Abdominal pain, generalized

## 2018-01-23 NOTE — NURSING NOTE
Chief Complaint   Patient presents with     RECHECK     post op 1/3/18      Dulce Valverde Medical Assistant

## 2018-02-05 DIAGNOSIS — I10 BENIGN ESSENTIAL HYPERTENSION: Primary | ICD-10-CM

## 2018-02-09 ENCOUNTER — OFFICE VISIT (OUTPATIENT)
Dept: NEPHROLOGY | Facility: CLINIC | Age: 53
End: 2018-02-09
Payer: COMMERCIAL

## 2018-02-09 VITALS — SYSTOLIC BLOOD PRESSURE: 128 MMHG | DIASTOLIC BLOOD PRESSURE: 82 MMHG | HEART RATE: 81 BPM

## 2018-02-09 DIAGNOSIS — I10 BENIGN ESSENTIAL HYPERTENSION: ICD-10-CM

## 2018-02-09 DIAGNOSIS — M06.09 RHEUMATOID ARTHRITIS OF MULTIPLE SITES WITHOUT RHEUMATOID FACTOR (H): ICD-10-CM

## 2018-02-09 DIAGNOSIS — R31.29 MICROSCOPIC HEMATURIA: Primary | ICD-10-CM

## 2018-02-09 DIAGNOSIS — Z79.899 ENCOUNTER FOR LONG-TERM (CURRENT) USE OF MEDICATIONS: ICD-10-CM

## 2018-02-09 DIAGNOSIS — E66.01 MORBID OBESITY (H): ICD-10-CM

## 2018-02-09 LAB
ALBUMIN SERPL-MCNC: 3.3 G/DL (ref 3.4–5)
ALBUMIN UR-MCNC: NEGATIVE MG/DL
ALT SERPL W P-5'-P-CCNC: 30 U/L (ref 0–50)
ANION GAP SERPL CALCULATED.3IONS-SCNC: 6 MMOL/L (ref 3–14)
APPEARANCE UR: CLEAR
AST SERPL W P-5'-P-CCNC: 16 U/L (ref 0–45)
BACTERIA #/AREA URNS HPF: ABNORMAL /HPF
BILIRUB UR QL STRIP: NEGATIVE
BUN SERPL-MCNC: 17 MG/DL (ref 7–30)
CALCIUM SERPL-MCNC: 8.3 MG/DL (ref 8.5–10.1)
CHLORIDE SERPL-SCNC: 108 MMOL/L (ref 94–109)
CO2 SERPL-SCNC: 28 MMOL/L (ref 20–32)
COLOR UR AUTO: YELLOW
CREAT SERPL-MCNC: 1.04 MG/DL (ref 0.52–1.04)
CREAT UR-MCNC: 186 MG/DL
CRP SERPL-MCNC: 16.9 MG/L (ref 0–8)
DEPRECATED CALCIDIOL+CALCIFEROL SERPL-MC: 18 UG/L (ref 20–75)
ERYTHROCYTE [DISTWIDTH] IN BLOOD BY AUTOMATED COUNT: 13.7 % (ref 10–15)
FERRITIN SERPL-MCNC: 92 NG/ML (ref 8–252)
GFR SERPL CREATININE-BSD FRML MDRD: 55 ML/MIN/1.7M2
GLUCOSE SERPL-MCNC: 104 MG/DL (ref 70–99)
GLUCOSE UR STRIP-MCNC: NEGATIVE MG/DL
HCT VFR BLD AUTO: 38.1 % (ref 35–47)
HGB BLD-MCNC: 12.3 G/DL (ref 11.7–15.7)
HGB UR QL STRIP: NEGATIVE
IRON SATN MFR SERPL: 21 % (ref 15–46)
IRON SERPL-MCNC: 56 UG/DL (ref 35–180)
KETONES UR STRIP-MCNC: NEGATIVE MG/DL
LEUKOCYTE ESTERASE UR QL STRIP: ABNORMAL
MCH RBC QN AUTO: 30.7 PG (ref 26.5–33)
MCHC RBC AUTO-ENTMCNC: 32.3 G/DL (ref 31.5–36.5)
MCV RBC AUTO: 95 FL (ref 78–100)
MUCOUS THREADS #/AREA URNS LPF: PRESENT /LPF
NITRATE UR QL: NEGATIVE
PH UR STRIP: 5 PH (ref 5–7)
PHOSPHATE SERPL-MCNC: 3.5 MG/DL (ref 2.5–4.5)
PLATELET # BLD AUTO: 294 10E9/L (ref 150–450)
POTASSIUM SERPL-SCNC: 4.1 MMOL/L (ref 3.4–5.3)
PROT UR-MCNC: 0.12 G/L
PROT/CREAT 24H UR: 0.07 G/G CR (ref 0–0.2)
PTH-INTACT SERPL-MCNC: 53 PG/ML (ref 12–72)
RBC # BLD AUTO: 4.01 10E12/L (ref 3.8–5.2)
RBC #/AREA URNS AUTO: 2 /HPF (ref 0–2)
SODIUM SERPL-SCNC: 141 MMOL/L (ref 133–144)
SOURCE: ABNORMAL
SP GR UR STRIP: 1.02 (ref 1–1.03)
SQUAMOUS #/AREA URNS AUTO: 4 /HPF (ref 0–1)
TIBC SERPL-MCNC: 262 UG/DL (ref 240–430)
UROBILINOGEN UR STRIP-MCNC: 0 MG/DL (ref 0–2)
WBC # BLD AUTO: 5.5 10E9/L (ref 4–11)
WBC #/AREA URNS AUTO: 6 /HPF (ref 0–2)

## 2018-02-09 PROCEDURE — 84450 TRANSFERASE (AST) (SGOT): CPT | Performed by: INTERNAL MEDICINE

## 2018-02-09 PROCEDURE — 36415 COLL VENOUS BLD VENIPUNCTURE: CPT | Performed by: INTERNAL MEDICINE

## 2018-02-09 PROCEDURE — 81001 URINALYSIS AUTO W/SCOPE: CPT | Performed by: INTERNAL MEDICINE

## 2018-02-09 PROCEDURE — 82306 VITAMIN D 25 HYDROXY: CPT | Performed by: INTERNAL MEDICINE

## 2018-02-09 PROCEDURE — 85027 COMPLETE CBC AUTOMATED: CPT | Performed by: INTERNAL MEDICINE

## 2018-02-09 PROCEDURE — G0463 HOSPITAL OUTPT CLINIC VISIT: HCPCS | Mod: ZF

## 2018-02-09 PROCEDURE — 84460 ALANINE AMINO (ALT) (SGPT): CPT | Performed by: INTERNAL MEDICINE

## 2018-02-09 PROCEDURE — 80069 RENAL FUNCTION PANEL: CPT | Performed by: INTERNAL MEDICINE

## 2018-02-09 PROCEDURE — 82728 ASSAY OF FERRITIN: CPT | Performed by: INTERNAL MEDICINE

## 2018-02-09 PROCEDURE — 83540 ASSAY OF IRON: CPT | Performed by: INTERNAL MEDICINE

## 2018-02-09 PROCEDURE — 86140 C-REACTIVE PROTEIN: CPT | Performed by: INTERNAL MEDICINE

## 2018-02-09 PROCEDURE — 84156 ASSAY OF PROTEIN URINE: CPT | Performed by: INTERNAL MEDICINE

## 2018-02-09 PROCEDURE — 83970 ASSAY OF PARATHORMONE: CPT | Performed by: INTERNAL MEDICINE

## 2018-02-09 PROCEDURE — 83550 IRON BINDING TEST: CPT | Performed by: INTERNAL MEDICINE

## 2018-02-09 NOTE — MR AVS SNAPSHOT
After Visit Summary   2/9/2018    Elizabeth Rosenthal    MRN: [id number]           Patient Information     Date Of Birth          1965        Visit Information        Provider Department      2/9/2018 10:30 AM Paul Bashir MD [facility] Nephrology        Today's Diagnoses     Microscopic hematuria    -  1    Creatinine elevation        Morbid obesity (H)        Benign essential hypertension           Follow-ups after your visit        Your next 10 appointments already scheduled     Feb 23, 2018  6:00 PM CST   LAB with  LAB   [facility] Lab ([facility])    [address] [place] [address] [phone]           Please do not eat 10-12 hours before your appointment if you are coming in fasting for labs on lipids, cholesterol, or glucose (sugar). This does not apply to pregnant women. Water, hot tea and black coffee (with nothing added) are okay. Do not drink other fluids, diet soda or chew gum.              Future tests that were ordered for you today     Open Future Orders        Priority Expected Expires Ordered    Routine UA with micro reflex to culture Routine  3/30/2018 2/9/2018            Who to contact     If you have questions or need follow up information about today's clinic visit or your schedule please contact [facility] NEPHROLOGY directly at [phone].  Normal or non-critical lab and imaging results will be communicated to you by MyChart, letter or phone within 4 business days after the clinic has received the results. If you do not hear from us within 7 days, please contact the clinic through MyChart or phone. If you have a critical or abnormal lab result, we will notify you by phone as soon as possible.  Submit refill requests through [employer] or call your pharmacy and they will forward the refill request to us. Please allow 3 business days for your refill to be completed.          Additional Information About Your Visit         [employer] Information     [employer] gives you secure access to your electronic health record. If you see a primary care provider, you can also send messages to your care team and make appointments. If you have questions, please call your primary care clinic.  If you do not have a primary care provider, please call [phone] and they will assist you.        Care EveryWhere ID     This is your Care EveryWhere ID. This could be used by other organizations to access your [facility] medical records  [id number]        Your Vitals Were     Pulse                   81            Blood Pressure from Last 3 Encounters:   02/09/18 128/82   01/03/18 145/79   12/21/17 121/80    Weight from Last 3 Encounters:   01/03/18 133.9 kg (295 lb 3.1 oz)   12/11/17 134.7 kg (297 lb)   12/07/17 134.7 kg (297 lb)                 Today's Medication Changes          These changes are accurate as of 2/9/18  1:19 PM.  If you have any questions, ask your nurse or doctor.               These medicines have changed or have updated prescriptions.        Dose/Directions    fluticasone 110 MCG/ACT Inhaler   Commonly known as:  FLOVENT HFA   This may have changed:    - when to take this  - reasons to take this   Used for:  Mild intermittent asthma without complication        Dose:  2 puff   Inhale 2 puffs into the lungs 2 times daily   Quantity:  3 Inhaler   Refills:  3                Primary Care Provider Office Phone # Fax #    Eveline H MD Jamal [phone]       [address] [place] [address] [address]  [facility] [address]        Equal Access to Services     TIAGO LOMAX AH: Hadii dwayne seth hadasho Soomaali, waaxda luqadaha, qaybta kaalmada adeegyada, waxchele stephane aponte. So [facility] [phone].    ATENCIÓN: Si habla español, tiene a hightower disposición servicios gratuitos de asistencia lingüística. Llame al [phone].    We comply with applicable federal civil rights laws and [place] laws. We do not discriminate on the basis of  race, color, national origin, age, disability, sex, sexual orientation, or gender identity.            Thank you!     Thank you for choosing [facility] NEPHROLOGY  for your care. Our goal is always to provide you with excellent care. Hearing back from our patients is one way we can continue to improve our services. Please take a few minutes to complete the written survey that you may receive in the mail after your visit with us. Thank you!             Your Updated Medication List - Protect others around you: Learn how to safely use, store and throw away your medicines at www.disposemymeds.org.          This list is accurate as of 2/9/18  1:19 PM.  Always use your most recent med list.                   Brand Name Dispense Instructions for use Diagnosis    albuterol 108 (90 BASE) MCG/ACT Inhaler    PROAIR HFA/PROVENTIL HFA/VENTOLIN HFA    3 Inhaler    Inhale 1 puff into the lungs every 6 hours as needed for shortness of breath / dyspnea or wheezing    Mild intermittent asthma without complication       buPROPion 150 MG 24 hr tablet    WELLBUTRIN XL    90 tablet    Take 1 tablet (150 mg) by mouth every morning    Major depressive disorder, recurrent episode, mild (H)       cetirizine 10 MG tablet    zyrTEC     Take 10 mg by mouth daily        escitalopram 20 MG tablet    LEXAPRO    90 tablet    Take 1 tablet (20 mg) by mouth daily    Depression       fluticasone 110 MCG/ACT Inhaler    FLOVENT HFA    3 Inhaler    Inhale 2 puffs into the lungs 2 times daily    Mild intermittent asthma without complication       folic acid 1 MG tablet    FOLVITE    90 tablet    Take 1 tablet (1 mg) by mouth daily    Rheumatoid arthritis of multiple sites without rheumatoid factor (H), Encounter for long-term (current) use of medications       gabapentin 300 MG capsule    NEURONTIN    90 capsule    Take 1 capsule (300 mg) by mouth 3 times daily    Fibromyalgia       lidocaine 5 % Patch    LIDODERM    30 patch    Place 1-3 patches onto the  skin every 24 hours Patient will call to fill    Polyarthritis, Chronic bilateral low back pain without sciatica       lisinopril 10 MG tablet    PRINIVIL/ZESTRIL    90 tablet    Take 1 tablet (10 mg) by mouth daily    Benign essential hypertension, Mild intermittent asthma without complication       methotrexate 2.5 MG tablet CHEMO     30 tablet    Take 6 tablets (15 mg) by mouth once a week 6 tabs by mouth once a week    Rheumatoid arthritis of multiple sites without rheumatoid factor (H)       ondansetron 4 MG tablet    ZOFRAN    12 tablet    Take 1-2 tablets (4-8 mg) by mouth every 8 hours as needed for nausea    Tonsil stone       oxyCODONE 5 MG/5ML solution    ROXICODONE    750 mL    Take 5-10 mLs (5-10 mg) by mouth every 4 hours as needed for pain    Tonsil stone       pantoprazole 20 MG EC tablet    PROTONIX    180 tablet    Take 1 tablet (20 mg) by mouth 2 times daily    Abdominal pain, generalized

## 2018-02-09 NOTE — PROGRESS NOTES
"Nephrology     CC: creatinine elevation    HPI:  Elizabeth Rosenthal is a 52 year old female with pmh of RA, HTN, and morbid obesity who is referred by Dr Melgoza for creatinine elevation.     The patient has baseline creatinine of 1.0 but saw an increase to 1.2 in October and December 2017. Her creat is back to 1.04 today. The pt states she was feeling poorly with sore throat and other ENT complaints for which she had been seeing the allergy service and ENT and in January 2018 got a tonsillectomy. She states she feels much improved now. She denies any NSAIDs during this time. She reports 6 months of PPI, which continues today with her creatinine back down. Her BP has been well controlled on lisinopril 10mg and she denies lightheadedness.     She had CT scan in December 2017 with \"normal\" kidneys. She had no hematuria on UA in December but does have 6 RBCs today. She denies gyn bleeding or irritation, dysuria or frequency, fevers, low abd pain.     She denies LE swelling or SOB. No CP. No rashes. She denies night sweats. She does reports weeks of left sided reproducible rib pain and LUQ abd pain which she relates to her RA but denies other bony pains. No weight loss.       Past Medical History:   Diagnosis Date     Allergic rhinitis      Arthritis      Asthma      Autoimmune disease (H) 2011     Chronic pelvic pain in female      Depression      Depressive disorder      Gastroesophageal reflux disease      Hyperlipidemia      Hypertension      Immunosuppression (H)      Low back pain      Migraines      Morbid obesity with BMI of 45.0-49.9, adult (H)      Obstructive sleep apnea 2012     SHERIE (obstructive sleep apnea)     has cpap     Polyarthritis      Reduced vision 2012     TIA (transient ischemic attack)      Vertebral artery dissection (H)          Past Surgical History:   Procedure Laterality Date     ARTHROSCOPY KNEE BILATERAL       BIOPSY LYMPH NODE CERVICAL       COLONOSCOPY N/A 7/26/2017    Procedure: COMBINED " COLONOSCOPY, SINGLE OR MULTIPLE BIOPSY/POLYPECTOMY BY BIOPSY;  colonoscopy;  Surgeon: Thang Saleh MD;  Location:  GI     ENT SURGERY  lymph node biopsy 2010     HYSTEROSCOPY       TONSILLECTOMY Bilateral 1/3/2018    Procedure: TONSILLECTOMY;  Bilateral Tonsillectomy;  Surgeon: Ivania Esquivel MD;  Location: [facility] OR     TUBAL LIGATION          Family History   Problem Relation Age of Onset     Breast Cancer Mother      50s     CANCER Mother      HEART DISEASE Father      Substance Abuse Father      MENTAL ILLNESS Father      Asthma Paternal Grandmother      CEREBROVASCULAR DISEASE Paternal Grandmother      Migraines Son      Migraines Daughter    no fhx of kidney dz     Social History   Substance Use Topics     Smoking status: Never Smoker     Smokeless tobacco: Never Used     Alcohol use 0.0 oz/week     0 Standard drinks or equivalent per week      Comment: Occasional       Social History     Social History Narrative    Is an . Non-smoker.     Medications:  Current Outpatient Prescriptions   Medication Sig Dispense Refill     cetirizine (ZYRTEC) 10 MG tablet Take 10 mg by mouth daily       pantoprazole (PROTONIX) 20 MG EC tablet Take 1 tablet (20 mg) by mouth 2 times daily 180 tablet 3     methotrexate 2.5 MG tablet CHEMO Take 6 tablets (15 mg) by mouth once a week 6 tabs by mouth once a week 30 tablet 5     gabapentin (NEURONTIN) 300 MG capsule Take 1 capsule (300 mg) by mouth 3 times daily 90 capsule 11     folic acid (FOLVITE) 1 MG tablet Take 1 tablet (1 mg) by mouth daily 90 tablet 3     lidocaine (LIDODERM) 5 % Patch Place 1-3 patches onto the skin every 24 hours Patient will call to fill 30 patch 1     lisinopril (PRINIVIL/ZESTRIL) 10 MG tablet Take 1 tablet (10 mg) by mouth daily 90 tablet 3     fluticasone (FLOVENT HFA) 110 MCG/ACT Inhaler Inhale 2 puffs into the lungs 2 times daily (Patient taking differently: Inhale 2 puffs into the lungs 2 times daily as needed ) 3 Inhaler 3      buPROPion (WELLBUTRIN XL) 150 MG 24 hr tablet Take 1 tablet (150 mg) by mouth every morning 90 tablet 3     escitalopram (LEXAPRO) 20 MG tablet Take 1 tablet (20 mg) by mouth daily 90 tablet 2     oxyCODONE (ROXICODONE) 5 MG/5ML solution Take 5-10 mLs (5-10 mg) by mouth every 4 hours as needed for pain (Patient not taking: Reported on 1/23/2018) 750 mL 0     ondansetron (ZOFRAN) 4 MG tablet Take 1-2 tablets (4-8 mg) by mouth every 8 hours as needed for nausea (Patient not taking: Reported on 2/9/2018) 12 tablet 0     albuterol (PROAIR HFA/PROVENTIL HFA/VENTOLIN HFA) 108 (90 BASE) MCG/ACT Inhaler Inhale 1 puff into the lungs every 6 hours as needed for shortness of breath / dyspnea or wheezing (Patient not taking: Reported on 2/9/2018) 3 Inhaler 3       Review Of Systems:  Constitutional: Fatigue for months   Eyes: negative  Ears/Nose/Throat: negative  Cardiovascular: negative  Respiratory: No shortness of breath, dyspnea on exertion, cough, or hemoptysis  Gastrointestinal: as above  Genitourinary: negative  Musculoskeletal: fibromyalgia  Skin: negative  Neurologic: negative  Endocrine: negative  Hematologic/Lymphatic/Immunologic: negative  Psychiatric: negative    OBJECTIVE:  Physical exam:  /82  Pulse 81  There is no height or weight on file to calculate BMI.   Gen: NAD, obses  HEENT: anicteric, MMM  CV: regular rate and rhythm, normal S1 S2, no S3 or S4 and no murmur, click, or rub  Resp: clear to ausculation bilaterally, normal respiratory effort  Abd: bowel sounds presents, no bruit, soft, NTND   Ext: WWP, no LE edema   Skin: warm and dry, no rashes or ecchymoses appreciated   Psych: normal mood, normal affect  Neuro: alert and oriented x3, CN2-12 grossly intact     Recent Labs   Lab Test  02/09/18   0853  12/21/17   0857  12/07/17   1552   06/16/17   0719   NA  141  141   --    --   141   POTASSIUM  4.1  4.4   --    --   4.0   CHLORIDE  108   --    --    --   108   CO2  28   --    --    --   27    ANIONGAP  6   --    --    --   6   GLC  104*   --    --    --   95   BUN  17   --    --    --   17   CR  1.04   --   1.21*   < >  1.01   CHESTER  8.3*   --    --    --   8.1*    < > = values in this interval not displayed.     Lab Results   Component Value Date    WBC 5.5 02/09/2018     Lab Results   Component Value Date    RBC 4.01 02/09/2018     Lab Results   Component Value Date    HGB 12.3 02/09/2018     Lab Results   Component Value Date    HCT 38.1 02/09/2018     No components found for: MCT  Lab Results   Component Value Date    MCV 95 02/09/2018     Lab Results   Component Value Date    MCH 30.7 02/09/2018     Lab Results   Component Value Date    MCHC 32.3 02/09/2018     Lab Results   Component Value Date    RDW 13.7 02/09/2018     Lab Results   Component Value Date     02/09/2018     Color Urine (no units)   Date Value   02/09/2018 Yellow     Appearance Urine (no units)   Date Value   02/09/2018 Clear     Glucose Urine (mg/dL)   Date Value   02/09/2018 Negative     Bilirubin Urine (no units)   Date Value   02/09/2018 Negative     Ketones Urine (mg/dL)   Date Value   02/09/2018 Negative     Specific Gravity Urine (no units)   Date Value   02/09/2018 1.021     pH Urine (pH)   Date Value   02/09/2018 5.0     Protein Albumin Urine (mg/dL)   Date Value   02/09/2018 Negative     Nitrite Urine (no units)   Date Value   02/09/2018 Negative     Leukocyte Esterase Urine (no units)   Date Value   02/09/2018 Small (A)     Urine protein: 0.07g/g-cr     Ferritin 92  Iron sat 21%      ASSESSMENT/PLAN:  Pt is a 52 year old female here to establish care.    Elizabeth was seen today for consult.    Diagnoses and all orders for this visit:    Creatinine elevation  Patient's baseline creatinine 1.0, has repeatedly 1.2 but back to 1.04 today. Unclear but possible hemodynamic effects prior to her tonsillectomy could be the etiology. PPI caused AIN not likely given improvement still on drug. Myeloma workup unnecessary given  improvement. No structural cause seen on December CT scan. No proteinuria and no hematuria during the creatinine elevation so no concern for GN. No need for kidney biopsy at this time. No plan for Renal f/u at this time, but please send patient back if creatinine again repeatedly is elevated / repeatedly rising.     Microscopic hematuria  New today without explanatory symptoms. UA did have squamous cells, will repeat when patient is able (will make Urology referral if hematuria again present).   -     Routine UA with micro reflex to culture; Future    Morbid obesity (H)  Discussed importance of weight loss in maintaining kidney health.     Benign essential hypertension  BP at goal 140/90 on lisinopril 10mg daily, no change. Pt euvolemic on exam.          Return to clinic as needed. No f/u planned at this time.     Patient seen and discussed with Dr. Chávez who agrees with this plan.    Paul Bashir MD  Renal Fellow  Pager [phone]    I have seen and reviewed the patient with the fellow. The fellow's note reflects our joint assessment and plan. -- Teo Chávez M.D.

## 2018-02-09 NOTE — LETTER
"2/9/2018      RE: Elizabeth Rosenthal  [address] [address]       Nephrology     CC: creatinine elevation    HPI:  Elizabeth Rosenthal is a 52 year old female with pmh of RA, HTN, and morbid obesity who is referred by Dr Melgoza for creatinine elevation.     The patient has baseline creatinine of 1.0 but saw an increase to 1.2 in October and December 2017. Her creat is back to 1.04 today. The pt states she was feeling poorly with sore throat and other ENT complaints for which she had been seeing the allergy service and ENT and in January 2018 got a tonsillectomy. She states she feels much improved now. She denies any NSAIDs during this time. She reports 6 months of PPI, which continues today with her creatinine back down. Her BP has been well controlled on lisinopril 10mg and she denies lightheadedness.     She had CT scan in December 2017 with \"normal\" kidneys. She had no hematuria on UA in December but does have 6 RBCs today. She denies gyn bleeding or irritation, dysuria or frequency, fevers, low abd pain.     She denies LE swelling or SOB. No CP. No rashes. She denies night sweats. She does reports weeks of left sided reproducible rib pain and LUQ abd pain which she relates to her RA but denies other bony pains. No weight loss.       Past Medical History:   Diagnosis Date     Allergic rhinitis      Arthritis      Asthma      Autoimmune disease (H) 2011     Chronic pelvic pain in female      Depression      Depressive disorder      Gastroesophageal reflux disease      Hyperlipidemia      Hypertension      Immunosuppression (H)      Low back pain      Migraines      Morbid obesity with BMI of 45.0-49.9, adult (H)      Obstructive sleep apnea 2012     SHERIE (obstructive sleep apnea)     has cpap     Polyarthritis      Reduced vision 2012     TIA (transient ischemic attack)      Vertebral artery dissection (H)          Past Surgical History:   Procedure Laterality Date     ARTHROSCOPY KNEE BILATERAL   "     BIOPSY LYMPH NODE CERVICAL       COLONOSCOPY N/A 7/26/2017    Procedure: COMBINED COLONOSCOPY, SINGLE OR MULTIPLE BIOPSY/POLYPECTOMY BY BIOPSY;  colonoscopy;  Surgeon: Thang Saleh MD;  Location:  GI     ENT SURGERY  lymph node biopsy 2010     HYSTEROSCOPY       TONSILLECTOMY Bilateral 1/3/2018    Procedure: TONSILLECTOMY;  Bilateral Tonsillectomy;  Surgeon: Ivania Esquivel MD;  Location: [facility] OR     TUBAL LIGATION          Family History   Problem Relation Age of Onset     Breast Cancer Mother      50s     CANCER Mother      HEART DISEASE Father      Substance Abuse Father      MENTAL ILLNESS Father      Asthma Paternal Grandmother      CEREBROVASCULAR DISEASE Paternal Grandmother      Migraines Son      Migraines Daughter    no fhx of kidney dz     Social History   Substance Use Topics     Smoking status: Never Smoker     Smokeless tobacco: Never Used     Alcohol use 0.0 oz/week     0 Standard drinks or equivalent per week      Comment: Occasional       Social History     Social History Narrative    Is an . Non-smoker.     Medications:  Current Outpatient Prescriptions   Medication Sig Dispense Refill     cetirizine (ZYRTEC) 10 MG tablet Take 10 mg by mouth daily       pantoprazole (PROTONIX) 20 MG EC tablet Take 1 tablet (20 mg) by mouth 2 times daily 180 tablet 3     methotrexate 2.5 MG tablet CHEMO Take 6 tablets (15 mg) by mouth once a week 6 tabs by mouth once a week 30 tablet 5     gabapentin (NEURONTIN) 300 MG capsule Take 1 capsule (300 mg) by mouth 3 times daily 90 capsule 11     folic acid (FOLVITE) 1 MG tablet Take 1 tablet (1 mg) by mouth daily 90 tablet 3     lidocaine (LIDODERM) 5 % Patch Place 1-3 patches onto the skin every 24 hours Patient will call to fill 30 patch 1     lisinopril (PRINIVIL/ZESTRIL) 10 MG tablet Take 1 tablet (10 mg) by mouth daily 90 tablet 3     fluticasone (FLOVENT HFA) 110 MCG/ACT Inhaler Inhale 2 puffs into the lungs 2 times daily (Patient taking  differently: Inhale 2 puffs into the lungs 2 times daily as needed ) 3 Inhaler 3     buPROPion (WELLBUTRIN XL) 150 MG 24 hr tablet Take 1 tablet (150 mg) by mouth every morning 90 tablet 3     escitalopram (LEXAPRO) 20 MG tablet Take 1 tablet (20 mg) by mouth daily 90 tablet 2     oxyCODONE (ROXICODONE) 5 MG/5ML solution Take 5-10 mLs (5-10 mg) by mouth every 4 hours as needed for pain (Patient not taking: Reported on 1/23/2018) 750 mL 0     ondansetron (ZOFRAN) 4 MG tablet Take 1-2 tablets (4-8 mg) by mouth every 8 hours as needed for nausea (Patient not taking: Reported on 2/9/2018) 12 tablet 0     albuterol (PROAIR HFA/PROVENTIL HFA/VENTOLIN HFA) 108 (90 BASE) MCG/ACT Inhaler Inhale 1 puff into the lungs every 6 hours as needed for shortness of breath / dyspnea or wheezing (Patient not taking: Reported on 2/9/2018) 3 Inhaler 3       Review Of Systems:  Constitutional: Fatigue for months   Eyes: negative  Ears/Nose/Throat: negative  Cardiovascular: negative  Respiratory: No shortness of breath, dyspnea on exertion, cough, or hemoptysis  Gastrointestinal: as above  Genitourinary: negative  Musculoskeletal: fibromyalgia  Skin: negative  Neurologic: negative  Endocrine: negative  Hematologic/Lymphatic/Immunologic: negative  Psychiatric: negative    OBJECTIVE:  Physical exam:  /82  Pulse 81  There is no height or weight on file to calculate BMI.   Gen: NAD, obses  HEENT: anicteric, MMM  CV: regular rate and rhythm, normal S1 S2, no S3 or S4 and no murmur, click, or rub  Resp: clear to ausculation bilaterally, normal respiratory effort  Abd: bowel sounds presents, no bruit, soft, NTND   Ext: WWP, no LE edema   Skin: warm and dry, no rashes or ecchymoses appreciated   Psych: normal mood, normal affect  Neuro: alert and oriented x3, CN2-12 grossly intact     Recent Labs   Lab Test  02/09/18   0853  12/21/17   0857  12/07/17   1552   06/16/17   0719   NA  141  141   --    --   141   POTASSIUM  4.1  4.4   --    --    4.0   CHLORIDE  108   --    --    --   108   CO2  28   --    --    --   27   ANIONGAP  6   --    --    --   6   GLC  104*   --    --    --   95   BUN  17   --    --    --   17   CR  1.04   --   1.21*   < >  1.01   CHESTER  8.3*   --    --    --   8.1*    < > = values in this interval not displayed.     Lab Results   Component Value Date    WBC 5.5 02/09/2018     Lab Results   Component Value Date    RBC 4.01 02/09/2018     Lab Results   Component Value Date    HGB 12.3 02/09/2018     Lab Results   Component Value Date    HCT 38.1 02/09/2018     No components found for: MCT  Lab Results   Component Value Date    MCV 95 02/09/2018     Lab Results   Component Value Date    MCH 30.7 02/09/2018     Lab Results   Component Value Date    MCHC 32.3 02/09/2018     Lab Results   Component Value Date    RDW 13.7 02/09/2018     Lab Results   Component Value Date     02/09/2018     Color Urine (no units)   Date Value   02/09/2018 Yellow     Appearance Urine (no units)   Date Value   02/09/2018 Clear     Glucose Urine (mg/dL)   Date Value   02/09/2018 Negative     Bilirubin Urine (no units)   Date Value   02/09/2018 Negative     Ketones Urine (mg/dL)   Date Value   02/09/2018 Negative     Specific Gravity Urine (no units)   Date Value   02/09/2018 1.021     pH Urine (pH)   Date Value   02/09/2018 5.0     Protein Albumin Urine (mg/dL)   Date Value   02/09/2018 Negative     Nitrite Urine (no units)   Date Value   02/09/2018 Negative     Leukocyte Esterase Urine (no units)   Date Value   02/09/2018 Small (A)     Urine protein: 0.07g/g-cr     Ferritin 92  Iron sat 21%      ASSESSMENT/PLAN:  Pt is a 52 year old female here to establish care.    Elizabeth was seen today for consult.    Diagnoses and all orders for this visit:    Creatinine elevation  Patient's baseline creatinine 1.0, has repeatedly 1.2 but back to 1.04 today. Unclear but possible hemodynamic effects prior to her tonsillectomy could be the etiology. PPI caused AIN not  likely given improvement still on drug. Myeloma workup unnecessary given improvement. No structural cause seen on December CT scan. No proteinuria and no hematuria during the creatinine elevation so no concern for GN. No need for kidney biopsy at this time. No plan for Renal f/u at this time, but please send patient back if creatinine again repeatedly is elevated / repeatedly rising.     Microscopic hematuria  New today without explanatory symptoms. UA did have squamous cells, will repeat when patient is able (will make Urology referral if hematuria again present).   -     Routine UA with micro reflex to culture; Future    Morbid obesity (H)  Discussed importance of weight loss in maintaining kidney health.     Benign essential hypertension  BP at goal 140/90 on lisinopril 10mg daily, no change. Pt euvolemic on exam.          Return to clinic as needed. No f/u planned at this time.     Patient seen and discussed with Dr. Chávez who agrees with this plan.    Paul Bashir MD  Renal Fellow  Pager [phone]    I have seen and reviewed the patient with the fellow. The fellow's note reflects our joint assessment and plan. -- Teo Chávez M.D.      Paul Bashir MD

## 2018-02-16 ENCOUNTER — MYC MEDICAL ADVICE (OUTPATIENT)
Dept: RHEUMATOLOGY | Facility: CLINIC | Age: 53
End: 2018-02-16

## 2018-02-16 ENCOUNTER — MYC MEDICAL ADVICE (OUTPATIENT)
Dept: INTERNAL MEDICINE | Facility: CLINIC | Age: 53
End: 2018-02-16

## 2018-02-16 DIAGNOSIS — M06.09 RHEUMATOID ARTHRITIS OF MULTIPLE SITES WITHOUT RHEUMATOID FACTOR (H): ICD-10-CM

## 2018-02-16 DIAGNOSIS — G89.29 CHRONIC BILATERAL LOW BACK PAIN WITHOUT SCIATICA: ICD-10-CM

## 2018-02-16 DIAGNOSIS — M79.7 FIBROMYALGIA: ICD-10-CM

## 2018-02-16 DIAGNOSIS — I10 BENIGN ESSENTIAL HYPERTENSION: ICD-10-CM

## 2018-02-16 DIAGNOSIS — J45.20 MILD INTERMITTENT ASTHMA WITHOUT COMPLICATION: ICD-10-CM

## 2018-02-16 DIAGNOSIS — F33.0 MAJOR DEPRESSIVE DISORDER, RECURRENT EPISODE, MILD (H): ICD-10-CM

## 2018-02-16 DIAGNOSIS — F33.9 RECURRENT MAJOR DEPRESSIVE DISORDER, REMISSION STATUS UNSPECIFIED (H): ICD-10-CM

## 2018-02-16 DIAGNOSIS — M54.50 CHRONIC BILATERAL LOW BACK PAIN WITHOUT SCIATICA: ICD-10-CM

## 2018-02-16 DIAGNOSIS — R10.84 ABDOMINAL PAIN, GENERALIZED: ICD-10-CM

## 2018-02-16 DIAGNOSIS — Z79.899 ENCOUNTER FOR LONG-TERM (CURRENT) USE OF MEDICATIONS: ICD-10-CM

## 2018-02-16 DIAGNOSIS — M13.0 POLYARTHRITIS: ICD-10-CM

## 2018-02-16 NOTE — TELEPHONE ENCOUNTER
PT has changed pharmacy to [facility] which they need new prescriptions and are unable to transfer them.  I messaged the PT telling her I would send the new prescriptions to the doctor for an approval.     Israel CAMPBELL  Rheumatology

## 2018-02-19 RX ORDER — ESCITALOPRAM OXALATE 20 MG/1
20 TABLET ORAL DAILY
Qty: 90 TABLET | Refills: 3 | Status: SHIPPED | OUTPATIENT
Start: 2018-02-19 | End: 2019-06-21

## 2018-02-19 RX ORDER — LISINOPRIL 10 MG/1
10 TABLET ORAL DAILY
Qty: 90 TABLET | Refills: 3 | Status: SHIPPED | OUTPATIENT
Start: 2018-02-19 | End: 2019-06-21

## 2018-02-19 RX ORDER — BUPROPION HYDROCHLORIDE 150 MG/1
150 TABLET ORAL EVERY MORNING
Qty: 90 TABLET | Refills: 3 | Status: SHIPPED | OUTPATIENT
Start: 2018-02-19 | End: 2019-06-21

## 2018-02-19 RX ORDER — PANTOPRAZOLE SODIUM 20 MG/1
20 TABLET, DELAYED RELEASE ORAL 2 TIMES DAILY
Qty: 180 TABLET | Refills: 3 | Status: SHIPPED | OUTPATIENT
Start: 2018-02-19 | End: 2019-06-21

## 2018-02-21 RX ORDER — FOLIC ACID 1 MG/1
1 TABLET ORAL DAILY
Qty: 90 TABLET | Refills: 1 | Status: SHIPPED | OUTPATIENT
Start: 2018-02-21 | End: 2018-11-13

## 2018-02-21 RX ORDER — GABAPENTIN 300 MG/1
300 CAPSULE ORAL 3 TIMES DAILY
Qty: 270 CAPSULE | Refills: 1 | Status: SHIPPED | OUTPATIENT
Start: 2018-02-21 | End: 2019-01-17

## 2018-02-21 RX ORDER — LIDOCAINE 50 MG/G
1-3 PATCH TOPICAL EVERY 24 HOURS
Qty: 30 PATCH | Refills: 1 | Status: SHIPPED | OUTPATIENT
Start: 2018-02-21 | End: 2019-08-09

## 2018-02-22 DIAGNOSIS — R31.29 MICROSCOPIC HEMATURIA: ICD-10-CM

## 2018-02-22 LAB
ALBUMIN UR-MCNC: NEGATIVE MG/DL
APPEARANCE UR: CLEAR
BACTERIA #/AREA URNS HPF: ABNORMAL /HPF
BILIRUB UR QL STRIP: NEGATIVE
COLOR UR AUTO: YELLOW
GLUCOSE UR STRIP-MCNC: NEGATIVE MG/DL
HGB UR QL STRIP: NEGATIVE
HYALINE CASTS #/AREA URNS LPF: 3 /LPF (ref 0–2)
KETONES UR STRIP-MCNC: NEGATIVE MG/DL
LEUKOCYTE ESTERASE UR QL STRIP: ABNORMAL
MUCOUS THREADS #/AREA URNS LPF: PRESENT /LPF
NITRATE UR QL: NEGATIVE
PH UR STRIP: 5 PH (ref 5–7)
RBC #/AREA URNS AUTO: 2 /HPF (ref 0–2)
SOURCE: ABNORMAL
SP GR UR STRIP: 1.03 (ref 1–1.03)
SQUAMOUS #/AREA URNS AUTO: 2 /HPF (ref 0–1)
UROBILINOGEN UR STRIP-MCNC: 0 MG/DL (ref 0–2)
WBC #/AREA URNS AUTO: 3 /HPF (ref 0–2)

## 2018-02-28 ENCOUNTER — TELEPHONE (OUTPATIENT)
Dept: RHEUMATOLOGY | Facility: CLINIC | Age: 53
End: 2018-02-28

## 2018-02-28 NOTE — TELEPHONE ENCOUNTER
Eddie with [facility], LVM at 10:28am, regarding lidocaine patch.    Eddie states patches are usually 12 hrs on & 12 hrs off & would like to fill rx based on this.    Please callback Ruby Ribbon at [phone], ref #[id number]

## 2018-03-02 NOTE — TELEPHONE ENCOUNTER
Called and spoke with pharmacist.  Pt has been using patches for 7 years.  They are assuming that she knows how to use them so they are fine with the prescription as it is.    Called and spoke with pt, she is using the patches on no more than 12 hours at a time and then knows she cannot reapply for 12 hours after the come off.      Pt did have a concern that her face if puffy, wondering if she has cushing's syndrome.  Did discuss that her PCP would be the one that would handle this issue.  Pt understands, is going to wait a few days and see if puffiness goes down.    Pt had no further questions.    Charline Avilez RN  R[facility]umatology Clinic

## 2018-03-13 ENCOUNTER — APPOINTMENT (OUTPATIENT)
Dept: MRI IMAGING | Facility: CLINIC | Age: 53
End: 2018-03-13
Attending: STUDENT IN AN ORGANIZED HEALTH CARE EDUCATION/TRAINING PROGRAM
Payer: COMMERCIAL

## 2018-03-13 ENCOUNTER — HOSPITAL ENCOUNTER (EMERGENCY)
Facility: CLINIC | Age: 53
Discharge: HOME | End: 2018-03-13
Attending: INTERNAL MEDICINE | Admitting: INTERNAL MEDICINE
Payer: COMMERCIAL

## 2018-03-13 ENCOUNTER — APPOINTMENT (OUTPATIENT)
Dept: CT IMAGING | Facility: CLINIC | Age: 53
End: 2018-03-13
Attending: INTERNAL MEDICINE
Payer: COMMERCIAL

## 2018-03-13 VITALS
SYSTOLIC BLOOD PRESSURE: 138 MMHG | TEMPERATURE: 98.8 F | HEART RATE: 63 BPM | BODY MASS INDEX: 46.23 KG/M2 | DIASTOLIC BLOOD PRESSURE: 78 MMHG | OXYGEN SATURATION: 99 % | RESPIRATION RATE: 18 BRPM | WEIGHT: 293 LBS

## 2018-03-13 DIAGNOSIS — R20.0 RT FACIAL NUMBNESS: ICD-10-CM

## 2018-03-13 LAB
ANION GAP SERPL CALCULATED.3IONS-SCNC: 7 MMOL/L (ref 3–14)
APTT PPP: 25 SEC (ref 22–37)
BASOPHILS # BLD AUTO: 0 10E9/L (ref 0–0.2)
BASOPHILS NFR BLD AUTO: 0.6 %
BUN SERPL-MCNC: 15 MG/DL (ref 7–30)
CALCIUM SERPL-MCNC: 9 MG/DL (ref 8.5–10.1)
CHLORIDE SERPL-SCNC: 109 MMOL/L (ref 94–109)
CHOLEST SERPL-MCNC: 228 MG/DL
CO2 SERPL-SCNC: 25 MMOL/L (ref 20–32)
CREAT BLD-MCNC: 1 MG/DL (ref 0.52–1.04)
CREAT SERPL-MCNC: 0.97 MG/DL (ref 0.52–1.04)
DIFFERENTIAL METHOD BLD: NORMAL
EOSINOPHIL # BLD AUTO: 0.1 10E9/L (ref 0–0.7)
EOSINOPHIL NFR BLD AUTO: 2 %
ERYTHROCYTE [DISTWIDTH] IN BLOOD BY AUTOMATED COUNT: 14.1 % (ref 10–15)
GFR SERPL CREATININE-BSD FRML MDRD: 58 ML/MIN/1.7M2
GFR SERPL CREATININE-BSD FRML MDRD: 60 ML/MIN/1.7M2
GLUCOSE BLDC GLUCOMTR-MCNC: 72 MG/DL (ref 70–99)
GLUCOSE SERPL-MCNC: 82 MG/DL (ref 70–99)
HBA1C MFR BLD: 6.1 % (ref 4.3–6)
HCT VFR BLD AUTO: 40.7 % (ref 35–47)
HDLC SERPL-MCNC: 69 MG/DL
HGB BLD-MCNC: 13.4 G/DL (ref 11.7–15.7)
IMM GRANULOCYTES # BLD: 0 10E9/L (ref 0–0.4)
IMM GRANULOCYTES NFR BLD: 0.1 %
INR BLD: 1 (ref 0.86–1.14)
INTERPRETATION ECG - MUSE: NORMAL
LDLC SERPL CALC-MCNC: 137 MG/DL
LYMPHOCYTES # BLD AUTO: 2.2 10E9/L (ref 0.8–5.3)
LYMPHOCYTES NFR BLD AUTO: 32.2 %
MCH RBC QN AUTO: 30.7 PG (ref 26.5–33)
MCHC RBC AUTO-ENTMCNC: 32.9 G/DL (ref 31.5–36.5)
MCV RBC AUTO: 93 FL (ref 78–100)
MONOCYTES # BLD AUTO: 0.5 10E9/L (ref 0–1.3)
MONOCYTES NFR BLD AUTO: 6.5 %
NEUTROPHILS # BLD AUTO: 4.1 10E9/L (ref 1.6–8.3)
NEUTROPHILS NFR BLD AUTO: 58.6 %
NONHDLC SERPL-MCNC: 159 MG/DL
NRBC # BLD AUTO: 0 10*3/UL
NRBC BLD AUTO-RTO: 0 /100
PLATELET # BLD AUTO: 336 10E9/L (ref 150–450)
POTASSIUM SERPL-SCNC: 4.1 MMOL/L (ref 3.4–5.3)
RBC # BLD AUTO: 4.36 10E12/L (ref 3.8–5.2)
SODIUM SERPL-SCNC: 141 MMOL/L (ref 133–144)
TRIGL SERPL-MCNC: 111 MG/DL
TROPONIN I BLD-MCNC: 0 UG/L (ref 0–0.1)
WBC # BLD AUTO: 6.9 10E9/L (ref 4–11)

## 2018-03-13 PROCEDURE — 70544 MR ANGIOGRAPHY HEAD W/O DYE: CPT | Mod: XS

## 2018-03-13 PROCEDURE — 80048 BASIC METABOLIC PNL TOTAL CA: CPT | Performed by: INTERNAL MEDICINE

## 2018-03-13 PROCEDURE — 00000146 ZZHCL STATISTIC GLUCOSE BY METER IP

## 2018-03-13 PROCEDURE — 85610 PROTHROMBIN TIME: CPT | Mod: QW

## 2018-03-13 PROCEDURE — 99285 EMERGENCY DEPT VISIT HI MDM: CPT | Mod: 25 | Performed by: INTERNAL MEDICINE

## 2018-03-13 PROCEDURE — 25000128 H RX IP 250 OP 636: Performed by: INTERNAL MEDICINE

## 2018-03-13 PROCEDURE — 85730 THROMBOPLASTIN TIME PARTIAL: CPT | Performed by: INTERNAL MEDICINE

## 2018-03-13 PROCEDURE — 83036 HEMOGLOBIN GLYCOSYLATED A1C: CPT | Performed by: INTERNAL MEDICINE

## 2018-03-13 PROCEDURE — 99284 EMERGENCY DEPT VISIT MOD MDM: CPT | Mod: 25 | Performed by: INTERNAL MEDICINE

## 2018-03-13 PROCEDURE — 85025 COMPLETE CBC W/AUTO DIFF WBC: CPT | Performed by: INTERNAL MEDICINE

## 2018-03-13 PROCEDURE — 80061 LIPID PANEL: CPT | Performed by: INTERNAL MEDICINE

## 2018-03-13 PROCEDURE — 40000141 ZZH STATISTIC PERIPHERAL IV START W/O US GUIDANCE

## 2018-03-13 PROCEDURE — 82565 ASSAY OF CREATININE: CPT

## 2018-03-13 PROCEDURE — 93005 ELECTROCARDIOGRAM TRACING: CPT | Performed by: INTERNAL MEDICINE

## 2018-03-13 PROCEDURE — 93010 ELECTROCARDIOGRAM REPORT: CPT | Mod: Z6 | Performed by: INTERNAL MEDICINE

## 2018-03-13 PROCEDURE — 83036 HEMOGLOBIN GLYCOSYLATED A1C: CPT | Performed by: STUDENT IN AN ORGANIZED HEALTH CARE EDUCATION/TRAINING PROGRAM

## 2018-03-13 PROCEDURE — 84484 ASSAY OF TROPONIN QUANT: CPT

## 2018-03-13 PROCEDURE — 40000740 ZZH STATISTIC STROKE CODE W/ ACCESS

## 2018-03-13 PROCEDURE — A9585 GADOBUTROL INJECTION: HCPCS | Performed by: INTERNAL MEDICINE

## 2018-03-13 PROCEDURE — 70450 CT HEAD/BRAIN W/O DYE: CPT

## 2018-03-13 RX ORDER — METHYLPREDNISOLONE SODIUM SUCCINATE 125 MG/2ML
125 INJECTION, POWDER, LYOPHILIZED, FOR SOLUTION INTRAMUSCULAR; INTRAVENOUS ONCE
Status: DISCONTINUED | OUTPATIENT
Start: 2018-03-13 | End: 2018-03-13

## 2018-03-13 RX ORDER — GADOBUTROL 604.72 MG/ML
10 INJECTION INTRAVENOUS ONCE
Status: COMPLETED | OUTPATIENT
Start: 2018-03-13 | End: 2018-03-13

## 2018-03-13 RX ORDER — ATORVASTATIN CALCIUM 80 MG/1
80 TABLET, FILM COATED ORAL DAILY
Qty: 30 TABLET | Refills: 1 | Status: SHIPPED | OUTPATIENT
Start: 2018-03-13 | End: 2018-08-01

## 2018-03-13 RX ORDER — DIPHENHYDRAMINE HYDROCHLORIDE 50 MG/ML
50 INJECTION INTRAMUSCULAR; INTRAVENOUS ONCE
Status: DISCONTINUED | OUTPATIENT
Start: 2018-03-13 | End: 2018-03-13

## 2018-03-13 RX ADMIN — GADOBUTROL 10 ML: 604.72 INJECTION INTRAVENOUS at 17:38

## 2018-03-13 ASSESSMENT — VISUAL ACUITY
OU: NORMAL ACUITY
OU: NORMAL ACUITY
OD: 20/40
OU: NORMAL ACUITY
OS: 20/25

## 2018-03-13 ASSESSMENT — ENCOUNTER SYMPTOMS: NUMBNESS: 1

## 2018-03-13 NOTE — ED AVS SNAPSHOT
[facility], Emergency Department    [address] [facility] [address]    Phone:  [phone]                                       Elizabeth Rosenthal   MRN: [id number]    Department:  [facility], Emergency Department   Date of Visit:  3/13/2018           Patient Information     Date Of Birth          1965        Your diagnoses for this visit were:     Rt facial numbness        You were seen by Vernell Celestin MD.        Discharge Instructions       Please make an appointment to follow up with Neurology Clinic (phone: [phone]) in 1-2 weeks, and with primary care as soon as possible.     Start Atorvastatin 80mg daily and aspirin 81mg daily.         Future Appointments        Provider Department Dept Phone Center    3/22/2018 8:25 AM Nataliia Gonzalez MD [facility] [phone] [facility]      24 Hour Appointment Hotline       To make an appointment at any [facility], call [phone] ([phone]). If you don't have a family doctor or clinic, we will help you find one. [facility] clinics are conveniently located to serve the needs of you and your family.             Review of your medicines      START taking        Dose / Directions Last dose taken    aspirin 81 MG tablet   Dose:  81 mg   Quantity:  30 tablet        Take 1 tablet (81 mg) by mouth daily   Refills:  OTC        atorvastatin 80 MG tablet   Commonly known as:  LIPITOR   Dose:  80 mg   Quantity:  30 tablet        Take 1 tablet (80 mg) by mouth daily   Refills:  1          Our records show that you are taking the medicines listed below. If these are incorrect, please call your family doctor or clinic.        Dose / Directions Last dose taken    albuterol 108 (90 BASE) MCG/ACT Inhaler   Commonly known as:  PROAIR HFA/PROVENTIL HFA/VENTOLIN HFA   Dose:  1 puff   Quantity:  3 Inhaler        Inhale 1 puff into the lungs every 6 hours as needed for shortness of breath / dyspnea or wheezing   Refills:  3        buPROPion  150 MG 24 hr tablet   Commonly known as:  WELLBUTRIN XL   Dose:  150 mg   Quantity:  90 tablet        Take 1 tablet (150 mg) by mouth every morning   Refills:  3        cetirizine 10 MG tablet   Commonly known as:  zyrTEC   Dose:  10 mg        Take 10 mg by mouth daily   Refills:  0        escitalopram 20 MG tablet   Commonly known as:  LEXAPRO   Dose:  20 mg   Quantity:  90 tablet        Take 1 tablet (20 mg) by mouth daily   Refills:  3        fluticasone 110 MCG/ACT Inhaler   Commonly known as:  FLOVENT HFA   Dose:  2 puff   Quantity:  3 Inhaler        Inhale 2 puffs into the lungs 2 times daily   Refills:  3        folic acid 1 MG tablet   Commonly known as:  FOLVITE   Dose:  1 mg   Quantity:  90 tablet        Take 1 tablet (1 mg) by mouth daily   Refills:  1        gabapentin 300 MG capsule   Commonly known as:  NEURONTIN   Dose:  300 mg   Quantity:  270 capsule        Take 1 capsule (300 mg) by mouth 3 times daily   Refills:  1        lidocaine 5 % Patch   Commonly known as:  LIDODERM   Dose:  1-3 patch   Quantity:  30 patch        Place 1-3 patches onto the skin every 24 hours Patient will call to fill   Refills:  1        lisinopril 10 MG tablet   Commonly known as:  PRINIVIL/ZESTRIL   Dose:  10 mg   Quantity:  90 tablet        Take 1 tablet (10 mg) by mouth daily   Refills:  3        methotrexate 2.5 MG tablet CHEMO   Dose:  15 mg   Quantity:  72 tablet        Take 6 tablets (15 mg) by mouth once a week 6 tabs by mouth once a week   Refills:  1        ondansetron 4 MG tablet   Commonly known as:  ZOFRAN   Dose:  4-8 mg   Quantity:  12 tablet        Take 1-2 tablets (4-8 mg) by mouth every 8 hours as needed for nausea   Refills:  0        oxyCODONE 5 MG/5ML solution   Commonly known as:  ROXICODONE   Dose:  5-10 mg   Quantity:  750 mL        Take 5-10 mLs (5-10 mg) by mouth every 4 hours as needed for pain   Refills:  0        pantoprazole 20 MG EC tablet   Commonly known as:  PROTONIX   Dose:  20 mg    Quantity:  180 tablet        Take 1 tablet (20 mg) by mouth 2 times daily   Refills:  3                Prescriptions were sent or printed at these locations (2 Prescriptions)                   Other Prescriptions                Printed at Department/Unit printer (2 of 2)         atorvastatin (LIPITOR) 80 MG tablet               aspirin 81 MG tablet                Procedures and tests performed during your visit     Activity: Bedrest    Basic metabolic panel    CBC with platelets differential    CT Head w/o Contrast    Creatinine POCT    Dysphagia Screen    EKG 12-lead, tracing only    Glucose by meter    Glucose monitor nursing POCT    Hemoglobin A1c    INR point of care    ISTAT INR nursing POCT    ISTAT troponin nursing POCT    Lipid Profile    MR Brain for Stroke Cmpl w/o & w Contras    Notify CT that Stroke patient is in ED    Partial thromboplastin time    Pulse oximetry nursing    Troponin POCT    Vital signs and neuro checks      Orders Needing Specimen Collection     None      Pending Results     No orders found from 3/11/2018 to 3/14/2018.            Pending Culture Results     No orders found from 3/11/2018 to 3/14/2018.            Pending Results Instructions     If you had any lab results that were not finalized at the time of your Discharge, you can call the ED Lab Result RN at [phone]. You will be contacted by this team for any positive Lab results or changes in treatment. The nurses are available 7 days a week from 10A to 6:30P.  You can leave a message 24 hours per day and they will return your call.        Thank you for choosing [facility]       Thank you for choosing [facility] for your care. Our goal is always to provide you with excellent care. Hearing back from our patients is one way we can continue to improve our services. Please take a few minutes to complete the written survey that you may receive in the mail after you visit with us. Thank you!        [employer] Information     [employer] gives  you secure access to your electronic health record. If you see a primary care provider, you can also send messages to your care team and make appointments. If you have questions, please call your primary care clinic.  If you do not have a primary care provider, please call [phone] and they will assist you.        Care EveryWhere ID     This is your Care EveryWhere ID. This could be used by other organizations to access your [place] medical records  [id number]        Equal Access to Services     TIAGO LOMAX : Hadii dwayne Copeland, warogerda luana, qaybta kaalmajenny pinto, betty aponte. So [facility] [phone].    ATENCIÓN: Si habla español, tiene a hightower disposición servicios gratuitos de asistencia lingüística. Llame al [phone].    We comply with applicable federal civil rights laws and [place] laws. We do not discriminate on the basis of race, color, national origin, age, disability, sex, sexual orientation, or gender identity.            After Visit Summary       This is your record. Keep this with you and show to your community pharmacist(s) and doctor(s) at your next visit.

## 2018-03-13 NOTE — ED NOTES
Pt arrived to the ED with c/o right facial numbness and mild right foot numbness x 1 hour. On arrival pt alert and oriented x4. No other deficits noted initially.

## 2018-03-13 NOTE — PHARMACY
Pharmacy Stroke Code Response  Pharmacist responded as part of the Stroke Code Team activation to patient care area ED.  The Stroke Team determined that the patient was not a candidate for IV alteplase therapy and the pharmacy team was dismissed at 1215.       Radha Vasquez, PharmD Resident

## 2018-03-13 NOTE — ED PROVIDER NOTES
History     Chief Complaint   Patient presents with     Numbness     HPI  Elizabeth Rosenthal is a 52 year old female with a history of hypertension, morbid obesity, polyarthritis, asthma, TIA (2005), fibromyalgia and depression who presents with right facial numbness.  Patient reports developing sudden onset right facial numbness about 1 hour ago (11:15 AM).  Denies any other complaints currently.    PAST MEDICAL HISTORY:   Past Medical History:   Diagnosis Date     Allergic rhinitis      Arthritis      Asthma      Autoimmune disease (H) 2011     Chronic pelvic pain in female      Depression      Depressive disorder      Gastroesophageal reflux disease      Hyperlipidemia      Hypertension      Immunosuppression (H)      Low back pain      Migraines      Morbid obesity with BMI of 45.0-49.9, adult (H)      Obstructive sleep apnea 2012     SHERIE (obstructive sleep apnea)     has cpap     Polyarthritis      Reduced vision 2012     TIA (transient ischemic attack)      Vertebral artery dissection (H)        PAST SURGICAL HISTORY:   Past Surgical History:   Procedure Laterality Date     ARTHROSCOPY KNEE BILATERAL       BIOPSY LYMPH NODE CERVICAL       COLONOSCOPY N/A 7/26/2017    Procedure: COMBINED COLONOSCOPY, SINGLE OR MULTIPLE BIOPSY/POLYPECTOMY BY BIOPSY;  colonoscopy;  Surgeon: Thang Saleh MD;  Location: [facility] GI     ENT SURGERY  lymph node biopsy 2010     HYSTEROSCOPY       TONSILLECTOMY Bilateral 1/3/2018    Procedure: TONSILLECTOMY;  Bilateral Tonsillectomy;  Surgeon: Ivania Esquivel MD;  Location: [facility] OR     TUBAL LIGATION         FAMILY HISTORY:   Family History   Problem Relation Age of Onset     Breast Cancer Mother      50s     CANCER Mother      HEART DISEASE Father      Substance Abuse Father      MENTAL ILLNESS Father      Asthma Paternal Grandmother      CEREBROVASCULAR DISEASE Paternal Grandmother      Migraines Son      Migraines Daughter        SOCIAL HISTORY:   Social History   Substance Use  Topics     Smoking status: Never Smoker     Smokeless tobacco: Never Used     Alcohol use 0.0 oz/week     0 Standard drinks or equivalent per week      Comment: Occasional     Current Facility-Administered Medications   Medication     0.9% sodium chloride BOLUS     Current Outpatient Prescriptions   Medication     gabapentin (NEURONTIN) 300 MG capsule     lidocaine (LIDODERM) 5 % Patch     folic acid (FOLVITE) 1 MG tablet     methotrexate 2.5 MG tablet CHEMO     pantoprazole (PROTONIX) 20 MG EC tablet     lisinopril (PRINIVIL/ZESTRIL) 10 MG tablet     buPROPion (WELLBUTRIN XL) 150 MG 24 hr tablet     escitalopram (LEXAPRO) 20 MG tablet     cetirizine (ZYRTEC) 10 MG tablet     oxyCODONE (ROXICODONE) 5 MG/5ML solution     ondansetron (ZOFRAN) 4 MG tablet     fluticasone (FLOVENT HFA) 110 MCG/ACT Inhaler     albuterol (PROAIR HFA/PROVENTIL HFA/VENTOLIN HFA) 108 (90 BASE) MCG/ACT Inhaler        Allergies   Allergen Reactions     Nuts Itching     Barley Grass GI Disturbance     Celery Oil      Codeine Itching     Contrast Dye Itching     Food Diarrhea, Unknown and Nausea and Vomiting     Headaches=potatoes. Peanuts=migraine     Oat Other (See Comments) and Nausea and Vomiting     abdominal pain       Penicillins Itching     Rice      Shellfish-Derived Products Nausea and Vomiting     Yeast Cramps, Diarrhea, Itching and Nausea and Vomiting      I have reviewed the Medications, Allergies, Past Medical and Surgical History, and Social History in the Epic system.    Review of Systems   Neurological: Positive for numbness (right face).   All other systems reviewed and are negative.      Physical Exam   BP: (!) 145/94  Heart Rate: 79  Temp: 98.8  F (37.1  C)  Resp: 16  Weight: 133.9 kg (295 lb 3.1 oz)  SpO2: 96 %      Physical Exam   Constitutional: She is oriented to person, place, and time. No distress.   HENT:   Head: Atraumatic.   Mouth/Throat: Oropharynx is clear and moist. No oropharyngeal exudate.   Eyes: Pupils are  equal, round, and reactive to light. No scleral icterus.   Neck: Neck supple. No JVD present.   Cardiovascular: Normal rate, normal heart sounds and intact distal pulses.    Pulmonary/Chest: Effort normal and breath sounds normal. No respiratory distress. She has no wheezes. She has no rales. She exhibits no tenderness.   Abdominal: Soft. Bowel sounds are normal. She exhibits no distension and no mass. There is no tenderness. There is no rebound and no guarding.   Musculoskeletal: She exhibits no edema or tenderness.   Neurological: She is alert and oriented to person, place, and time. No cranial nerve deficit. Coordination normal.   Right side facial numbness no droop or any focal neuro deficits   Skin: Skin is warm. No rash noted. She is not diaphoretic.       ED Course     ED Course     Procedures     12:13 PM  The patient was seen and examined by Dr. Celestin in Room 18.               EKG Interpretation:      Interpreted by Vernell Celestin MD  Time reviewed: 1:04 PM  Symptoms at time of EKG: numbness   Rhythm: normal sinus   Rate: 76 bpm  Axis: Normal  Ectopy: none  Conduction: normal  ST Segments/ T Waves: QTc prolongation   Q Waves: none  Comparison to prior: No old EKG available    Clinical Impression: no acute change    Critical Care time:  was 30 minutes for this patient excluding procedures.  Results for orders placed or performed during the hospital encounter of 03/13/18 (from the past 24 hour(s))   CBC with platelets differential     Status: None    Collection Time: 03/13/18 12:17 PM   Result Value Ref Range    WBC 6.9 4.0 - 11.0 10e9/L    RBC Count 4.36 3.8 - 5.2 10e12/L    Hemoglobin 13.4 11.7 - 15.7 g/dL    Hematocrit 40.7 35.0 - 47.0 %    MCV 93 78 - 100 fl    MCH 30.7 26.5 - 33.0 pg    MCHC 32.9 31.5 - 36.5 g/dL    RDW 14.1 10.0 - 15.0 %    Platelet Count 336 150 - 450 10e9/L    Diff Method Automated Method     % Neutrophils 58.6 %    % Lymphocytes 32.2 %    % Monocytes 6.5 %    % Eosinophils 2.0 %     % Basophils 0.6 %    % Immature Granulocytes 0.1 %    Nucleated RBCs 0 0 /100    Absolute Neutrophil 4.1 1.6 - 8.3 10e9/L    Absolute Lymphocytes 2.2 0.8 - 5.3 10e9/L    Absolute Monocytes 0.5 0.0 - 1.3 10e9/L    Absolute Eosinophils 0.1 0.0 - 0.7 10e9/L    Absolute Basophils 0.0 0.0 - 0.2 10e9/L    Abs Immature Granulocytes 0.0 0 - 0.4 10e9/L    Absolute Nucleated RBC 0.0    Basic metabolic panel     Status: Abnormal    Collection Time: 03/13/18 12:17 PM   Result Value Ref Range    Sodium 141 133 - 144 mmol/L    Potassium 4.1 3.4 - 5.3 mmol/L    Chloride 109 94 - 109 mmol/L    Carbon Dioxide 25 20 - 32 mmol/L    Anion Gap 7 3 - 14 mmol/L    Glucose 82 70 - 99 mg/dL    Urea Nitrogen 15 7 - 30 mg/dL    Creatinine 0.97 0.52 - 1.04 mg/dL    GFR Estimate 60 (L) >60 mL/min/1.7m2    GFR Estimate If Black 72 >60 mL/min/1.7m2    Calcium 9.0 8.5 - 10.1 mg/dL   Partial thromboplastin time     Status: None    Collection Time: 03/13/18 12:17 PM   Result Value Ref Range    PTT 25 22 - 37 sec   Lipid Profile     Status: Abnormal    Collection Time: 03/13/18 12:17 PM   Result Value Ref Range    Cholesterol 228 (H) <200 mg/dL    Triglycerides 111 <150 mg/dL    HDL Cholesterol 69 >49 mg/dL    LDL Cholesterol Calculated 137 (H) <100 mg/dL    Non HDL Cholesterol 159 (H) <130 mg/dL   Hemoglobin A1c     Status: Abnormal    Collection Time: 03/13/18 12:17 PM   Result Value Ref Range    Hemoglobin A1C 6.1 (H) 4.3 - 6.0 %   Glucose by meter     Status: None    Collection Time: 03/13/18 12:17 PM   Result Value Ref Range    Glucose 72 70 - 99 mg/dL   INR point of care     Status: None    Collection Time: 03/13/18 12:19 PM   Result Value Ref Range    INR Point of Care 1.0 0.86 - 1.14   Creatinine POCT     Status: Abnormal    Collection Time: 03/13/18 12:20 PM   Result Value Ref Range    Creatinine 1.0 0.52 - 1.04 mg/dL    GFR Estimate 58 (L) >60 mL/min/1.7m2    GFR Estimate If Black 70 >60 mL/min/1.7m2   Troponin POCT     Status: None     Collection Time: 03/13/18 12:23 PM   Result Value Ref Range    Troponin I 0.00 0.00 - 0.10 ug/L   CT Head w/o Contrast     Status: None    Collection Time: 03/13/18 12:32 PM    Narrative    CT HEAD W/O CONTRAST 3/13/2018 12:32 PM    Provided History: Evaluate for dissection/thromboembolism    Comparison: Brain MRI 6/15/2014.    Technique: Using multidetector thin collimation helical acquisition  technique, axial, coronal and sagittal CT images from the skull base  to the vertex were obtained without intravenous contrast.     Findings:    No intracranial hemorrhage, mass effect, or midline shift. The  ventricles are proportionate to the cerebral sulci. The gray to white  matter differentiation of the cerebral hemispheres is preserved. The  basal cisterns are patent.    Mild left maxillary sinus mucosal thickening. The mastoid air cells  are clear.       Impression    Impression: No acute intracranial pathology.     I have personally reviewed the examination and initial interpretation  and I agree with the findings.    GIA WEBSTER MD   EKG 12-lead, tracing only     Status: None    Collection Time: 03/13/18  1:03 PM   Result Value Ref Range    Interpretation ECG Click View Image link to view waveform and result            Labs Ordered and Resulted from Time of ED Arrival Up to the Time of Departure from the ED   BASIC METABOLIC PANEL - Abnormal; Notable for the following:        Result Value    GFR Estimate 60 (*)     All other components within normal limits   CREATININE POCT - Abnormal; Notable for the following:     GFR Estimate 58 (*)     All other components within normal limits   LIPID PROFILE - Abnormal; Notable for the following:     Cholesterol 228 (*)     LDL Cholesterol Calculated 137 (*)     Non HDL Cholesterol 159 (*)     All other components within normal limits   HEMOGLOBIN A1C - Abnormal; Notable for the following:     Hemoglobin A1C 6.1 (*)     All other components within normal limits   GLUCOSE  MONITOR NURSING POCT   CBC WITH PLATELETS DIFFERENTIAL   PARTIAL THROMBOPLASTIN TIME   INR POINT OF CARE   GLUCOSE BY METER   VITAL SIGNS AND NEURO CHECKS   ACTIVITY   PULSE OXIMETRY NURSING   ASSESSMENT   NOTIFY   ISTAT INR NURSING POCT   ISTAT TROPONIN NURSING POCT   TROPONIN POCT            Assessments & Plan (with Medical Decision Making)  Acute right facial numbness, no objective neuro deficits or h/o TIA, and onset only one hour PTA and stroke code called, glu ok, initial CT ok, MRI MRA pending per Neuro stroke team. Will sign off to incoming EP.       I have reviewed the nursing notes.    I have reviewed the findings, diagnosis, plan and need for follow up with the patient.    New Prescriptions    No medications on file       Final diagnoses:   Rt facial numbness     IAddie, am serving as a trained medical scribe to document services personally performed by Vernell Celestin MD, based on the provider's statements to me.   Vernell AL MD, was physically present and have reviewed and verified the accuracy of this note documented by Addie Cherry.     3/13/2018   [facility], [place], EMERGENCY DEPARTMENT     Vernell Celestin MD  03/15/18 [id number]

## 2018-03-13 NOTE — CONSULTS
[facility], [place]      Neurology Stroke Code    Patient Name: Elizabeth Rosenthal  : 1965 MRN#: [id number]    STROKE DATA     Stroke Code:  Time called:  2018 1210  Time patient seen:  2018 1213  Onset of symptoms:  2018 1115  Last known normal (pt's baseline):  2018 11:14  Head CT read by Dr. Carson Genao at:  2018 12:24  Stroke Code de-escalated at 2018 1227 after discussion with Dr. Cevallos due to symptoms not likely caused by stroke and presence of contraindications for both intravenous and intra-arterial stroke treatments.      TPA treatment:  Not given due to minor / isolated / quickly resolving stroke symptoms.    National Institutes of Health Stroke Scale (at presentation)  NIHSS done at:  time patient seen      Score    Level of consciousness:  (0)   Alert, keenly responsive    LOC questions:  (0)   Answers both questions correctly    LOC commands:  (0)   Performs both tasks correctly    Best gaze:  (0)   Normal    Visual:  (0)   No visual loss    Facial palsy:  (0)   Normal symmetrical movements    Motor arm (left):  (0)   No drift    Motor arm (right):  (0)   No drift    Motor leg (left):  (0)   No drift    Motor leg (right):  (0)   No drift    Limb ataxia:  (0)   Absent    Sensory:  (1)   Mild to moderate sensory loss    Best language:  (0)   Normal- no aphasia    Dysarthria:  (0)   Normal    Extinction and inattention:  (0)   No abnormality        NIHSS Total Score:  1           ASSESSMENT & RECOMMENDATONS     Stroke code activated due to R face numbness.    51yo female w/ pmh of asthma, GERD, HTN, HLD, obesity, SHERIE ,  Depression, migraines TIA and Vertebral artery dissection who presents w/ sudden onset of R facial numbness w/ onset 11:15am. No other weakness, numbness, tingling, dysarthria, aphasia; does later report posterior headache/pain. NIHSS 1 for sensory numbness in R face + leg, otherwise nonfocal. CTH negative, MRI w/o  acute findings / infarction. Of note, Hx / reports previous TIA in 2005 or 2006 w/ R sided numbness (per EMR, patient says it was left sided), subsequent negative w/u. 2013 R vert dissection presented w/ dizziness, f/u MRA 2014 was normal / resolved previous dissection. Rec'd previously to be on aspirin daily but stopped because of stomach ulcers.     Over the course of the ED visit, numbness improved but did not fully resolve; no other focal deficits. Image findings were described to patient; discussed likely etiologies (TIA vs atypical migraine vs. Stress reaction). Due to concern for TIA, would recommend treating as such and following up as described below.    Recommendations:   - Echo / TTE scheduled as an outpatient  - Start Atorvastatin 80mg  - Start ASA 81mg  - F/u w/ Primary Care regarding current presentation and abnormal labs (Hgb A1C, LDL) for further management  - F/u w/ Stroke clinic in 3-4mo or as appointment available.     The patient will be managed by the ED team and  followed by the Stroke Consult service    The patient was discussed with the staff, Dr. Cevallos.    Raul Graham MD   Pager: [phone]

## 2018-03-13 NOTE — ED AVS SNAPSHOT
[facility], [place], Emergency Department    [address] [place] [id number]    Phone:  [phone]                                       Elizabeth Rosenthal   MRN: [id number]    Department:  [facility], Emergency Department   Date of Visit:  3/13/2018           After Visit Summary Signature Page     I have received my discharge instructions, and my questions have been answered. I have discussed any challenges I see with this plan with the nurse or doctor.    ..........................................................................................................................................  Patient/Patient Representative Signature      ..........................................................................................................................................  Patient Representative Print Name and Relationship to Patient    ..................................................               ................................................  Date                                            Time    ..........................................................................................................................................  Reviewed by Signature/Title    ...................................................              ..............................................  Date                                                            Time

## 2018-03-14 ENCOUNTER — TELEPHONE (OUTPATIENT)
Dept: INTERNAL MEDICINE | Facility: CLINIC | Age: 53
End: 2018-03-14

## 2018-03-14 NOTE — TELEPHONE ENCOUNTER
Happy Wednesday!     Patient of Dr. Berry, called to request orders for ECHO post ER Visit. Please let patient know once orders are ready.

## 2018-03-14 NOTE — DISCHARGE INSTRUCTIONS
Please make an appointment to follow up with Neurology Clinic (phone: [phone]) in 1-2 weeks, and with primary care as soon as possible.     Start Atorvastatin 80mg daily and aspirin 81mg daily. Will need to check with your Rheumatologist about the risks of continuing aspirin and methotrexate long-term.

## 2018-03-15 DIAGNOSIS — G45.9 TRANSIENT CEREBRAL ISCHEMIA, UNSPECIFIED TYPE: Primary | ICD-10-CM

## 2018-03-19 ENCOUNTER — RADIANT APPOINTMENT (OUTPATIENT)
Dept: CARDIOLOGY | Facility: CLINIC | Age: 53
End: 2018-03-19
Attending: INTERNAL MEDICINE
Payer: COMMERCIAL

## 2018-03-19 DIAGNOSIS — G45.9 TRANSIENT CEREBRAL ISCHEMIA, UNSPECIFIED TYPE: ICD-10-CM

## 2018-03-19 RX ADMIN — Medication 6 ML: at 18:45

## 2018-03-20 ENCOUNTER — TELEPHONE (OUTPATIENT)
Dept: NEUROLOGY | Facility: CLINIC | Age: 53
End: 2018-03-20

## 2018-03-20 ENCOUNTER — OFFICE VISIT (OUTPATIENT)
Dept: NEUROLOGY | Facility: CLINIC | Age: 53
End: 2018-03-20
Payer: COMMERCIAL

## 2018-03-20 VITALS
HEART RATE: 82 BPM | SYSTOLIC BLOOD PRESSURE: 136 MMHG | DIASTOLIC BLOOD PRESSURE: 84 MMHG | WEIGHT: 293 LBS | BODY MASS INDEX: 45.99 KG/M2 | HEIGHT: 67 IN

## 2018-03-20 DIAGNOSIS — Z09 HOSPITAL DISCHARGE FOLLOW-UP: ICD-10-CM

## 2018-03-20 DIAGNOSIS — Z92.25 PERSONAL HISTORY OF IMMUNOSUPRESSION THERAPY: ICD-10-CM

## 2018-03-20 DIAGNOSIS — R20.0 RIGHT FACIAL NUMBNESS: Primary | ICD-10-CM

## 2018-03-20 DIAGNOSIS — M06.09 RHEUMATOID ARTHRITIS OF MULTIPLE SITES WITHOUT RHEUMATOID FACTOR (H): ICD-10-CM

## 2018-03-20 ASSESSMENT — ENCOUNTER SYMPTOMS
INSOMNIA: 0
DOUBLE VISION: 0
DECREASED APPETITE: 0
TREMORS: 0
RECTAL PAIN: 0
WEIGHT GAIN: 0
HOARSE VOICE: 0
BACK PAIN: 1
WEAKNESS: 0
PANIC: 0
HALLUCINATIONS: 0
FATIGUE: 1
SWOLLEN GLANDS: 1
MEMORY LOSS: 0
MYALGIAS: 1
COUGH: 0
COUGH DISTURBING SLEEP: 0
NIGHT SWEATS: 0
PARALYSIS: 0
JOINT SWELLING: 1
BRUISES/BLEEDS EASILY: 0
POLYDIPSIA: 0
NUMBNESS: 1
POLYPHAGIA: 0
DIZZINESS: 1
EXERCISE INTOLERANCE: 0
STIFFNESS: 1
SINUS CONGESTION: 0
SINUS PAIN: 0
CHILLS: 1
HYPOTENSION: 0
JAUNDICE: 0
SMELL DISTURBANCE: 0
DECREASED CONCENTRATION: 0
SEIZURES: 0
FEVER: 0
ABDOMINAL PAIN: 1
DISTURBANCES IN COORDINATION: 0
EYE IRRITATION: 0
CONSTIPATION: 0
NAUSEA: 1
TASTE DISTURBANCE: 0
SHORTNESS OF BREATH: 1
SPUTUM PRODUCTION: 0
BLOOD IN STOOL: 0
NECK MASS: 1
SLEEP DISTURBANCES DUE TO BREATHING: 0
BLOATING: 0
ORTHOPNEA: 1
HEADACHES: 1
LEG PAIN: 0
LOSS OF CONSCIOUSNESS: 0
NECK PAIN: 1
HYPERTENSION: 0
PALPITATIONS: 0
EYE PAIN: 0
ARTHRALGIAS: 1
POSTURAL DYSPNEA: 0
WHEEZING: 0
TROUBLE SWALLOWING: 0
EYE REDNESS: 0
EYE WATERING: 0
NERVOUS/ANXIOUS: 0
ALTERED TEMPERATURE REGULATION: 1
HEARTBURN: 0
MUSCLE WEAKNESS: 1
SORE THROAT: 0
WEIGHT LOSS: 0
SYNCOPE: 0
TINGLING: 1
DYSPNEA ON EXERTION: 1
MUSCLE CRAMPS: 1
DEPRESSION: 1
INCREASED ENERGY: 1
LIGHT-HEADEDNESS: 1
DIARRHEA: 0
VOMITING: 0
SNORES LOUDLY: 1
BOWEL INCONTINENCE: 0
SPEECH CHANGE: 0

## 2018-03-20 ASSESSMENT — PAIN SCALES - GENERAL: PAINLEVEL: NO PAIN (0)
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
## 2018-03-20 NOTE — TELEPHONE ENCOUNTER
Called and spoke to the pat. Recommended brain MRI with CNV and to reevaluate for stroke. Patient is in agreement with the plan.

## 2018-03-20 NOTE — MR AVS SNAPSHOT
After Visit Summary   3/20/2018    Elizabeth Rosenthal    MRN: [id number]           Patient Information     Date Of Birth          1965        Visit Information        Provider Department      3/20/2018 8:30 AM Karis Stevens APRN [facility] Neurology        Care Instructions    Elevated A1C and lipds-patient will follow up with PCP  Continue ASA and lipitor   High blood pressure management with PCP  It was recommended to follow up in the Stroke Clinic and will talk to our stroke provider    Right occipital/paracervical area tenderness and soreness in the right deltoid and right arm/shoulder stiffness feeling for about 1-1.5 weeks. History of RA and fibromyalgia.   Cervical MRI for high radiculopathy/nerve impingement  can be considered if no improvement in 2-4 weeks. May consider physical therapy.     Recommended to talk to Dr Berry about thyroid, pituitary, possible diabetes questions. Follow up about pain in the sternum and radiating to the back with Dr Berry or cardiology.         Call 911 and go to the Emergency Department with any stroke like symptoms              Follow-ups after your visit        Your next 10 appointments already scheduled     Mar 29, 2018  8:20 AM CDT   (Arrive by 8:05 AM)   Return Visit with Eveline Berry MD   [facility] ([facility])    [address] [place], [place] [id number] [phone]              Who to contact     Please call your clinic at [phone] to:    Ask questions about your health    Make or cancel appointments    Discuss your medicines    Learn about your test results    Speak to your doctor            Additional Information About Your Visit        MyChart Information     [employer] gives you secure access to your electronic health record. If you see a primary care provider, you can also send messages to your care team and make appointments. If you have questions, please  "call your primary care clinic.  If you do not have a primary care provider, please call [phone] and they will assist you.      [employer] is an electronic gateway that provides easy, online access to your medical records. With [employer], you can request a clinic appointment, read your test results, renew a prescription or communicate with your care team.     To access your existing account, please contact your [facility] or call [phone] for assistance.        Care EveryWhere ID     This is your Care EveryWhere ID. This could be used by other organizations to access your [facility] medical records  [id number]        Your Vitals Were     Pulse Height BMI (Body Mass Index)             82 1.702 m (5' 7\") 47.19 kg/m2          Blood Pressure from Last 3 Encounters:   03/20/18 136/84   03/13/18 138/78   02/09/18 128/82    Weight from Last 3 Encounters:   03/20/18 (!) 136.7 kg (301 lb 4.8 oz)   03/13/18 133.9 kg (295 lb 3.1 oz)   01/03/18 133.9 kg (295 lb 3.1 oz)              Today, you had the following     No orders found for display         Today's Medication Changes          These changes are accurate as of 3/20/18 10:04 AM.  If you have any questions, ask your nurse or doctor.               These medicines have changed or have updated prescriptions.        Dose/Directions    fluticasone 110 MCG/ACT Inhaler   Commonly known as:  FLOVENT HFA   This may have changed:    - when to take this  - reasons to take this   Used for:  Mild intermittent asthma without complication        Dose:  2 puff   Inhale 2 puffs into the lungs 2 times daily   Quantity:  3 Inhaler   Refills:  3                Primary Care Provider Office Phone # Fax #    Eveline Berry [phone]       [address] [place] [address] [address] [place] [id number]        Equal Access to Services     TIAGO LOMAX AH: Garcia Copeland, dipak clarke, qaybbetty galo " lamalachi aponte. So [facility] [phone].    ATENCIÓN: Si habla burke, tiene a hightower disposición servicios gratuitos de asistencia lingüística. Ene العراقي [phone].    We comply with applicable federal civil rights laws and [place] laws. We do not discriminate on the basis of race, color, national origin, age, disability, sex, sexual orientation, or gender identity.            Thank you!     Thank you for choosing [facility]  for your care. Our goal is always to provide you with excellent care. Hearing back from our patients is one way we can continue to improve our services. Please take a few minutes to complete the written survey that you may receive in the mail after your visit with us. Thank you!             Your Updated Medication List - Protect others around you: Learn how to safely use, store and throw away your medicines at www.disposemymeds.org.          This list is accurate as of 3/20/18 10:04 AM.  Always use your most recent med list.                   Brand Name Dispense Instructions for use Diagnosis    albuterol 108 (90 BASE) MCG/ACT Inhaler    PROAIR HFA/PROVENTIL HFA/VENTOLIN HFA    3 Inhaler    Inhale 1 puff into the lungs every 6 hours as needed for shortness of breath / dyspnea or wheezing    Mild intermittent asthma without complication       aspirin 81 MG tablet     30 tablet    Take 1 tablet (81 mg) by mouth daily        atorvastatin 80 MG tablet    LIPITOR    30 tablet    Take 1 tablet (80 mg) by mouth daily        buPROPion 150 MG 24 hr tablet    WELLBUTRIN XL    90 tablet    Take 1 tablet (150 mg) by mouth every morning    Major depressive disorder, recurrent episode, mild (H)       cetirizine 10 MG tablet    zyrTEC     Take 10 mg by mouth daily        escitalopram 20 MG tablet    LEXAPRO    90 tablet    Take 1 tablet (20 mg) by mouth daily    Recurrent major depressive disorder, remission status unspecified (H)       fluticasone 110 MCG/ACT Inhaler    FLOVENT HFA    3 Inhaler    Inhale 2  puffs into the lungs 2 times daily    Mild intermittent asthma without complication       folic acid 1 MG tablet    FOLVITE    90 tablet    Take 1 tablet (1 mg) by mouth daily    Rheumatoid arthritis of multiple sites without rheumatoid factor (H), Encounter for long-term (current) use of medications, Fibromyalgia, Polyarthritis, Chronic bilateral low back pain without sciatica       gabapentin 300 MG capsule    NEURONTIN    270 capsule    Take 1 capsule (300 mg) by mouth 3 times daily    Fibromyalgia, Polyarthritis, Chronic bilateral low back pain without sciatica, Rheumatoid arthritis of multiple sites without rheumatoid factor (H), Encounter for long-term (current) use of medications       lidocaine 5 % Patch    LIDODERM    30 patch    Place 1-3 patches onto the skin every 24 hours Patient will call to fill    Polyarthritis, Chronic bilateral low back pain without sciatica, Fibromyalgia, Rheumatoid arthritis of multiple sites without rheumatoid factor (H), Encounter for long-term (current) use of medications       lisinopril 10 MG tablet    PRINIVIL/ZESTRIL    90 tablet    Take 1 tablet (10 mg) by mouth daily    Benign essential hypertension, Mild intermittent asthma without complication       methotrexate 2.5 MG tablet CHEMO     72 tablet    Take 6 tablets (15 mg) by mouth once a week 6 tabs by mouth once a week    Rheumatoid arthritis of multiple sites without rheumatoid factor (H), Fibromyalgia, Polyarthritis, Chronic bilateral low back pain without sciatica, Encounter for long-term (current) use of medications       pantoprazole 20 MG EC tablet    PROTONIX    180 tablet    Take 1 tablet (20 mg) by mouth 2 times daily    Abdominal pain, generalized       VITAMIN D (CHOLECALCIFEROL) PO      Take 2,000 Units by mouth daily

## 2018-03-20 NOTE — PATIENT INSTRUCTIONS
Elevated A1C and lipds-patient will follow up with PCP  Continue ASA and lipitor   High blood pressure management with PCP  It was recommended to follow up in the Stroke Clinic and will talk to our stroke provider    Right occipital/paracervical area tenderness and soreness in the right deltoid and right arm/shoulder stiffness feeling for about 1-1.5 weeks. History of RA and fibromyalgia.   Cervical MRI for high radiculopathy/nerve impingement  can be considered if no improvement in 2-4 weeks. May consider physical therapy.     Recommended to talk to Dr Berry about thyroid, pituitary, possible diabetes questions. Follow up about pain in the sternum and radiating to the back with Dr Berry or cardiology.         Call 911 and go to the Emergency Department with any stroke like symptoms
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
## 2018-03-29 ENCOUNTER — OFFICE VISIT (OUTPATIENT)
Dept: INTERNAL MEDICINE | Facility: CLINIC | Age: 53
End: 2018-03-29
Payer: COMMERCIAL

## 2018-03-29 ENCOUNTER — RADIANT APPOINTMENT (OUTPATIENT)
Dept: MRI IMAGING | Facility: CLINIC | Age: 53
End: 2018-03-29
Attending: NURSE PRACTITIONER
Payer: COMMERCIAL

## 2018-03-29 VITALS
WEIGHT: 293 LBS | RESPIRATION RATE: 20 BRPM | OXYGEN SATURATION: 94 % | HEART RATE: 77 BPM | DIASTOLIC BLOOD PRESSURE: 83 MMHG | BODY MASS INDEX: 47.21 KG/M2 | SYSTOLIC BLOOD PRESSURE: 140 MMHG

## 2018-03-29 DIAGNOSIS — R20.0 RIGHT FACIAL NUMBNESS: ICD-10-CM

## 2018-03-29 DIAGNOSIS — E78.5 HYPERLIPIDEMIA LDL GOAL <100: ICD-10-CM

## 2018-03-29 DIAGNOSIS — I10 BENIGN ESSENTIAL HYPERTENSION: Primary | ICD-10-CM

## 2018-03-29 DIAGNOSIS — G45.9 TRANSIENT CEREBRAL ISCHEMIA, UNSPECIFIED TYPE: ICD-10-CM

## 2018-03-29 RX ORDER — GADOBUTROL 604.72 MG/ML
10 INJECTION INTRAVENOUS ONCE
Status: COMPLETED | OUTPATIENT
Start: 2018-03-29 | End: 2018-03-29

## 2018-03-29 RX ADMIN — GADOBUTROL 10 ML: 604.72 INJECTION INTRAVENOUS at 07:44

## 2018-03-29 ASSESSMENT — PAIN SCALES - GENERAL: PAINLEVEL: NO PAIN (0)

## 2018-03-29 NOTE — MR AVS SNAPSHOT
After Visit Summary   3/29/2018    Elizabeth Rosenthal    MRN: [id number]           Patient Information     Date Of Birth          1965        Visit Information        Provider Department      3/29/2018 8:20 AM Eveline Berry MD [facility]        Today's Diagnoses     Benign essential hypertension    -  1    Hyperlipidemia LDL goal <100          Care Instructions    Primary Care Center: [phone]     Primary Care Center Medication Refill Request Information:  * Please contact your pharmacy regarding ANY request for medication refills.  ** [facility] Prescription Fax = [phone]  * Please allow 3 business days for routine medication refills.  * Please allow 5 business days for controlled substance medication refills.     Primary Care Center Test Result notification information:  *You will be notified with in 7-10 days of your appointment day regarding the results of your test.  If you are on MyChart you will be notified as soon as the provider has reviewed the results and signed off on them.              Follow-ups after your visit        Future tests that were ordered for you today     Open Future Orders        Priority Expected Expires Ordered    Lipid Profile FASTING Routine 3/29/2018 3/29/2019 3/29/2018            Who to contact     Please call your clinic at [phone] to:    Ask questions about your health    Make or cancel appointments    Discuss your medicines    Learn about your test results    Speak to your doctor            Additional Information About Your Visit        MyChart Information     [employer] gives you secure access to your electronic health record. If you see a primary care provider, you can also send messages to your care team and make appointments. If you have questions, please call your primary care clinic.  If you do not have a primary care provider, please call [phone] and they will assist you.      [employer] is an electronic gateway that provides easy,  online access to your medical records. With [employer], you can request a clinic appointment, read your test results, renew a prescription or communicate with your care team.     To access your existing account, please contact your [facility] or call [phone] for assistance.        Care EveryWhere ID     This is your Care EveryWhere ID. This could be used by other organizations to access your [place] medical records  [id number]        Your Vitals Were     Pulse Respirations Pulse Oximetry BMI (Body Mass Index)          77 20 94% 47.21 kg/m2         Blood Pressure from Last 3 Encounters:   03/29/18 140/83   03/20/18 136/84   03/13/18 138/78    Weight from Last 3 Encounters:   03/29/18 136.7 kg (301 lb 6.4 oz)   03/20/18 (!) 136.7 kg (301 lb 4.8 oz)   03/13/18 133.9 kg (295 lb 3.1 oz)                 Today's Medication Changes          These changes are accurate as of 3/29/18  9:41 AM.  If you have any questions, ask your nurse or doctor.               These medicines have changed or have updated prescriptions.        Dose/Directions    fluticasone 110 MCG/ACT Inhaler   Commonly known as:  FLOVENT HFA   This may have changed:    - when to take this  - reasons to take this   Used for:  Mild intermittent asthma without complication        Dose:  2 puff   Inhale 2 puffs into the lungs 2 times daily   Quantity:  3 Inhaler   Refills:  3                Primary Care Provider Office Phone # Fax #    Eveline Berry [phone]       [address] [place] [address]        Equal Access to Services     TIAGO LOMAX : Hadsebastián storm Soroberto, waaxda luqadaha, qaybta kaalbetty rodarte. So [facility] [phone].    ATENCIÓN: Si habla español, tiene a hightower disposición servicios gratuitos de asistencia lingüística. Llame al [phone].    We comply with applicable federal civil rights laws and [place] laws. We do not  discriminate on the basis of race, color, national origin, age, disability, sex, sexual orientation, or gender identity.            Thank you!     Thank you for choosing [facility]  for your care. Our goal is always to provide you with excellent care. Hearing back from our patients is one way we can continue to improve our services. Please take a few minutes to complete the written survey that you may receive in the mail after your visit with us. Thank you!             Your Updated Medication List - Protect others around you: Learn how to safely use, store and throw away your medicines at www.disposemymeds.org.          This list is accurate as of 3/29/18  9:41 AM.  Always use your most recent med list.                   Brand Name Dispense Instructions for use Diagnosis    albuterol 108 (90 BASE) MCG/ACT Inhaler    PROAIR HFA/PROVENTIL HFA/VENTOLIN HFA    3 Inhaler    Inhale 1 puff into the lungs every 6 hours as needed for shortness of breath / dyspnea or wheezing    Mild intermittent asthma without complication       aspirin 81 MG tablet     30 tablet    Take 1 tablet (81 mg) by mouth daily        atorvastatin 80 MG tablet    LIPITOR    30 tablet    Take 1 tablet (80 mg) by mouth daily        buPROPion 150 MG 24 hr tablet    WELLBUTRIN XL    90 tablet    Take 1 tablet (150 mg) by mouth every morning    Major depressive disorder, recurrent episode, mild (H)       cetirizine 10 MG tablet    zyrTEC     Take 10 mg by mouth daily        escitalopram 20 MG tablet    LEXAPRO    90 tablet    Take 1 tablet (20 mg) by mouth daily    Recurrent major depressive disorder, remission status unspecified (H)       fluticasone 110 MCG/ACT Inhaler    FLOVENT HFA    3 Inhaler    Inhale 2 puffs into the lungs 2 times daily    Mild intermittent asthma without complication       folic acid 1 MG tablet    FOLVITE    90 tablet    Take 1 tablet (1 mg) by mouth daily    Rheumatoid arthritis of multiple sites without  rheumatoid factor (H), Encounter for long-term (current) use of medications, Fibromyalgia, Polyarthritis, Chronic bilateral low back pain without sciatica       gabapentin 300 MG capsule    NEURONTIN    270 capsule    Take 1 capsule (300 mg) by mouth 3 times daily    Fibromyalgia, Polyarthritis, Chronic bilateral low back pain without sciatica, Rheumatoid arthritis of multiple sites without rheumatoid factor (H), Encounter for long-term (current) use of medications       lidocaine 5 % Patch    LIDODERM    30 patch    Place 1-3 patches onto the skin every 24 hours Patient will call to fill    Polyarthritis, Chronic bilateral low back pain without sciatica, Fibromyalgia, Rheumatoid arthritis of multiple sites without rheumatoid factor (H), Encounter for long-term (current) use of medications       lisinopril 10 MG tablet    PRINIVIL/ZESTRIL    90 tablet    Take 1 tablet (10 mg) by mouth daily    Benign essential hypertension, Mild intermittent asthma without complication       methotrexate 2.5 MG tablet CHEMO     72 tablet    Take 6 tablets (15 mg) by mouth once a week 6 tabs by mouth once a week    Rheumatoid arthritis of multiple sites without rheumatoid factor (H), Fibromyalgia, Polyarthritis, Chronic bilateral low back pain without sciatica, Encounter for long-term (current) use of medications       pantoprazole 20 MG EC tablet    PROTONIX    180 tablet    Take 1 tablet (20 mg) by mouth 2 times daily    Abdominal pain, generalized       VITAMIN D (CHOLECALCIFEROL) PO      Take 2,000 Units by mouth daily

## 2018-03-29 NOTE — PATIENT INSTRUCTIONS
[facility]: [phone]     [facility] Medication Refill Request Information:  * Please contact your pharmacy regarding ANY request for medication refills.  ** [facility] Prescription Fax = [phone]  * Please allow 3 business days for routine medication refills.  * Please allow 5 business days for controlled substance medication refills.     [facility] Test Result notification information:  *You will be notified with in 7-10 days of your appointment day regarding the results of your test.  If you are on MyChart you will be notified as soon as the provider has reviewed the results and signed off on them.

## 2018-03-29 NOTE — PROGRESS NOTES
PRIMARY CARE CENTER       SUBJECTIVE:  Elizabeth Rosenthal is a 53 year old female with pmh of TIA and vertebral dissection who comes in for right facial tingling. She reports developing right sided facial pain and swelling approximately 4 weeks ago. The pain is located over the right zygomatic area, as well as behind the angle of the mandible bilaterally. Two weeks ago she developed sudden onset facial numbness and went to the ER, where her stroke workup was negative including CT and MRI. She has seen neurology since then as well, and was started on aspirin and high dose statin. The numbness has mostly resolved, but she continues to get intermittent flashes of pain on the right side of her face.  No history of trauma. No congestion or itchy eyes. No fevers or chills. No facial weakness. No masses or lesions.    Medications and allergies reviewed by me today.   Patient Active Problem List   Diagnosis     Iliotibial band syndrome     Right knee pain     Lumbar radicular pain     Polyarthritis     Depression     Benign essential hypertension     Mild intermittent asthma without complication     Morbid obesity (H)     Rheumatoid arthritis of multiple sites without rheumatoid factor (H)     Fibromyalgia     Encounter for long-term (current) use of medications     SHERIE (obstructive sleep apnea)     Morbid obesity with BMI of 45.0-49.9, adult (H)     Low back pain     Hyperlipidemia     Arthritis of knee, left     Creatinine elevation     ACP (advance care planning)     Current Outpatient Prescriptions   Medication Sig Dispense Refill     VITAMIN D, CHOLECALCIFEROL, PO Take 2,000 Units by mouth daily       atorvastatin (LIPITOR) 80 MG tablet Take 1 tablet (80 mg) by mouth daily 30 tablet 1     aspirin 81 MG tablet Take 1 tablet (81 mg) by mouth daily 30 tablet OTC     gabapentin (NEURONTIN) 300 MG capsule Take 1 capsule (300 mg) by mouth 3 times daily 270 capsule 1     lidocaine (LIDODERM) 5 % Patch Place  1-3 patches onto the skin every 24 hours Patient will call to fill 30 patch 1     folic acid (FOLVITE) 1 MG tablet Take 1 tablet (1 mg) by mouth daily 90 tablet 1     methotrexate 2.5 MG tablet CHEMO Take 6 tablets (15 mg) by mouth once a week 6 tabs by mouth once a week 72 tablet 1     pantoprazole (PROTONIX) 20 MG EC tablet Take 1 tablet (20 mg) by mouth 2 times daily 180 tablet 3     lisinopril (PRINIVIL/ZESTRIL) 10 MG tablet Take 1 tablet (10 mg) by mouth daily 90 tablet 3     buPROPion (WELLBUTRIN XL) 150 MG 24 hr tablet Take 1 tablet (150 mg) by mouth every morning 90 tablet 3     escitalopram (LEXAPRO) 20 MG tablet Take 1 tablet (20 mg) by mouth daily 90 tablet 3     cetirizine (ZYRTEC) 10 MG tablet Take 10 mg by mouth daily       fluticasone (FLOVENT HFA) 110 MCG/ACT Inhaler Inhale 2 puffs into the lungs 2 times daily (Patient taking differently: Inhale 2 puffs into the lungs 2 times daily as needed ) 3 Inhaler 3     albuterol (PROAIR HFA/PROVENTIL HFA/VENTOLIN HFA) 108 (90 BASE) MCG/ACT Inhaler Inhale 1 puff into the lungs every 6 hours as needed for shortness of breath / dyspnea or wheezing 3 Inhaler 3     Allergies   Allergen Reactions     Nuts Itching     Celery Oil      Codeine Itching     Contrast Dye Itching     CT Contrast.     Food Diarrhea, Unknown and Nausea and Vomiting     Headaches=potatoes. Peanuts=migraine     Oat Other (See Comments) and Nausea and Vomiting     abdominal pain       Penicillins Itching     Rice      Shellfish-Derived Products Nausea and Vomiting     Wheat Bran GI Disturbance     Yeast Cramps, Diarrhea, Itching and Nausea and Vomiting         Review Of Systems    10 point ROS of systems including Constitutional, Eyes, Respiratory, Cardiovascular, Pulmonary, Gastroenterology, Genitourinary, Integumentary, Musculoskeletal, Psychiatric were all negative except for pertinent positives noted in my HPI.    OBJECTIVE:  /83 (BP Location: Right arm, Patient Position: Sitting,  Cuff Size: Adult Large)  Pulse 77  Resp 20  Wt 136.7 kg (301 lb 6.4 oz)  SpO2 94%  BMI 47.21 kg/m2    General: Pleasant well-appearing female sitting upright in chair  Head: Normocephalic, atraumatic. No lacerations or abrasions. Mildly tender just posterior to angle of mandible bilaterally. No parotid mass.   Eyes: Pupils equal and reactive to light. EOM intact. Normal sclera.   Ears: Normal pinnae. Tender on anterior aspect of right EAC, otherwise patent without masses or lesions. TMs clear without erythema or effusion.   Nose: Nares patent. Normal turbinates. No discharge or exudates.  Oropharynx: Mucosa moist, no lesions or ulcerations. No tonsillar exudates. Palate symmetric. Posterior oropharynx clear.  Neck: No masses or cervical LAD. No thyromegaly.  CV: Regular rate and rhythm. No murmurs, rubs, or gallops.  Resp: Breath sounds clear to auscultation bilaterally.   Neuro: Alert, oriented x 3. No visual field deficit to confrontation. EOM intact. Decreased sensation to light touch over right zygomatic arch, otherwise intact in V1-V3 distributions. No facial weakness. Palate elevates symmetrically. Tongue protrudes midline. Strength 5/5 and symmetric in all four extremities. Sensation intact and symmetric to light touch in all four extremities. Normal reflexes. Normal rise from chair, normal gait.        ASSESSMENT/PLAN:  Pt is a 53 year old female here to for facial pain/numbness. The pain and other sensory problems could be trigeminal neuralgia, but it sounds like her neurologist is looking into this. She is on all the appropriate therapies in terms of TIA/stroke risk factors. Encouraged diet changes including increased fruits and veggies, and decreased sugars and processed foods. She will try this although she has lots of food allergies. Her last couple BPs have been trending up. She will monitor her BPs at home and follow up 4-6 weeks. I ordered a future lipid panel for the day of her next appointment  as well.     Elizabeth was seen today for transient ischemic attack and pain.    Diagnoses and all orders for this visit:    TIA--see HPI, now asymptomatic.  Will focus on prevention.    Benign essential hypertension  BP goal less than 130/80  Check home BP's, bring record to next OV  Lifestyle changes, weight loss    Hyperlipidemia LDL goal <100  -     Lipid Profile FASTING; Future         Pt should return to clinic for f/u with me in 4-6 weeks, whenever is convenient      This document was prepared by Vinay Sosa, acting as a medical scribe, on behalf of Dr. Berry, based on observations and statements made by the provider. This document has been reviewed and approved by the provider.      Provider Disclosure:    The above student acted as a scribe for this encounter.  I was present and performed the service.  I have reviewed the review of systems and past medical, family and social history.  I have reviewed the student's documentation of the exam and I have performed the exam.   I have reviewed and verified the medical student's documentation and it is correct except as follows: None  Eveline Berry M.D.  Internal Medicine   pager [phone]

## 2018-03-29 NOTE — NURSING NOTE
Chief Complaint   Patient presents with     Transient Ischemic Attack     follow up TIA     Pain     right arm   Nita Stevens LPN 9:01 AM on 3/29/2018

## 2018-04-05 NOTE — PROGRESS NOTES
Dear Elizabeth,   Your results were reviewed and appears to be stable at this time but I would like to talk to you about the results and your symptoms. Would you be able to schedule a clinic visit or telephone visit to go over the findings at your convenience? Please let me know.   Sincerely, Karis

## 2018-04-09 ENCOUNTER — OFFICE VISIT (OUTPATIENT)
Dept: NEUROLOGY | Facility: CLINIC | Age: 53
End: 2018-04-09
Payer: COMMERCIAL

## 2018-04-09 VITALS
SYSTOLIC BLOOD PRESSURE: 126 MMHG | HEIGHT: 67 IN | HEART RATE: 86 BPM | WEIGHT: 293 LBS | DIASTOLIC BLOOD PRESSURE: 84 MMHG | BODY MASS INDEX: 45.99 KG/M2

## 2018-04-09 DIAGNOSIS — E23.6 EMPTY SELLA (H): ICD-10-CM

## 2018-04-09 DIAGNOSIS — M06.09 RHEUMATOID ARTHRITIS OF MULTIPLE SITES WITHOUT RHEUMATOID FACTOR (H): ICD-10-CM

## 2018-04-09 DIAGNOSIS — H53.9 VISUAL DISTURBANCE: ICD-10-CM

## 2018-04-09 DIAGNOSIS — R20.0 RIGHT FACIAL NUMBNESS: Primary | ICD-10-CM

## 2018-04-09 ASSESSMENT — ENCOUNTER SYMPTOMS
WHEEZING: 0
MUSCLE CRAMPS: 1
SEIZURES: 0
POSTURAL DYSPNEA: 0
EYE PAIN: 0
CONSTIPATION: 0
ALTERED TEMPERATURE REGULATION: 1
MEMORY LOSS: 0
EYE WATERING: 1
HEMOPTYSIS: 0
HYPOTENSION: 0
LIGHT-HEADEDNESS: 1
DISTURBANCES IN COORDINATION: 0
STIFFNESS: 1
WEAKNESS: 0
NERVOUS/ANXIOUS: 0
SPUTUM PRODUCTION: 0
DECREASED APPETITE: 0
HALLUCINATIONS: 0
DYSPNEA ON EXERTION: 0
ABDOMINAL PAIN: 1
WEIGHT GAIN: 0
ARTHRALGIAS: 1
BACK PAIN: 1
RECTAL PAIN: 0
PALPITATIONS: 0
SNORES LOUDLY: 1
HEADACHES: 1
NECK PAIN: 1
NIGHT SWEATS: 0
SHORTNESS OF BREATH: 0
COUGH DISTURBING SLEEP: 0
TREMORS: 0
BLOATING: 0
COUGH: 0
MYALGIAS: 1
LEG PAIN: 0
INCREASED ENERGY: 0
DIARRHEA: 0
PANIC: 0
WEIGHT LOSS: 0
FATIGUE: 1
POLYDIPSIA: 0
DIZZINESS: 1
JAUNDICE: 0
NUMBNESS: 1
MUSCLE WEAKNESS: 1
CHILLS: 0
DOUBLE VISION: 0
EYE IRRITATION: 0
JOINT SWELLING: 1
HEARTBURN: 0
SLEEP DISTURBANCES DUE TO BREATHING: 0
DEPRESSION: 1
ORTHOPNEA: 0
POLYPHAGIA: 1
LOSS OF CONSCIOUSNESS: 0
PARALYSIS: 0
BOWEL INCONTINENCE: 0
EYE REDNESS: 0
VOMITING: 0
EXERCISE INTOLERANCE: 0
TINGLING: 1
FEVER: 0
SPEECH CHANGE: 0
SYNCOPE: 0
NAUSEA: 0
DECREASED CONCENTRATION: 0
BLOOD IN STOOL: 0
INSOMNIA: 0
HYPERTENSION: 1

## 2018-04-09 ASSESSMENT — PAIN SCALES - GENERAL: PAINLEVEL: MILD PAIN (3)

## 2018-04-09 NOTE — PROGRESS NOTES
"Re: Elizabeth Rosenthal      MRN# [id number]  YOB: 1965  Date of Visit:4/09/2018    OUTPATIENT NEUROLOGY VISIT NOTE    Reason for Visit:  Brain MRI results review and right facial numbness follow up    Interval History:  Elizabeth Rosenthal is a 53-year-old female presents to the clinic today for the above mentioned reasons. Initial Neurology Clinic visit on 3/20/2018 for persistent right facial numbness and ED follow up, see clinic visit note for details.   History of TIA in Feb 2006 with negative stroke work up per patient, right vertebral dissection in 2013 and was seen at [facility] and follow up in 2014 showed resolution. History of fibromyalgia, RA takes methotrexate, migraine headaches typically left sided, chronic pain in joints and abdomen, gluten sensitivity and gluten free diet.      Today patient reports that still she has numbness in the right cheek and occasionally numbness on the right side of her face and sometimes right temple/TMJ area a \"straight line\" pain lasting for a day and pain in the back of her head. Reports that laying down on the left side makes her headache worse. Reports right eye blurry. Headache is not worse with straining or BMs. Patient had last eye exam at [facility] about 3 months ago and presumed no acute findings.  No PT for head or neck pain in the past. PT in the past for other reasons.  Reports that turning her head to the left triggers dizziness and pain in the occipital area. Reports tingling in the fingers and sometimes in the right arm or right foot.   Patient reports a significant weight gain (100 lbs) in one year and reports early menopause in her late 40s.Patient wonders of possible endocrinology causes of her weight gaining.   Patient wonders about empty sella and her above mentioned weight concerns.   Patient has been taking gabapentin for about 4-5 years and occasionally steroids-last time was a short course after tonsillectomy and for " RA flare ups (but stopped taking it for flare ups).  Patient reports history of migraine headaches.Patient reports diet modification helped with the pain (salads,celery and yeast).  Migraine headache frequency about once per year and typically on the left side of her head.   Discussed brain MRI results and persistent localized right facial numbness (V2 area) with one of our neurologists, Dr Wooten. Empty sella can be one of the indicators of IIH. Patient does report head pain and right eye concerns therefore, IIH evaluation appears to be reasonable. Recommended a formal ophthalmology evaluation with dilated eye exam and peripheral visual fields and brain MRV. Discussed possible evaluation with LP-opening pressure. No other acute findings per imaging report to explain patient's symptoms.   Regarding facial numbness-question of possible trigeminal neuropathy. Recommended TSH and AMAN (last test in 2011), ACE, Lyme. Patient did not feel that she is at risk of Lyme disease and opted out Lyme test (last test negative in 2011).   Patient would like to discuss the recommended tests with Dr Berry.     Plan discussed with the patient:  Recommended to address endocrinology questions/weight gain with PCP  Brain MRV and ophthalmology evaluation of possible IIH. May consider LP with opening pressure  Lab-TSH, AMAN, Lyme, ACE for persistent facial numbness -patient decided not to proceed with Lyme antibodies testing  Recommended to call our Clinic with worsening of the symptoms. Patient would like to wait for a couple of weeks. Patient feels that her symptoms have been improving and not limiting her at this time. Patient will get back to our Clinic if her symptoms were not improving or getting worse.     Neurodiagnostic Testing    MRI images and results reviewed  MR BRAIN W/O & W CONTRAST 3/29/2018 8:49 AM     Provided History: ; Right facial numbness  ICD-10:     Comparison: 3/13/2018     Technique: Axial T2-weighted images  with fat saturation and axial  T1-weighted images were obtained without intravenous contrast from the  level of the foramen magnum to the corpus callosum.  Axial  diffusion-weighted with ADC map images of the whole brain and sagittal  T1-weighted images through the midskull were also obtained without  intravenous contrast. Following intravenous administration of  gadolinium, axial and coronal T1-weighted images with fat saturation  were obtained with focus on the region of the 3rd-6th cranial nerves.     Contrast: 10mL Gadavist     Findings: There is no visualized abnormality of the trigeminal nerves  along their courses. There is no mass effect, midline shift, or  evidence of intracranial hemorrhage. The ventricles are proportionate  to the cerebral sulci.  Incidental partially empty sella as previously. Regarding the  trigeminal nerve, no abnormal enhancement on either side, and no  definite vascular contact. A right-sided venous plexus comes close to  the root entry zone on 3-D T2 weighted images, without definite  abutment or compression-displacement of the 5th nerve.     Postcontrast images demonstrate no abnormal intracranial enhancing  lesions.  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 Again there is borderline cerebellar tonsillar, 3 mm  below the foramen magnum.     No definite abnormality of the skull marrow signal is noted. The major  vascular intracranial flow-voids are present. The visualized portions  of paranasal sinuses, and mastoid air cells are relatively clear. The  orbits are grossly unremarkable.  There is no evident area of  restricted diffusion.          Impression:    1. No evidence of acute infarction or trigeminal pathology.  2. No abnormal intra or extra-axial enhancing mass.  3. Incidental partially-primary empty sella, as previously, most  commonly asymptomatic.  4. 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     I have personally reviewed the examination and initial interpretation  and I agree with the findings.     PHILMOENA ARANDA MD         MRI brain without and with contrast  MRA of the head without contrast  Neck MRA without and with contrast     Provided History:  stroke.     Comparison:  CT 3/13/2018, MR brain 6/15/2014       Technique:   Brain MRI:  Axial diffusion, FLAIR, T2-weighted, susceptibility, and  coronal T1-weighted images were obtained without intravenous contrast.  Following intravenous gadolinium-based contrast administration, axial  and coronal T1-weighted images were obtained.    Head MRA: 3D time-of-flight MRA of the [place] was performed  without intravenous contrast.  Neck MRA:  Limited non contrast 2DTOF images were obtained of the  mid-cervical region. Following intravenous gadolinium-based contrast  administration, a contrast enhanced MRA of the neck/cervical vessels  was performed.  Three-dimensional reconstructions of the neck and head MRA were  created, which were reviewed by the radiologist.     Dose: 10mL Gadavist     Findings:   Brain MRI: No parenchymal diffusion restriction. There is no  intracranial hemorrhage on susceptibility weighted images. Ventricles  are proportionate to the cerebral sulci. Asymmetric right choroid  plexus xanthogranuloma. Contrast-enhanced images of the brain  demonstrate no abnormal intra- or extra-axial enhancement.     Mild left maxillary sinus mucosal thickening. Tiny right maxillary  sinus mucus retention cyst.     Head MRA demonstrates no aneurysm or stenosis of the major  intracranial arteries. The anterior communicating artery is patent.  Both posterior communicating arteries are patent; the left is  diminutive.     Neck MRA demonstrates patent major cervical arteries. The normal  distal right internal carotid  artery measures 5 mm. The normal distal  left internal carotid artery measures 5 mm. Antegrade flow in the  major cervical vasculature.         Impression:  1. No acute infarct or intracranial hemorrhage.  2. No abnormal intracranial enhancement.  3. Head MRA demonstrates no aneurysm or stenosis of the major  intracranial arteries.  4. Neck MRA demonstrates patent major cervical arteries.     I have personally reviewed the examination and initial interpretation  and I agree with the findings.     GIA WEBSTER MD             Past Medical History reviewed   Social History   Substance Use Topics     Smoking status: Never Smoker     Smokeless tobacco: Never Used     Alcohol use 0.0 oz/week      Comment: Occasional    reviewed and verified with the patient     Allergies   Allergen Reactions     Nuts Itching     Celery Oil      Codeine Itching     Contrast Dye Itching     CT Contrast.     Food Diarrhea, Unknown and Nausea and Vomiting     Headaches=potatoes. Peanuts=migraine     Oat Other (See Comments) and Nausea and Vomiting     abdominal pain       Penicillins Itching     Rice      Shellfish-Derived Products Nausea and Vomiting     Wheat Bran GI Disturbance     Yeast Cramps, Diarrhea, Itching and Nausea and Vomiting       Current Outpatient Prescriptions   Medication Sig Dispense Refill     VITAMIN D, CHOLECALCIFEROL, PO Take 2,000 Units by mouth daily       atorvastatin (LIPITOR) 80 MG tablet Take 1 tablet (80 mg) by mouth daily 30 tablet 1     aspirin 81 MG tablet Take 1 tablet (81 mg) by mouth daily 30 tablet OTC     gabapentin (NEURONTIN) 300 MG capsule Take 1 capsule (300 mg) by mouth 3 times daily 270 capsule 1     lidocaine (LIDODERM) 5 % Patch Place 1-3 patches onto the skin every 24 hours Patient will call to fill 30 patch 1     folic acid (FOLVITE) 1 MG tablet Take 1 tablet (1 mg) by mouth daily 90 tablet 1     methotrexate 2.5 MG tablet CHEMO Take 6 tablets (15 mg) by mouth once a week 6 tabs by mouth  "once a week 72 tablet 1     pantoprazole (PROTONIX) 20 MG EC tablet Take 1 tablet (20 mg) by mouth 2 times daily 180 tablet 3     lisinopril (PRINIVIL/ZESTRIL) 10 MG tablet Take 1 tablet (10 mg) by mouth daily 90 tablet 3     buPROPion (WELLBUTRIN XL) 150 MG 24 hr tablet Take 1 tablet (150 mg) by mouth every morning 90 tablet 3     escitalopram (LEXAPRO) 20 MG tablet Take 1 tablet (20 mg) by mouth daily 90 tablet 3     cetirizine (ZYRTEC) 10 MG tablet Take 10 mg by mouth daily       fluticasone (FLOVENT HFA) 110 MCG/ACT Inhaler Inhale 2 puffs into the lungs 2 times daily (Patient taking differently: Inhale 2 puffs into the lungs 2 times daily as needed ) 3 Inhaler 3     albuterol (PROAIR HFA/PROVENTIL HFA/VENTOLIN HFA) 108 (90 BASE) MCG/ACT Inhaler Inhale 1 puff into the lungs every 6 hours as needed for shortness of breath / dyspnea or wheezing 3 Inhaler 3   reviewed and verified with the patient    Review of Systems:   A 10-point ROS including constitutional, eyes, respiratory, cardiovascular, gastroenterology, genitourinary, integumentary, musculoskeletal, neurology and psychiatric were reviewed and concerns as mentioned in the interval history.      General Exam:   /84 (BP Location: Left arm, Patient Position: Chair, Cuff Size: Adult Large)  Pulse 86  Ht 1.702 m (5' 7\")  Wt 136.5 kg (301 lb)  BMI 47.14 kg/m2  GEN: Awake, NAD; good eye contact, responses appropriately, healthy appearing,  HEENT: Head atraumatic/Normocephalic. Speech is fluent without dysarthria. EOM intact.  Face is symmetrical. Intact and symmetrical eyebrow and lid raise and eyelid closure, smiles. Hearing Intact to conversation speech. Strength intact in the upper and lower extremities bilaterally.  Normal casual gait.    Assessment and Plan:  See Interval History for our discussion and plan.     Time spent with pt answering questions, discussing findings, counseling and coordinating care was more than 50% the appointment time, 36  " minutes.         GEOVANNA Ann, CNP  [facility]rology [facility]

## 2018-04-09 NOTE — MR AVS SNAPSHOT
After Visit Summary   4/9/2018    Elizabeth Rosenthal    MRN: [id number]           Patient Information     Date Of Birth          1965        Visit Information        Provider Department      4/9/2018 12:30 PM Karis Stevens APRN [facility] Neurology        Care Instructions    Plan:  Will get back to you after talking to neurosurgery/trigeminal specialist and endocrinology.            Follow-ups after your visit        Your next 10 appointments already scheduled     Apr 28, 2018  8:15 AM CDT   LAB with  LAB   [facility])    [address] [place] [address]  1st Floor  [facility] [id number] [phone]           Please do not eat 10-12 hours before your appointment if you are coming in fasting for labs on lipids, cholesterol, or glucose (sugar). This does not apply to pregnant women. Water, hot tea and black coffee (with nothing added) are okay. Do not drink other fluids, diet soda or chew gum.            Apr 28, 2018  9:20 AM CDT   (Arrive by 9:05 AM)   Return Visit with Eveline Berry MD   [facility] ([facility])    [address] [place] [address]  4th [facility] [id number] [phone]            Jul 09, 2018  7:30 AM CDT   (Arrive by 7:15 AM)   MyChart Rheumatology Return with Tyson Ribeiro MD   [facility] ([facility])    [address] [place] [address]  [address]  [facility] [id number] [phone]              Who to contact     Please call your clinic at [phone] to:    Ask questions about your health    Make or cancel appointments    Discuss your medicines    Learn about your test results    Speak to your doctor            Additional Information About Your Visit        MyChart Information     [facility] gives you secure access to your electronic health record. If you see a primary care provider, you can also send messages to your care team and make  "appointments. If you have questions, please call your primary care clinic.  If you do not have a primary care provider, please call [phone] and they will assist you.      [employer] is an electronic gateway that provides easy, online access to your medical records. With [employer], you can request a clinic appointment, read your test results, renew a prescription or communicate with your care team.     To access your existing account, please contact your [facility] or call [phone] for assistance.        Care EveryWhere ID     This is your Care EveryWhere ID. This could be used by other organizations to access your [place] medical records  [id number]        Your Vitals Were     Pulse Height BMI (Body Mass Index)             86 1.702 m (5' 7\") 47.14 kg/m2          Blood Pressure from Last 3 Encounters:   04/09/18 126/84   03/29/18 140/83   03/20/18 136/84    Weight from Last 3 Encounters:   04/09/18 136.5 kg (301 lb)   03/29/18 136.7 kg (301 lb 6.4 oz)   03/20/18 (!) 136.7 kg (301 lb 4.8 oz)              Today, you had the following     No orders found for display         Today's Medication Changes          These changes are accurate as of 4/9/18  1:45 PM.  If you have any questions, ask your nurse or doctor.               These medicines have changed or have updated prescriptions.        Dose/Directions    fluticasone 110 MCG/ACT Inhaler   Commonly known as:  FLOVENT HFA   This may have changed:    - when to take this  - reasons to take this   Used for:  Mild intermittent asthma without complication        Dose:  2 puff   Inhale 2 puffs into the lungs 2 times daily   Quantity:  3 Inhaler   Refills:  3                Primary Care Provider Office Phone # Fax #    Eveline Berry [phone]       [address] [place] [address] [address] [place] [id number]        Equal Access to Services     TIAGO LOMAX AH: Garcia Copeland, dipak clarke, carlos duval " betty pintoandi rojasaan ah. [facility] [phone].    ATENCIÓN: Si jose de jesusla burke, tiene a hightower disposición servicios gratuitos de asistencia lingüística. Ene al [phone].    We comply with applicable federal civil rights laws and [place] laws. We do not discriminate on the basis of race, color, national origin, age, disability, sex, sexual orientation, or gender identity.            Thank you!     Thank you for choosing [facility] NEUROLOGY  for your care. Our goal is always to provide you with excellent care. Hearing back from our patients is one way we can continue to improve our services. Please take a few minutes to complete the written survey that you may receive in the mail after your visit with us. Thank you!             Your Updated Medication List - Protect others around you: Learn how to safely use, store and throw away your medicines at www.disposemymeds.org.          This list is accurate as of 4/9/18  1:45 PM.  Always use your most recent med list.                   Brand Name Dispense Instructions for use Diagnosis    albuterol 108 (90 BASE) MCG/ACT Inhaler    PROAIR HFA/PROVENTIL HFA/VENTOLIN HFA    3 Inhaler    Inhale 1 puff into the lungs every 6 hours as needed for shortness of breath / dyspnea or wheezing    Mild intermittent asthma without complication       aspirin 81 MG tablet     30 tablet    Take 1 tablet (81 mg) by mouth daily        atorvastatin 80 MG tablet    LIPITOR    30 tablet    Take 1 tablet (80 mg) by mouth daily        buPROPion 150 MG 24 hr tablet    WELLBUTRIN XL    90 tablet    Take 1 tablet (150 mg) by mouth every morning    Major depressive disorder, recurrent episode, mild (H)       cetirizine 10 MG tablet    zyrTEC     Take 10 mg by mouth daily        escitalopram 20 MG tablet    LEXAPRO    90 tablet    Take 1 tablet (20 mg) by mouth daily    Recurrent major depressive disorder, remission status unspecified (H)       fluticasone 110 MCG/ACT  Inhaler    FLOVENT HFA    3 Inhaler    Inhale 2 puffs into the lungs 2 times daily    Mild intermittent asthma without complication       folic acid 1 MG tablet    FOLVITE    90 tablet    Take 1 tablet (1 mg) by mouth daily    Rheumatoid arthritis of multiple sites without rheumatoid factor (H), Encounter for long-term (current) use of medications, Fibromyalgia, Polyarthritis, Chronic bilateral low back pain without sciatica       gabapentin 300 MG capsule    NEURONTIN    270 capsule    Take 1 capsule (300 mg) by mouth 3 times daily    Fibromyalgia, Polyarthritis, Chronic bilateral low back pain without sciatica, Rheumatoid arthritis of multiple sites without rheumatoid factor (H), Encounter for long-term (current) use of medications       lidocaine 5 % Patch    LIDODERM    30 patch    Place 1-3 patches onto the skin every 24 hours Patient will call to fill    Polyarthritis, Chronic bilateral low back pain without sciatica, Fibromyalgia, Rheumatoid arthritis of multiple sites without rheumatoid factor (H), Encounter for long-term (current) use of medications       lisinopril 10 MG tablet    PRINIVIL/ZESTRIL    90 tablet    Take 1 tablet (10 mg) by mouth daily    Benign essential hypertension, Mild intermittent asthma without complication       methotrexate 2.5 MG tablet CHEMO     72 tablet    Take 6 tablets (15 mg) by mouth once a week 6 tabs by mouth once a week    Rheumatoid arthritis of multiple sites without rheumatoid factor (H), Fibromyalgia, Polyarthritis, Chronic bilateral low back pain without sciatica, Encounter for long-term (current) use of medications       pantoprazole 20 MG EC tablet    PROTONIX    180 tablet    Take 1 tablet (20 mg) by mouth 2 times daily    Abdominal pain, generalized       VITAMIN D (CHOLECALCIFEROL) PO      Take 2,000 Units by mouth daily

## 2018-04-09 NOTE — PATIENT INSTRUCTIONS
Plan :  Recommended to address endocrinology questions/weight gain with PCP  Brain MRV and ophthalmology evaluation of possible IIH. May consider LP with opening pressure  Lab-TSH, AMAN, Lyme, ACE for persistent facial numbness -patient decided not to proceed with Lyme antibodies testing  Recommended to call our Clinic with worsening of the symptoms or any new symptoms

## 2018-04-10 ENCOUNTER — TELEPHONE (OUTPATIENT)
Dept: NEUROLOGY | Facility: CLINIC | Age: 53
End: 2018-04-10

## 2018-04-10 NOTE — TELEPHONE ENCOUNTER
Follow up 4/9/2018 clinic visit. See clinic visit note for recommendations discussed with the patient.

## 2018-04-28 ENCOUNTER — OFFICE VISIT (OUTPATIENT)
Dept: INTERNAL MEDICINE | Facility: CLINIC | Age: 53
End: 2018-04-28
Payer: COMMERCIAL

## 2018-04-28 VITALS
SYSTOLIC BLOOD PRESSURE: 117 MMHG | DIASTOLIC BLOOD PRESSURE: 77 MMHG | WEIGHT: 293 LBS | BODY MASS INDEX: 47.93 KG/M2 | HEART RATE: 74 BPM

## 2018-04-28 DIAGNOSIS — E78.5 HYPERLIPIDEMIA LDL GOAL <100: ICD-10-CM

## 2018-04-28 DIAGNOSIS — R63.5 ABNORMAL WEIGHT GAIN: ICD-10-CM

## 2018-04-28 DIAGNOSIS — R51.9 NONINTRACTABLE HEADACHE, UNSPECIFIED CHRONICITY PATTERN, UNSPECIFIED HEADACHE TYPE: Primary | ICD-10-CM

## 2018-04-28 DIAGNOSIS — R20.0 RIGHT FACIAL NUMBNESS: ICD-10-CM

## 2018-04-28 LAB
CHOLEST SERPL-MCNC: 146 MG/DL
HDLC SERPL-MCNC: 74 MG/DL
LDLC SERPL CALC-MCNC: 60 MG/DL
NONHDLC SERPL-MCNC: 72 MG/DL
TRIGL SERPL-MCNC: 62 MG/DL
TSH SERPL DL<=0.005 MIU/L-ACNC: 0.82 MU/L (ref 0.4–4)

## 2018-04-28 ASSESSMENT — PAIN SCALES - GENERAL: PAINLEVEL: MODERATE PAIN (4)

## 2018-04-28 NOTE — PATIENT INSTRUCTIONS
[facility]: [phone]     [facility] Medication Refill Request Information:  * Please contact your pharmacy regarding ANY request for medication refills.  ** [facility] Prescription Fax = [phone]  * Please allow 3 business days for routine medication refills.  * Please allow 5 business days for controlled substance medication refills.     [facility] Test Result notification information:  *You will be notified with in 7-10 days of your appointment day regarding the results of your test.  If you are on MyChart you will be notified as soon as the provider has reviewed the results and signed off on them.    Neurology [phone]  Endocrine [phone]

## 2018-04-28 NOTE — MR AVS SNAPSHOT
After Visit Summary   4/28/2018    Elizabeth Rosenthal    MRN: [id number]           Patient Information     Date Of Birth          1965        Visit Information        Provider Department      4/28/2018 9:20 AM Eveline Berry MD [facility]        Today's Diagnoses     Nonintractable headache, unspecified chronicity pattern, unspecified headache type    -  1    Abnormal weight gain          Care Instructions    Primary Care Center: [phone]     Primary Care Center Medication Refill Request Information:  * Please contact your pharmacy regarding ANY request for medication refills.  ** [facility] Prescription Fax = [phone]  * Please allow 3 business days for routine medication refills.  * Please allow 5 business days for controlled substance medication refills.     Primary Care Center Test Result notification information:  *You will be notified with in 7-10 days of your appointment day regarding the results of your test.  If you are on MyChart you will be notified as soon as the provider has reviewed the results and signed off on them.    Neurology [phone]  Endocrine [phone]               Follow-ups after your visit        Additional Services     ENDOCRINOLOGY ADULT REFERRAL       Your provider has referred you to: [facility]: Endocrinology and Diabetes Clinic [facility] [phone]   [email or web address]      Please be aware that coverage of these services is subject to the terms and limitations of your health insurance plan.  Call member services at your health plan with any benefit or coverage questions.      Please bring the following to your appointment:    >>   Any x-rays, CTs or MRIs which have been performed.  Contact the facility where they were done to arrange for  prior to your scheduled appointment.    >>   List of current medications   >>   This referral request   >>   Any documents/labs given to you for  this referral            NEUROLOGY ADULT REFERRAL       Your provider has referred you for the following: specifically headache clinic, external referral if necessary/if we do not have one    Please be aware that coverage of these services is subject to the terms and limitations of your health insurance plan.  Call member services at your health plan with any benefit or coverage questions.      Please bring the following with you to your appointment:    (1) Any X-Rays, CTs or MRIs which have been performed.  Contact the facility where they were done to arrange for  prior to your scheduled appointment.    (2) List of current medications  (3) This referral request   (4) Any documents/labs given to you for this referral                  Follow-up notes from your care team     Return in about 4 months (around 8/28/2018) for f/u to today's visit.      Your next 10 appointments already scheduled     May 09, 2018  7:15 AM CDT   (Arrive by 7:00 AM)   NEW NEURO with Heriberto Naranjo MD   [facility] ([facility])    [address]  [address] [facility] [id number] [phone]            Jul 09, 2018  7:30 AM CDT   (Arrive by 7:15 AM)   MyChart Rheumatology Return with Tyson Ribeiro MD   [facility] ([facility])    [address]  [address]  [facility] [address] [phone]            Aug 29, 2018 10:15 AM CDT   (Arrive by 10:00 AM)   Return Visit with Eveline Berry MD   [facility] ([facility])    [address]  4th Floor  [facility] [address] [phone]              Future tests that were ordered for you today     Open Future Orders        Priority Expected Expires Ordered    ENDOCRINOLOGY ADULT REFERRAL Routine  4/28/2019 4/28/2018    NEUROLOGY ADULT REFERRAL Routine  4/28/2019 4/28/2018            Who to contact     Please call your clinic at  [phone] to:    Ask questions about your health    Make or cancel appointments    Discuss your medicines    Learn about your test results    Speak to your doctor            Additional Information About Your Visit        e[employer] Information     [email or web address] gives you secure access to your electronic health record. If you see a primary care provider, you can also send messages to your care team and make appointments. If you have questions, please call your primary care clinic.  If you do not have a primary care provider, please call [phone] and they will assist you.      [email or web address] is an electronic gateway that provides easy, online access to your medical records. With [email or web address], you can request a clinic appointment, read your test results, renew a prescription or communicate with your care team.     To access your existing account, please contact your [facility] or call [phone] for assistance.        Care EveryWhere ID     This is your Care EveryWhere ID. This could be used by other organizations to access your [facility] medical records  [id number]        Your Vitals Were     Pulse Breastfeeding? BMI (Body Mass Index)             74 No 47.93 kg/m2          Blood Pressure from Last 3 Encounters:   04/28/18 117/77   04/09/18 126/84   03/29/18 140/83    Weight from Last 3 Encounters:   04/28/18 138.8 kg (306 lb)   04/09/18 136.5 kg (301 lb)   03/29/18 136.7 kg (301 lb 6.4 oz)                 Today's Medication Changes          These changes are accurate as of 4/28/18  9:42 AM.  If you have any questions, ask your nurse or doctor.               These medicines have changed or have updated prescriptions.        Dose/Directions    fluticasone 110 MCG/ACT Inhaler   Commonly known as:  FLOVENT HFA   This may have changed:    - when to take this  - reasons to take this   Used for:  Mild intermittent asthma without complication        Dose:  2 puff   Inhale 2 puffs into the lungs 2 times  daily   Quantity:  3 Inhaler   Refills:  3                Primary Care Provider Office Phone # Fax #    Eveline Berry [phone]       [address] [place] [address]        Equal Access to Services     TIAGO LOMAX : Garcia seth chhayao Soroberto, waaxda luqadaha, qaybta kaalmada adeoriana, betty elenain hayaadewayne jerryandi moran shelly aponte. So [facility] [phone].    ATENCIÓN: Si habla español, tiene a hightower disposición servicios gratuitos de asistencia lingüística. [facility] [phone].    We comply with applicable federal civil rights laws and [place] laws. We do not discriminate on the basis of race, color, national origin, age, disability, sex, sexual orientation, or gender identity.            Thank you!     Thank you for choosing [facility]  for your care. Our goal is always to provide you with excellent care. Hearing back from our patients is one way we can continue to improve our services. Please take a few minutes to complete the written survey that you may receive in the mail after your visit with us. Thank you!             Your Updated Medication List - Protect others around you: Learn how to safely use, store and throw away your medicines at www.disposemymeds.org.          This list is accurate as of 4/28/18  9:42 AM.  Always use your most recent med list.                   Brand Name Dispense Instructions for use Diagnosis    albuterol 108 (90 Base) MCG/ACT Inhaler    PROAIR HFA/PROVENTIL HFA/VENTOLIN HFA    3 Inhaler    Inhale 1 puff into the lungs every 6 hours as needed for shortness of breath / dyspnea or wheezing    Mild intermittent asthma without complication       aspirin 81 MG tablet     30 tablet    Take 1 tablet (81 mg) by mouth daily        atorvastatin 80 MG tablet    LIPITOR    30 tablet    Take 1 tablet (80 mg) by mouth daily        buPROPion 150 MG 24 hr tablet    WELLBUTRIN XL    90 tablet    Take 1 tablet (150 mg) by mouth every morning     Major depressive disorder, recurrent episode, mild (H)       cetirizine 10 MG tablet    zyrTEC     Take 10 mg by mouth daily        escitalopram 20 MG tablet    LEXAPRO    90 tablet    Take 1 tablet (20 mg) by mouth daily    Recurrent major depressive disorder, remission status unspecified (H)       fluticasone 110 MCG/ACT Inhaler    FLOVENT HFA    3 Inhaler    Inhale 2 puffs into the lungs 2 times daily    Mild intermittent asthma without complication       folic acid 1 MG tablet    FOLVITE    90 tablet    Take 1 tablet (1 mg) by mouth daily    Rheumatoid arthritis of multiple sites without rheumatoid factor (H), Encounter for long-term (current) use of medications, Fibromyalgia, Polyarthritis, Chronic bilateral low back pain without sciatica       gabapentin 300 MG capsule    NEURONTIN    270 capsule    Take 1 capsule (300 mg) by mouth 3 times daily    Fibromyalgia, Polyarthritis, Chronic bilateral low back pain without sciatica, Rheumatoid arthritis of multiple sites without rheumatoid factor (H), Encounter for long-term (current) use of medications       lidocaine 5 % Patch    LIDODERM    30 patch    Place 1-3 patches onto the skin every 24 hours Patient will call to fill    Polyarthritis, Chronic bilateral low back pain without sciatica, Fibromyalgia, Rheumatoid arthritis of multiple sites without rheumatoid factor (H), Encounter for long-term (current) use of medications       lisinopril 10 MG tablet    PRINIVIL/ZESTRIL    90 tablet    Take 1 tablet (10 mg) by mouth daily    Benign essential hypertension, Mild intermittent asthma without complication       methotrexate 2.5 MG tablet CHEMO     72 tablet    Take 6 tablets (15 mg) by mouth once a week 6 tabs by mouth once a week    Rheumatoid arthritis of multiple sites without rheumatoid factor (H), Fibromyalgia, Polyarthritis, Chronic bilateral low back pain without sciatica, Encounter for long-term (current) use of medications       pantoprazole 20 MG EC  tablet    PROTONIX    180 tablet    Take 1 tablet (20 mg) by mouth 2 times daily    Abdominal pain, generalized       UNABLE TO FIND      250 mg by Oral or Feeding Tube route daily Magnesium Citrate        VITAMIN D (CHOLECALCIFEROL) PO      Take 2,000 Units by mouth daily

## 2018-04-28 NOTE — PROGRESS NOTES
[facility]  Primary Care Center   Eveline Berry MD  04/28/2018      Chief Complaint:   Follow up headaches, weight gain    History of Present Illness:   Elizabeth Rosenthal is a 53 year old female with a history of TIA, vertebral dissection, HTN, and RA who presents for follow up of headaches and weight gain.    She has been dealing with headaches in 2 spots on the back of her head with associated blurring of her vision and numbness of the left side of her face, from her forehead down to her jaw with tenderness at the angle of the jaw.  These headaches tend to come on mid-morning and then last throughout the day.  She notes the headaches are worse when turning her head but they are only 2 - 3 in intensity and do not interfere with her normal daily activity.  She is not having headaches daily but they are occurring most days.  MRI showed empty sella and borderline cerebellar tonsillar ectopia.  She saw neurology earlier this month who wanted her to see ophthalmology and have a brain MRV to evaluate for possible idiopathic intracranial hypertension.  The opthalmology appointment is scheduled for 5/9/18 but she is holding on the MRV as she has had so many tests recently.  She has had migraines for years however these are usually on the top of her head with stabbing pain, nausea, and vomiting.  Sometimes she has flashing spots in her vision with her migraines but not always.  She had tension headaches as a teen but not regularly since then.  She recalls a similar episode of numbness on the left side of her face a couple years ago but this was only below her eye to her jaw line which resolved.  She acknowledges that sometimes she over-worries about     She gained 100 pounds in a year and has found it difficult to lose weight.  She started Celexa about the time she gain the weight and initially thought this might be the cause.  She even took a year off work to focus on weight loss and was only able to lose 20 pounds.  At  that time she was tracking everything she ate and doing water aerobics 2 - 3 hours a day.  She then gained 40 pounds when she went back to work.  She has done Weight Watchers previously without success and is unable to do Magali Darrell with her dietary restrictions.  She has not tried Overeaters Anonymous but is hesitant as she does not really like group settings.  She is not interested in bariatric surgery at this point.  She has had significant stress in her life over 20+ years in her job as a  as well as family stressors.  She used to have a therapist but not currently.  She has been on Gabapentin for about 5 years for fibromyalgia.  She has difficulty walking long distances due to knee pain and notes she has to lay down for 1 - 2 hours after going to the grocery store due to pain.  She enjoys gardening but has to limit this to not much more than 30 minutes.  She is frustrated that she cannot do all the activities she used to enjoy.  She is much more fatigued than she used to be and sometimes has to rest most of Saturday to recover from her week.    She had normal menstrual periods when she was younger.  She denies any abnormal hair growth or patterned hair loss.  She has some fat at the nape of her neck but no moon facies or dark purple striae on her abdomen.  She denies any significant ankle swelling or areas of spontaneous bruising.  She has no history of coronary artery disease and DXA scan 4/5/16 showed normal bone density (lowest T-score -0.4).  She has dealt with acne most of her life.       Review of Systems:   Pertinent items are noted in HPI, remainder of complete ROS is negative.      Active Medications:      albuterol (PROAIR HFA/PROVENTIL HFA/VENTOLIN HFA) 108 (90 BASE) MCG/ACT Inhaler, Inhale 1 puff into the lungs every 6 hours as needed for shortness of breath / dyspnea or wheezing, Disp: 3 Inhaler, Rfl: 3     aspirin 81 MG tablet, Take 1 tablet (81 mg) by mouth daily, Disp: 30 tablet, Rfl:  OTC     atorvastatin (LIPITOR) 80 MG tablet, Take 1 tablet (80 mg) by mouth daily, Disp: 30 tablet, Rfl: 1     buPROPion (WELLBUTRIN XL) 150 MG 24 hr tablet, Take 1 tablet (150 mg) by mouth every morning, Disp: 90 tablet, Rfl: 3     cetirizine (ZYRTEC) 10 MG tablet, Take 10 mg by mouth daily, Disp: , Rfl:      escitalopram (LEXAPRO) 20 MG tablet, Take 1 tablet (20 mg) by mouth daily, Disp: 90 tablet, Rfl: 3     fluticasone (FLOVENT HFA) 110 MCG/ACT Inhaler, Inhale 2 puffs into the lungs 2 times daily (Patient taking differently: Inhale 2 puffs into the lungs 2 times daily as needed ), Disp: 3 Inhaler, Rfl: 3     folic acid (FOLVITE) 1 MG tablet, Take 1 tablet (1 mg) by mouth daily, Disp: 90 tablet, Rfl: 1     gabapentin (NEURONTIN) 300 MG capsule, Take 1 capsule (300 mg) by mouth 3 times daily, Disp: 270 capsule, Rfl: 1     lidocaine (LIDODERM) 5 % Patch, Place 1-3 patches onto the skin every 24 hours Patient will call to fill, Disp: 30 patch, Rfl: 1     lisinopril (PRINIVIL/ZESTRIL) 10 MG tablet, Take 1 tablet (10 mg) by mouth daily, Disp: 90 tablet, Rfl: 3     methotrexate 2.5 MG tablet CHEMO, Take 6 tablets (15 mg) by mouth once a week 6 tabs by mouth once a week, Disp: 72 tablet, Rfl: 1     pantoprazole (PROTONIX) 20 MG EC tablet, Take 1 tablet (20 mg) by mouth 2 times daily, Disp: 180 tablet, Rfl: 3     UNABLE TO FIND, 250 mg by Oral or Feeding Tube route daily Magnesium Citrate, Disp: , Rfl:      VITAMIN D, CHOLECALCIFEROL, PO, Take 2,000 Units by mouth daily, Disp: , Rfl:       Allergies:   Codeine - itching  Contrast dye - itching  Penicillins - itching      Past Medical History:  Vertebral artery dissection 2014  Transient ischemic attack 2006  Morbid obesity with BMI 45 - 49.9  Obstructive sleep apnea   Hypertension  Hyperlipidemia  Rheumatoid arthritis   Fibromyalgia   Mild intermittent asthma   Depression  Migraines   Gastroesophageal reflux disease   Chronic pelvic pain  Allergic rhinitis   Multiple  food allergies     Past Surgical History:  Tonsillectomy 1/3/18  Lymph node biopsy 2010  Tubal ligation 1999  Fallopian tube cyst removal 1994  Knee arthroscopy, bilateral     Family History:   Breast cancer - mother  Heart disease - father  Substance abuse - father  Mental illness - father  Asthma - paternal grandmother   Cerebrovascular disease - paternal grandmother  Migraines - son, daughter     Social History:   Marital Status: single  Presents to clinic alone.  Tobacco Use: No previous or current tobacco use.   Alcohol Use: Occasional alcohol use.      Physical Exam:   /77  Pulse 74  Wt 138.8 kg (306 lb)  Breastfeeding? No  BMI 47.93 kg/m2     Physical Examination:  General:  Pleasant, alert, no acute distress, very tearful  Neck:  Dorsal fat pad  M/S:   No peripheral edema.  Skin:   Light purple - silvery stretch marks on abdomen. No striae.   No moon facies.    Assessment and Plan:  Nonintractable headache, unspecified chronicity pattern, unspecified headache type  Question of atypical migraines or mixed headache pattern.  She has already seen neurology and will see ophthalmology to evaluate for possible IIH next month.  Recommended she consider seeing a headache specialist who may have more insight on her particular headaches.  - NEUROLOGY ADULT REFERRAL    Abnormal weight gain  Patient has weight gain, hypertension, lethargy, depression, dorsal fat pad, abnormal glucose, headaches, back ache, and acne.  No other major features of Cushing's present - will have her see endocrinology for further evaluation, before going ahead and testing for this.  Patient has tried numerous other strategies for weight loss.  Recommended trying Overeaters Anonymous and encouraged her to look forward rather than back toward things that have not worked.    - ENDOCRINOLOGY ADULT REFERRAL     Follow-up: Return in about 4 months (around 8/28/2018) for f/u to today's visit.         Scribe Disclosure:   I, Janny Goodwin, am  serving as a scribe to document services personally performed by Eveline Berry MD at this visit, based upon the provider's statements to me. All documentation has been reviewed by the aforementioned provider prior to being entered into the official medical record.     Portions of this medical record were completed by a scribe. UPON MY REVIEW AND AUTHENTICATION BY ELECTRONIC SIGNATURE, this confirms (a) I performed the applicable clinical services, and (b) the record is accurate.      Total time spent 40 minutes.  More than 50% of the time spent with Ms. Rosenthal on counseling / coordinating her care      Eveline Berry M.D.  Internal Medicine  Primary Care Center   pager [phone]

## 2018-04-28 NOTE — NURSING NOTE
Chief Complaint   Patient presents with     Hypertension     Patient here for hypertension follow up.       Leonela Olson CMA at 8:39 AM on 4/28/2018.

## 2018-04-30 LAB — ANA SER QL IF: NEGATIVE

## 2018-04-30 NOTE — TELEPHONE ENCOUNTER
FUTURE VISIT INFORMATION      FUTURE VISIT INFORMATION:    Date: 05/09/18    Time: 715am    Location: [facility]  REFERRAL INFORMATION:    Referring provider:  BETTINA LEWIS    Referring providers clinic:  [address] [place] [address] [id number]    Reason for visit/diagnosis  New R facial numbness    RECORDS REQUESTED FROM:       Clinic name Comments Records Status Imaging Status    Primary Empty sella, partially per brain MRI report on 3/29, Visual disturbance per ref/pt, ref/recs in [facility], In [facility]  Eye recs from [facility] ([phone]) faxed. 04/30/18 In [facility]                              RECORDS STATUS    Bhavana Sent to HIM. JS has notes. -Fatmata 04/30/18

## 2018-05-02 NOTE — PROGRESS NOTES
Dear Elizabeth,   I hope everything is well and your symptoms are better. Your recent Antinuclear antibody test (AMAN) and thyroid stimulating hormone (TSH) were normal.   Let me know if you have any other question or your symptoms persist or any worsening.   Sincerely, Karis

## 2018-05-08 ENCOUNTER — HOSPITAL ENCOUNTER (OUTPATIENT)
Dept: MRI IMAGING | Facility: CLINIC | Age: 53
Discharge: HOME | End: 2018-05-08
Attending: NURSE PRACTITIONER | Admitting: NURSE PRACTITIONER
Payer: COMMERCIAL

## 2018-05-08 ENCOUNTER — TELEPHONE (OUTPATIENT)
Dept: NEUROLOGY | Facility: CLINIC | Age: 53
End: 2018-05-08

## 2018-05-08 DIAGNOSIS — H53.9 VISUAL DISTURBANCE: ICD-10-CM

## 2018-05-08 DIAGNOSIS — R20.0 RIGHT FACIAL NUMBNESS: ICD-10-CM

## 2018-05-08 DIAGNOSIS — E23.6 EMPTY SELLA (H): ICD-10-CM

## 2018-05-08 PROCEDURE — A9585 GADOBUTROL INJECTION: HCPCS | Performed by: NURSE PRACTITIONER

## 2018-05-08 PROCEDURE — 25000128 H RX IP 250 OP 636: Performed by: NURSE PRACTITIONER

## 2018-05-08 PROCEDURE — 70546 MR ANGIOGRAPH HEAD W/O&W/DYE: CPT

## 2018-05-08 RX ORDER — GADOBUTROL 604.72 MG/ML
10 INJECTION INTRAVENOUS ONCE
Status: COMPLETED | OUTPATIENT
Start: 2018-05-08 | End: 2018-05-08

## 2018-05-08 RX ADMIN — GADOBUTROL 10 ML: 604.72 INJECTION INTRAVENOUS at 11:54

## 2018-05-08 NOTE — TELEPHONE ENCOUNTER
Called and spoke to the patient about her brain MRV results. Findings of moderate bilateral transverse sinuses. Next step to see neurophthalmologist for IIH given her persistent headaches, empty sella, visual disturbances, weight gain. Patient is already scheduled  to see Dr Naranjo on 5/9/2018. Will wait for Dr Naranjo's assessment and recommendations.   Patient appreciates the call.     Per radiologist  Impression:  Pre and postcontrast Head MRV demonstrates moderate narrowing of the  distal transverse sinuses bilaterally. This can be a sign of  idiopathic intracranial hypertension, but recommend clinical  correlation.

## 2018-05-09 ENCOUNTER — PRE VISIT (OUTPATIENT)
Dept: OPHTHALMOLOGY | Facility: CLINIC | Age: 53
End: 2018-05-09

## 2018-05-09 ENCOUNTER — OFFICE VISIT (OUTPATIENT)
Dept: OPHTHALMOLOGY | Facility: CLINIC | Age: 53
End: 2018-05-09
Payer: COMMERCIAL

## 2018-05-09 DIAGNOSIS — H53.10 SUBJECTIVE VISUAL DISTURBANCE: Primary | ICD-10-CM

## 2018-05-09 DIAGNOSIS — M54.81 OCCIPITAL NEURALGIA, UNSPECIFIED LATERALITY: Primary | ICD-10-CM

## 2018-05-09 DIAGNOSIS — H53.10 SUBJECTIVE VISUAL DISTURBANCE: ICD-10-CM

## 2018-05-09 RX ORDER — METAXALONE 800 MG/1
800 TABLET ORAL 3 TIMES DAILY PRN
Qty: 9 TABLET | Refills: 0 | Status: SHIPPED | OUTPATIENT
Start: 2018-05-09 | End: 2019-01-17

## 2018-05-09 RX ORDER — METHYLPREDNISOLONE 4 MG
TABLET, DOSE PACK ORAL
Qty: 21 TABLET | Refills: 0 | Status: SHIPPED | OUTPATIENT
Start: 2018-05-09 | End: 2018-07-26

## 2018-05-09 ASSESSMENT — REFRACTION_WEARINGRX
SPECS_TYPE: PAL
OD_CYLINDER: +1.00
OS_SPHERE: -1.75
OD_SPHERE: -0.75
OD_AXIS: 150
OS_ADD: +2.00
OS_AXIS: 030
OD_ADD: +2.00
OS_CYLINDER: +1.50

## 2018-05-09 ASSESSMENT — TONOMETRY
OS_IOP_MMHG: 10
OD_IOP_MMHG: 11
IOP_METHOD: ICARE

## 2018-05-09 ASSESSMENT — VISUAL ACUITY
CORRECTION_TYPE: GLASSES
OD_CC+: -
OS_CC: 20/20
OD_CC: 20/20
METHOD: SNELLEN - LINEAR

## 2018-05-09 ASSESSMENT — EXTERNAL EXAM - RIGHT EYE: OD_EXAM: NORMAL

## 2018-05-09 ASSESSMENT — SLIT LAMP EXAM - LIDS
COMMENTS: NORMAL
COMMENTS: NORMAL

## 2018-05-09 ASSESSMENT — CONF VISUAL FIELD
METHOD: COUNTING FINGERS
OS_NORMAL: 1
OD_NORMAL: 1

## 2018-05-09 ASSESSMENT — CUP TO DISC RATIO
OS_RATIO: 0.1
OD_RATIO: 0.1

## 2018-05-09 ASSESSMENT — EXTERNAL EXAM - LEFT EYE: OS_EXAM: NORMAL

## 2018-05-09 NOTE — NURSING NOTE
Chief Complaints and History of Present Illnesses   Patient presents with     Consult For     headaches with visual disturbances     HPI    Affected eye(s):  Right   Symptoms:     Blurred vision      Duration:  2 months   Frequency:  Constant       Do you have eye pain now?:  No      Comments:  Right eye is blurry and headaches right side of head. Worse as day goes by. Sometimes right side of face goes numb. Has macular degermation right eye. Has had injections and scans are stable.     Sarahi Nielson COT 7:06 AM May 9, 2018

## 2018-05-09 NOTE — LETTER
2018         RE:  :  MRN: Elizabeth Rosenthal  1965  [id number]     Dear STORM Stevens,    Thank you for asking me to see your very pleasant patient, Elizabeth Rosenthal, in neuro-ophthalmic consultation.  I would like to thank you for sending your records and I have summarized them in the history of present illness.  My assessment and plan are below.  For further details, please see my attached clinic note.      Assessment & Plan   Elizabeth Rosenthal is a 53 year old female with the following diagnoses:   1. Occipital neuralgia, unspecified laterality    2. Subjective visual disturbance         Ms. Rosenthal is a 53 year old lady for occipital headaches and MR findings consistent with possible IIH.    She has a one month history of medium-intensity occipital headaches starting 3 hours upon waking.  It has a dull quality with rare sharp pain.  It gets progressively worse during the day with some occasional light-headedness and nausea.  It is worse when she bends over.  Her headache is worse when she tilts her head sometimes.  She denies TVOs, pulsatile tinnitus, and diplopia.  She is not on any new medications.  She sometimes has some blurriness in her right eye.    She has a long history of migraines over the past 30 years about 5-6 times per year, but now only about once a year over the past several years, as she has been avoiding her triggers.  Approximately a year ago, she had some numbness in the V3 region of her left face for about a month and then disappeared.  This Spring, it came back on the right side in a vertical fashion about a month ago.  She also felt her eyelid was drooping and had a full CVA work up including MRI and echocardiogram which was normal.  I reviewed personally her MRV performed yesterday revealed moderate narrowing of the distal transverse sinuses on both sides.  She was previously noted on MR to have an empty sella.    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    She has a history of rheumatoid arthritis diagnosed in 2011, hypertension, and asthma.  She has a number of environmental allergens.  She is on methotrexate, folic acid, atorvastatin lisinopril, wellbutrin, gabapentin and escitalopram.    On exam, her vision is 20/20 in both eyes with normal intraocular pressure and no afferent pupillary defect. She has full color plates.  Her anterior segment is unremarkable.  She has no vitritis.  Spontaneous venous pulsations in both eyes.  The disc margins are clear and the nerves appear healthy.  There is a pigmented PED in the right eye in the superior macula.      Her afferent function appears normal.  She is orthophoric.OCT of her maculae reveal a residual PED in the right eye and an OCT of her nerves was normal.  Her visual fields show nonspecific superior deficits.    It is my impression that she most likely does not have pseudotumor cerebri, and instead headaches likely related to greater occipital neuralgia.  Could consider short burst of steroid and muscle relaxant.  Could consider neck physical therapy.  Discussed proper ergonomics.  Also could consider greater occipital nerve block.  She wanted to try the medrol dosepak and skelaxin.  Will ask her to do the skelaxin x 3 days.  Follow up as needed for worsening symptoms.      Again, thank you for allowing me to participate in the care of your patient.      Sincerely,    Heriberto Naranjo MD  Professor, Neuro-Ophthalmology  Department of Ophthalmology and Visual Neurosciences  [facility]    CC: Karis Stevens, GEOVANNA CNP  [address] [facility] [id number]  [facility] [address]  VIA In Basket     Eveline Berry MD  [address]  [facility] [address]  VIA In Basket     GEOVANNA Carter CNP  [address] [facility] [address]  VIA In Basket     Olga Cody RN  VIA In Basket     Citlaly Wooten MD  [address] [facility]  [address]  VIA In Basket       DX = greater occipital neuralgia

## 2018-05-09 NOTE — MR AVS SNAPSHOT
After Visit Summary   5/9/2018    Elizabeth Rosenthal    MRN: [id number]           Patient Information     Date Of Birth          1965        Visit Information        Provider Department      5/9/2018 7:15 AM Heriberto Naranjo MD [facility] Ophthalmology        Today's Diagnoses     Occipital neuralgia, unspecified laterality    -  1    Subjective visual disturbance           Follow-ups after your visit        Your next 10 appointments already scheduled     May 15, 2018  7:00 AM CDT   (Arrive by 6:45 AM)   New Patient Visit with Citlaly Wooten MD   [facility] Neurology ([facility])    [address] [facility]  3rd Floor  [facility] [id number] [phone]            Aug 01, 2018  9:00 AM CDT   (Arrive by 8:45 AM)   MyChart Rheumatology Return with Tyson Ribeiro MD   [facility] Rheumatology ([facility])    [address] [place] [address]  [address]  [facility] [id number] [phone]            Aug 29, 2018 10:15 AM CDT   (Arrive by 10:00 AM)   Return Visit with Eveline Berry MD   [facility] ([facility])    [address] [place] [address]  4th Floor  [facility] [id number] [phone]              Who to contact     Please call your clinic at [phone] to:    Ask questions about your health    Make or cancel appointments    Discuss your medicines    Learn about your test results    Speak to your doctor            Additional Information About Your Visit        MyChart Information     Hotreader gives you secure access to your electronic health record. If you see a primary care provider, you can also send messages to your care team and make appointments. If you have questions, please call your primary care clinic.  If you do not have a primary care provider, please call [phone] and they will assist you.      Hotreader is an electronic gateway that provides easy, online access to your medical records.  With menuvox, you can request a clinic appointment, read your test results, renew a prescription or communicate with your care team.     To access your existing account, please contact your [facility] or call [phone] for assistance.        Care EveryWhere ID     This is your Care EveryWhere ID. This could be used by other organizations to access your [facility] medical records  [id number]         Blood Pressure from Last 3 Encounters:   04/28/18 117/77   04/09/18 126/84   03/29/18 140/83    Weight from Last 3 Encounters:   04/28/18 138.8 kg (306 lb)   04/09/18 136.5 kg (301 lb)   03/29/18 136.7 kg (301 lb 6.4 oz)              We Performed the Following     Color Vision - Screening OU (both eyes)     DILATED FUNDUS EXAM     Glaucoma Top OU     IOP Measurement     OCT Optic Nerve RNFL Spectralis OU (both eyes)          Today's Medication Changes          These changes are accurate as of 5/9/18  9:03 AM.  If you have any questions, ask your nurse or doctor.               Start taking these medicines.        Dose/Directions    metaxalone 800 MG tablet   Commonly known as:  SKELAXIN   Used for:  Occipital neuralgia, unspecified laterality   Started by:  Heriberto Naranjo MD        Dose:  800 mg   Take 1 tablet (800 mg) by mouth 3 times daily as needed for moderate pain   Quantity:  9 tablet   Refills:  0       methylPREDNISolone 4 MG tablet   Commonly known as:  MEDROL DOSEPAK   Used for:  Occipital neuralgia, unspecified laterality   Started by:  Heriberto Naranjo MD        Follow package instructions   Quantity:  21 tablet   Refills:  0         These medicines have changed or have updated prescriptions.        Dose/Directions    fluticasone 110 MCG/ACT Inhaler   Commonly known as:  FLOVENT HFA   This may have changed:    - when to take this  - reasons to take this   Used for:  Mild intermittent asthma without complication        Dose:  2 puff   Inhale 2 puffs into the  lungs 2 times daily   Quantity:  3 Inhaler   Refills:  3            Where to get your medicines      These medications were sent to [id number] - [place], [place] - [address] [facility] [address]     Phone:  [phone]     metaxalone 800 MG tablet    methylPREDNISolone 4 MG tablet                Primary Care Provider Office Phone # Fax #    Eveline Berry [phone]       [address] [place] [address] [address]  [facility] [address]        Equal Access to Services     L[facility] : Hadii aad ku hadasho Soomaali, waaxda luqadaha, qaybta kaalmada adeegyada, waxay elenain haycheyenne bravo . So [facility] [phone].    ATENCIÓN: Si habla español, tiene a hightower disposición servicios gratuitos de asistencia lingüística. o[facility]nnie[facility] [phone].    We comply with applicable federal civil rights laws and [place] laws. We do not discriminate on the basis of race, color, national origin, age, disability, sex, sexual orientation, or gender identity.            Thank you!     Thank you for choosing [facility] OPHTHALMOLOGY  for your care. Our goal is always to provide you with excellent care. Hearing back from our patients is one way we can continue to improve our services. Please take a few minutes to complete the written survey that you may receive in the mail after your visit with us. Thank you!             Your Updated Medication List - Protect others around you: Learn how to safely use, store and throw away your medicines at www.disposemymeds.org.          This list is accurate as of 5/9/18  9:03 AM.  Always use your most recent med list.                   Brand Name Dispense Instructions for use Diagnosis    albuterol 108 (90 Base) MCG/ACT Inhaler    PROAIR HFA/PROVENTIL HFA/VENTOLIN HFA    3 Inhaler    Inhale 1 puff into the lungs every 6 hours as needed for shortness of breath / dyspnea or wheezing    Mild intermittent asthma without complication       aspirin  81 MG tablet     30 tablet    Take 1 tablet (81 mg) by mouth daily        atorvastatin 80 MG tablet    LIPITOR    30 tablet    Take 1 tablet (80 mg) by mouth daily        buPROPion 150 MG 24 hr tablet    WELLBUTRIN XL    90 tablet    Take 1 tablet (150 mg) by mouth every morning    Major depressive disorder, recurrent episode, mild (H)       cetirizine 10 MG tablet    zyrTEC     Take 10 mg by mouth daily        escitalopram 20 MG tablet    LEXAPRO    90 tablet    Take 1 tablet (20 mg) by mouth daily    Recurrent major depressive disorder, remission status unspecified (H)       fluticasone 110 MCG/ACT Inhaler    FLOVENT HFA    3 Inhaler    Inhale 2 puffs into the lungs 2 times daily    Mild intermittent asthma without complication       folic acid 1 MG tablet    FOLVITE    90 tablet    Take 1 tablet (1 mg) by mouth daily    Rheumatoid arthritis of multiple sites without rheumatoid factor (H), Encounter for long-term (current) use of medications, Fibromyalgia, Polyarthritis, Chronic bilateral low back pain without sciatica       gabapentin 300 MG capsule    NEURONTIN    270 capsule    Take 1 capsule (300 mg) by mouth 3 times daily    Fibromyalgia, Polyarthritis, Chronic bilateral low back pain without sciatica, Rheumatoid arthritis of multiple sites without rheumatoid factor (H), Encounter for long-term (current) use of medications       lidocaine 5 % Patch    LIDODERM    30 patch    Place 1-3 patches onto the skin every 24 hours Patient will call to fill    Polyarthritis, Chronic bilateral low back pain without sciatica, Fibromyalgia, Rheumatoid arthritis of multiple sites without rheumatoid factor (H), Encounter for long-term (current) use of medications       lisinopril 10 MG tablet    PRINIVIL/ZESTRIL    90 tablet    Take 1 tablet (10 mg) by mouth daily    Benign essential hypertension, Mild intermittent asthma without complication       metaxalone 800 MG tablet    SKELAXIN    9 tablet    Take 1 tablet (800 mg) by  mouth 3 times daily as needed for moderate pain    Occipital neuralgia, unspecified laterality       methotrexate 2.5 MG tablet CHEMO     72 tablet    Take 6 tablets (15 mg) by mouth once a week 6 tabs by mouth once a week    Rheumatoid arthritis of multiple sites without rheumatoid factor (H), Fibromyalgia, Polyarthritis, Chronic bilateral low back pain without sciatica, Encounter for long-term (current) use of medications       methylPREDNISolone 4 MG tablet    MEDROL DOSEPAK    21 tablet    Follow package instructions    Occipital neuralgia, unspecified laterality       pantoprazole 20 MG EC tablet    PROTONIX    180 tablet    Take 1 tablet (20 mg) by mouth 2 times daily    Abdominal pain, generalized       UNABLE TO FIND      250 mg by Oral or Feeding Tube route daily Magnesium Citrate        VITAMIN D (CHOLECALCIFEROL) PO      Take 2,000 Units by mouth daily

## 2018-05-09 NOTE — PROGRESS NOTES
Assessment & Plan     Elizabeth Rosenthal is a 53 year old female with the following diagnoses:   1. Occipital neuralgia, unspecified laterality    2. Subjective visual disturbance         Ms. Rosenthal is a 53 year old lady for occipital headaches and MR findings consistent with possible IIH.    She has a one month history of medium-intensity occipital headaches starting 3 hours upon waking.  It has a dull quality with rare sharp pain.  It gets progressively worse during the day with some occasional light-headedness and nausea.  It is worse when she bends over.  Her headache is worse when she tilts her head sometimes.  She denies TVOs, pulsatile tinnitus, and diplopia.  She is not on any new medications.  She sometimes has some blurriness in her right eye.    She has a long history of migraines over the past 30 years about 5-6 times per year, but now only about once a year over the past several years, as she has been avoiding her triggers.  Approximately a year ago, she had some numbness in the V3 region of her left face for about a month and then disappeared.  This Spring, it came back on the right side in a vertical fashion about a month ago.  She also felt her eyelid was drooping and had a full CVA work up including MRI and echocardiogram which was normal.  I reviewed personally her MRV performed yesterday revealed moderate narrowing of the distal transverse sinuses on both sides.  She was previously noted on MR to have an empty sella.    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    She has a history of rheumatoid arthritis diagnosed in 2011, hypertension, and asthma.  She has a number of environmental allergens.  She is on methotrexate, folic acid, atorvastatin lisinopril, wellbutrin, gabapentin and escitalopram.    On exam, her vision is 20/20 in both eyes with normal intraocular pressure and no afferent pupillary defect.  She has full color  plates.  Her anterior segment is unremarkable.  She has no vitritis.  Spontaneous venous pulsations in both eyes.  The disc margins are clear and the nerves appear healthy.  There is a pigmented PED in the right eye in the superior macula.      Her afferent function appears normal.  She is orthophoric.OCT of her maculae reveal a residual PED in the right eye and an OCT of her nerves was normal.  Her visual fields show nonspecific superior deficits.      It is my impression that she most likely does not have pseudotumor cerebri, and instead headaches likely related to greater occipital neuralgia.  Could consider short burst of steroid and muscle relaxant.  Could consider neck physical therapy.  Discussed proper ergonomics.  Also could consider greater occipital nerve block.  She wanted to try the medrol dosepak and skelaxin.  Will ask her to do the skelaxin x 3 days.  Follow up as needed for worsening symptoms.           Attending Physician Attestation:  Complete documentation of historical and exam elements from today's encounter can be found in the full encounter summary report (not reduplicated in this progress note).  I personally obtained the chief complaint(s) and history of present illness.  I confirmed and edited as necessary the review of systems, past medical/surgical history, family history, social history, and examination findings as documented by others; and I examined the patient myself.  I personally reviewed the relevant tests, images, and reports as documented above.  I formulated and edited as necessary the assessment and plan and discussed the findings and management plan with the patient and family. - Heriberto Naranjo MD

## 2018-07-26 ENCOUNTER — OFFICE VISIT (OUTPATIENT)
Dept: FAMILY MEDICINE | Facility: CLINIC | Age: 53
End: 2018-07-26
Payer: COMMERCIAL

## 2018-07-26 ENCOUNTER — RADIANT APPOINTMENT (OUTPATIENT)
Dept: GENERAL RADIOLOGY | Facility: CLINIC | Age: 53
End: 2018-07-26
Payer: COMMERCIAL

## 2018-07-26 VITALS
DIASTOLIC BLOOD PRESSURE: 85 MMHG | OXYGEN SATURATION: 97 % | BODY MASS INDEX: 48.55 KG/M2 | HEART RATE: 73 BPM | SYSTOLIC BLOOD PRESSURE: 132 MMHG | WEIGHT: 293 LBS

## 2018-07-26 DIAGNOSIS — R07.89 CHEST WALL PAIN: Primary | ICD-10-CM

## 2018-07-26 DIAGNOSIS — R07.89 CHEST WALL PAIN: ICD-10-CM

## 2018-07-26 RX ORDER — PREDNISONE 20 MG/1
20 TABLET ORAL DAILY
Qty: 10 TABLET | Refills: 0 | Status: SHIPPED | OUTPATIENT
Start: 2018-07-26 | End: 2019-01-17

## 2018-07-26 ASSESSMENT — PAIN SCALES - GENERAL: PAINLEVEL: EXTREME PAIN (9)

## 2018-07-26 NOTE — PROGRESS NOTES
[facility]  Primary Care Center   James Lewis MD  07/26/2018      Chief Complaint:   Pain       History of Present Illness:   Elizabeth Rosenthal is an immunosupressed 53 year old female with a history of occipital neuralgia, rheumatoid arthritis, depression, polyarthritis, obesity, hypertension, hyperlipidemia, and fibromyalgia who presents for evaluation of rib pain after a fall.    On Sunday, patient was going down her porch stairs with her dog on a leash, when her dog sprinted toward an animal and pulled her down. She fell on the gravel, and her chest made initial contact. She enies hitting her head. Initially, she had chest, neck, hip, and knee pain. The neck, hip, and knee have improved, however, the chest pain has worsened. Chest pain is described as rib pain that starts on the center of her chest and extends to the lateral left chest.  Her ROM has been limited by pain, specifically when bending and abducting her left arm. She denies any ecchymosis or erythema of the skin. Patient has been putting lidocaine patches which has provided minimal relief. She has not taken any over the counter pain medications because she is worried about interactions with her medications. Patient is also allergic to opioids.  Patient denies any prior fractures, but notes that she might have broken a right rib several years ago but does not remember the details. Patient did get a DEXA scan several years ago that was normal.      Review of Systems:   Pertinent items are noted in HPI, remainder of complete ROS is negative.      Active Medications:      albuterol (PROAIR HFA/PROVENTIL HFA/VENTOLIN HFA) 108 (90 BASE) MCG/ACT Inhaler, Inhale 1 puff into the lungs every 6 hours as needed for shortness of breath / dyspnea or wheezing, Disp: 3 Inhaler, Rfl: 3     aspirin 81 MG tablet, Take 1 tablet (81 mg) by mouth daily, Disp: 30 tablet, Rfl: OTC     atorvastatin (LIPITOR) 80 MG tablet, Take 1 tablet (80 mg) by mouth daily, Disp: 30  tablet, Rfl: 1     buPROPion (WELLBUTRIN XL) 150 MG 24 hr tablet, Take 1 tablet (150 mg) by mouth every morning, Disp: 90 tablet, Rfl: 3     cetirizine (ZYRTEC) 10 MG tablet, Take 10 mg by mouth daily, Disp: , Rfl:      escitalopram (LEXAPRO) 20 MG tablet, Take 1 tablet (20 mg) by mouth daily, Disp: 90 tablet, Rfl: 3     fluticasone (FLOVENT HFA) 110 MCG/ACT Inhaler, Inhale 2 puffs into the lungs 2 times daily (Patient taking differently: Inhale 2 puffs into the lungs 2 times daily as needed ), Disp: 3 Inhaler, Rfl: 3     folic acid (FOLVITE) 1 MG tablet, Take 1 tablet (1 mg) by mouth daily, Disp: 90 tablet, Rfl: 1     gabapentin (NEURONTIN) 300 MG capsule, Take 1 capsule (300 mg) by mouth 3 times daily, Disp: 270 capsule, Rfl: 1     lidocaine (LIDODERM) 5 % Patch, Place 1-3 patches onto the skin every 24 hours Patient will call to fill, Disp: 30 patch, Rfl: 1     lisinopril (PRINIVIL/ZESTRIL) 10 MG tablet, Take 1 tablet (10 mg) by mouth daily, Disp: 90 tablet, Rfl: 3     metaxalone (SKELAXIN) 800 MG tablet, Take 1 tablet (800 mg) by mouth 3 times daily as needed for moderate pain, Disp: 9 tablet, Rfl: 0     methotrexate 2.5 MG tablet CHEMO, Take 6 tablets (15 mg) by mouth once a week 6 tabs by mouth once a week, Disp: 72 tablet, Rfl: 1     methylPREDNISolone (MEDROL DOSEPAK) 4 MG tablet, Follow package instructions, Disp: 21 tablet, Rfl: 0     pantoprazole (PROTONIX) 20 MG EC tablet, Take 1 tablet (20 mg) by mouth 2 times daily, Disp: 180 tablet, Rfl: 3     topiramate (TOPAMAX) 25 MG tablet, Take one tab HS for week, then two tabs HS for one week, then one tab am and two tabs at HS for one week, then two tabs twice daily, Disp: 120 tablet, Rfl: 3     UNABLE TO FIND, 250 mg by Oral or Feeding Tube route daily Magnesium Citrate, Disp: , Rfl:      VITAMIN D, CHOLECALCIFEROL, PO, Take 2,000 Units by mouth daily, Disp: , Rfl:       Allergies:   Nuts; Celery oil; Codeine; Contrast dye; Food; Oat; Penicillins; Rice;  Shellfish-derived products; Wheat bran; and Yeast      Past Medical History:  Allergic rhinitis  Rheumatoid arthritis of multiple sites without rheumatoid factor (H)  Asthma  Autoimmune disease   Chronic pelvic pain in female  Depression  Gastroesophageal reflux disease   Head injury   History of stroke without residual deficits  Hyperlipidemia  Hypertension  Immunosuppression  Low back pain  Migraines  Morbid obesity with BMI of 45.0-49.9, adult (H)  Obstructive sleep apnea  Polyarthritis  Reduced vision  TIA (transient ischemic attack)  Vertebral artery dissection (H)  Iliotibial band syndrome  Right knee pain  Lumbar radicular pain  Polyarthritis   Fibromyalgia  Creatinine elevation  ACP (advance care planning)  Occipital neuralgia  Microscopic hematuria      Past Surgical History:  Arthroscopy knee bilateral   Biopsy lymph node cervical  Colonoscopy  ENT surgery  Genitourinary surgery  Head and neck surgery   Hysterectomy  Tonsillectomy  Tubal ligation     Family History:   Mother- Breast Cancer  Father- Heart disease, Substance abuse, mental illness  Paternal grandmother- Asthma, Cerebrovascular disease  Son- migraines  Daughter- migraines       Social History:   The patient is not , a nonsmoker, and does not consume alcohol.     Physical Exam:   /85  Pulse 73  Wt 140.6 kg (310 lb)  SpO2 97%  Breastfeeding? No  BMI 48.55 kg/m2   Constitutional: Alert. In no distress.  Head: Normocephalic. No masses, lesions, tenderness or abnormalities.  ENT: No neck nodes or sinus tenderness.  Cardiovascular: RRR. No murmurs, clicks, gallops, or rub.  Respiratory: Clear to auscultation bilaterally, no wheezes or crackles. Deep breathing limited by pain.  Gastrointestinal: Protuberant soft abdomen soft. Non-tender. BS normal. No masses or organomegaly.  Musculoskeletal: Extremities normal. No gross deformities noted. Normal muscle tone. Pain on palpation from the sternum to the left lateral chest.   Skin: No  suspicious lesions. No rashes.  Psychiatric: Mentation appears normal. Normal affect.     Laboratory Results from 4/28/2018:   Component      Latest Ref Rng & Units 4/28/2018   Cholesterol      <200 mg/dL 146   Triglycerides      <150 mg/dL 62   HDL Cholesterol      >49 mg/dL 74   LDL Cholesterol Calculated      <100 mg/dL 60   Non HDL Cholesterol      <130 mg/dL 72   TSH      0.40 - 4.00 mU/L 0.82   AMAN interpretation      NEG:Negative Negative       Assessment and Plan:  Chest wall pain  Patient has been having increasing rib pain after a low-risk fall, given distrubution of pain, rib fracture is possible. Patient voiced understanding that treatment or interventions would not change regardless of whether or not she has a fracture, however, imaging will help estimate how long symptoms will persist. Recommended normal dose of Tylenol PRN and  ice + heat. Prednisone prescription was also given. Patient was advised to take it only if XR is negative for fracture to accelerate symptom relief.   - XR Ribs & Chest Bilateral G/E 4 Views  - predniSONE (DELTASONE) 20 MG tablet  Dispense: 10 tablet; Refill: 0     Follow-up: Routine     Scribe: Wally Wilkins MS4    The scribe note is reviewed and agreed with    James Lewis MD

## 2018-07-26 NOTE — MR AVS SNAPSHOT
After Visit Summary   7/26/2018    Elizabeth Rosenthal    MRN: [id number]           Patient Information     Date Of Birth          1965        Visit Information        Provider Department      7/26/2018 12:20 PM James Lewis MD [facility]        Today's Diagnoses     Chest wall pain    -  1      Care Instructions    Primary Care Center Medication Refill Request Information:  * Please contact your pharmacy regarding ANY request for medication refills.  ** [facility] Prescription Fax = [phone]  * Please allow 3 business days for routine medication refills.  * Please allow 5 business days for controlled substance medication refills.     Primary Care Center Test Result notification information:  *You will be notified with in 7-10 days of your appointment day regarding the results of your test.  If you are on MyChart you will be notified as soon as the provider has reviewed the results and signed off on them.    Primary Care Center: [phone]           Follow-ups after your visit        Your next 10 appointments already scheduled     Aug 01, 2018  9:00 AM CDT   (Arrive by 8:45 AM)   MyChart Rheumatology Return with Tyson Ribeiro MD   [facility] ([facility])    [address] [place] [address]  [address]  [facility] [address] [phone]            Aug 29, 2018 10:10 AM CDT   (Arrive by 9:55 AM)   Return Visit with Eveline Berry MD   [facility] ([facility])    [address] [place] [address]  4th Floor  [facility] [address] [phone]              Future tests that were ordered for you today     Open Future Orders        Priority Expected Expires Ordered    XR Ribs & Chest Bilateral G/E 4 Views Routine 7/26/2018 7/26/2019 7/26/2018            Who to contact     Please call your clinic at [phone] to:    Ask questions about your health    Make or cancel appointments    Discuss your medicines    Learn  about your test results    Speak to your doctor            Additional Information About Your Visit        [employer] Information     [employer] gives you secure access to your electronic health record. If you see a primary care provider, you can also send messages to your care team and make appointments. If you have questions, please call your primary care clinic.  If you do not have a primary care provider, please call [phone] and they will assist you.      [employer] is an electronic gateway that provides easy, online access to your medical records. With [employer], you can request a clinic appointment, read your test results, renew a prescription or communicate with your care team.     To access your existing account, please contact your [facility] or call [phone] for assistance.        Care EveryWhere ID     This is your Care EveryWhere ID. This could be used by other organizations to access your [facility] medical records  [id number]        Your Vitals Were     Pulse Pulse Oximetry Breastfeeding? BMI (Body Mass Index)          73 97% No 48.55 kg/m2         Blood Pressure from Last 3 Encounters:   07/26/18 132/85   04/28/18 117/77   04/09/18 126/84    Weight from Last 3 Encounters:   07/26/18 140.6 kg (310 lb)   04/28/18 138.8 kg (306 lb)   04/09/18 136.5 kg (301 lb)                 Today's Medication Changes          These changes are accurate as of 7/26/18 12:29 PM.  If you have any questions, ask your nurse or doctor.               Start taking these medicines.        Dose/Directions    predniSONE 20 MG tablet   Commonly known as:  DELTASONE   Used for:  Chest wall pain   Started by:  James Lewis MD        Dose:  20 mg   Take 1 tablet (20 mg) by mouth daily   Quantity:  10 tablet   Refills:  0         These medicines have changed or have updated prescriptions.        Dose/Directions    fluticasone 110 MCG/ACT Inhaler   Commonly known as:  FLOVENT HFA   This may have  changed:    - when to take this  - reasons to take this   Used for:  Mild intermittent asthma without complication        Dose:  2 puff   Inhale 2 puffs into the lungs 2 times daily   Quantity:  3 Inhaler   Refills:  3         Stop taking these medicines if you haven't already. Please contact your care team if you have questions.     methylPREDNISolone 4 MG tablet   Commonly known as:  MEDROL DOSEPAK   Stopped by:  James Lewis MD                Where to get your medicines      Some of these will need a paper prescription and others can be bought over the counter.  Ask your nurse if you have questions.     Bring a paper prescription for each of these medications     predniSONE 20 MG tablet                Primary Care Provider Office Phone # Fax #    Eveline Berry [phone]       [address] [place] [id number]        Equal Access to Services     TIAGO LOMAX : Garcia storm Soroberto, waaxda luqadaha, qaybta kaalmada getachewyajenny, betty bravo . [facility] [phone].    ATENCIÓN: Si habla español, tiene a hightower disposición servicios gratuitos de asistencia lingüística. Ll[facility] [phone].    We comply with applicable federal civil rights laws and [place] laws. We do not discriminate on the basis of race, color, national origin, age, disability, sex, sexual orientation, or gender identity.            Thank you!     Thank you for choosing [facility]  for your care. Our goal is always to provide you with excellent care. Hearing back from our patients is one way we can continue to improve our services. Please take a few minutes to complete the written survey that you may receive in the mail after your visit with us. Thank you!             Your Updated Medication List - Protect others around you: Learn how to safely use, store and throw away your medicines at www.disposemymeds.org.          This list is accurate as of 7/26/18  12:29 PM.  Always use your most recent med list.                   Brand Name Dispense Instructions for use Diagnosis    albuterol 108 (90 Base) MCG/ACT Inhaler    PROAIR HFA/PROVENTIL HFA/VENTOLIN HFA    3 Inhaler    Inhale 1 puff into the lungs every 6 hours as needed for shortness of breath / dyspnea or wheezing    Mild intermittent asthma without complication       aspirin 81 MG tablet     30 tablet    Take 1 tablet (81 mg) by mouth daily        atorvastatin 80 MG tablet    LIPITOR    30 tablet    Take 1 tablet (80 mg) by mouth daily        buPROPion 150 MG 24 hr tablet    WELLBUTRIN XL    90 tablet    Take 1 tablet (150 mg) by mouth every morning    Major depressive disorder, recurrent episode, mild (H)       cetirizine 10 MG tablet    zyrTEC     Take 10 mg by mouth daily        escitalopram 20 MG tablet    LEXAPRO    90 tablet    Take 1 tablet (20 mg) by mouth daily    Recurrent major depressive disorder, remission status unspecified (H)       fluticasone 110 MCG/ACT Inhaler    FLOVENT HFA    3 Inhaler    Inhale 2 puffs into the lungs 2 times daily    Mild intermittent asthma without complication       folic acid 1 MG tablet    FOLVITE    90 tablet    Take 1 tablet (1 mg) by mouth daily    Rheumatoid arthritis of multiple sites without rheumatoid factor (H), Encounter for long-term (current) use of medications, Fibromyalgia, Polyarthritis, Chronic bilateral low back pain without sciatica       gabapentin 300 MG capsule    NEURONTIN    270 capsule    Take 1 capsule (300 mg) by mouth 3 times daily    Fibromyalgia, Polyarthritis, Chronic bilateral low back pain without sciatica, Rheumatoid arthritis of multiple sites without rheumatoid factor (H), Encounter for long-term (current) use of medications       lidocaine 5 % Patch    LIDODERM    30 patch    Place 1-3 patches onto the skin every 24 hours Patient will call to fill    Polyarthritis, Chronic bilateral low back pain without sciatica, Fibromyalgia,  Rheumatoid arthritis of multiple sites without rheumatoid factor (H), Encounter for long-term (current) use of medications       lisinopril 10 MG tablet    PRINIVIL/ZESTRIL    90 tablet    Take 1 tablet (10 mg) by mouth daily    Benign essential hypertension, Mild intermittent asthma without complication       metaxalone 800 MG tablet    SKELAXIN    9 tablet    Take 1 tablet (800 mg) by mouth 3 times daily as needed for moderate pain    Occipital neuralgia, unspecified laterality       methotrexate 2.5 MG tablet CHEMO     72 tablet    Take 6 tablets (15 mg) by mouth once a week 6 tabs by mouth once a week    Rheumatoid arthritis of multiple sites without rheumatoid factor (H), Fibromyalgia, Polyarthritis, Chronic bilateral low back pain without sciatica, Encounter for long-term (current) use of medications       pantoprazole 20 MG EC tablet    PROTONIX    180 tablet    Take 1 tablet (20 mg) by mouth 2 times daily    Abdominal pain, generalized       predniSONE 20 MG tablet    DELTASONE    10 tablet    Take 1 tablet (20 mg) by mouth daily    Chest wall pain       topiramate 25 MG tablet    TOPAMAX    120 tablet    Take one tab HS for week, then two tabs HS for one week, then one tab am and two tabs at HS for one week, then two tabs twice daily    Chronic daily headache       UNABLE TO FIND      250 mg by Oral or Feeding Tube route daily Magnesium Citrate        VITAMIN D (CHOLECALCIFEROL) PO      Take 2,000 Units by mouth daily

## 2018-07-26 NOTE — PATIENT INSTRUCTIONS
[facility] Medication Refill Request Information:  * Please contact your pharmacy regarding ANY request for medication refills.  ** [facility] Prescription Fax = [phone]  * Please allow 3 business days for routine medication refills.  * Please allow 5 business days for controlled substance medication refills.     [facility] Test Result notification information:  *You will be notified with in 7-10 days of your appointment day regarding the results of your test.  If you are on MyChart you will be notified as soon as the provider has reviewed the results and signed off on them.    [facility]: [phone]

## 2018-07-26 NOTE — NURSING NOTE
Chief Complaint   Patient presents with     Pain     Pt fell on 722/18. Pt states she may feel she broke her rib. Pt has pain on the left side.        Bety Hammond LPN at 11:55 AM on 7/26/2018.

## 2018-08-01 ENCOUNTER — APPOINTMENT (OUTPATIENT)
Dept: LAB | Facility: CLINIC | Age: 53
End: 2018-08-01
Payer: COMMERCIAL

## 2018-08-01 ENCOUNTER — OFFICE VISIT (OUTPATIENT)
Dept: RHEUMATOLOGY | Facility: CLINIC | Age: 53
End: 2018-08-01
Attending: INTERNAL MEDICINE
Payer: COMMERCIAL

## 2018-08-01 VITALS
OXYGEN SATURATION: 97 % | WEIGHT: 293 LBS | BODY MASS INDEX: 45.99 KG/M2 | DIASTOLIC BLOOD PRESSURE: 81 MMHG | TEMPERATURE: 99 F | SYSTOLIC BLOOD PRESSURE: 136 MMHG | HEART RATE: 79 BPM | HEIGHT: 67 IN

## 2018-08-01 DIAGNOSIS — Z79.899 ENCOUNTER FOR LONG-TERM (CURRENT) USE OF MEDICATIONS: ICD-10-CM

## 2018-08-01 DIAGNOSIS — M54.50 CHRONIC BILATERAL LOW BACK PAIN WITHOUT SCIATICA: ICD-10-CM

## 2018-08-01 DIAGNOSIS — M79.7 FIBROMYALGIA: ICD-10-CM

## 2018-08-01 DIAGNOSIS — M06.09 RHEUMATOID ARTHRITIS OF MULTIPLE SITES WITHOUT RHEUMATOID FACTOR (H): Primary | ICD-10-CM

## 2018-08-01 DIAGNOSIS — M13.0 POLYARTHRITIS: ICD-10-CM

## 2018-08-01 DIAGNOSIS — G89.29 CHRONIC BILATERAL LOW BACK PAIN WITHOUT SCIATICA: ICD-10-CM

## 2018-08-01 LAB
ALBUMIN SERPL-MCNC: 3.2 G/DL (ref 3.4–5)
ALT SERPL W P-5'-P-CCNC: 19 U/L (ref 0–50)
AST SERPL W P-5'-P-CCNC: 10 U/L (ref 0–45)
CREAT SERPL-MCNC: 1.05 MG/DL (ref 0.52–1.04)
CRP SERPL-MCNC: 12.7 MG/L (ref 0–8)
ERYTHROCYTE [DISTWIDTH] IN BLOOD BY AUTOMATED COUNT: 13.1 % (ref 10–15)
GFR SERPL CREATININE-BSD FRML MDRD: 55 ML/MIN/1.7M2
HCT VFR BLD AUTO: 39.5 % (ref 35–47)
HGB BLD-MCNC: 12.8 G/DL (ref 11.7–15.7)
MCH RBC QN AUTO: 30.6 PG (ref 26.5–33)
MCHC RBC AUTO-ENTMCNC: 32.4 G/DL (ref 31.5–36.5)
MCV RBC AUTO: 95 FL (ref 78–100)
PLATELET # BLD AUTO: 339 10E9/L (ref 150–450)
RBC # BLD AUTO: 4.18 10E12/L (ref 3.8–5.2)
WBC # BLD AUTO: 6.9 10E9/L (ref 4–11)

## 2018-08-01 PROCEDURE — 82565 ASSAY OF CREATININE: CPT | Performed by: INTERNAL MEDICINE

## 2018-08-01 PROCEDURE — 84450 TRANSFERASE (AST) (SGOT): CPT | Performed by: INTERNAL MEDICINE

## 2018-08-01 PROCEDURE — G0463 HOSPITAL OUTPT CLINIC VISIT: HCPCS | Mod: ZF

## 2018-08-01 PROCEDURE — 85027 COMPLETE CBC AUTOMATED: CPT | Performed by: INTERNAL MEDICINE

## 2018-08-01 PROCEDURE — 82040 ASSAY OF SERUM ALBUMIN: CPT | Performed by: INTERNAL MEDICINE

## 2018-08-01 PROCEDURE — 86140 C-REACTIVE PROTEIN: CPT | Performed by: INTERNAL MEDICINE

## 2018-08-01 PROCEDURE — 84460 ALANINE AMINO (ALT) (SGPT): CPT | Performed by: INTERNAL MEDICINE

## 2018-08-01 PROCEDURE — 36415 COLL VENOUS BLD VENIPUNCTURE: CPT | Performed by: INTERNAL MEDICINE

## 2018-08-01 ASSESSMENT — PAIN SCALES - GENERAL: PAINLEVEL: EXTREME PAIN (9)
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
## 2018-08-01 NOTE — MR AVS SNAPSHOT
After Visit Summary   8/1/2018    Elizabeth Rosenthal    MRN: [id number]           Patient Information     Date Of Birth          1965        Visit Information        Provider Department      8/1/2018 9:00 AM Tyson Ribeiro MD [facility] Rheumatology        Today's Diagnoses     Rheumatoid arthritis of multiple sites without rheumatoid factor (H)    -  1    Encounter for long-term (current) use of medications        Fibromyalgia        Polyarthritis        Chronic bilateral low back pain without sciatica           Follow-ups after your visit        Follow-up notes from your care team     Return in about 6 months (around 2/1/2019).      Your next 10 appointments already scheduled     Aug 29, 2018 10:10 AM CDT   (Arrive by 9:55 AM)   Return Visit with Eveline Berry MD   [facility] ([facility])    [address] [place] [address]  4th Floor  [facility] [address] [phone]            Jan 30, 2019  8:00 AM CST   (Arrive by 7:45 AM)   [facility] with Tyson Ribeiro [facility]   [facility] Rhe[facility] ([facility])    [address] [place] [address]  [address] [place] [address] [phone]              Who to contact     If you have questions or need follow up information about today's clinic visit or your schedule please contact [facility] RHEUMATOLOGY directly at [phone].  Normal or non-critical lab and imaging results will be communicated to you by MyChart, letter or phone within 4 business days after the clinic has received the results. If you do not hear from us within 7 days, please contact the clinic through MyChart or phone. If you have a critical or abnormal lab result, we will notify you by phone as soon as possible.  Submit refill requests through [employer] or call your pharmacy and they will forward the refill request to us. Please allow 3 business days for your refill to be completed.          Additional Information  "About Your Visit        [employer] Information     [employer] gives you secure access to your electronic health record. If you see a primary care provider, you can also send messages to your care team and make appointments. If you have questions, please call your primary care clinic.  If you do not have a primary care provider, please call [phone] and they will assist you.        Care EveryWhere ID     This is your Care EveryWhere ID. This could be used by other organizations to access your [facility] medical records  [id number]        Your Vitals Were     Pulse Temperature Height Pulse Oximetry BMI (Body Mass Index)       79 99  F (37.2  C) (Oral) 1.702 m (5' 7\") 97% 48.11 kg/m2        Blood Pressure from Last 3 Encounters:   08/01/18 136/81   07/26/18 132/85   04/28/18 117/77    Weight from Last 3 Encounters:   08/01/18 139.3 kg (307 lb 3.2 oz)   07/26/18 140.6 kg (310 lb)   04/28/18 138.8 kg (306 lb)              We Performed the Following     Albumin level     ALT     AST     CBC with platelets     Creatinine     CRP inflammation          Today's Medication Changes          These changes are accurate as of 8/1/18 11:59 PM.  If you have any questions, ask your nurse or doctor.               These medicines have changed or have updated prescriptions.        Dose/Directions    fluticasone 110 MCG/ACT Inhaler   Commonly known as:  FLOVENT HFA   This may have changed:    - when to take this  - reasons to take this   Used for:  Mild intermittent asthma without complication        Dose:  2 puff   Inhale 2 puffs into the lungs 2 times daily   Quantity:  3 Inhaler   Refills:  3            Where to get your medicines      These medications were sent to [facility]  for [facility], [place] - [address] [place] [address]  [address] [place] [address]     Phone:  [phone]     methotrexate 2.5 MG tablet CHEMO                Primary Care Provider Office Phone # Fax #    Eveline Berry [phone] " [phone]       [address] [place] [address]  [facility] [address]        Equal Access to Services     TIAGO LOMAX : Hadii aad ku hadrissakayleigh Dinorah, waaxda luqadaha, qaybta kaalmada blair, betty elenain hayaadewayne jerryandi samayoafina aponte. So [facility] [phone].    ATENCIÓN: Si habla español, tiene a hightower disposición servicios gratuitos de asistencia lingüística. Lonnieame al [phone].    We comply with applicable federal civil rights laws and [place] laws. We do not discriminate on the basis of race, color, national origin, age, disability, sex, sexual orientation, or gender identity.            Thank you!     Thank you for choosing [facility] RHEUMATOLOGY  for your care. Our goal is always to provide you with excellent care. Hearing back from our patients is one way we can continue to improve our services. Please take a few minutes to complete the written survey that you may receive in the mail after your visit with us. Thank you!             Your Updated Medication List - Protect others around you: Learn how to safely use, store and throw away your medicines at www.disposemymeds.org.          This list is accurate as of 8/1/18 11:59 PM.  Always use your most recent med list.                   Brand Name Dispense Instructions for use Diagnosis    albuterol 108 (90 Base) MCG/ACT Inhaler    PROAIR HFA/PROVENTIL HFA/VENTOLIN HFA    3 Inhaler    Inhale 1 puff into the lungs every 6 hours as needed for shortness of breath / dyspnea or wheezing    Mild intermittent asthma without complication       aspirin 81 MG tablet     30 tablet    Take 1 tablet (81 mg) by mouth daily        buPROPion 150 MG 24 hr tablet    WELLBUTRIN XL    90 tablet    Take 1 tablet (150 mg) by mouth every morning    Major depressive disorder, recurrent episode, mild (H)       cetirizine 10 MG tablet    zyrTEC     Take 10 mg by mouth daily        escitalopram 20 MG tablet    LEXAPRO    90 tablet    Take 1 tablet (20 mg) by mouth daily    Recurrent major  depressive disorder, remission status unspecified (H)       fluticasone 110 MCG/ACT Inhaler    FLOVENT HFA    3 Inhaler    Inhale 2 puffs into the lungs 2 times daily    Mild intermittent asthma without complication       folic acid 1 MG tablet    FOLVITE    90 tablet    Take 1 tablet (1 mg) by mouth daily    Rheumatoid arthritis of multiple sites without rheumatoid factor (H), Encounter for long-term (current) use of medications, Fibromyalgia, Polyarthritis, Chronic bilateral low back pain without sciatica       gabapentin 300 MG capsule    NEURONTIN    270 capsule    Take 1 capsule (300 mg) by mouth 3 times daily    Fibromyalgia, Polyarthritis, Chronic bilateral low back pain without sciatica, Rheumatoid arthritis of multiple sites without rheumatoid factor (H), Encounter for long-term (current) use of medications       lidocaine 5 % Patch    LIDODERM    30 patch    Place 1-3 patches onto the skin every 24 hours Patient will call to fill    Polyarthritis, Chronic bilateral low back pain without sciatica, Fibromyalgia, Rheumatoid arthritis of multiple sites without rheumatoid factor (H), Encounter for long-term (current) use of medications       lisinopril 10 MG tablet    PRINIVIL/ZESTRIL    90 tablet    Take 1 tablet (10 mg) by mouth daily    Benign essential hypertension, Mild intermittent asthma without complication       metaxalone 800 MG tablet    SKELAXIN    9 tablet    Take 1 tablet (800 mg) by mouth 3 times daily as needed for moderate pain    Occipital neuralgia, unspecified laterality       methotrexate 2.5 MG tablet CHEMO     72 tablet    Take 6 tablets (15 mg) by mouth once a week 6 tabs by mouth once a week    Rheumatoid arthritis of multiple sites without rheumatoid factor (H), Fibromyalgia, Polyarthritis, Chronic bilateral low back pain without sciatica, Encounter for long-term (current) use of medications       pantoprazole 20 MG EC tablet    PROTONIX    180 tablet    Take 1 tablet (20 mg) by mouth  2 times daily    Abdominal pain, generalized       predniSONE 20 MG tablet    DELTASONE    10 tablet    Take 1 tablet (20 mg) by mouth daily    Chest wall pain       topiramate 25 MG tablet    TOPAMAX    120 tablet    Take one tab HS for week, then two tabs HS for one week, then one tab am and two tabs at HS for one week, then two tabs twice daily    Chronic daily headache       UNABLE TO FIND      250 mg by Oral or Feeding Tube route daily Magnesium Citrate        VITAMIN D (CHOLECALCIFEROL) PO      Take 2,000 Units by mouth daily
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
## 2018-11-13 DIAGNOSIS — M79.7 FIBROMYALGIA: ICD-10-CM

## 2018-11-13 DIAGNOSIS — M13.0 POLYARTHRITIS: ICD-10-CM

## 2018-11-13 DIAGNOSIS — M54.50 CHRONIC BILATERAL LOW BACK PAIN WITHOUT SCIATICA: ICD-10-CM

## 2018-11-13 DIAGNOSIS — G89.29 CHRONIC BILATERAL LOW BACK PAIN WITHOUT SCIATICA: ICD-10-CM

## 2018-11-13 DIAGNOSIS — Z79.899 ENCOUNTER FOR LONG-TERM (CURRENT) USE OF MEDICATIONS: ICD-10-CM

## 2018-11-13 DIAGNOSIS — M06.09 RHEUMATOID ARTHRITIS OF MULTIPLE SITES WITHOUT RHEUMATOID FACTOR (H): ICD-10-CM

## 2018-11-14 RX ORDER — FOLIC ACID 1 MG/1
1 TABLET ORAL DAILY
Qty: 90 TABLET | Refills: 3 | Status: SHIPPED | OUTPATIENT
Start: 2018-11-14 | End: 2019-01-17

## 2019-01-17 ENCOUNTER — OFFICE VISIT (OUTPATIENT)
Dept: RHEUMATOLOGY | Facility: CLINIC | Age: 54
End: 2019-01-17
Attending: INTERNAL MEDICINE
Payer: COMMERCIAL

## 2019-01-17 VITALS
DIASTOLIC BLOOD PRESSURE: 64 MMHG | TEMPERATURE: 98.4 F | HEART RATE: 74 BPM | OXYGEN SATURATION: 97 % | WEIGHT: 293 LBS | HEIGHT: 67 IN | SYSTOLIC BLOOD PRESSURE: 112 MMHG | BODY MASS INDEX: 45.99 KG/M2 | RESPIRATION RATE: 16 BRPM

## 2019-01-17 DIAGNOSIS — M06.09 RHEUMATOID ARTHRITIS OF MULTIPLE SITES WITHOUT RHEUMATOID FACTOR (H): Primary | ICD-10-CM

## 2019-01-17 DIAGNOSIS — M54.50 CHRONIC BILATERAL LOW BACK PAIN WITHOUT SCIATICA: ICD-10-CM

## 2019-01-17 DIAGNOSIS — Z79.899 ENCOUNTER FOR LONG-TERM (CURRENT) USE OF MEDICATIONS: ICD-10-CM

## 2019-01-17 DIAGNOSIS — M79.7 FIBROMYALGIA: ICD-10-CM

## 2019-01-17 DIAGNOSIS — G89.29 CHRONIC BILATERAL LOW BACK PAIN WITHOUT SCIATICA: ICD-10-CM

## 2019-01-17 DIAGNOSIS — M06.09 RHEUMATOID ARTHRITIS OF MULTIPLE SITES WITHOUT RHEUMATOID FACTOR (H): ICD-10-CM

## 2019-01-17 DIAGNOSIS — M13.0 POLYARTHRITIS: ICD-10-CM

## 2019-01-17 DIAGNOSIS — R79.89 ELEVATED SERUM CREATININE: ICD-10-CM

## 2019-01-17 LAB
ALBUMIN SERPL-MCNC: 3.3 G/DL (ref 3.4–5)
ALT SERPL W P-5'-P-CCNC: 26 U/L (ref 0–50)
AST SERPL W P-5'-P-CCNC: 16 U/L (ref 0–45)
CREAT SERPL-MCNC: 1.25 MG/DL (ref 0.52–1.04)
CRP SERPL-MCNC: 13.2 MG/L (ref 0–8)
ERYTHROCYTE [DISTWIDTH] IN BLOOD BY AUTOMATED COUNT: 13.6 % (ref 10–15)
GFR SERPL CREATININE-BSD FRML MDRD: 48 ML/MIN/{1.73_M2}
HCT VFR BLD AUTO: 40 % (ref 35–47)
HGB BLD-MCNC: 13 G/DL (ref 11.7–15.7)
MCH RBC QN AUTO: 30.8 PG (ref 26.5–33)
MCHC RBC AUTO-ENTMCNC: 32.5 G/DL (ref 31.5–36.5)
MCV RBC AUTO: 95 FL (ref 78–100)
PLATELET # BLD AUTO: 343 10E9/L (ref 150–450)
RBC # BLD AUTO: 4.22 10E12/L (ref 3.8–5.2)
WBC # BLD AUTO: 8.2 10E9/L (ref 4–11)

## 2019-01-17 PROCEDURE — G0463 HOSPITAL OUTPT CLINIC VISIT: HCPCS | Mod: ZF

## 2019-01-17 PROCEDURE — 84450 TRANSFERASE (AST) (SGOT): CPT | Performed by: INTERNAL MEDICINE

## 2019-01-17 PROCEDURE — 85027 COMPLETE CBC AUTOMATED: CPT | Performed by: INTERNAL MEDICINE

## 2019-01-17 PROCEDURE — 82040 ASSAY OF SERUM ALBUMIN: CPT | Performed by: INTERNAL MEDICINE

## 2019-01-17 PROCEDURE — 86140 C-REACTIVE PROTEIN: CPT | Performed by: INTERNAL MEDICINE

## 2019-01-17 PROCEDURE — 82565 ASSAY OF CREATININE: CPT | Performed by: INTERNAL MEDICINE

## 2019-01-17 PROCEDURE — 36415 COLL VENOUS BLD VENIPUNCTURE: CPT | Performed by: INTERNAL MEDICINE

## 2019-01-17 PROCEDURE — 84460 ALANINE AMINO (ALT) (SGPT): CPT | Performed by: INTERNAL MEDICINE

## 2019-01-17 RX ORDER — FOLIC ACID 1 MG/1
1 TABLET ORAL DAILY
Qty: 90 TABLET | Refills: 3 | Status: SHIPPED | OUTPATIENT
Start: 2019-01-17 | End: 2019-09-10

## 2019-01-17 RX ORDER — GABAPENTIN 300 MG/1
300 CAPSULE ORAL 3 TIMES DAILY
Qty: 270 CAPSULE | Refills: 1 | Status: SHIPPED | OUTPATIENT
Start: 2019-01-17 | End: 2019-09-10

## 2019-01-17 ASSESSMENT — PAIN SCALES - GENERAL: PAINLEVEL: SEVERE PAIN (7)

## 2019-01-17 ASSESSMENT — MIFFLIN-ST. JEOR: SCORE: 2042.18
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
## 2019-01-17 NOTE — NURSING NOTE
"Chief Complaint   Patient presents with     RECHECK     RA       Pt complains of increasing left knee placement. She is also experiencing numbness in the left side of her face. She states she had a full workup of it including stroke workup and MRI's. She is also getting headaches that have been worked up without any diagnosis. She would like to know if it's  a side effect of any medications she is taking.    Vital signs:                         Estimated body mass index is 48.11 kg/m  as calculated from the following:    Height as of 8/1/18: 1.702 m (5' 7\").    Weight as of 8/1/18: 139.3 kg (307 lb 3.2 oz).          Rea Lucas [facility]  1/17/2019 4:21 PM            "
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
## 2019-01-17 NOTE — PATIENT INSTRUCTIONS
Recommend appointment with Dr. Berry for follow up of high blood pressure and other medical problems and to discuss elevated Creatinine kidney test

## 2019-02-16 DIAGNOSIS — R79.89 ELEVATED SERUM CREATININE: ICD-10-CM

## 2019-02-16 LAB
CREAT SERPL-MCNC: 1.11 MG/DL (ref 0.52–1.04)
GFR SERPL CREATININE-BSD FRML MDRD: 56 ML/MIN/{1.73_M2}

## 2019-03-04 ENCOUNTER — TELEPHONE (OUTPATIENT)
Dept: RHEUMATOLOGY | Facility: CLINIC | Age: 54
End: 2019-03-04

## 2019-06-20 DIAGNOSIS — I10 BENIGN ESSENTIAL HYPERTENSION: ICD-10-CM

## 2019-06-20 DIAGNOSIS — F33.9 RECURRENT MAJOR DEPRESSIVE DISORDER, REMISSION STATUS UNSPECIFIED (H): ICD-10-CM

## 2019-06-20 DIAGNOSIS — F33.0 MAJOR DEPRESSIVE DISORDER, RECURRENT EPISODE, MILD (H): ICD-10-CM

## 2019-06-20 DIAGNOSIS — J45.20 MILD INTERMITTENT ASTHMA WITHOUT COMPLICATION: ICD-10-CM

## 2019-06-20 DIAGNOSIS — R10.84 ABDOMINAL PAIN, GENERALIZED: ICD-10-CM

## 2019-06-20 NOTE — TELEPHONE ENCOUNTER
[facility] Call Center    Phone Message    May a detailed message be left on voicemail: yes, Pt is wanting to get a call back in regards to if medication can be renewed before her apt.     Reason for Call: Medication Question or concern regarding medication   Prescription Clarification  Name of Medication:   -lisinopril (PRINIVIL/ZESTRIL) 10 MG tablet,  -buPROPion (WELLBUTRIN XL) 150 MG 24 hr tablet,  -pantoprazole (PROTONIX) 20 MG EC tablet,  -escitalopram (LEXAPRO) 20 MG table  tPrescribing Provider: Eveline Berry   Pharmacy: [facility]  [place] [id number] [facility]   What on the order needs clarification? Pt is wanting to know if she can get these medication renewed before her apt on 8/1/2019.      Action Taken: Message routed to:  [facility] pcc

## 2019-06-21 RX ORDER — PANTOPRAZOLE SODIUM 20 MG/1
20 TABLET, DELAYED RELEASE ORAL 2 TIMES DAILY
Qty: 180 TABLET | Refills: 3 | Status: SHIPPED | OUTPATIENT
Start: 2019-06-21 | End: 2020-10-01

## 2019-06-21 RX ORDER — BUPROPION HYDROCHLORIDE 150 MG/1
150 TABLET ORAL EVERY MORNING
Qty: 90 TABLET | Refills: 0 | Status: SHIPPED | OUTPATIENT
Start: 2019-06-21 | End: 2019-09-16

## 2019-06-21 RX ORDER — ESCITALOPRAM OXALATE 20 MG/1
20 TABLET ORAL DAILY
Qty: 90 TABLET | Refills: 0 | Status: SHIPPED | OUTPATIENT
Start: 2019-06-21 | End: 2019-09-16

## 2019-06-21 RX ORDER — LISINOPRIL 10 MG/1
10 TABLET ORAL DAILY
Qty: 90 TABLET | Refills: 0 | Status: SHIPPED | OUTPATIENT
Start: 2019-06-21 | End: 2019-09-16

## 2019-06-21 NOTE — TELEPHONE ENCOUNTER
Last Clinic Visit: 7/26/2018  [facility]  But overdue for annual physical.    Scheduling has been notified to contact the pt for appointment.  Overdue for labs - clinic notified.

## 2019-07-29 ENCOUNTER — PRE VISIT (OUTPATIENT)
Dept: INTERNAL MEDICINE | Facility: CLINIC | Age: 54
End: 2019-07-29

## 2019-07-29 NOTE — TELEPHONE ENCOUNTER
[facility]  Dept: [phone]     Patient: Elizabeth Rosenthal   : 1965  MRN: [id number]  Encounter: 19       Pre Visit Assessment    Health Maintenance Due   Topic Date Due     ASTHMA CONTROL TEST  1965     DEPRESSION ACTION PLAN  1965     HIV SCREENING  1979     ZOSTER IMMUNIZATION (1 of 2) 2014     MEDICARE ANNUAL WELLNESS VISIT  2018     ASTHMA ACTION PLAN  2018     PHQ-9  2018     MEHUL ASSESSMENT  2018     MAMMO SCREENING  2019     Chart Reviewed for Labs needed/Health Maintenance: Yes   If labs needed, orders placed:  No, Provider will address need for tests at the time of appointment.    Lab appointment scheduled:  No    Notes:  Patient confirmed they will attend appointment:  N/A  Appointment rescheduled?  N/A      Sirena Cotter at 5:04 PM on 2019
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
## 2019-08-01 ENCOUNTER — OFFICE VISIT (OUTPATIENT)
Dept: INTERNAL MEDICINE | Facility: CLINIC | Age: 54
End: 2019-08-01
Payer: COMMERCIAL

## 2019-08-01 VITALS
DIASTOLIC BLOOD PRESSURE: 74 MMHG | TEMPERATURE: 98.6 F | OXYGEN SATURATION: 97 % | BODY MASS INDEX: 47.09 KG/M2 | SYSTOLIC BLOOD PRESSURE: 114 MMHG | HEIGHT: 66 IN | WEIGHT: 293 LBS | HEART RATE: 52 BPM

## 2019-08-01 DIAGNOSIS — E66.01 MORBID OBESITY (H): ICD-10-CM

## 2019-08-01 DIAGNOSIS — I10 BENIGN ESSENTIAL HYPERTENSION: ICD-10-CM

## 2019-08-01 DIAGNOSIS — M25.50 MULTIPLE JOINT PAIN: ICD-10-CM

## 2019-08-01 DIAGNOSIS — Z12.39 BREAST CANCER SCREENING: ICD-10-CM

## 2019-08-01 DIAGNOSIS — R73.03 PREDIABETES: ICD-10-CM

## 2019-08-01 DIAGNOSIS — Z79.899 ENCOUNTER FOR LONG-TERM (CURRENT) USE OF MEDICATIONS: ICD-10-CM

## 2019-08-01 DIAGNOSIS — F33.1 MAJOR DEPRESSIVE DISORDER, RECURRENT EPISODE, MODERATE (H): ICD-10-CM

## 2019-08-01 DIAGNOSIS — Z00.00 ROUTINE HISTORY AND PHYSICAL EXAMINATION OF ADULT: Primary | ICD-10-CM

## 2019-08-01 DIAGNOSIS — M06.09 RHEUMATOID ARTHRITIS OF MULTIPLE SITES WITHOUT RHEUMATOID FACTOR (H): ICD-10-CM

## 2019-08-01 LAB
ALBUMIN SERPL-MCNC: 3.3 G/DL (ref 3.4–5)
ALT SERPL W P-5'-P-CCNC: 23 U/L (ref 0–50)
ANION GAP SERPL CALCULATED.3IONS-SCNC: 4 MMOL/L (ref 3–14)
AST SERPL W P-5'-P-CCNC: 14 U/L (ref 0–45)
BUN SERPL-MCNC: 17 MG/DL (ref 7–30)
CALCIUM SERPL-MCNC: 8.4 MG/DL (ref 8.5–10.1)
CHLORIDE SERPL-SCNC: 109 MMOL/L (ref 94–109)
CHOLEST SERPL-MCNC: 206 MG/DL
CO2 SERPL-SCNC: 30 MMOL/L (ref 20–32)
CREAT SERPL-MCNC: 1.12 MG/DL (ref 0.52–1.04)
CRP SERPL-MCNC: 11 MG/L (ref 0–8)
ERYTHROCYTE [DISTWIDTH] IN BLOOD BY AUTOMATED COUNT: 13.3 % (ref 10–15)
GFR SERPL CREATININE-BSD FRML MDRD: 55 ML/MIN/{1.73_M2}
GLUCOSE SERPL-MCNC: 97 MG/DL (ref 70–99)
HBA1C MFR BLD: 5.8 % (ref 0–5.6)
HCT VFR BLD AUTO: 40.5 % (ref 35–47)
HDLC SERPL-MCNC: 61 MG/DL
HGB BLD-MCNC: 13.1 G/DL (ref 11.7–15.7)
LDLC SERPL CALC-MCNC: 119 MG/DL
MCH RBC QN AUTO: 31 PG (ref 26.5–33)
MCHC RBC AUTO-ENTMCNC: 32.3 G/DL (ref 31.5–36.5)
MCV RBC AUTO: 96 FL (ref 78–100)
NONHDLC SERPL-MCNC: 146 MG/DL
PLATELET # BLD AUTO: 290 10E9/L (ref 150–450)
POTASSIUM SERPL-SCNC: 3.9 MMOL/L (ref 3.4–5.3)
RBC # BLD AUTO: 4.23 10E12/L (ref 3.8–5.2)
SODIUM SERPL-SCNC: 142 MMOL/L (ref 133–144)
TRIGL SERPL-MCNC: 133 MG/DL
WBC # BLD AUTO: 5.3 10E9/L (ref 4–11)

## 2019-08-01 RX ORDER — DULOXETIN HYDROCHLORIDE 30 MG/1
CAPSULE, DELAYED RELEASE ORAL
Qty: 60 CAPSULE | Refills: 1 | Status: SHIPPED | OUTPATIENT
Start: 2019-08-01 | End: 2019-09-16

## 2019-08-01 ASSESSMENT — ENCOUNTER SYMPTOMS
MUSCLE CRAMPS: 0
DYSURIA: 0
DOUBLE VISION: 0
NECK PAIN: 1
PANIC: 0
POLYDIPSIA: 0
EYE WATERING: 0
ARTHRALGIAS: 1
DECREASED CONCENTRATION: 1
DIZZINESS: 1
HALLUCINATIONS: 0
ALTERED TEMPERATURE REGULATION: 1
BLOATING: 0
NIGHT SWEATS: 0
FLANK PAIN: 1
CONSTIPATION: 0
EYE PAIN: 0
MUSCLE WEAKNESS: 0
ABDOMINAL PAIN: 1
BLOOD IN STOOL: 0
HEADACHES: 1
JOINT SWELLING: 1
LOSS OF CONSCIOUSNESS: 0
TINGLING: 1
INCREASED ENERGY: 0
WEIGHT GAIN: 0
DECREASED APPETITE: 0
SEIZURES: 0
MYALGIAS: 1
SPEECH CHANGE: 0
HEMATURIA: 0
STIFFNESS: 1
NAUSEA: 1
WEIGHT LOSS: 0
FATIGUE: 1
BACK PAIN: 1
DIARRHEA: 1
MEMORY LOSS: 1
FEVER: 0
VOMITING: 0
HEARTBURN: 1
INSOMNIA: 0
DISTURBANCES IN COORDINATION: 0
CHILLS: 0
NERVOUS/ANXIOUS: 0
DEPRESSION: 1

## 2019-08-01 ASSESSMENT — MIFFLIN-ST. JEOR: SCORE: 2005.87

## 2019-08-01 ASSESSMENT — ANXIETY QUESTIONNAIRES
7. FEELING AFRAID AS IF SOMETHING AWFUL MIGHT HAPPEN: NOT AT ALL
5. BEING SO RESTLESS THAT IT IS HARD TO SIT STILL: NOT AT ALL
GAD7 TOTAL SCORE: 0
6. BECOMING EASILY ANNOYED OR IRRITABLE: NOT AT ALL
4. TROUBLE RELAXING: NOT AT ALL
1. FEELING NERVOUS, ANXIOUS, OR ON EDGE: NOT AT ALL
3. WORRYING TOO MUCH ABOUT DIFFERENT THINGS: NOT AT ALL
GAD7 TOTAL SCORE: 0
2. NOT BEING ABLE TO STOP OR CONTROL WORRYING: NOT AT ALL
7. FEELING AFRAID AS IF SOMETHING AWFUL MIGHT HAPPEN: NOT AT ALL
GAD7 TOTAL SCORE: 0

## 2019-08-01 ASSESSMENT — PATIENT HEALTH QUESTIONNAIRE - PHQ9
SUM OF ALL RESPONSES TO PHQ QUESTIONS 1-9: 10
SUM OF ALL RESPONSES TO PHQ QUESTIONS 1-9: 10

## 2019-08-01 ASSESSMENT — PAIN SCALES - GENERAL: PAINLEVEL: SEVERE PAIN (6)

## 2019-08-01 NOTE — NURSING NOTE
Chief Complaint   Patient presents with     Physical     pt here for physical       Sirena Cotter CMA at 9:03 AM on 8/1/2019.

## 2019-08-01 NOTE — PATIENT INSTRUCTIONS
[facility] Medication Refill Request Information:  * Please contact your pharmacy regarding ANY request for medication refills.  ** [facility] Prescription Fax = [phone]  * Please allow 3 business days for routine medication refills.  * Please allow 5 business days for controlled substance medication refills.     [facility] Test Result notification information:  *You will be notified with in 7-10 days of your appointment day regarding the results of your test.  If you are on MyChart you will be notified as soon as the provider has reviewed the results and signed off on them.    [facility]: [phone]

## 2019-08-02 ASSESSMENT — ANXIETY QUESTIONNAIRES: GAD7 TOTAL SCORE: 0

## 2019-08-02 ASSESSMENT — PATIENT HEALTH QUESTIONNAIRE - PHQ9: SUM OF ALL RESPONSES TO PHQ QUESTIONS 1-9: 10

## 2019-08-05 ENCOUNTER — TELEPHONE (OUTPATIENT)
Dept: INTERNAL MEDICINE | Facility: CLINIC | Age: 54
End: 2019-08-05

## 2019-08-05 NOTE — TELEPHONE ENCOUNTER
[facility] Call Center    Phone Message    May a detailed message be left on voicemail: yes    Reason for Call: Other: PT's diclofenac (VOLTAREN) 1 % topical gel is not covered.  Please follow up with alternatives at [phone] Ref# [id number]     Action Taken: Message routed to:  [facility] ([facility]): reina

## 2019-08-06 NOTE — TELEPHONE ENCOUNTER
[facility] called the PA team in regards to the Voltaren gel. They wanted to find out if the provider was going to have the patient try one of the alternatives or initiate a PA. We informed them that per the note below, the patient was advised to use Aspercream at this time. They will profile the script for Voltaren.

## 2019-08-06 NOTE — TELEPHONE ENCOUNTER
Called the patient to advise that Over the counter Aspercream is an alternative to Voltaren.   She understood. Wanda Russo Paramedic on 8/6/2019 at 8:51 AM

## 2019-08-07 DIAGNOSIS — M13.0 POLYARTHRITIS: ICD-10-CM

## 2019-08-07 DIAGNOSIS — Z79.899 ENCOUNTER FOR LONG-TERM (CURRENT) USE OF MEDICATIONS: ICD-10-CM

## 2019-08-07 DIAGNOSIS — M79.7 FIBROMYALGIA: ICD-10-CM

## 2019-08-07 DIAGNOSIS — M54.50 CHRONIC BILATERAL LOW BACK PAIN WITHOUT SCIATICA: ICD-10-CM

## 2019-08-07 DIAGNOSIS — M06.09 RHEUMATOID ARTHRITIS OF MULTIPLE SITES WITHOUT RHEUMATOID FACTOR (H): ICD-10-CM

## 2019-08-07 DIAGNOSIS — G89.29 CHRONIC BILATERAL LOW BACK PAIN WITHOUT SCIATICA: ICD-10-CM

## 2019-08-09 DIAGNOSIS — G89.29 CHRONIC BILATERAL LOW BACK PAIN WITHOUT SCIATICA: Primary | ICD-10-CM

## 2019-08-09 DIAGNOSIS — M79.7 FIBROMYALGIA: ICD-10-CM

## 2019-08-09 DIAGNOSIS — M13.0 POLYARTHRITIS: ICD-10-CM

## 2019-08-09 DIAGNOSIS — M06.09 RHEUMATOID ARTHRITIS OF MULTIPLE SITES WITHOUT RHEUMATOID FACTOR (H): ICD-10-CM

## 2019-08-09 DIAGNOSIS — M54.50 CHRONIC BILATERAL LOW BACK PAIN WITHOUT SCIATICA: Primary | ICD-10-CM

## 2019-08-09 DIAGNOSIS — Z79.899 ENCOUNTER FOR LONG-TERM (CURRENT) USE OF MEDICATIONS: ICD-10-CM

## 2019-08-09 NOTE — TELEPHONE ENCOUNTER
METHOTREXATE TAB 2.5MG      Last Written Prescription Date:  1-17-19  Last Fill Quantity: 72,   # refills: 1  Last Office Visit: 1-17-19  Future Office visit:  8-22-19    CBC RESULTS:   Recent Labs   Lab Test 08/01/19  0836   WBC 5.3   RBC 4.23   HGB 13.1   HCT 40.5   MCV 96   MCH 31.0   MCHC 32.3   RDW 13.3          Creatinine   Date Value Ref Range Status   08/01/2019 1.12 (H) 0.52 - 1.04 mg/dL Final   ]    Liver Function Studies -   Recent Labs   Lab Test 08/01/19  0836  06/16/17  0719   PROTTOTAL  --   --  6.8   ALBUMIN 3.3*   < > 3.4   BILITOTAL  --   --  0.4   ALKPHOS  --   --  85   AST 14   < > 14   ALT 23   < > 22    < > = values in this interval not displayed.       Routing refill request to provider for review/approval because:  Per protocol.

## 2019-08-12 RX ORDER — LIDOCAINE 50 MG/G
PATCH TOPICAL
Qty: 30 PATCH | Refills: 1 | Status: SHIPPED | OUTPATIENT
Start: 2019-08-12 | End: 2019-09-10

## 2019-08-19 ENCOUNTER — DOCUMENTATION ONLY (OUTPATIENT)
Dept: CARE COORDINATION | Facility: CLINIC | Age: 54
End: 2019-08-19

## 2019-09-10 ENCOUNTER — OFFICE VISIT (OUTPATIENT)
Dept: RHEUMATOLOGY | Facility: CLINIC | Age: 54
End: 2019-09-10
Attending: INTERNAL MEDICINE
Payer: COMMERCIAL

## 2019-09-10 VITALS
WEIGHT: 293 LBS | TEMPERATURE: 98.9 F | OXYGEN SATURATION: 95 % | SYSTOLIC BLOOD PRESSURE: 124 MMHG | HEART RATE: 89 BPM | DIASTOLIC BLOOD PRESSURE: 86 MMHG | BODY MASS INDEX: 49.86 KG/M2

## 2019-09-10 DIAGNOSIS — Z79.899 ENCOUNTER FOR LONG-TERM (CURRENT) USE OF MEDICATIONS: ICD-10-CM

## 2019-09-10 DIAGNOSIS — G89.29 CHRONIC BILATERAL LOW BACK PAIN WITHOUT SCIATICA: ICD-10-CM

## 2019-09-10 DIAGNOSIS — M79.7 FIBROMYALGIA: ICD-10-CM

## 2019-09-10 DIAGNOSIS — M13.0 POLYARTHRITIS: ICD-10-CM

## 2019-09-10 DIAGNOSIS — M06.09 RHEUMATOID ARTHRITIS OF MULTIPLE SITES WITHOUT RHEUMATOID FACTOR (H): ICD-10-CM

## 2019-09-10 DIAGNOSIS — M54.50 CHRONIC BILATERAL LOW BACK PAIN WITHOUT SCIATICA: ICD-10-CM

## 2019-09-10 PROCEDURE — G0463 HOSPITAL OUTPT CLINIC VISIT: HCPCS | Mod: ZF

## 2019-09-10 RX ORDER — GABAPENTIN 300 MG/1
300 CAPSULE ORAL 3 TIMES DAILY
Qty: 270 CAPSULE | Refills: 1 | Status: SHIPPED | OUTPATIENT
Start: 2019-09-10 | End: 2019-09-10

## 2019-09-10 RX ORDER — LIDOCAINE 50 MG/G
PATCH TOPICAL
Qty: 30 PATCH | Refills: 1 | Status: SHIPPED | OUTPATIENT
Start: 2019-09-10 | End: 2020-07-26

## 2019-09-10 RX ORDER — FOLIC ACID 1 MG/1
1 TABLET ORAL DAILY
Qty: 90 TABLET | Refills: 3 | Status: SHIPPED | OUTPATIENT
Start: 2019-09-10 | End: 2020-05-29

## 2019-09-10 RX ORDER — GABAPENTIN 300 MG/1
300 CAPSULE ORAL 3 TIMES DAILY
Qty: 270 CAPSULE | Refills: 3 | Status: SHIPPED | OUTPATIENT
Start: 2019-09-10 | End: 2020-07-26

## 2019-09-10 ASSESSMENT — PAIN SCALES - GENERAL: PAINLEVEL: MODERATE PAIN (5)

## 2019-09-10 NOTE — NURSING NOTE
Chief Complaint   Patient presents with     RECHECK     6 mos follow up         /86 (BP Location: Right arm, Patient Position: Sitting, Cuff Size: Adult Large)   Pulse 89   Temp 98.9  F (37.2  C)   Wt 141.4 kg (311 lb 11.2 oz)   SpO2 95%   BMI 49.86 kg/m        Nakul Rosenthal, EMT

## 2019-09-10 NOTE — PROGRESS NOTES
[facility]  Rheumatology Clinic  Denilson Kelley MD  09/10/2019     Name: Elizabeth Rosenthal  MRN: [id number]  Age: 54 year old  : 1965  Referring provider: Referred Self    Assessment and Plan:  # Seronegative rheumatoid arthritis  Patient relates significant benefit from methotrexate in terms of reduced joint pain and swelling, although persistent left greater than right knee pain and low back pain plague her on a daily basis. Exam shows no inflammatory joint changes. Blood work on 2019 showed creatinine, liver function, and complete blood count all negative or normal. CRP was slightly elevated at 11. Hemoglobin A1c was 5.8.     Inflammatory arthropathy is improved, but persistent arthralgia and stiffness and elevated CRP suggest a smoldering inflammatory component is present. I recommended increasing methotrexate from 15 to 20 mg weekly. While on the drug, she should obtain every three month LFTs, creatinine, and CBC. She may use folic acid 1 mg daily to prevent mucosal side effects.     # Bilateral knee pain:  Patient has been offered total knee replacement surgery on the left pending weight loss. Concentrated efforts to lose giana have not borne fruit in the last several year.  I do not think that corticosteroid or viscosupplementation injections would benefit her. We discussed isometric quadriceps strengthening exercises today. I recommend performance of these exercises twice daily in order to provide more support to arthritis knees. She may use Cymbalta 60 mg for pain control as well as gabapentin. Return to clinic in five months.     Orders:  - folic acid (FOLVITE) 1 MG tablet  Dispense: 90 tablet; Refill: 3  - methotrexate 2.5 MG tablet  Dispense: 96 tablet; Refill: 3  - Creatinine  - ALT  - AST  - CBC with platelets  - CRP inflammation  - gabapentin (NEURONTIN) 300 MG capsule  Dispense: 270 capsule; Refill: 1  - lidocaine (LIDODERM) 5 % patch  Dispense: 30 patch; Refill: 1     Follow-up: Return in  about 5 months (around 2/10/2020).     HPI:   Elizabeth Rosenthal is a 54 year old female who last saw Dr. Ribeiro on 01/17/2019 for follow up of seronegative rheumatoid arthritis as well as fibromyalgia and osteoarthritis of knees and spine. Ongoing joint pain at that time was thought due to soft issue pain and osteoarthritis. Methotrexate monotherapy and gabapentin were continued.     The patient reports that she historically has joint pain in her feet, ankles, hips, back, knees, and hands. She states that these areas hurt bilaterally, though her left hand seems to be more swollen. She explains that she has left knee pain all the time, though this is exacerbated with weightbearing. She notes that her right knee is becoming painful as well. She states that corticosteroid injections no longer provide relief. She has been told that total knee replacement is warranted, however, orthopedics does not want to replace her knee until she loses weight.    She endorses headaches down the right side of her face that feel different from her typical migraines. She underwent a stroke work-up in the emergency department for facial numbness and her headache has been present since that time. She states that she had 4 MRIs last year which she believes may be allergy related. She also states that she has a strange bump on the top of her head that had slowly increased in side. She explains that this feels like the rheumatoid arthritis bumps she used to have.     She can only physically handle water aerobics and swimming. Her only physical activity involves seated gardening and involves a lot of digging with a shovel. She has not worked with a physical therapist on quadriceps strengthening.     In the last year, she joined a program that brings produce to her home and she has been eating much more fruit and vegetables, though she has not lost any weight. She states that she has tried eliminating all sugar, though she is not good at this. She  did meet with a dietician and states they had a hard time as she has so many food allergies. She states that she can consume pop tarts, muffins, and mochas because they does not have any yeast or other products that she is allergic to. She had a friend that had bariatric surgery and had a negative experience which makes the patient somewhat afraid to pursue this surgery.    She has been taking methotrexate and reports this provides her significant relief. She states that she cannot get out of bed without methotrexate. She explains that she does have cyclical joint symptoms such as pain as she reaches time for injections. She denies any mouth ulcers.    She has stiffness in her hands after activities like writing. She denies morning stiffness in her hands.     Review of Systems:   Pertinent items are noted in HPI or as below, remainder of complete ROS is negative.      No recent problems with hearing or vision. No swallowing problems.   No breathing difficulty, shortness of breath, coughing, or wheezing  No chest pain or palpitations  No heart burn, indigestion, abdominal pain, nausea, vomiting, diarrhea  No urination problems, no bloody, cloudy urine, no dysuria  No numbing, tingling, weakness  No headaches or confusion  No rashes. No easy bleeding or bruising.     Active Medications:      aspirin 81 MG tablet, Take 1 tablet (81 mg) by mouth daily, Disp: 30 tablet, Rfl: OTC     cetirizine (ZYRTEC) 10 MG tablet, Take 10 mg by mouth daily, Disp: , Rfl:      DULoxetine (CYMBALTA) 30 MG capsule, Take one capsule by mouth once daily for 2 weeks, then increase to one capsule twice daily, Disp: 60 capsule, Rfl: 1     folic acid (FOLVITE) 1 MG tablet, Take 1 tablet (1 mg) by mouth daily, Disp: 90 tablet, Rfl: 3     gabapentin (NEURONTIN) 300 MG capsule, Take 1 capsule (300 mg) by mouth 3 times daily, Disp: 270 capsule, Rfl: 3     lidocaine (LIDODERM) 5 % patch, Apply 1-3 patches onto the skin every 24 hours as needed, Disp:  30 patch, Rfl: 1     lisinopril (PRINIVIL/ZESTRIL) 10 MG tablet, Take 1 tablet (10 mg) by mouth daily, Disp: 90 tablet, Rfl: 0     methotrexate 2.5 MG tablet, Take 8 tablets (20 mg) by mouth every 7 days, Disp: 96 tablet, Rfl: 3     pantoprazole (PROTONIX) 20 MG EC tablet, Take 1 tablet (20 mg) by mouth 2 times daily, Disp: 180 tablet, Rfl: 3     albuterol (PROAIR HFA/PROVENTIL HFA/VENTOLIN HFA) 108 (90 BASE) MCG/ACT Inhaler, Inhale 1 puff into the lungs every 6 hours as needed for shortness of breath / dyspnea or wheezing (Patient taking differently: Inhale 1 puff into the lungs every 6 hours as needed for shortness of breath / dyspnea or wheezing (PRN) ), Disp: 3 Inhaler, Rfl: 3     buPROPion (WELLBUTRIN XL) 150 MG 24 hr tablet, Take 1 tablet (150 mg) by mouth every morning (Patient not taking: Reported on 9/10/2019), Disp: 90 tablet, Rfl: 0     diclofenac (VOLTAREN) 1 % topical gel, Place 2 g onto the skin 4 times daily (Patient not taking: Reported on 9/10/2019), Disp: 100 g, Rfl: 1     escitalopram (LEXAPRO) 20 MG tablet, Take 1 tablet (20 mg) by mouth daily (Patient not taking: Reported on 9/10/2019), Disp: 90 tablet, Rfl: 0     fluticasone (FLOVENT HFA) 110 MCG/ACT Inhaler, Inhale 2 puffs into the lungs 2 times daily (Patient not taking: Reported on 9/10/2019), Disp: 3 Inhaler, Rfl: 3     VITAMIN D, CHOLECALCIFEROL, PO, Take 2,000 Units by mouth daily, Disp: , Rfl:      Allergies:   Nuts   Celery Oil   Codeine   Contrast Dye   Food   Potatoes  Peanuts  Cantaloupe  Lettuce  Oat   Penicillins   Rice   Shellfish-Derived Products   Wheat Bran   Yeast       Past Medical History:  Allergic rhinitis    Asthma   Chronic pelvic pain in female   Depression   Depressive disorder   Gastroesophageal reflux disease   Head injury   Hyperlipidemia   Hypertension   Immunosuppression    Migraines   Morbid obesity   Obstructive sleep apnea   Reduced vision   Transient ischemic attack  Vertebral artery dissection  Iliotibial  band syndrome  Right knee pain  Lumbar radicular pain  Rheumatoid arthritis of multiple sites without rheumatoid factor  Fibromyalgia  Arthritis of knee, left  Creatinine elevation     Past Surgical History:  Arthroscopy knee bilateral    Biopsy lymph node cervical    Colonoscopy 7/26/2017  Lymph node biopsy 2010  Genitourinary surgery, fallopian tube cyst 1994 and tubal ligation 1999  Lymph node removed from neck for biopsy 2011   Hysteroscopy    Tonsillectomy, bilateral 1/3/2018    Family History:    The patient's family history includes Asthma in her paternal grandmother; Breast Cancer in her mother; Cancer in her mother; Cerebrovascular Disease in her paternal grandmother; Heart Disease in her father; Mental Illness in her father; Migraines in her daughter and son; Substance Abuse in her father.    Social History:  The patient reports that she has never smoked. She has never used smokeless tobacco. She reports that she drinks alcohol. She reports that she does not use drugs.   PCP: Eveline Berry  Marital Status: Single     Physical Exam:   /86 (BP Location: Right arm, Patient Position: Sitting, Cuff Size: Adult Large)   Pulse 89   Temp 98.9  F (37.2  C)   Wt 141.4 kg (311 lb 11.2 oz)   SpO2 95%   BMI 49.86 kg/m     Wt Readings from Last 4 Encounters:   09/10/19 141.4 kg (311 lb 11.2 oz)   08/01/19 138.9 kg (306 lb 4.8 oz)   01/17/19 141.7 kg (312 lb 8 oz)   08/01/18 139.3 kg (307 lb 3.2 oz)     Constitutional: Well-developed, appearing stated age; cooperative  Eyes: Normal EOM, PERRLA, vision, conjunctiva, sclera  ENT: Normal external ears, nose, hearing, lips, teeth, gums, throat. No mucous membrane lesions, normal saliva pool  Neck: No mass or thyroid enlargement  Resp: Lungs clear to auscultation, nl to palpation  CV: RRR, no murmurs, rubs or gallops, no edema  GI: No ABD mass or tenderness, no HSM  : Not tested  Lymph: No cervical, supraclavicular, inguinal or epitrochlear nodes  MS: The  TMJ, neck, shoulder, elbow, wrist, MCP/PIP/DIP, spine, hip, knee, ankle, and foot MTP/IP joints were examined and found normal. No active synovitis or altered joint anatomy. Full joint ROM. Normal  strength. No dactylitis, tenosynovitis, enthesopathy.  Skin: No nail pitting, alopecia, rash, nodules or lesions  Neuro: Normal cranial nerves, strength, sensation, DTRs.   Psych: Normal judgement, orientation, memory, affect.     Laboratory:   RHEUM RESULTS Latest Ref Rng & Units 1/17/2019 2/16/2019 8/1/2019   CRP, INFLAMMATION 0.0 - 8.0 mg/L 13.2(H) - 11.0(H)   AST 0 - 45 U/L 16 - 14   ALT 0 - 50 U/L 26 - 23   ALBUMIN 3.4 - 5.0 g/dL 3.3(L) - 3.3(L)   WBC 4.0 - 11.0 10e9/L 8.2 - 5.3   RBC 3.8 - 5.2 10e12/L 4.22 - 4.23   HGB 11.7 - 15.7 g/dL 13.0 - 13.1   HCT 35.0 - 47.0 % 40.0 - 40.5   MCV 78 - 100 fl 95 - 96   MCHC 31.5 - 36.5 g/dL 32.5 - 32.3   RDW 10.0 - 15.0 % 13.6 - 13.3    - 450 10e9/L 343 - 290   CREATININE 0.52 - 1.04 mg/dL 1.25(H) 1.11(H) 1.12(H)   GFR ESTIMATE, IF BLACK >60 mL/min/[1.73:m2] 56(L) 65 64   GFR ESTIMATE >60 mL/min/[1.73:m2] 48(L) 56(L) 55(L)   HEPATITIS C ANTIBODY NR - - -     AMAN interpretation   Date Value Ref Range Status   04/28/2018 Negative NEG^Negative Final     Comment:                                        Reference range:  <1:40  NEGATIVE  1:40 - 1:80  BORDERLINE POSITIVE  >1:80 POSITIVE       Scribe Disclosure:  Rogelio AL, am serving as a scribe to document services personally performed by Denilson Kelley MD at this visit, based upon the provider's statements to me. All documentation has been reviewed by the aforementioned provider prior to being entered into the official medical record.

## 2019-09-10 NOTE — LETTER
9/10/2019      RE: Elizabeth Rosenthal  [address] [id number]       [facility]  Rheumatology Clinic  Denilson Kelley MD  09/10/2019     Name: Elizabeth Rosenthal  MRN: [id number]  Age: 54 year old  : 1965  Referring provider: Referred Self    Assessment and Plan:  # Seronegative rheumatoid arthritis  Patient relates significant benefit from methotrexate in terms of reduced joint pain and swelling, although persistent left greater than right knee pain and low back pain plague her on a daily basis. Exam shows no inflammatory joint changes. Blood work on 2019 showed creatinine, liver function, and complete blood count all negative or normal. CRP was slightly elevated at 11. Hemoglobin A1c was 5.8.     Inflammatory arthropathy is improved, but persistent arthralgia and stiffness and elevated CRP suggest a smoldering inflammatory component is present. I recommended increasing methotrexate from 15 to 20 mg weekly. While on the drug, she should obtain every three month LFTs, creatinine, and CBC. She may use folic acid 1 mg daily to prevent mucosal side effects.     # Bilateral knee pain:  Patient has been offered total knee replacement surgery on the left pending weight loss. Concentrated efforts to lose giana have not borne fruit in the last several year.  I do not think that corticosteroid or viscosupplementation injections would benefit her. We discussed isometric quadriceps strengthening exercises today. I recommend performance of these exercises twice daily in order to provide more support to arthritis knees. She may use Cymbalta 60 mg for pain control as well as gabapentin. Return to clinic in five months.     Orders:  - folic acid (FOLVITE) 1 MG tablet  Dispense: 90 tablet; Refill: 3  - methotrexate 2.5 MG tablet  Dispense: 96 tablet; Refill: 3  - Creatinine  - ALT  - AST  - CBC with platelets  - CRP inflammation  - gabapentin (NEURONTIN) 300 MG capsule  Dispense: 270 capsule; Refill: 1  -  lidocaine (LIDODERM) 5 % patch  Dispense: 30 patch; Refill: 1     Follow-up: Return in about 5 months (around 2/10/2020).     HPI:   Elizabeth Rosenthal is a 54 year old female who last saw Dr. Ribeiro on 01/17/2019 for follow up of seronegative rheumatoid arthritis as well as fibromyalgia and osteoarthritis of knees and spine. Ongoing joint pain at that time was thought due to soft issue pain and osteoarthritis. Methotrexate monotherapy and gabapentin were continued.     The patient reports that she historically has joint pain in her feet, ankles, hips, back, knees, and hands. She states that these areas hurt bilaterally, though her left hand seems to be more swollen. She explains that she has left knee pain all the time, though this is exacerbated with weightbearing. She notes that her right knee is becoming painful as well. She states that corticosteroid injections no longer provide relief. She has been told that total knee replacement is warranted, however, orthopedics does not want to replace her knee until she loses weight.    She endorses headaches down the right side of her face that feel different from her typical migraines. She underwent a stroke work-up in the emergency department for facial numbness and her headache has been present since that time. She states that she had 4 MRIs last year which she believes may be allergy related. She also states that she has a strange bump on the top of her head that had slowly increased in side. She explains that this feels like the rheumatoid arthritis bumps she used to have.     She can only physically handle water aerobics and swimming. Her only physical activity involves seated gardening and involves a lot of digging with a shovel. She has not worked with a physical therapist on quadriceps strengthening.     In the last year, she joined a program that brings produce to her home and she has been eating much more fruit and vegetables, though she has not lost any weight.  She states that she has tried eliminating all sugar, though she is not good at this. She did meet with a dietician and states they had a hard time as she has so many food allergies. She states that she can consume pop tarts, muffins, and mochas because they does not have any yeast or other products that she is allergic to. She had a friend that had bariatric surgery and had a negative experience which makes the patient somewhat afraid to pursue this surgery.    She has been taking methotrexate and reports this provides her significant relief. She states that she cannot get out of bed without methotrexate. She explains that she does have cyclical joint symptoms such as pain as she reaches time for injections. She denies any mouth ulcers.    She has stiffness in her hands after activities like writing. She denies morning stiffness in her hands.     Review of Systems:   Pertinent items are noted in HPI or as below, remainder of complete ROS is negative.      No recent problems with hearing or vision. No swallowing problems.   No breathing difficulty, shortness of breath, coughing, or wheezing  No chest pain or palpitations  No heart burn, indigestion, abdominal pain, nausea, vomiting, diarrhea  No urination problems, no bloody, cloudy urine, no dysuria  No numbing, tingling, weakness  No headaches or confusion  No rashes. No easy bleeding or bruising.     Active Medications:      aspirin 81 MG tablet, Take 1 tablet (81 mg) by mouth daily, Disp: 30 tablet, Rfl: OTC     cetirizine (ZYRTEC) 10 MG tablet, Take 10 mg by mouth daily, Disp: , Rfl:      DULoxetine (CYMBALTA) 30 MG capsule, Take one capsule by mouth once daily for 2 weeks, then increase to one capsule twice daily, Disp: 60 capsule, Rfl: 1     folic acid (FOLVITE) 1 MG tablet, Take 1 tablet (1 mg) by mouth daily, Disp: 90 tablet, Rfl: 3     gabapentin (NEURONTIN) 300 MG capsule, Take 1 capsule (300 mg) by mouth 3 times daily, Disp: 270 capsule, Rfl: 3      lidocaine (LIDODERM) 5 % patch, Apply 1-3 patches onto the skin every 24 hours as needed, Disp: 30 patch, Rfl: 1     lisinopril (PRINIVIL/ZESTRIL) 10 MG tablet, Take 1 tablet (10 mg) by mouth daily, Disp: 90 tablet, Rfl: 0     methotrexate 2.5 MG tablet, Take 8 tablets (20 mg) by mouth every 7 days, Disp: 96 tablet, Rfl: 3     pantoprazole (PROTONIX) 20 MG EC tablet, Take 1 tablet (20 mg) by mouth 2 times daily, Disp: 180 tablet, Rfl: 3     albuterol (PROAIR HFA/PROVENTIL HFA/VENTOLIN HFA) 108 (90 BASE) MCG/ACT Inhaler, Inhale 1 puff into the lungs every 6 hours as needed for shortness of breath / dyspnea or wheezing (Patient taking differently: Inhale 1 puff into the lungs every 6 hours as needed for shortness of breath / dyspnea or wheezing (PRN) ), Disp: 3 Inhaler, Rfl: 3     buPROPion (WELLBUTRIN XL) 150 MG 24 hr tablet, Take 1 tablet (150 mg) by mouth every morning (Patient not taking: Reported on 9/10/2019), Disp: 90 tablet, Rfl: 0     diclofenac (VOLTAREN) 1 % topical gel, Place 2 g onto the skin 4 times daily (Patient not taking: Reported on 9/10/2019), Disp: 100 g, Rfl: 1     escitalopram (LEXAPRO) 20 MG tablet, Take 1 tablet (20 mg) by mouth daily (Patient not taking: Reported on 9/10/2019), Disp: 90 tablet, Rfl: 0     fluticasone (FLOVENT HFA) 110 MCG/ACT Inhaler, Inhale 2 puffs into the lungs 2 times daily (Patient not taking: Reported on 9/10/2019), Disp: 3 Inhaler, Rfl: 3     VITAMIN D, CHOLECALCIFEROL, PO, Take 2,000 Units by mouth daily, Disp: , Rfl:      Allergies:   Nuts   Celery Oil   Codeine   Contrast Dye   Food   Potatoes  Peanuts  Cantaloupe  Lettuce  Oat   Penicillins   Rice   Shellfish-Derived Products   Wheat Bran   Yeast       Past Medical History:  Allergic rhinitis    Asthma   Chronic pelvic pain in female   Depression   Depressive disorder   Gastroesophageal reflux disease   Head injury   Hyperlipidemia   Hypertension   Immunosuppression    Migraines   Morbid obesity   Obstructive sleep  apnea   Reduced vision   Transient ischemic attack  Vertebral artery dissection  Iliotibial band syndrome  Right knee pain  Lumbar radicular pain  Rheumatoid arthritis of multiple sites without rheumatoid factor  Fibromyalgia  Arthritis of knee, left  Creatinine elevation     Past Surgical History:  Arthroscopy knee bilateral    Biopsy lymph node cervical    Colonoscopy 7/26/2017  Lymph node biopsy 2010  Genitourinary surgery, fallopian tube cyst 1994 and tubal ligation 1999  Lymph node removed from neck for biopsy 2011   Hysteroscopy    Tonsillectomy, bilateral 1/3/2018    Family History:    The patient's family history includes Asthma in her paternal grandmother; Breast Cancer in her mother; Cancer in her mother; Cerebrovascular Disease in her paternal grandmother; Heart Disease in her father; Mental Illness in her father; Migraines in her daughter and son; Substance Abuse in her father.    Social History:  The patient reports that she has never smoked. She has never used smokeless tobacco. She reports that she drinks alcohol. She reports that she does not use drugs.   PCP: Eveline Berry  Marital Status: Single     Physical Exam:   /86 (BP Location: Right arm, Patient Position: Sitting, Cuff Size: Adult Large)   Pulse 89   Temp 98.9  F (37.2  C)   Wt 141.4 kg (311 lb 11.2 oz)   SpO2 95%   BMI 49.86 kg/m      Wt Readings from Last 4 Encounters:   09/10/19 141.4 kg (311 lb 11.2 oz)   08/01/19 138.9 kg (306 lb 4.8 oz)   01/17/19 141.7 kg (312 lb 8 oz)   08/01/18 139.3 kg (307 lb 3.2 oz)     Constitutional: Well-developed, appearing stated age; cooperative  Eyes: Normal EOM, PERRLA, vision, conjunctiva, sclera  ENT: Normal external ears, nose, hearing, lips, teeth, gums, throat. No mucous membrane lesions, normal saliva pool  Neck: No mass or thyroid enlargement  Resp: Lungs clear to auscultation, nl to palpation  CV: RRR, no murmurs, rubs or gallops, no edema  GI: No ABD mass or tenderness, no HSM  :  Not tested  Lymph: No cervical, supraclavicular, inguinal or epitrochlear nodes  MS: The TMJ, neck, shoulder, elbow, wrist, MCP/PIP/DIP, spine, hip, knee, ankle, and foot MTP/IP joints were examined and found normal. No active synovitis or altered joint anatomy. Full joint ROM. Normal  strength. No dactylitis, tenosynovitis, enthesopathy.  Skin: No nail pitting, alopecia, rash, nodules or lesions  Neuro: Normal cranial nerves, strength, sensation, DTRs.   Psych: Normal judgement, orientation, memory, affect.     Laboratory:   RHEUM RESULTS Latest Ref Rng & Units 1/17/2019 2/16/2019 8/1/2019   CRP, INFLAMMATION 0.0 - 8.0 mg/L 13.2(H) - 11.0(H)   AST 0 - 45 U/L 16 - 14   ALT 0 - 50 U/L 26 - 23   ALBUMIN 3.4 - 5.0 g/dL 3.3(L) - 3.3(L)   WBC 4.0 - 11.0 10e9/L 8.2 - 5.3   RBC 3.8 - 5.2 10e12/L 4.22 - 4.23   HGB 11.7 - 15.7 g/dL 13.0 - 13.1   HCT 35.0 - 47.0 % 40.0 - 40.5   MCV 78 - 100 fl 95 - 96   MCHC 31.5 - 36.5 g/dL 32.5 - 32.3   RDW 10.0 - 15.0 % 13.6 - 13.3    - 450 10e9/L 343 - 290   CREATININE 0.52 - 1.04 mg/dL 1.25(H) 1.11(H) 1.12(H)   GFR ESTIMATE, IF BLACK >60 mL/min/[1.73:m2] 56(L) 65 64   GFR ESTIMATE >60 mL/min/[1.73:m2] 48(L) 56(L) 55(L)   HEPATITIS C ANTIBODY NR - - -     AMAN interpretation   Date Value Ref Range Status   04/28/2018 Negative NEG^Negative Final     Comment:                                        Reference range:  <1:40  NEGATIVE  1:40 - 1:80  BORDERLINE POSITIVE  >1:80 POSITIVE       Scribe Disclosure:  Rogelio AL, am serving as a scribe to document services personally performed by Denilson Kelley MD at this visit, based upon the provider's statements to me. All documentation has been reviewed by the aforementioned provider prior to being entered into the official medical record.    Denilson Kelley MD

## 2019-09-10 NOTE — LETTER
9/10/2019       RE: Elizabeth Rosenthal  [address] [facility] [id number]     Dear Colleague,    Thank you for referring your patient, Elizabeth Rosenthal, to the [facility] RHEUMATOLOGY at [facility]. Please see a copy of my visit note below.    [facility]  Rheumatology Clinic  Denilson Kelley MD  09/10/2019     Name: Elizabeth Rosenthal  MRN: [id number]  Age: 54 year old  : 1965  Referring provider: Referred Self    Assessment and Plan:  # Seronegative rheumatoid arthritis  Patient relates significant benefit from methotrexate in terms of reduced joint pain and swelling, although persistent left greater than right knee pain and low back pain plague her on a daily basis. Exam shows no inflammatory joint changes. Blood work on 2019 showed creatinine, liver function, and complete blood count all negative or normal. CRP was slightly elevated at 11. Hemoglobin A1c was 5.8.     Inflammatory arthropathy is improved, but persistent arthralgia and stiffness and elevated CRP suggest a smoldering inflammatory component is present. I recommended increasing methotrexate from 15 to 20 mg weekly. While on the drug, she should obtain every three month LFTs, creatinine, and CBC. She may use folic acid 1 mg daily to prevent mucosal side effects.     # Bilateral knee pain:  Patient has been offered total knee replacement surgery on the left pending weight loss. Concentrated efforts to lose giana have not borne fruit in the last several year.  I do not think that corticosteroid or viscosupplementation injections would benefit her. We discussed isometric quadriceps strengthening exercises today. I recommend performance of these exercises twice daily in order to provide more support to arthritis knees. She may use Cymbalta 60 mg for pain control as well as gabapentin. Return to clinic in five months.     Orders:  - folic acid (FOLVITE) 1 MG tablet  Dispense: 90 tablet; Refill: 3  -  methotrexate 2.5 MG tablet  Dispense: 96 tablet; Refill: 3  - Creatinine  - ALT  - AST  - CBC with platelets  - CRP inflammation  - gabapentin (NEURONTIN) 300 MG capsule  Dispense: 270 capsule; Refill: 1  - lidocaine (LIDODERM) 5 % patch  Dispense: 30 patch; Refill: 1     Follow-up: Return in about 5 months (around 2/10/2020).     HPI:   Elizabeth Rosenthal is a 54 year old female who last saw Dr. Ribeiro on 01/17/2019 for follow up of seronegative rheumatoid arthritis as well as fibromyalgia and osteoarthritis of knees and spine. Ongoing joint pain at that time was thought due to soft issue pain and osteoarthritis. Methotrexate monotherapy and gabapentin were continued.     The patient reports that she historically has joint pain in her feet, ankles, hips, back, knees, and hands. She states that these areas hurt bilaterally, though her left hand seems to be more swollen. She explains that she has left knee pain all the time, though this is exacerbated with weightbearing. She notes that her right knee is becoming painful as well. She states that corticosteroid injections no longer provide relief. She has been told that total knee replacement is warranted, however, orthopedics does not want to replace her knee until she loses weight.    She endorses headaches down the right side of her face that feel different from her typical migraines. She underwent a stroke work-up in the emergency department for facial numbness and her headache has been present since that time. She states that she had 4 MRIs last year which she believes may be allergy related. She also states that she has a strange bump on the top of her head that had slowly increased in side. She explains that this feels like the rheumatoid arthritis bumps she used to have.     She can only physically handle water aerobics and swimming. Her only physical activity involves seated gardening and involves a lot of digging with a shovel. She has not worked with a physical  therapist on quadriceps strengthening.     In the last year, she joined a program that brings produce to her home and she has been eating much more fruit and vegetables, though she has not lost any weight. She states that she has tried eliminating all sugar, though she is not good at this. She did meet with a dietician and states they had a hard time as she has so many food allergies. She states that she can consume pop tarts, muffins, and mochas because they does not have any yeast or other products that she is allergic to. She had a friend that had bariatric surgery and had a negative experience which makes the patient somewhat afraid to pursue this surgery.    She has been taking methotrexate and reports this provides her significant relief. She states that she cannot get out of bed without methotrexate. She explains that she does have cyclical joint symptoms such as pain as she reaches time for injections. She denies any mouth ulcers.    She has stiffness in her hands after activities like writing. She denies morning stiffness in her hands.     Review of Systems:   Pertinent items are noted in HPI or as below, remainder of complete ROS is negative.      No recent problems with hearing or vision. No swallowing problems.   No breathing difficulty, shortness of breath, coughing, or wheezing  No chest pain or palpitations  No heart burn, indigestion, abdominal pain, nausea, vomiting, diarrhea  No urination problems, no bloody, cloudy urine, no dysuria  No numbing, tingling, weakness  No headaches or confusion  No rashes. No easy bleeding or bruising.     Active Medications:      aspirin 81 MG tablet, Take 1 tablet (81 mg) by mouth daily, Disp: 30 tablet, Rfl: OTC     cetirizine (ZYRTEC) 10 MG tablet, Take 10 mg by mouth daily, Disp: , Rfl:      DULoxetine (CYMBALTA) 30 MG capsule, Take one capsule by mouth once daily for 2 weeks, then increase to one capsule twice daily, Disp: 60 capsule, Rfl: 1     folic acid  (FOLVITE) 1 MG tablet, Take 1 tablet (1 mg) by mouth daily, Disp: 90 tablet, Rfl: 3     gabapentin (NEURONTIN) 300 MG capsule, Take 1 capsule (300 mg) by mouth 3 times daily, Disp: 270 capsule, Rfl: 3     lidocaine (LIDODERM) 5 % patch, Apply 1-3 patches onto the skin every 24 hours as needed, Disp: 30 patch, Rfl: 1     lisinopril (PRINIVIL/ZESTRIL) 10 MG tablet, Take 1 tablet (10 mg) by mouth daily, Disp: 90 tablet, Rfl: 0     methotrexate 2.5 MG tablet, Take 8 tablets (20 mg) by mouth every 7 days, Disp: 96 tablet, Rfl: 3     pantoprazole (PROTONIX) 20 MG EC tablet, Take 1 tablet (20 mg) by mouth 2 times daily, Disp: 180 tablet, Rfl: 3     albuterol (PROAIR HFA/PROVENTIL HFA/VENTOLIN HFA) 108 (90 BASE) MCG/ACT Inhaler, Inhale 1 puff into the lungs every 6 hours as needed for shortness of breath / dyspnea or wheezing (Patient taking differently: Inhale 1 puff into the lungs every 6 hours as needed for shortness of breath / dyspnea or wheezing (PRN) ), Disp: 3 Inhaler, Rfl: 3     buPROPion (WELLBUTRIN XL) 150 MG 24 hr tablet, Take 1 tablet (150 mg) by mouth every morning (Patient not taking: Reported on 9/10/2019), Disp: 90 tablet, Rfl: 0     diclofenac (VOLTAREN) 1 % topical gel, Place 2 g onto the skin 4 times daily (Patient not taking: Reported on 9/10/2019), Disp: 100 g, Rfl: 1     escitalopram (LEXAPRO) 20 MG tablet, Take 1 tablet (20 mg) by mouth daily (Patient not taking: Reported on 9/10/2019), Disp: 90 tablet, Rfl: 0     fluticasone (FLOVENT HFA) 110 MCG/ACT Inhaler, Inhale 2 puffs into the lungs 2 times daily (Patient not taking: Reported on 9/10/2019), Disp: 3 Inhaler, Rfl: 3     VITAMIN D, CHOLECALCIFEROL, PO, Take 2,000 Units by mouth daily, Disp: , Rfl:      Allergies:   Nuts   Celery Oil   Codeine   Contrast Dye   Food   Potatoes  Peanuts  Cantaloupe  Lettuce  Oat   Penicillins   Rice   Shellfish-Derived Products   Wheat Bran   Yeast       Past Medical History:  Allergic rhinitis    Asthma   Chronic  pelvic pain in female   Depression   Depressive disorder   Gastroesophageal reflux disease   Head injury   Hyperlipidemia   Hypertension   Immunosuppression    Migraines   Morbid obesity   Obstructive sleep apnea   Reduced vision   Transient ischemic attack  Vertebral artery dissection  Iliotibial band syndrome  Right knee pain  Lumbar radicular pain  Rheumatoid arthritis of multiple sites without rheumatoid factor  Fibromyalgia  Arthritis of knee, left  Creatinine elevation     Past Surgical History:  Arthroscopy knee bilateral    Biopsy lymph node cervical    Colonoscopy 7/26/2017  Lymph node biopsy 2010  Genitourinary surgery, fallopian tube cyst 1994 and tubal ligation 1999  Lymph node removed from neck for biopsy 2011   Hysteroscopy    Tonsillectomy, bilateral 1/3/2018    Family History:    The patient's family history includes Asthma in her paternal grandmother; Breast Cancer in her mother; Cancer in her mother; Cerebrovascular Disease in her paternal grandmother; Heart Disease in her father; Mental Illness in her father; Migraines in her daughter and son; Substance Abuse in her father.    Social History:  The patient reports that she has never smoked. She has never used smokeless tobacco. She reports that she drinks alcohol. She reports that she does not use drugs.   PCP: Eveline Berry  Marital Status: Single     Physical Exam:   /86 (BP Location: Right arm, Patient Position: Sitting, Cuff Size: Adult Large)   Pulse 89   Temp 98.9  F (37.2  C)   Wt 141.4 kg (311 lb 11.2 oz)   SpO2 95%   BMI 49.86 kg/m      Wt Readings from Last 4 Encounters:   09/10/19 141.4 kg (311 lb 11.2 oz)   08/01/19 138.9 kg (306 lb 4.8 oz)   01/17/19 141.7 kg (312 lb 8 oz)   08/01/18 139.3 kg (307 lb 3.2 oz)     Constitutional: Well-developed, appearing stated age; cooperative  Eyes: Normal EOM, PERRLA, vision, conjunctiva, sclera  ENT: Normal external ears, nose, hearing, lips, teeth, gums, throat. No mucous membrane  lesions, normal saliva pool  Neck: No mass or thyroid enlargement  Resp: Lungs clear to auscultation, nl to palpation  CV: RRR, no murmurs, rubs or gallops, no edema  GI: No ABD mass or tenderness, no HSM  : Not tested  Lymph: No cervical, supraclavicular, inguinal or epitrochlear nodes  MS: The TMJ, neck, shoulder, elbow, wrist, MCP/PIP/DIP, spine, hip, knee, ankle, and foot MTP/IP joints were examined and found normal. No active synovitis or altered joint anatomy. Full joint ROM. Normal  strength. No dactylitis, tenosynovitis, enthesopathy.  Skin: No nail pitting, alopecia, rash, nodules or lesions  Neuro: Normal cranial nerves, strength, sensation, DTRs.   Psych: Normal judgement, orientation, memory, affect.     Laboratory:   RHEUM RESULTS Latest Ref Rng & Units 1/17/2019 2/16/2019 8/1/2019   CRP, INFLAMMATION 0.0 - 8.0 mg/L 13.2(H) - 11.0(H)   AST 0 - 45 U/L 16 - 14   ALT 0 - 50 U/L 26 - 23   ALBUMIN 3.4 - 5.0 g/dL 3.3(L) - 3.3(L)   WBC 4.0 - 11.0 10e9/L 8.2 - 5.3   RBC 3.8 - 5.2 10e12/L 4.22 - 4.23   HGB 11.7 - 15.7 g/dL 13.0 - 13.1   HCT 35.0 - 47.0 % 40.0 - 40.5   MCV 78 - 100 fl 95 - 96   MCHC 31.5 - 36.5 g/dL 32.5 - 32.3   RDW 10.0 - 15.0 % 13.6 - 13.3    - 450 10e9/L 343 - 290   CREATININE 0.52 - 1.04 mg/dL 1.25(H) 1.11(H) 1.12(H)   GFR ESTIMATE, IF BLACK >60 mL/min/[1.73:m2] 56(L) 65 64   GFR ESTIMATE >60 mL/min/[1.73:m2] 48(L) 56(L) 55(L)   HEPATITIS C ANTIBODY NR - - -     AMAN interpretation   Date Value Ref Range Status   04/28/2018 Negative NEG^Negative Final     Comment:                                        Reference range:  <1:40  NEGATIVE  1:40 - 1:80  BORDERLINE POSITIVE  >1:80 POSITIVE       Scribe Disclosure:  I, Rogelio Albert, am serving as a scribe to document services personally performed by Denilson Kelley MD at this visit, based upon the provider's statements to me. All documentation has been reviewed by the aforementioned provider prior to being entered into the  official medical record.    Again, thank you for allowing me to participate in the care of your patient.      Sincerely,    Denilson Kelley MD

## 2019-09-10 NOTE — PATIENT INSTRUCTIONS
Diagnosis:  1. Seronegative rheumatoid arthritis  2. Osteoarthritis, knees and back    Plan:  -- Work on strengthening the quadriceps muscles with the isometric exercise we discussed in which you will press your heel into the ground as if you're squashing a bug for a count of 10-[place], 10 reps, twice daily  -- Increase methotrexate dosage from 6 tablets weekly up to 8 tablets weekly.  -- Let's plan for repeat blood work in mid October and again in January

## 2019-09-16 ENCOUNTER — OFFICE VISIT (OUTPATIENT)
Dept: INTERNAL MEDICINE | Facility: CLINIC | Age: 54
End: 2019-09-16
Payer: COMMERCIAL

## 2019-09-16 ENCOUNTER — ANCILLARY PROCEDURE (OUTPATIENT)
Dept: MAMMOGRAPHY | Facility: CLINIC | Age: 54
End: 2019-09-16
Attending: INTERNAL MEDICINE
Payer: COMMERCIAL

## 2019-09-16 VITALS
HEART RATE: 83 BPM | OXYGEN SATURATION: 97 % | WEIGHT: 293 LBS | DIASTOLIC BLOOD PRESSURE: 82 MMHG | SYSTOLIC BLOOD PRESSURE: 115 MMHG | BODY MASS INDEX: 48.95 KG/M2

## 2019-09-16 DIAGNOSIS — I10 BENIGN ESSENTIAL HYPERTENSION: ICD-10-CM

## 2019-09-16 DIAGNOSIS — J45.20 MILD INTERMITTENT ASTHMA WITHOUT COMPLICATION: ICD-10-CM

## 2019-09-16 DIAGNOSIS — F33.0 MAJOR DEPRESSIVE DISORDER, RECURRENT EPISODE, MILD (H): Primary | ICD-10-CM

## 2019-09-16 DIAGNOSIS — L72.11 PILAR CYST: ICD-10-CM

## 2019-09-16 DIAGNOSIS — Z12.39 BREAST CANCER SCREENING: ICD-10-CM

## 2019-09-16 RX ORDER — BUPROPION HYDROCHLORIDE 150 MG/1
150 TABLET ORAL EVERY MORNING
Qty: 90 TABLET | Refills: 3 | Status: SHIPPED | OUTPATIENT
Start: 2019-09-16 | End: 2020-09-06

## 2019-09-16 RX ORDER — ALBUTEROL SULFATE 90 UG/1
1-2 AEROSOL, METERED RESPIRATORY (INHALATION) EVERY 4 HOURS PRN
Qty: 1 INHALER | Refills: 3 | Status: SHIPPED | OUTPATIENT
Start: 2019-09-16 | End: 2020-03-19

## 2019-09-16 RX ORDER — ALBUTEROL SULFATE 90 UG/1
1-2 AEROSOL, METERED RESPIRATORY (INHALATION) EVERY 4 HOURS PRN
Qty: 1 INHALER | Refills: 3 | COMMUNITY
Start: 2019-09-16 | End: 2019-09-16

## 2019-09-16 RX ORDER — LISINOPRIL 10 MG/1
10 TABLET ORAL DAILY
Qty: 90 TABLET | Refills: 3 | Status: SHIPPED | OUTPATIENT
Start: 2019-09-16 | End: 2020-10-01

## 2019-09-16 RX ORDER — DULOXETIN HYDROCHLORIDE 30 MG/1
CAPSULE, DELAYED RELEASE ORAL
Qty: 180 CAPSULE | Refills: 3 | Status: SHIPPED | OUTPATIENT
Start: 2019-09-16 | End: 2020-02-04

## 2019-09-16 ASSESSMENT — PAIN SCALES - GENERAL: PAINLEVEL: MODERATE PAIN (4)

## 2019-09-16 NOTE — PROGRESS NOTES
"CC:  Routine f/u    HPI:  Last OV we addressed (see progress note):  Headaches  Fatigue  Depression  Seasonal allergies/asthma  Knee pain, lidocaine, cane  Obesity  Multiple food allergies  Resistant to lifestyle changes  A1C  Lipids  Component      Latest Ref Rng & Units 8/1/2019   Cholesterol      <200 mg/dL 206 (H)   Triglycerides      <150 mg/dL 133   HDL Cholesterol      >49 mg/dL 61   LDL Cholesterol Calculated      <100 mg/dL 119 (H)   Non HDL Cholesterol      <130 mg/dL 146 (H)   Hemoglobin A1C      0 - 5.6 % 5.8 (H)   Advised lifestyle changes  Mammogram, done today, results pending    Today:  Taking Duloxetine 30 up to bid, helping a bit with pain and depression, has trouble with remembering to take evening dose, and notes return of depressive symptoms when misses dose.  Discussed strategies for remembering evening med  Will add back bupropion as well  She would like to keep escitalopram available in case she needs it.  Effective as a \"rescue\" medication for her.  We discussed how this strategy is atypical, but will continue for now.  Advised against using it as much as possible.  She is aware of serotonin syndrome symptoms.  Encouraged lifestyle changes    Topical diclofenac not covered by insurance  Pain slightly improved  Notes intermittent headaches stemming from right TMJ area, radiating to temple  Advised her to visit dentist to evaluate for TMJ.  Reviewed need for refills  Check scalp bump, present x approx. One year, asymptomatic    Patient Active Problem List   Diagnosis     Iliotibial band syndrome     Right knee pain     Lumbar radicular pain     Polyarthritis     Depression     Benign essential hypertension     Mild intermittent asthma without complication     Morbid obesity (H)     Rheumatoid arthritis of multiple sites without rheumatoid factor (H)     Fibromyalgia     Encounter for long-term (current) use of medications     SHERIE (obstructive sleep apnea)     Morbid obesity with BMI of " 45.0-49.9, adult (H)     Low back pain     Hyperlipidemia     Arthritis of knee, left     Creatinine elevation     ACP (advance care planning)     Current Outpatient Medications   Medication Sig Dispense Refill     albuterol (PROAIR HFA/PROVENTIL HFA/VENTOLIN HFA) 108 (90 Base) MCG/ACT inhaler Inhale 1-2 puffs into the lungs every 4 hours as needed for shortness of breath / dyspnea or wheezing 1 Inhaler 3     aspirin 81 MG tablet Take 1 tablet (81 mg) by mouth daily 30 tablet OTC     buPROPion (WELLBUTRIN XL) 150 MG 24 hr tablet Take 1 tablet (150 mg) by mouth every morning 90 tablet 3     cetirizine (ZYRTEC) 10 MG tablet Take 10 mg by mouth daily       DULoxetine (CYMBALTA) 30 MG capsule Take one capsule by mouth once daily for 2 weeks, then increase to one capsule twice daily 180 capsule 3     fluticasone (FLOVENT HFA) 110 MCG/ACT Inhaler Inhale 2 puffs into the lungs 2 times daily 3 Inhaler 3     folic acid (FOLVITE) 1 MG tablet Take 1 tablet (1 mg) by mouth daily 90 tablet 3     gabapentin (NEURONTIN) 300 MG capsule Take 1 capsule (300 mg) by mouth 3 times daily 270 capsule 3     lidocaine (LIDODERM) 5 % patch Apply 1-3 patches onto the skin every 24 hours as needed 30 patch 1     lisinopril (PRINIVIL/ZESTRIL) 10 MG tablet Take 1 tablet (10 mg) by mouth daily 90 tablet 3     methotrexate 2.5 MG tablet Take 8 tablets (20 mg) by mouth every 7 days 96 tablet 3     pantoprazole (PROTONIX) 20 MG EC tablet Take 1 tablet (20 mg) by mouth 2 times daily 180 tablet 3     VITAMIN D, CHOLECALCIFEROL, PO Take 2,000 Units by mouth daily       Allergies   Allergen Reactions     Nuts Itching     Celery Oil      Codeine Itching     Contrast Dye Itching     CT Contrast.     Food Diarrhea, Unknown and Nausea and Vomiting     Headaches=potatoes. Peanuts=migraine     No Clinical Screening - See Comments Other (See Comments)     Cantelope- Abdominal cramping; diarrhea; mouth itches.  Lettuce- Abdominal cramping; Diarrhea     Oat  Other (See Comments) and Nausea and Vomiting     abdominal pain       Penicillins Itching     Rice      Shellfish-Derived Products Nausea and Vomiting     Wheat Bran GI Disturbance     Yeast Cramps, Diarrhea, Itching and Nausea and Vomiting     /82 (BP Location: Right arm, Patient Position: Sitting, Cuff Size: Adult Large)   Pulse 83   Wt 138.8 kg (306 lb)   SpO2 97%   BMI 48.95 kg/m      Small pilar cyst visible and palpable crown of scalp, just left of hair parting.    Elizabeth was seen today for recheck medication.    Diagnoses and all orders for this visit:  Major depressive disorder, recurrent episode, mild (H)  -     Refill buPROPion (WELLBUTRIN XL) 150 MG 24 hr tablet; Take 1 tablet (150 mg) by mouth every morning  -     Refill DULoxetine (CYMBALTA) 30 MG capsule; Take one capsule by mouth once daily for 2 weeks, then increase to one capsule twice daily    Benign essential hypertension  -     Refill lisinopril (PRINIVIL/ZESTRIL) 10 MG tablet; Take 1 tablet (10 mg) by mouth daily    Mild intermittent asthma without complication  -     Refill albuterol (PROAIR HFA/PROVENTIL HFA/VENTOLIN HFA) 108 (90 Base) MCG/ACT inhaler; Inhale 1-2 puffs into the lungs every 4 hours as needed for shortness of breath / dyspnea or wheezing    Pilar cyst, no intervention needed at this time        Total time spent 25 minutes.  More than 50% of the time spent with Ms. Rosenthal on counseling / coordinating her care      Eveline Berry M.D.  Internal Medicine  [facility]   pager [phone]

## 2019-09-16 NOTE — PATIENT INSTRUCTIONS
[facility] Medication Refill Request Information:  * Please contact your pharmacy regarding ANY request for medication refills.  ** [facility] Prescription Fax = [phone]  * Please allow 3 business days for routine medication refills.  * Please allow 5 business days for controlled substance medication refills.     [facility] Test Result notification information:  *You will be notified with in 7-10 days of your appointment day regarding the results of your test.  If you are on MyChart you will be notified as soon as the provider has reviewed the results and signed off on them.    [facility]: [phone]

## 2019-09-16 NOTE — NURSING NOTE
Chief Complaint   Patient presents with     Recheck Medication     pt here for 6 week follow up       FLORESITA Garcia at 1:12 PM on 9/16/2019.

## 2019-09-17 PROBLEM — F33.0 MAJOR DEPRESSIVE DISORDER, RECURRENT EPISODE, MILD (H): Status: ACTIVE | Noted: 2019-09-17

## 2019-11-07 ENCOUNTER — HEALTH MAINTENANCE LETTER (OUTPATIENT)
Age: 54
End: 2019-11-07

## 2020-02-04 DIAGNOSIS — F33.0 MAJOR DEPRESSIVE DISORDER, RECURRENT EPISODE, MILD (H): ICD-10-CM

## 2020-02-04 RX ORDER — DULOXETIN HYDROCHLORIDE 30 MG/1
CAPSULE, DELAYED RELEASE ORAL
Qty: 180 CAPSULE | Refills: 3 | Status: SHIPPED | OUTPATIENT
Start: 2020-02-04 | End: 2020-10-01 | Stop reason: ALTCHOICE

## 2020-02-12 NOTE — TELEPHONE ENCOUNTER
Pharmacy called and they had sent a RX for Cymbalta that was incorrect. RX for Cymbalta in pt's chart is the correct RX.  Craina Celeste RN 9:07 AM on 2/12/2020.

## 2020-02-14 ENCOUNTER — TELEPHONE (OUTPATIENT)
Dept: INTERNAL MEDICINE | Facility: CLINIC | Age: 55
End: 2020-02-14

## 2020-02-14 NOTE — TELEPHONE ENCOUNTER
Central Prior Authorization Team   Phone: [phone]      PA Initiation    Medication: DULoxetine (CYMBALTA) 30 MG capsule  Insurance Company: [employer] Part D - Phone [phone] Fax [phone]  Pharmacy Filling the Rx: [facility] - [address]  Filling Pharmacy Phone: [phone]  Filling Pharmacy Fax:    Start Date: 2/14/2020

## 2020-02-14 NOTE — TELEPHONE ENCOUNTER
February 14, 2020  8:39 AM    Prior Authorization Retail Medication Request    Medication/Dose: Cymbalta 30 MG  ICD code (if different than what is on RX):  F33.0  Previously Tried and Failed:  Unknown  Rationale:  None    Insurance Name:        Pharmacy Information (if different than what is on RX)  Name:  [facility]  Phone:  [phone]

## 2020-02-18 NOTE — TELEPHONE ENCOUNTER
Prior Authorization Approval    Authorization Effective Date: 2/14/2020  Authorization Expiration Date: 2/14/2021  Medication: DULoxetine (CYMBALTA) 30 MG capsule-APPROVED  Approved Dose/Quantity:   Reference #:     Insurance Company: [employer] Part D - Phone [phone] Fax [phone]  Expected CoPay:       CoPay Card Available:      Foundation Assistance Needed:    Which Pharmacy is filling the prescription (Not needed for infusion/clinic administered): [facility] - [address]  Pharmacy Notified: Yes-Pharmacy will process.   Patient Notified: No    Plan limit of 2 per day also has been approved per call to [employer]. Pharmacy will process on a couple hours to give some time for it to be entered from the insurance side. Rep tried to process but didn't go through at the time.

## 2020-03-18 ENCOUNTER — VIRTUAL VISIT (OUTPATIENT)
Dept: FAMILY MEDICINE | Facility: OTHER | Age: 55
End: 2020-03-18

## 2020-03-18 NOTE — PROGRESS NOTES
"Date: 2020 11:53:39  Clinician: Natasha Gonzalez  Clinician NPI: [id number]  Patient: Elizabeth Rosenthal  Patient : 1965  Patient Address: [address] [place] [id number]  Patient Phone: [phone]  Visit Protocol: URI  Patient Summary:  Elizabeth is a 55 year old ( : 1965 ) female who initiated a Visit for COVID-19 (Coronavirus) evaluation and screening. When asked the question \"Please sign me up to receive news, health information and promotions. \", Elizabeth responded \"No\".    Elizabeth states her symptoms started today.   Her symptoms consist of a sore throat, a cough, nasal congestion, malaise, and chills. Elizabeth also feels feverish but was unable to measure her temperature.   Symptom details     Nasal secretions: The color of her mucus is clear.    Cough: Elizabeth coughs a few times an hour and her cough is not more bothersome at night. Phlegm does not come into her throat when she coughs. She does not believe her cough is caused by post-nasal drip.     Sore throat: Elizabeth reports having moderate throat pain (4-6 on a 10 point pain scale), does not have exudate on her tonsils, and can swallow liquids. She is not sure if the lymph nodes in her neck are enlarged. A rash has not appeared on the skin since the sore throat started.      Elizabeth denies having wheezing, ear pain, headache, rhinitis, facial pain or pressure, myalgias, and teeth pain. She also denies taking antibiotic medication for the symptoms and having recent facial or sinus surgery in the past 60 days. She is not experiencing dyspnea.   Precipitating events  Within the past week, Elizabeth has not been exposed to someone with strep throat. She has not recently been exposed to someone with influenza. Elizabeth has been in close contact with the following high risk individuals: immunocompromised people.   Pertinent COVID-19 (Coronavirus) information  Elizabeth has not traveled internationally or to the areas where COVID-19 (Coronavirus) is widespread in " the last 14 days before the start of her symptoms.   Elizabeth has not had a close contact with a laboratory-confirmed COVID-19 patient within 14 days of symptom onset. She also has not had a close contact with a suspected COVID-19 patient within 14 days of symptom onset.   Elizabeth is not a healthcare worker and does not work in a healthcare facility.   Pertinent medical history  Elizabeth does not get yeast infections when she takes antibiotics.   Elizabeth does not need a return to work/school note.   Weight: 290 lbs   Elizabeth does not smoke or use smokeless tobacco.   Additional information as reported by the patient (free text): I have asthma and rheumatoid arthritis   Weight: 290 lbs    MEDICATIONS: pantoprazole oral, Zyrtec oral, escitalopram oxalate oral, lisinopril oral, folic acid oral, methotrexate sodium oral, ALLERGIES: codeine, Penicillins  Clinician Response:  Dear Elizabeth,   Based on the information you have provided, you do have symptoms that are consistent with Coronavirus (COVID-19).  The coronavirus causes mild to severe respiratory illness with the most common symptoms including fever, cough and difficulty breathing. Unfortunately, many viruses cause similar symptoms and it can be difficult to distinguish between viruses, especially in mild cases, so we are presuming that anyone with cough or fever has coronavirus at this time.  Coronavirus/COVID-19 has reached the point of community spread in [place], meaning that we are finding the virus in people with no known exposure risk for jean-claude the virus. Given the increasing commonness of coronavirus in the community we are no longer testing patients who are not critically ill.  If you are a health care worker, you should refer to your employee health office for instructions about returning to work.  For everyone else who has cough or fever, you should assume you are infected with coronavirus. Accordingly, you should self-quarantine for seven days from the first  day your symptoms started OR 72 hours after your cough and fever completely resolve - WHICHEVER is LONGER. You should call if you find increasing shortness of breath, wheezing or sustained fever above 101.5. If you are significantly short of breath or experience chest pain you should call 911 or report to the nearest emergency department for urgent evaluation.    Isolate yourself at home.   Do Not allow any visitors  Do Not go to work or school  Do Not go to [employer],  centers, shopping, or other public places.  Do Not shake hands.  Avoid close contact with others (hugging, kissing).   Protect Others:    Cover Your Mouth and Nose with a mask, disposable tissue or wash cloth to avoid spreading germs to others.  Wash your hands and face frequently with soap and water.   If you develop significant shortness of breath that prevents you from doing normal activities, please call 911 or proceed to the nearest emergency room and alert them immediately that you have been in self-isolation for possible coronavirus.   For more information about COVID19 and options for caring for yourself at home, please visit the CDC website at https://www.cdc.gov/coronavirus/2019-ncov/about/steps-when-sick.htmlFor more options for care at [facility], please visit our website at [email or web address]     Diagnosis: Cough  Diagnosis ICD: R05

## 2020-03-19 ENCOUNTER — MYC MEDICAL ADVICE (OUTPATIENT)
Dept: INTERNAL MEDICINE | Facility: CLINIC | Age: 55
End: 2020-03-19

## 2020-03-19 DIAGNOSIS — J45.20 MILD INTERMITTENT ASTHMA WITHOUT COMPLICATION: ICD-10-CM

## 2020-03-19 DIAGNOSIS — F33.0 MAJOR DEPRESSIVE DISORDER, RECURRENT EPISODE, MILD (H): Primary | ICD-10-CM

## 2020-03-19 RX ORDER — ALBUTEROL SULFATE 90 UG/1
1-2 AEROSOL, METERED RESPIRATORY (INHALATION) EVERY 4 HOURS PRN
Qty: 1 INHALER | Refills: 3 | Status: SHIPPED | OUTPATIENT
Start: 2020-03-19 | End: 2021-06-30

## 2020-03-19 RX ORDER — DULOXETIN HYDROCHLORIDE 60 MG/1
60 CAPSULE, DELAYED RELEASE ORAL DAILY
Qty: 90 CAPSULE | Refills: 3 | Status: SHIPPED | OUTPATIENT
Start: 2020-03-19 | End: 2020-07-26

## 2020-03-21 ENCOUNTER — VIRTUAL VISIT (OUTPATIENT)
Dept: FAMILY MEDICINE | Facility: OTHER | Age: 55
End: 2020-03-21

## 2020-03-22 NOTE — PROGRESS NOTES
"Date: 2020 20:31:20  Clinician: Cathy Jay  Clinician NPI: [id number]  Patient: Elizabeth Rosenthal  Patient : 1965  Patient Address: [address] [place] [address]  Patient Phone: [phone]  Visit Protocol: URI  Patient Summary:  Elizabeth is a 55 year old ( : 1965 ) female who initiated a Visit for cold, sinus infection, or influenza. When asked the question \"Please sign me up to receive news, health information and promotions. \", Elizabeth responded \"No\".    Elizabeth states her symptoms started suddenly 3-6 days ago.   Her symptoms consist of a sore throat, a cough, nasal congestion, malaise, and chills.   Symptom details     Nasal secretions: The color of her mucus is clear.    Cough: Elizabeth coughs a few times an hour and her cough is not more bothersome at night. Phlegm does not come into her throat when she coughs. She does not believe her cough is caused by post-nasal drip.     Sore throat: Elizabeth reports having moderate throat pain (4-6 on a 10 point pain scale), has exudate on her tonsils, and can swallow liquids. She is not sure if the lymph nodes in her neck are enlarged. A rash has not appeared on the skin since the sore throat started.      Elizabeth denies having ear pain, rhinitis, facial pain or pressure, myalgias, wheezing, teeth pain, headache, and fever. She also denies double sickening (worsening symptoms after initial improvement), taking antibiotic medication for the symptoms, and having recent facial or sinus surgery in the past 60 days. She is not experiencing dyspnea.   Precipitating events  Within the past week, Elizabeth has not been exposed to someone with strep throat. She has not recently been exposed to someone with influenza. Elizabeth has been in close contact with the following high risk individuals: people with asthma, heart disease or diabetes.   Pertinent COVID-19 (Coronavirus) information  Elizabeth has not traveled internationally or to the areas where COVID-19 " (Coronavirus) is widespread, including cruise ship travel in the last 14 days before the start of her symptoms.   Elizabeth has not had a close contact with a laboratory-confirmed COVID-19 patient within 14 days of symptom onset. She also has not had a close contact with a suspected COVID-19 patient within 14 days of symptom onset.   Elizabeth is not a healthcare worker and does not work in a healthcare facility.   Pertinent medical history  Elizabeth does not get yeast infections when she takes antibiotics.   Elizabeth does not need a return to work/school note.   Weight: 290 lbs   Elizabeth does not smoke or use smokeless tobacco.   Additional information as reported by the patient (free text): I have developed what look like blisters on the back of my throat - not strep blisters, more like the fluid-filled blisters you get from a minor burn.  I also have one similar blister on my hand.  (???) should I get a strep test? Does this change prior diagnosis of cold, flu or mild COVID 19?   Weight: 290 lbs    MEDICATIONS: pantoprazole oral, Zyrtec oral, escitalopram oxalate oral, lisinopril oral, folic acid oral, methotrexate sodium oral, ALLERGIES: Penicillins, codeine  Clinician Response:  Dear Elizabeth,  Based on the information provided, you have a viral upper respiratory infection, otherwise known as a cold. Symptoms vary from person to person, but can include sneezing, coughing, a runny nose, sore throat, and headache and range from mild to severe.  Unfortunately, there are no medications that can cure a cold, so treatment is focused on controlling symptoms as much as possible. Most people gradually feel better until symptoms are gone in 1-2 weeks.  Medication information  Because you have a viral infection, antibiotics will not help you get better. Treating a viral infection with antibiotics could actually make you feel worse.  I am prescribing:       Fluticasone 50 mcg/actuation nasal spray. Inhale 2 sprays in each nostril 1 time per  day; after 1 week, may adjust to 1 - 2 sprays in each nostril 1 time per day. This medication takes several days to start working, so keep taking it even if it doesn't help right away. There are no refills with this prescription.      Benzonatate (Tessalon Perles) 100 mg oral capsule. Take 1-2 capsules by mouth 3 times per day as needed for your cough. There are no refills with this prescription.     Unless you are allergic to the over-the-counter medication(s) below, I recommend using:       Acetaminophen (Tylenol or store brand) oral tablet. Take 1-2 tablets by mouth every 4-6 hours to help with the discomfort.      Guaifenesin + dextromethorphan (Robitussin DM, Mucinex DM, or store brand).     Over-the-counter medications do not require a prescription. Ask the pharmacist if you have any questions.  Self care  Steps you can take to be as comfortable as possible:     Rest.    Drink plenty of water and other liquids.    Take a hot shower to loosen congestion    Use throat lozenges.    Gargle with warm salt water (1/4 teaspoon of salt per 8 ounce glass of water).    Suck on frozen items such as popsicles or ice cubes.    Drink hot tea with lemon and honey.    Take a spoonful of honey to reduce your cough.     When to seek care  Please be seen in a clinic or urgent care if new symptoms develop, or symptoms become worse.  Call 911 or go to the emergency room if you feel that your throat is closing off, you suddenly develop a rash, you are unable to swallow fluids, you are drooling, or you are having difficulty breathing.  Additional treatment plan   Based on the information you have provided, you do have symptoms that are consistent with Coronavirus (COVID-19).  The coronavirus causes mild to severe respiratory illness with the most common symptoms including fever, cough and difficulty breathing. Unfortunately, many viruses cause similar symptoms and it can be difficult to distinguish between viruses, especially in  mild cases, so we are presuming that anyone with cough or fever has coronavirus at this time.  Coronavirus/COVID-19 has reached the point of community spread in [place], meaning that we are finding the virus in people with no known exposure risk for jean-claude the virus. Given the increasing commonness of coronavirus in the community we are no longer testing patients who are not critically ill.  If you are a health care worker, you should refer to your employee health office for instructions about testing and returning to work.  For everyone else who has cough or fever, you should assume you are infected with coronavirus. Since you will not be tested but have symptoms that may be consistent with coronavirus, the CDC recommends you stay in self-isolation until these three things have happened:    You have had no fever for at least 72 hours (that is three full days of no fever without the use of medicine that reduces fevers)    AND   Other symptoms have improved (for example, when your cough or shortness of breath have improved)   AND   At least 7 days have passed since your symptoms first appeared.   How to Isolate:   Isolate yourself at home.  Do Not allow any visitors  Do Not go to work or school  Do Not go to [employer],  centers, shopping, or other public places.  Do Not shake hands.  Avoid close contact with others (hugging, kissing).   Protect Others:   Cover Your Mouth and Nose with a mask, disposable tissue or wash cloth to avoid spreading germs to others.  Wash your hands and face frequently with soap and water.   We know it can be scary to hear that you might have COVID-19. Our team can help track your symptoms and make sure you are doing ok over the next two weeks using a program called [facility] to keep in touch. When you receive an email from [facility], please consider enrolling in our monitoring program. There is no cost to you for monitoring. Here is a URL where you can learn more:  [email or web address]  Managing Symptoms:   At this time, we primarily recommend Tylenol (Acetaminophen) for fever or pain. If you have liver or kidney problems, contact your primary care provider for instructions on use of tylenol. Adults can take 650 mg (two 325 mg pills) by mouth every 4-6 hours as needed OR 1,000 mg (two 500 mg pills) every 8 hours as needed. MAXIMUM DAILY DOSE: 3,000mg. For children, refer to dosing on bottle based on age or weight.   If you develop significant shortness of breath that prevents you from doing normal activities, please call 911 or proceed to the nearest emergency room and alert them immediately that you have been in self-isolation for possible coronavirus.  For more information about COVID19 and options for caring for yourself at home, please visit the CDC website at https://www.cdc.gov/coronavirus/2019-ncov/about/steps-when-sick.htmlFor more options for care at [facility], please visit our website at [email or web address]    Diagnosis: Acute upper respiratory infection, unspecified  Diagnosis ICD: J06.9  Additional Clinician Notes: Your symptoms may be due a common cold but I cannot rule out COVID-19. COVID-19 is now considered to be widespread in [place] (and outpatient testing is not available at this time). I recommend the supportive care measures and self-isolation detailed above. Hang in there and I hope you feel better soon.    Re: the blisters on the back of your throat - it's hard to say what they are or what caused them. Continue to monitor them and let us know if anything changes or worsens.  Prescription: fluticasone 50 mcg/actuation nasal spray,suspension 1 120 spray aerosol with adapter (grams), 30 days supply. Inhale 2 sprays in each nostril 1 time per day; after 1 week, may adjust to 1 - 2 sprays in each nostril 1 time per day.. Refills: 0, Refill as needed: no, Allow substitutions: yes  Prescription: benzonatate  (Tessalon Perles) 100 mg oral capsule 30 capsule, 5 days supply. Take 1-2 capsules by mouth 3 times per day as needed. Refills: 0, Refill as needed: no, Allow substitutions: yes

## 2020-03-23 ENCOUNTER — NURSE TRIAGE (OUTPATIENT)
Dept: NURSING | Facility: CLINIC | Age: 55
End: 2020-03-23

## 2020-03-23 NOTE — TELEPHONE ENCOUNTER
"    Reason for Disposition    [1] Pus on tonsils (back of throat) AND [2]  fever AND [3] swollen neck lymph nodes (\"glands\")    Additional Information    Negative: Severe difficulty breathing (e.g., struggling for each breath, speaks in single words, stridor)    Negative: Sounds like a life-threatening emergency to the triager    Negative: [1] Diagnosed strep throat AND [2] taking antibiotic AND [3] symptoms continue    Negative: Throat culture results, call about    Negative: Productive cough is main symptom    Negative: Non-productive cough is main symptom    Negative: Hoarseness is main symptom    Negative: Runny nose is main symptom    Negative: [1] Drooling or spitting out saliva (because can't swallow) AND [2] normal breathing    Negative: Unable to open mouth completely    Negative: [1] Difficulty breathing AND [2] not severe    Negative: Fever > 104 F (40 C)    Negative: [1] Refuses to drink anything AND [2] for > 12 hours    Negative: [1] Drinking very little AND [2] dehydration suspected (e.g., no urine > 12 hours, very dry mouth, very lightheaded)    Negative: Patient sounds very sick or weak to the triager    Answer Assessment - Initial Assessment Questions  1. ONSET: \"When did the throat start hurting?\" (Hours or days ago)       5-6 days  2. SEVERITY: \"How bad is the sore throat?\" (Scale 1-10; mild, moderate or severe)    - MILD (1-3):  doesn't interfere with eating or normal activities    - MODERATE (4-7): interferes with eating some solids and normal activities    - SEVERE (8-10):  excruciating pain, interferes with most normal activities    - SEVERE DYSPHAGIA: can't swallow liquids, drooling      4-5/10  3. STREP EXPOSURE: \"Has there been any exposure to strep within the past week?\" If so, ask: \"What type of contact occurred?\"       [place]  4. VIRAL SYMPTOMS: \"Are there any symptoms of a cold, such as a runny nose, cough, hoarse voice or red eyes?\"       Cough, runny, stuffy nose, " "no voice hoarseness, no eye redness  5. FEVER: \"Do you have a fever?\" If so, ask: \"What is your temperature, how was it measured, and when did it start?\"      No thermometer, chills  6. PUS ON THE TONSILS: \"Is there pus on the tonsils in the back of your throat?\"      No tonsils  7. OTHER SYMPTOMS: \"Do you have any other symptoms?\" (e.g., difficulty breathing, headache, rash)      White coating on back of throat, throat spray  8. PREGNANCY: \"Is there any chance you are pregnant?\" \"When was your last menstrual period?\"      N/A    Protocols used: SORE THROAT-A-AH      "

## 2020-03-24 NOTE — TELEPHONE ENCOUNTER
Duplicate.  This was already addressed in Mychart.  See MyChart.  No strep test or telephone visit needed at this time.       Francis Escobedo CMA ([facility]) at 9:16 AM on 3/24/2020

## 2020-03-26 ENCOUNTER — VIRTUAL VISIT (OUTPATIENT)
Dept: INTERNAL MEDICINE | Facility: CLINIC | Age: 55
End: 2020-03-26
Payer: COMMERCIAL

## 2020-03-26 ENCOUNTER — TELEPHONE (OUTPATIENT)
Dept: NURSING | Facility: CLINIC | Age: 55
End: 2020-03-26

## 2020-03-26 DIAGNOSIS — J03.90 ACUTE TONSILLITIS, UNSPECIFIED ETIOLOGY: Primary | ICD-10-CM

## 2020-03-26 RX ORDER — AZITHROMYCIN 250 MG/1
TABLET, FILM COATED ORAL
Qty: 6 TABLET | Refills: 0 | Status: SHIPPED | OUTPATIENT
Start: 2020-03-26 | End: 2020-03-31

## 2020-03-26 NOTE — PROGRESS NOTES
"Elizabeth Rosenthal is a 55 year old female who is being evaluated via a billable telephone visit.      The patient has been notified of following:     \"This telephone visit will be conducted via a call between you and your physician/provider. We have found that certain health care needs can be provided without the need for a physical exam.  This service lets us provide the care you need with a short phone conversation.  If a prescription is necessary we can send it directly to your pharmacy.  If lab work is needed we can place an order for that and you can then stop by our lab to have the test done at a later time.    If during the course of the call the physician/provider feels a telephone visit is not appropriate, you will not be charged for this service.\"     Chief Complaint   Patient presents with     Pharyngitis     Pt reports sore throat for the last three days, she reports white spots on the back of her throat     Elizabeth Rosenthal complains of severe sore throat for three days rating the pain between 6-8/10. She still has white coating in the posterior pharynx and blisters seen in the back of her throat.  She is able to eat ice cream.  She takes pain medications for RA. Yesterday she had myalgias and felt \"terrible\" and spent most of the day in bed.  She has tenderness when palpation her right neck.   She is already in quarantine for possible Covid19 exposure.  She does not own a thermometer.   She is concerned she has strep. She denies severe headache.     I have reviewed and updated the patient's Past Medical History, Social History, Family History and Medication List.    ALLERGIES  Nuts; Celery oil; Codeine; Contrast dye; Food; No clinical screening - see comments; Oat; Penicillins; Rice; Shellfish-derived products; Wheat bran; and Yeast    Patient Active Problem List   Diagnosis     Iliotibial band syndrome     Right knee pain     Lumbar radicular pain     Polyarthritis     Depression     Benign essential " hypertension     Mild intermittent asthma without complication     Morbid obesity (H)     Rheumatoid arthritis of multiple sites without rheumatoid factor (H)     Fibromyalgia     Encounter for long-term (current) use of medications     SHERIE (obstructive sleep apnea)     Morbid obesity with BMI of 45.0-49.9, adult (H)     Low back pain     Hyperlipidemia     Arthritis of knee, left     Creatinine elevation     ACP (advance care planning)     Major depressive disorder, recurrent episode, mild (H)       Assessment/Plan:  Elizabeth was seen today for pharyngitis.    Diagnoses and all orders for this visit:    Acute tonsillitis, unspecified etiology  -     azithromycin (ZITHROMAX) 250 MG tablet; Take 2 tablets (500 mg) by mouth daily for 1 day, THEN 1 tablet (250 mg) daily for 4 days. She should continue her throat lozenges, fluids, etc    Call if no improvement after 4 days.     Phone call duration: 6 minutes    Therese AUSTIN CNP

## 2020-03-26 NOTE — TELEPHONE ENCOUNTER
.  [facility]: Nurse Triage Note  SITUATION/BACKGROUND                                                      Elizabeth Rosenthal is a 55 year old female who calls to report having worsening sore throat.   This throat pain has been present for the past 3 days. Multiple blisters and white mucous noted. Rates pain 5/10 intermittent.   Yesterday in bed and had chills all day. Today not as fatigued up and about and no chills. But throat looks about the same.  Had Virtual visit on 3/21/20. Cough remains intermittent, has not received her albuterol inhaler that will arrive via [employer] mail service.  Patient has been using cough drops and chloraseptic spray for her throat pain with temporary  relief. Picture sent to Primary care through [employer].  Per protocol, patient should seek medical care within 24 hrs for sore throat >3 days  This nurse contacted Primary Care for telephone visit to assist with sore throat concerns.   Appointment scheduled today at 5 pm with Therese Estrada.        RECOMMENDATION/PLAN                                                      RECOMMENDED DISPOSITION:  Seek care within 24 hrs. Appointment (telephone) scheduled for 5 pm today. Will comply with recommendation: Yes    If further questions/concerns or if symptoms do not improve, worsen or new symptoms develop, call your PCP or [phone] to talk with the Resident on call, as soon as possible.    Guideline used: sore throat  Telephone Triage Protocols for Nurses, Fifth Edition, Eliana Mcgee, RN, RN

## 2020-03-26 NOTE — NURSING NOTE
Chief Complaint   Patient presents with     Pharyngitis     Pt reports sore throat for the last three days, she reports white spots on the back of her throat     FLORESITA García at 3:52 PM sign on 3/26/2020

## 2020-05-29 DIAGNOSIS — M79.7 FIBROMYALGIA: ICD-10-CM

## 2020-05-29 DIAGNOSIS — M54.50 CHRONIC BILATERAL LOW BACK PAIN WITHOUT SCIATICA: ICD-10-CM

## 2020-05-29 DIAGNOSIS — M13.0 POLYARTHRITIS: ICD-10-CM

## 2020-05-29 DIAGNOSIS — Z79.899 ENCOUNTER FOR LONG-TERM (CURRENT) USE OF MEDICATIONS: ICD-10-CM

## 2020-05-29 DIAGNOSIS — M06.09 RHEUMATOID ARTHRITIS OF MULTIPLE SITES WITHOUT RHEUMATOID FACTOR (H): ICD-10-CM

## 2020-05-29 DIAGNOSIS — G89.29 CHRONIC BILATERAL LOW BACK PAIN WITHOUT SCIATICA: ICD-10-CM

## 2020-05-29 RX ORDER — FOLIC ACID 1 MG/1
1 TABLET ORAL DAILY
Qty: 90 TABLET | Refills: 1 | Status: SHIPPED | OUTPATIENT
Start: 2020-05-29 | End: 2021-01-10

## 2020-05-29 NOTE — TELEPHONE ENCOUNTER
folic acid (FOLVITE) 1 MG tablet     Last Written Prescription Date:  9/10/2019  Last Fill Quantity: 90,   # refills: 3  Last Office Visit : 9/10/2019  Future Office visit:  None  90 Tabs, 1 Refills sent to pharmacy for Pt care.   (Pharmacy Change)    Yamini Joseph RN  Central Triage Red Flags/Med Refills

## 2020-06-08 NOTE — NURSING NOTE
Chief Complaint   Patient presents with     Results     pt here to discuss test results     Sirena Cotter CMA at 7:46 AM on 12/21/2017.    
130

## 2020-07-15 DIAGNOSIS — M06.09 RHEUMATOID ARTHRITIS OF MULTIPLE SITES WITHOUT RHEUMATOID FACTOR (H): ICD-10-CM

## 2020-07-15 LAB
ALT SERPL W P-5'-P-CCNC: 26 U/L (ref 0–50)
AST SERPL W P-5'-P-CCNC: 14 U/L (ref 0–45)
CREAT SERPL-MCNC: 1.06 MG/DL (ref 0.52–1.04)
CRP SERPL-MCNC: 11.4 MG/L (ref 0–8)
ERYTHROCYTE [DISTWIDTH] IN BLOOD BY AUTOMATED COUNT: 13.2 % (ref 10–15)
GFR SERPL CREATININE-BSD FRML MDRD: 59 ML/MIN/{1.73_M2}
HCT VFR BLD AUTO: 40 % (ref 35–47)
HGB BLD-MCNC: 13 G/DL (ref 11.7–15.7)
MCH RBC QN AUTO: 30.7 PG (ref 26.5–33)
MCHC RBC AUTO-ENTMCNC: 32.5 G/DL (ref 31.5–36.5)
MCV RBC AUTO: 95 FL (ref 78–100)
PLATELET # BLD AUTO: 297 10E9/L (ref 150–450)
RBC # BLD AUTO: 4.23 10E12/L (ref 3.8–5.2)
WBC # BLD AUTO: 4.9 10E9/L (ref 4–11)

## 2020-07-23 NOTE — PROGRESS NOTES
[facility]  Rheumatology Clinic  Denilson Kelley MD  2020     Name: Elizabeth Rosenthal  MRN: [id number]  Age: 54 year old  : 1965  Referring provider: Referred Self    Assessment and Plan:  # Seronegative rheumatoid arthritis  Patient relates significant benefit from methotrexate with reduced joint pain and swelling, although persistent left greater than right knee pain and low back pain affect her on a daily basis.  No examination was possible today, due to the telephone nature of the interaction in the time of the coronavirus pandemic. Blood work done on July 15, 2020 showed creatinine, transaminases, and CBC all stable or normal.  CRP was 11.4, unchanged from 3 measurements obtained since 2018.    Inflammatory arthropathy remains improved, and persistent arthralgia and stiffness have moderated, suggesting improved control of a smoldering inflammatory component. I recommended maintaining methotrexate at 20 mg weekly. While on the drug, she should obtain every three month LFTs, creatinine, and CBC. She may use folic acid 1 mg daily to prevent mucosal side effects.     # Bilateral knee pain:  Patient has been offered total knee replacement surgery on the left pending weight loss. Concentrated efforts to lose giana have not borne fruit in the last several year.  I do not think that corticosteroid or viscosupplementation injections would benefit her. We discussed again weight-bearing and isometric quadriceps strengthening exercises today. I recommend performance of these exercises twice daily in order to provide more support to arthritic knees. She may use Cymbalta 60 mg for pain control as well as gabapentin 300 mg up to tid.    #Febrile illness, spring 2020: Patient expresses desire to know whether she carries coronavirus antibodies, now several months after experiencing respiratory and febrile illness suspicious for SARS-CoV-2.  I ordered serologic testing to determine whether patient has had  "exposure to the virus.    Follow-up: Return to clinic in 4 months.    HPI:   Elizabeth Rosenthal presents for follow up of seronegative rheumatoid arthritis as well as fibromyalgia and osteoarthritis of knees and spine.  She was last seen in rheumatology in September 2019, when inflammatory arthritis was judged active.  Increased methotrexate to 20 mg weekly was recommended.  Gabapentin was continued at 300 mg 3 times daily.    Interval history 07-:    She had high fever and cough in 3-2020, with waxing/waning symptoms since then. Concerned that she had COVID19.    L knee \"crinkles\" when she is gardening.  Elbows, wrists, shoulders have been painful; spot on the elbow \"feels bruised\". There is visible swelling in the left elbow on the inside of the elbow.  Joint pain is constant, but generally mild, unless she is vigorously active.  Outside of her hips above the buttocks.   EARLY MORNING STIFFNESS is stilll 30-60 minutes.  She continues methotrexate 20 mg weekly; no adverse effects. She thinks it helps significantly. She supplements with 1-3 gabapentin per day; this also helps.  She gardens ~ 1 hour per day. She notes hand swelling afterwards.    History 9-2019:    The patient reports that she historically has joint pain in her feet, ankles, hips, back, knees, and hands. She states that these areas hurt bilaterally, though her left hand seems to be more swollen. She explains that she has left knee pain all the time, though this is exacerbated with weightbearing. She notes that her right knee is becoming painful as well. She states that corticosteroid injections no longer provide relief. She has been told that total knee replacement is warranted, however, orthopedics does not want to replace her knee until she loses weight.    She endorses headaches down the right side of her face that feel different from her typical migraines. She underwent a stroke work-up in the emergency department for facial numbness and her " headache has been present since that time. She states that she had 4 MRIs last year which she believes may be allergy related. She also states that she has a strange bump on the top of her head that had slowly increased in side. She explains that this feels like the rheumatoid arthritis bumps she used to have.     She can only physically handle water aerobics and swimming. Her only physical activity involves seated gardening and involves a lot of digging with a shovel. She has not worked with a physical therapist on quadriceps strengthening.     In the last year, she joined a program that brings produce to her home and she has been eating much more fruit and vegetables, though she has not lost any weight. She states that she has tried eliminating all sugar, though she is not good at this. She did meet with a dietician and states they had a hard time as she has so many food allergies. She states that she can consume pop tarts, muffins, and mochas because they does not have any yeast or other products that she is allergic to. She had a friend that had bariatric surgery and had a negative experience which makes the patient somewhat afraid to pursue this surgery.    She has been taking methotrexate and reports this provides her significant relief. She states that she cannot get out of bed without methotrexate. She explains that she does have cyclical joint symptoms such as pain as she reaches time for injections. She denies any mouth ulcers.    She has stiffness in her hands after activities like writing. She denies morning stiffness in her hands.     Review of Systems:   Pertinent items are noted in HPI or as below, remainder of complete ROS is negative.      No recent problems with hearing or vision. No swallowing problems.   No breathing difficulty, shortness of breath, coughing, or wheezing  No chest pain or palpitations  No heart burn, indigestion, abdominal pain, nausea, vomiting, diarrhea  No urination problems,  no bloody, cloudy urine, no dysuria  No numbing, tingling, weakness  No headaches or confusion  No rashes. No easy bleeding or bruising.     Active Medications:   Current Outpatient Medications   Medication     albuterol (PROAIR HFA/PROVENTIL HFA/VENTOLIN HFA) 108 (90 Base) MCG/ACT inhaler     aspirin 81 MG tablet     buPROPion (WELLBUTRIN XL) 150 MG 24 hr tablet     cetirizine (ZYRTEC) 10 MG tablet     DULoxetine (CYMBALTA) 30 MG capsule     DULoxetine (CYMBALTA) 60 MG capsule     fluticasone (FLOVENT HFA) 110 MCG/ACT Inhaler     folic acid (FOLVITE) 1 MG tablet     gabapentin (NEURONTIN) 300 MG capsule     lidocaine (LIDODERM) 5 % patch     lisinopril (PRINIVIL/ZESTRIL) 10 MG tablet     methotrexate 2.5 MG tablet     pantoprazole (PROTONIX) 20 MG EC tablet     VITAMIN D, CHOLECALCIFEROL, PO     No current facility-administered medications for this visit.        Allergies:   Nuts   Celery Oil   Codeine   Contrast Dye   Food   Potatoes  Peanuts  Cantaloupe  Lettuce  Oat   Penicillins   Rice   Shellfish-Derived Products   Wheat Bran   Yeast       Past Medical History:  Allergic rhinitis    Asthma   Chronic pelvic pain in female   Depression   Depressive disorder   Gastroesophageal reflux disease   Head injury   Hyperlipidemia   Hypertension   Immunosuppression    Migraines   Morbid obesity   Obstructive sleep apnea   Reduced vision   Transient ischemic attack  Vertebral artery dissection  Iliotibial band syndrome  Right knee pain  Lumbar radicular pain  Rheumatoid arthritis of multiple sites without rheumatoid factor  Fibromyalgia  Arthritis of knee, left  Creatinine elevation     Past Surgical History:  Arthroscopy knee bilateral    Biopsy lymph node cervical    Colonoscopy 7/26/2017  Lymph node biopsy 2010  Genitourinary surgery, fallopian tube cyst 1994 and tubal ligation 1999  Lymph node removed from neck for biopsy 2011   Hysteroscopy    Tonsillectomy, bilateral 1/3/2018    Family History:    The patient's  "family history includes Asthma in her paternal grandmother; Breast Cancer in her mother; Cancer in her mother; Cerebrovascular Disease in her paternal grandmother; Heart Disease in her father; Mental Illness in her father; Migraines in her daughter and son; Substance Abuse in her father.    Social History:  The patient reports that she has never smoked. She has never used smokeless tobacco. She reports that she drinks alcohol. She reports that she does not use drugs.   PCP: Eveline Berry  Marital Status: Single     Physical Exam:   There were no vitals taken for this visit.   Wt Readings from Last 4 Encounters:   09/16/19 138.8 kg (306 lb)   09/10/19 141.4 kg (311 lb 11.2 oz)   08/01/19 138.9 kg (306 lb 4.8 oz)   01/17/19 141.7 kg (312 lb 8 oz)     Psych: Normal judgement, orientation, memory, affect.     Laboratory:   RHEUM RESULTS Latest Ref Rng & Units 2/16/2019 8/1/2019 7/15/2020   CRP, INFLAMMATION 0.0 - 8.0 mg/L - 11.0(H) 11.4(H)   AST 0 - 45 U/L - 14 14   ALT 0 - 50 U/L - 23 26   ALBUMIN 3.4 - 5.0 g/dL - 3.3(L) -   WBC 4.0 - 11.0 10e9/L - 5.3 4.9   RBC 3.8 - 5.2 10e12/L - 4.23 4.23   HGB 11.7 - 15.7 g/dL - 13.1 13.0   HCT 35.0 - 47.0 % - 40.5 40.0   MCV 78 - 100 fl - 96 95   MCHC 31.5 - 36.5 g/dL - 32.3 32.5   RDW 10.0 - 15.0 % - 13.3 13.2    - 450 10e9/L - 290 297   CREATININE 0.52 - 1.04 mg/dL 1.11(H) 1.12(H) 1.06(H)   GFR ESTIMATE, IF BLACK >60 mL/min/[1.73:m2] 65 64 68   GFR ESTIMATE >60 mL/min/[1.73:m2] 56(L) 55(L) 59(L)   HEPATITIS C ANTIBODY NR - - -     AMAN interpretation   Date Value Ref Range Status   04/28/2018 Negative NEG^Negative Final     Comment:                                        Reference range:  <1:40  NEGATIVE  1:40 - 1:80  BORDERLINE POSITIVE  >1:80 POSITIVE     Elizabeth Rosenthal is a 55 year old female who is being evaluated via a billable telephone visit.      The patient has been notified of following:     \"This telephone visit will be conducted via a call between you and " "your physician/provider. We have found that certain health care needs can be provided without the need for a physical exam.  This service lets us provide the care you need with a short phone conversation.  If a prescription is necessary we can send it directly to your pharmacy.  If lab work is needed we can place an order for that and you can then stop by our lab to have the test done at a later time.    Telephone visits are billed at different rates depending on your insurance coverage. During this emergency period, for some insurers they may be billed the same as an in-person visit.  Please reach out to your insurance provider with any questions.    If during the course of the call the physician/provider feels a telephone visit is not appropriate, you will not be charged for this service.\"    Patient has given verbal consent for Telephone visit?  Yes    What phone number would you like to be contacted at? [phone]    How would you like to obtain your AVS? [facility]    Phone call duration: 22 minutes    Denilson Kelley MD      "

## 2020-07-24 ENCOUNTER — VIRTUAL VISIT (OUTPATIENT)
Dept: RHEUMATOLOGY | Facility: CLINIC | Age: 55
End: 2020-07-24
Attending: INTERNAL MEDICINE
Payer: COMMERCIAL

## 2020-07-24 DIAGNOSIS — M13.0 POLYARTHRITIS: ICD-10-CM

## 2020-07-24 DIAGNOSIS — Z79.899 ENCOUNTER FOR LONG-TERM (CURRENT) USE OF MEDICATIONS: ICD-10-CM

## 2020-07-24 DIAGNOSIS — M54.50 CHRONIC BILATERAL LOW BACK PAIN WITHOUT SCIATICA: ICD-10-CM

## 2020-07-24 DIAGNOSIS — M79.7 FIBROMYALGIA: ICD-10-CM

## 2020-07-24 DIAGNOSIS — F33.0 MAJOR DEPRESSIVE DISORDER, RECURRENT EPISODE, MILD (H): ICD-10-CM

## 2020-07-24 DIAGNOSIS — G89.29 CHRONIC BILATERAL LOW BACK PAIN WITHOUT SCIATICA: ICD-10-CM

## 2020-07-24 DIAGNOSIS — M06.09 RHEUMATOID ARTHRITIS OF MULTIPLE SITES WITHOUT RHEUMATOID FACTOR (H): Primary | ICD-10-CM

## 2020-07-24 ASSESSMENT — PAIN SCALES - GENERAL: PAINLEVEL: MILD PAIN (3)

## 2020-07-24 NOTE — LETTER
2020       RE: Elizabeth Rosenthal  [address] [place] [address]     Dear Colleague,    Thank you for referring your patient, Elizabeth Rosenthal, to the [facility] RHEUMATOLOGY at [facility]. Please see a copy of my visit note below.    [facility]  Rheumatology Clinic  Denilson Kelley MD  2020     Name: Elizabeth Rosenthal  MRN: [id number]  Age: 54 year old  : 1965  Referring provider: Referred Self    Assessment and Plan:  # Seronegative rheumatoid arthritis  Patient relates significant benefit from methotrexate with reduced joint pain and swelling, although persistent left greater than right knee pain and low back pain affect her on a daily basis.  No examination was possible today, due to the telephone nature of the interaction in the time of the coronavirus pandemic. Blood work done on July 15, 2020 showed creatinine, transaminases, and CBC all stable or normal.  CRP was 11.4, unchanged from 3 measurements obtained since 2018.    Inflammatory arthropathy remains improved, and persistent arthralgia and stiffness have moderated, suggesting improved control of a smoldering inflammatory component. I recommended maintaining methotrexate at 20 mg weekly. While on the drug, she should obtain every three month LFTs, creatinine, and CBC. She may use folic acid 1 mg daily to prevent mucosal side effects.     # Bilateral knee pain:  Patient has been offered total knee replacement surgery on the left pending weight loss. Concentrated efforts to lose giana have not borne fruit in the last several year.  I do not think that corticosteroid or viscosupplementation injections would benefit her. We discussed again weight-bearing and isometric quadriceps strengthening exercises today. I recommend performance of these exercises twice daily in order to provide more support to arthritic knees. She may use Cymbalta 60 mg for pain control as well as gabapentin 300 mg up to  "tid.    #Febrile illness, spring 2020: Patient expresses desire to know whether she carries coronavirus antibodies, now several months after experiencing respiratory and febrile illness suspicious for SARS-CoV-2.  I ordered serologic testing to determine whether patient has had exposure to the virus.    Follow-up: Return to clinic in 4 months.    HPI:   Elizabeth Rosenthal presents for follow up of seronegative rheumatoid arthritis as well as fibromyalgia and osteoarthritis of knees and spine.  She was last seen in rheumatology in September 2019, when inflammatory arthritis was judged active.  Increased methotrexate to 20 mg weekly was recommended.  Gabapentin was continued at 300 mg 3 times daily.    Interval history 07-:    She had high fever and cough in 3-2020, with waxing/waning symptoms since then. Concerned that she had COVID19.    L knee \"crinkles\" when she is gardening.  Elbows, wrists, shoulders have been painful; spot on the elbow \"feels bruised\". There is visible swelling in the left elbow on the inside of the elbow.  Joint pain is constant, but generally mild, unless she is vigorously active.  Outside of her hips above the buttocks.   EARLY MORNING STIFFNESS is stilll 30-60 minutes.  She continues methotrexate 20 mg weekly; no adverse effects. She thinks it helps significantly. She supplements with 1-3 gabapentin per day; this also helps.  She gardens ~ 1 hour per day. She notes hand swelling afterwards.    History 9-2019:    The patient reports that she historically has joint pain in her feet, ankles, hips, back, knees, and hands. She states that these areas hurt bilaterally, though her left hand seems to be more swollen. She explains that she has left knee pain all the time, though this is exacerbated with weightbearing. She notes that her right knee is becoming painful as well. She states that corticosteroid injections no longer provide relief. She has been told that total knee replacement is " warranted, however, orthopedics does not want to replace her knee until she loses weight.    She endorses headaches down the right side of her face that feel different from her typical migraines. She underwent a stroke work-up in the emergency department for facial numbness and her headache has been present since that time. She states that she had 4 MRIs last year which she believes may be allergy related. She also states that she has a strange bump on the top of her head that had slowly increased in side. She explains that this feels like the rheumatoid arthritis bumps she used to have.     She can only physically handle water aerobics and swimming. Her only physical activity involves seated gardening and involves a lot of digging with a shovel. She has not worked with a physical therapist on quadriceps strengthening.     In the last year, she joined a program that brings produce to her home and she has been eating much more fruit and vegetables, though she has not lost any weight. She states that she has tried eliminating all sugar, though she is not good at this. She did meet with a dietician and states they had a hard time as she has so many food allergies. She states that she can consume pop tarts, muffins, and mochas because they does not have any yeast or other products that she is allergic to. She had a friend that had bariatric surgery and had a negative experience which makes the patient somewhat afraid to pursue this surgery.    She has been taking methotrexate and reports this provides her significant relief. She states that she cannot get out of bed without methotrexate. She explains that she does have cyclical joint symptoms such as pain as she reaches time for injections. She denies any mouth ulcers.    She has stiffness in her hands after activities like writing. She denies morning stiffness in her hands.     Review of Systems:   Pertinent items are noted in HPI or as below, remainder of complete ROS  is negative.      No recent problems with hearing or vision. No swallowing problems.   No breathing difficulty, shortness of breath, coughing, or wheezing  No chest pain or palpitations  No heart burn, indigestion, abdominal pain, nausea, vomiting, diarrhea  No urination problems, no bloody, cloudy urine, no dysuria  No numbing, tingling, weakness  No headaches or confusion  No rashes. No easy bleeding or bruising.     Active Medications:   Current Outpatient Medications   Medication     albuterol (PROAIR HFA/PROVENTIL HFA/VENTOLIN HFA) 108 (90 Base) MCG/ACT inhaler     aspirin 81 MG tablet     buPROPion (WELLBUTRIN XL) 150 MG 24 hr tablet     cetirizine (ZYRTEC) 10 MG tablet     DULoxetine (CYMBALTA) 30 MG capsule     DULoxetine (CYMBALTA) 60 MG capsule     fluticasone (FLOVENT HFA) 110 MCG/ACT Inhaler     folic acid (FOLVITE) 1 MG tablet     gabapentin (NEURONTIN) 300 MG capsule     lidocaine (LIDODERM) 5 % patch     lisinopril (PRINIVIL/ZESTRIL) 10 MG tablet     methotrexate 2.5 MG tablet     pantoprazole (PROTONIX) 20 MG EC tablet     VITAMIN D, CHOLECALCIFEROL, PO     No current facility-administered medications for this visit.        Allergies:   Nuts   Celery Oil   Codeine   Contrast Dye   Food   Potatoes  Peanuts  Cantaloupe  Lettuce  Oat   Penicillins   Rice   Shellfish-Derived Products   Wheat Bran   Yeast       Past Medical History:  Allergic rhinitis    Asthma   Chronic pelvic pain in female   Depression   Depressive disorder   Gastroesophageal reflux disease   Head injury   Hyperlipidemia   Hypertension   Immunosuppression    Migraines   Morbid obesity   Obstructive sleep apnea   Reduced vision   Transient ischemic attack  Vertebral artery dissection  Iliotibial band syndrome  Right knee pain  Lumbar radicular pain  Rheumatoid arthritis of multiple sites without rheumatoid factor  Fibromyalgia  Arthritis of knee, left  Creatinine elevation     Past Surgical History:  Arthroscopy knee bilateral    Biopsy  lymph node cervical    Colonoscopy 7/26/2017  Lymph node biopsy 2010  Genitourinary surgery, fallopian tube cyst 1994 and tubal ligation 1999  Lymph node removed from neck for biopsy 2011   Hysteroscopy    Tonsillectomy, bilateral 1/3/2018    Family History:    The patient's family history includes Asthma in her paternal grandmother; Breast Cancer in her mother; Cancer in her mother; Cerebrovascular Disease in her paternal grandmother; Heart Disease in her father; Mental Illness in her father; Migraines in her daughter and son; Substance Abuse in her father.    Social History:  The patient reports that she has never smoked. She has never used smokeless tobacco. She reports that she drinks alcohol. She reports that she does not use drugs.   PCP: Eveline Berry  Marital Status: Single     Physical Exam:   There were no vitals taken for this visit.   Wt Readings from Last 4 Encounters:   09/16/19 138.8 kg (306 lb)   09/10/19 141.4 kg (311 lb 11.2 oz)   08/01/19 138.9 kg (306 lb 4.8 oz)   01/17/19 141.7 kg (312 lb 8 oz)     Psych: Normal judgement, orientation, memory, affect.     Laboratory:   RHEUM RESULTS Latest Ref Rng & Units 2/16/2019 8/1/2019 7/15/2020   CRP, INFLAMMATION 0.0 - 8.0 mg/L - 11.0(H) 11.4(H)   AST 0 - 45 U/L - 14 14   ALT 0 - 50 U/L - 23 26   ALBUMIN 3.4 - 5.0 g/dL - 3.3(L) -   WBC 4.0 - 11.0 10e9/L - 5.3 4.9   RBC 3.8 - 5.2 10e12/L - 4.23 4.23   HGB 11.7 - 15.7 g/dL - 13.1 13.0   HCT 35.0 - 47.0 % - 40.5 40.0   MCV 78 - 100 fl - 96 95   MCHC 31.5 - 36.5 g/dL - 32.3 32.5   RDW 10.0 - 15.0 % - 13.3 13.2    - 450 10e9/L - 290 297   CREATININE 0.52 - 1.04 mg/dL 1.11(H) 1.12(H) 1.06(H)   GFR ESTIMATE, IF BLACK >60 mL/min/[1.73:m2] 65 64 68   GFR ESTIMATE >60 mL/min/[1.73:m2] 56(L) 55(L) 59(L)   HEPATITIS C ANTIBODY NR - - -     AMAN interpretation   Date Value Ref Range Status   04/28/2018 Negative NEG^Negative Final     Comment:                                        Reference range:  <1:40   NEGATIVE  1:40 - 1:80  BORDERLINE POSITIVE  >1:80 POSITIVE     Elizabeth Rosenthal is a 55 year old female who is being evaluated via a billable telephone visit.            Phone call duration: 22 minutes    Denilson Kelley MD

## 2020-07-26 RX ORDER — DULOXETIN HYDROCHLORIDE 60 MG/1
60 CAPSULE, DELAYED RELEASE ORAL DAILY
Qty: 90 CAPSULE | Refills: 3 | Status: SHIPPED | OUTPATIENT
Start: 2020-07-26 | End: 2020-10-01 | Stop reason: ALTCHOICE

## 2020-07-26 RX ORDER — GABAPENTIN 300 MG/1
300 CAPSULE ORAL 3 TIMES DAILY
Qty: 270 CAPSULE | Refills: 3 | Status: SHIPPED | OUTPATIENT
Start: 2020-07-26 | End: 2021-01-10

## 2020-07-26 RX ORDER — LIDOCAINE 50 MG/G
PATCH TOPICAL
Qty: 30 PATCH | Refills: 3 | Status: SHIPPED | OUTPATIENT
Start: 2020-07-26 | End: 2022-04-06

## 2020-07-26 NOTE — PATIENT INSTRUCTIONS
Diagnosis:  1.  Inflammatory arthritis, improved with increased methotrexate dose  2.  Advanced osteoarthritis of the knees      Plan:  1.  Continue methotrexate at 8 tablets (20 mg weekly.  2.  Continue gabapentin 300 mg up to 3 times daily.  3.  While on methotrexate, undergo every 3-month laboratory evaluation, and limit alcohol to 2 drinks weekly.

## 2020-07-27 ENCOUNTER — NURSE TRIAGE (OUTPATIENT)
Dept: NURSING | Facility: CLINIC | Age: 55
End: 2020-07-27

## 2020-07-27 NOTE — TELEPHONE ENCOUNTER
Calling to schedule the the covid serology test  Informed they will call her. Jose Roberson RN [phone]

## 2020-08-19 ENCOUNTER — NURSE TRIAGE (OUTPATIENT)
Dept: NURSING | Facility: CLINIC | Age: 55
End: 2020-08-19

## 2020-08-19 NOTE — TELEPHONE ENCOUNTER
"Called to schedule COVID-19 antibody testing that was ordered on 7/24.  Caller reported that she has been coughing x 2 days now, said\"i think it is my asthma\".  Caller was advised to send a my chart message to her primary care provider if she can still go ahead to have antibody testing scheduled.  Marcelina Barlow RN    Reason for Disposition    [1] Caller requesting NON-URGENT health information AND [2] PCP's office is the best resource    Additional Information    Negative: [1] Caller is not with the adult (patient) AND [2] reporting urgent symptoms    Negative: Lab result questions    Negative: Medication questions    Negative: Caller can't be reached by phone    Negative: Caller has already spoken to PCP or another triager    Negative: RN needs further essential information from caller in order to complete triage    Negative: Requesting regular office appointment    Protocols used: INFORMATION ONLY CALL-A-AH      "

## 2020-08-20 ENCOUNTER — NURSE TRIAGE (OUTPATIENT)
Dept: NURSING | Facility: CLINIC | Age: 55
End: 2020-08-20

## 2020-08-20 DIAGNOSIS — J45.20 MILD INTERMITTENT ASTHMA WITHOUT COMPLICATION: ICD-10-CM

## 2020-08-20 DIAGNOSIS — M06.09 RHEUMATOID ARTHRITIS OF MULTIPLE SITES WITHOUT RHEUMATOID FACTOR (H): ICD-10-CM

## 2020-08-20 LAB
ALT SERPL W P-5'-P-CCNC: 18 U/L (ref 0–50)
AST SERPL W P-5'-P-CCNC: 8 U/L (ref 0–45)
CREAT SERPL-MCNC: 1.04 MG/DL (ref 0.52–1.04)
CRP SERPL-MCNC: 12.6 MG/L (ref 0–8)
ERYTHROCYTE [DISTWIDTH] IN BLOOD BY AUTOMATED COUNT: 12.8 % (ref 10–15)
GFR SERPL CREATININE-BSD FRML MDRD: 60 ML/MIN/{1.73_M2}
HCT VFR BLD AUTO: 42.4 % (ref 35–47)
HGB BLD-MCNC: 13.5 G/DL (ref 11.7–15.7)
MCH RBC QN AUTO: 30.6 PG (ref 26.5–33)
MCHC RBC AUTO-ENTMCNC: 31.8 G/DL (ref 31.5–36.5)
MCV RBC AUTO: 96 FL (ref 78–100)
PLATELET # BLD AUTO: 367 10E9/L (ref 150–450)
RBC # BLD AUTO: 4.41 10E12/L (ref 3.8–5.2)
WBC # BLD AUTO: 6.6 10E9/L (ref 4–11)

## 2020-08-20 RX ORDER — DEXAMETHASONE 4 MG/1
2 TABLET ORAL 2 TIMES DAILY
Qty: 3 INHALER | Refills: 3 | Status: SHIPPED | OUTPATIENT
Start: 2020-08-20 | End: 2024-02-02

## 2020-08-20 NOTE — TELEPHONE ENCOUNTER
Patient had COVID Serology test ordered over a month ago, order confirmed in Epic. Has not received a phone call to schedule.     Transferred to COVID testing line.     Savanna Miller RN  [facility]    Additional Information    General information question, no triage required and triager able to answer question    Protocols used: INFORMATION ONLY CALL-A-AH

## 2020-08-20 NOTE — TELEPHONE ENCOUNTER
Health Call Center    Phone Message    May a detailed message be left on voicemail: yes     Reason for Call: Medication Refill Request    Has the patient contacted the pharmacy for the refill? Yes   Name of medication being requested: fluticasone (FLOVENT HFA) 110 MCG/ACT Inhaler  Provider who prescribed the medication: Dr Berry  Pharmacy: [facility] #[id number] - [place], [place] - [address]   Date medication is needed: as soon as possible          Action Taken: Message routed to:  [facility]    Travel Screening: Not Applicable

## 2020-08-20 NOTE — TELEPHONE ENCOUNTER
fluticasone (FLOVENT HFA) 110 MCG/ACT Inhaler      Last Written Prescription Date:  6-1-2017  Last Fill Quantity: 3 inh,   # refills: 3  Last Office Visit : 3-  Future Office visit:  none    Routing refill request to provider for review/approval because:  Probable gap in treatment - last ordered in 2017.      Kathleen M Doege RN

## 2020-08-21 LAB
COVID-19 ANTIBODY IGG: NEGATIVE
LAB TEST METHOD: NORMAL

## 2020-09-02 DIAGNOSIS — F33.0 MAJOR DEPRESSIVE DISORDER, RECURRENT EPISODE, MILD (H): ICD-10-CM

## 2020-09-06 RX ORDER — BUPROPION HYDROCHLORIDE 150 MG/1
150 TABLET ORAL EVERY MORNING
Qty: 90 TABLET | Refills: 0 | Status: SHIPPED | OUTPATIENT
Start: 2020-09-06 | End: 2021-02-12

## 2020-09-06 NOTE — TELEPHONE ENCOUNTER
Last Virtual Visit: 3/26/20, no upcoming appointments, overdue for PHQ-9 last one 8/1/19 score of 10

## 2020-10-01 ENCOUNTER — OFFICE VISIT (OUTPATIENT)
Dept: INTERNAL MEDICINE | Facility: CLINIC | Age: 55
End: 2020-10-01
Payer: COMMERCIAL

## 2020-10-01 VITALS
BODY MASS INDEX: 48.79 KG/M2 | DIASTOLIC BLOOD PRESSURE: 84 MMHG | SYSTOLIC BLOOD PRESSURE: 146 MMHG | HEART RATE: 86 BPM | WEIGHT: 293 LBS | OXYGEN SATURATION: 97 %

## 2020-10-01 DIAGNOSIS — R10.84 ABDOMINAL PAIN, GENERALIZED: ICD-10-CM

## 2020-10-01 DIAGNOSIS — Z23 NEED FOR VACCINATION: ICD-10-CM

## 2020-10-01 DIAGNOSIS — I10 BENIGN ESSENTIAL HYPERTENSION: ICD-10-CM

## 2020-10-01 DIAGNOSIS — F33.1 MAJOR DEPRESSIVE DISORDER, RECURRENT EPISODE, MODERATE (H): Primary | ICD-10-CM

## 2020-10-01 PROCEDURE — 99214 OFFICE O/P EST MOD 30 MIN: CPT | Mod: 25 | Performed by: STUDENT IN AN ORGANIZED HEALTH CARE EDUCATION/TRAINING PROGRAM

## 2020-10-01 PROCEDURE — 90686 IIV4 VACC NO PRSV 0.5 ML IM: CPT | Performed by: STUDENT IN AN ORGANIZED HEALTH CARE EDUCATION/TRAINING PROGRAM

## 2020-10-01 PROCEDURE — G0008 ADMIN INFLUENZA VIRUS VAC: HCPCS | Performed by: STUDENT IN AN ORGANIZED HEALTH CARE EDUCATION/TRAINING PROGRAM

## 2020-10-01 PROCEDURE — G0009 ADMIN PNEUMOCOCCAL VACCINE: HCPCS | Performed by: STUDENT IN AN ORGANIZED HEALTH CARE EDUCATION/TRAINING PROGRAM

## 2020-10-01 PROCEDURE — 90670 PCV13 VACCINE IM: CPT | Performed by: STUDENT IN AN ORGANIZED HEALTH CARE EDUCATION/TRAINING PROGRAM

## 2020-10-01 RX ORDER — LISINOPRIL 10 MG/1
10 TABLET ORAL DAILY
Qty: 90 TABLET | Refills: 3 | Status: SHIPPED | OUTPATIENT
Start: 2020-10-01 | End: 2021-06-30

## 2020-10-01 RX ORDER — ESCITALOPRAM OXALATE 20 MG/1
20 TABLET ORAL DAILY
Qty: 30 TABLET | Refills: 3 | Status: SHIPPED | OUTPATIENT
Start: 2020-10-01 | End: 2021-05-06 | Stop reason: SINTOL

## 2020-10-01 RX ORDER — PANTOPRAZOLE SODIUM 20 MG/1
20 TABLET, DELAYED RELEASE ORAL 2 TIMES DAILY
Qty: 180 TABLET | Refills: 3 | Status: SHIPPED | OUTPATIENT
Start: 2020-10-01 | End: 2021-06-30

## 2020-10-01 ASSESSMENT — PAIN SCALES - GENERAL: PAINLEVEL: NO PAIN (0)
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
## 2020-10-01 NOTE — NURSING NOTE
Chief Complaint   Patient presents with     Recheck Medication     pt here for med renewals     Asthma     pt here to discuss Rx for inhaler at ellis Nugent CMA, EMT at 2:00 PM on 10/1/2020.

## 2020-10-01 NOTE — NURSING NOTE
Patient received Dr. Lewis vaccine. Vaccine was given under the supervision of Dr. Lewis. See immunization list for administration details. Pt was advised to remain in [facility] lobby for 15 minutes in case of an averse reaction. Given by Destiny Purcell CMA, EMT 3:39 PM 10/1/2020

## 2020-10-01 NOTE — PATIENT INSTRUCTIONS
[facility] Medication Refill Request Information:  * Please contact your pharmacy regarding ANY request for medication refills.  ** [facility] Prescription Fax = [phone]  * Please allow 3 business days for routine medication refills.  * Please allow 5 business days for controlled substance medication refills.     [facility] Test Result notification information:  *You will be notified with in 7-10 days of your appointment day regarding the results of your test.  If you are on MyChart you will be notified as soon as the provider has reviewed the results and signed off on them.    [facility]: [phone]

## 2020-12-06 ENCOUNTER — HEALTH MAINTENANCE LETTER (OUTPATIENT)
Age: 55
End: 2020-12-06

## 2020-12-08 DIAGNOSIS — G89.29 CHRONIC BILATERAL LOW BACK PAIN WITHOUT SCIATICA: ICD-10-CM

## 2020-12-08 DIAGNOSIS — M06.09 RHEUMATOID ARTHRITIS OF MULTIPLE SITES WITHOUT RHEUMATOID FACTOR (H): ICD-10-CM

## 2020-12-08 DIAGNOSIS — Z79.899 ENCOUNTER FOR LONG-TERM (CURRENT) USE OF MEDICATIONS: ICD-10-CM

## 2020-12-08 DIAGNOSIS — M13.0 POLYARTHRITIS: ICD-10-CM

## 2020-12-08 DIAGNOSIS — M79.7 FIBROMYALGIA: ICD-10-CM

## 2020-12-08 DIAGNOSIS — M54.50 CHRONIC BILATERAL LOW BACK PAIN WITHOUT SCIATICA: ICD-10-CM

## 2020-12-09 NOTE — TELEPHONE ENCOUNTER
methotrexate 2.5 MG tablet    Take 8 tablets (20 mg) by mouth every 7 days     Last Written Prescription Date:  9/10/19  Last Fill Quantity: 96,   # refills: 3  Last Office Visit: 7/24/20   Return in about 4 months (around 11/24/2020).    Plan:  1.  Continue methotrexate at 8 tablets (20 mg weekly.  2.  Continue gabapentin 300 mg up to 3 times daily.  3.  While on methotrexate, undergo every 3-month laboratory evaluation, and limit alcohol to 2 drinks weekly.    Future Office visit:  none    CBC RESULTS:   Recent Labs   Lab Test 08/20/20  1705   WBC 6.6   RBC 4.41   HGB 13.5   HCT 42.4   MCV 96   MCH 30.6   MCHC 31.8   RDW 12.8          Creatinine   Date Value Ref Range Status   08/20/2020 1.04 0.52 - 1.04 mg/dL Final   ]    Liver Function Studies -   Recent Labs   Lab Test 08/20/20  1705 08/01/19  0836 08/01/19  0836 06/16/17  0719 06/16/17  0719   PROTTOTAL  --   --   --   --  6.8   ALBUMIN  --   --  3.3*   < > 3.4   BILITOTAL  --   --   --   --  0.4   ALKPHOS  --   --   --   --  85   AST 8   < > 14   < > 14   ALT 18   < > 23   < > 22    < > = values in this interval not displayed.       Routing refill request to provider for review/approval because:  Drug not on the [facility], [facility] or [facility] refill protocol.     Labs overdue     Scheduling has been notified to contact the pt for appointment.

## 2020-12-19 DIAGNOSIS — M06.09 RHEUMATOID ARTHRITIS OF MULTIPLE SITES WITHOUT RHEUMATOID FACTOR (H): ICD-10-CM

## 2020-12-19 LAB
ALT SERPL W P-5'-P-CCNC: 20 U/L (ref 0–50)
AST SERPL W P-5'-P-CCNC: 10 U/L (ref 0–45)
CREAT SERPL-MCNC: 1.02 MG/DL (ref 0.52–1.04)
CRP SERPL-MCNC: 13.4 MG/L (ref 0–8)
ERYTHROCYTE [DISTWIDTH] IN BLOOD BY AUTOMATED COUNT: 13.4 % (ref 10–15)
GFR SERPL CREATININE-BSD FRML MDRD: 61 ML/MIN/{1.73_M2}
HCT VFR BLD AUTO: 40 % (ref 35–47)
HGB BLD-MCNC: 12.4 G/DL (ref 11.7–15.7)
MCH RBC QN AUTO: 29.2 PG (ref 26.5–33)
MCHC RBC AUTO-ENTMCNC: 31 G/DL (ref 31.5–36.5)
MCV RBC AUTO: 94 FL (ref 78–100)
PLATELET # BLD AUTO: 319 10E9/L (ref 150–450)
RBC # BLD AUTO: 4.25 10E12/L (ref 3.8–5.2)
WBC # BLD AUTO: 6.1 10E9/L (ref 4–11)

## 2020-12-19 PROCEDURE — 85027 COMPLETE CBC AUTOMATED: CPT | Performed by: PATHOLOGY

## 2020-12-19 PROCEDURE — 86140 C-REACTIVE PROTEIN: CPT | Performed by: PATHOLOGY

## 2020-12-19 PROCEDURE — 84460 ALANINE AMINO (ALT) (SGPT): CPT | Performed by: PATHOLOGY

## 2020-12-19 PROCEDURE — 82565 ASSAY OF CREATININE: CPT | Performed by: PATHOLOGY

## 2020-12-19 PROCEDURE — 36415 COLL VENOUS BLD VENIPUNCTURE: CPT | Performed by: PATHOLOGY

## 2020-12-19 PROCEDURE — 84450 TRANSFERASE (AST) (SGOT): CPT | Performed by: PATHOLOGY

## 2021-01-07 NOTE — PROGRESS NOTES
[facility]  [facility]heumatology Clinic-[facility]  Denilson Kelley MD  2021     Name: Elizabeth Rosenthal  MRN: [id number]  Age: 55 year old  : 1965  Referring provider: Referred Self    Assessment and Plan:  # Seronegative rheumatoid arthritis  There has been significant, if incomplete, benefit from methotrexate with reduced joint pain and swelling.  No examination was possible today, due to the telephone nature of the interaction in the time of the coronavirus pandemic. Lab work on 2020 showed creatinine, transaminases, and CBC all normal; CRP was minimally and stably elevated at 13.4.    Inflammatory arthropathy remains stable/improved, and persistent joint pain and stiffness have moderated, suggesting improved control of a smoldering inflammatory process. I recommended maintaining methotrexate at 20 mg weekly. While on the drug, obtain every three month LFTs, creatinine, and CBC. Use folic acid 1 mg daily to prevent mucosal side effects.     # Bilateral knee pain:  I agree that medication is not likely to provide significant benefit due to cartilage loss as the major pain generator. I agree with planned total knee replacement surgery on the left pending weight loss. I recommend continuing isometric exercises, weight management, and use of lidocaine patches, heating pad, rest for pain control, as well as gabapentin 300 mg up to tid.    # Increased headaches: Adjustment of anti-depressant medications may be helpful. I will refer to Mental Health.      Follow-up: Return to clinic in 6 months.    HPI:   Elizabeth Rosenthal presents for follow up of seronegative rheumatoid arthritis as well as fibromyalgia and osteoarthritis of knees and spine.  She was last seen in rheumatology in , when inflammatory arthritis was judged improved.  Increased methotrexate at 20 mg weekly was recommended.  Gabapentin was continued at 300 mg 3 times daily.    Interval history 2021:    She reports slowly improving from  "months of exhaustion and coughing in the summer/Fall.  Joints are unchanged--she has chronic pain in knees--L > R; ankles, elbows. Pain is generally unaffected by activity.     She does not use ibufprofen or tylenol. She uses lidocain patches, heating pad, rest, and gabapentin for joint pain (300-600 mg daily). She had been using duloxetine for pain and for migraine HA. She developed insomnia on the latter, so she stopped cymbalta and returned to escatalopram. Now the headaches are worse once again. She is concerned that medication combinations might be exacerbating HA.    She takes methotrexate 8 tabs once weekly. No HA associated.    Interval history 07-:    She had high fever and cough in 3-2020, with waxing/waning symptoms since then. Concerned that she had COVID19.    L knee \"crinkles\" when she is gardening.  Elbows, wrists, shoulders have been painful; spot on the elbow \"feels bruised\". There is visible swelling in the left elbow on the inside of the elbow.  Joint pain is constant, but generally mild, unless she is vigorously active.  Outside of her hips above the buttocks.   EARLY MORNING STIFFNESS is stilll 30-60 minutes.  She continues methotrexate 20 mg weekly; no adverse effects. She thinks it helps significantly. She supplements with 1-3 gabapentin per day; this also helps.  She gardens ~ 1 hour per day. She notes hand swelling afterwards.       Review of Systems:   Pertinent items are noted in HPI or as below, remainder of complete ROS is negative.      No recent problems with hearing or vision. No swallowing problems.   No breathing difficulty, shortness of breath, coughing, or wheezing  No chest pain or palpitations  No heart burn, indigestion, abdominal pain, nausea, vomiting, diarrhea  No urination problems, no bloody, cloudy urine, no dysuria  No numbing, tingling, weakness  No headaches or confusion  No rashes. No easy bleeding or bruising.     Active Medications:   Current Outpatient " Medications   Medication     albuterol (PROAIR HFA/PROVENTIL HFA/VENTOLIN HFA) 108 (90 Base) MCG/ACT inhaler     aspirin 81 MG tablet     buPROPion (WELLBUTRIN XL) 150 MG 24 hr tablet     cetirizine (ZYRTEC) 10 MG tablet     escitalopram (LEXAPRO) 20 MG tablet     fluticasone (FLOVENT HFA) 110 MCG/ACT inhaler     folic acid (FOLVITE) 1 MG tablet     gabapentin (NEURONTIN) 300 MG capsule     lidocaine (LIDODERM) 5 % patch     lisinopril (ZESTRIL) 10 MG tablet     methotrexate 2.5 MG tablet     pantoprazole (PROTONIX) 20 MG EC tablet     VITAMIN D, CHOLECALCIFEROL, PO     No current facility-administered medications for this visit.        Allergies:   Nuts   Celery Oil   Codeine   Contrast Dye   Food   Potatoes  Peanuts  Cantaloupe  Lettuce  Oat   Penicillins   Rice   Shellfish-Derived Products   Wheat Bran   Yeast       Past Medical History:  Allergic rhinitis    Asthma   Chronic pelvic pain in female   Depression   Depressive disorder   Gastroesophageal reflux disease   Head injury   Hyperlipidemia   Hypertension   Immunosuppression    Migraines   Morbid obesity   Obstructive sleep apnea   Reduced vision   Transient ischemic attack  Vertebral artery dissection  Iliotibial band syndrome  Right knee pain  Lumbar radicular pain  Rheumatoid arthritis of multiple sites without rheumatoid factor  Fibromyalgia  Arthritis of knee, left  Creatinine elevation     Past Surgical History:  Arthroscopy knee bilateral    Biopsy lymph node cervical    Colonoscopy 7/26/2017  Lymph node biopsy 2010  Genitourinary surgery, fallopian tube cyst 1994 and tubal ligation 1999  Lymph node removed from neck for biopsy 2011   Hysteroscopy    Tonsillectomy, bilateral 1/3/2018    Family History:    The patient's family history includes Asthma in her paternal grandmother; Breast Cancer in her mother; Cancer in her mother; Cerebrovascular Disease in her paternal grandmother; Heart Disease in her father; Mental Illness in her father; Migraines in  her daughter and son; Substance Abuse in her father.    Social History:  The patient reports that she has never smoked. She has never used smokeless tobacco. She reports that she drinks alcohol. She reports that she does not use drugs.   PCP: Eveline Berry  Marital Status: Single     Physical Exam:   There were no vitals taken for this visit.   Wt Readings from Last 4 Encounters:   10/01/20 138.3 kg (305 lb)   09/16/19 138.8 kg (306 lb)   09/10/19 141.4 kg (311 lb 11.2 oz)   08/01/19 138.9 kg (306 lb 4.8 oz)     Psych: Normal judgement, orientation, memory, affect.     Laboratory:   RHEUM RESULTS Latest Ref Rng & Units 7/15/2020 8/20/2020 12/19/2020   CRP, INFLAMMATION 0.0 - 8.0 mg/L 11.4(H) 12.6(H) 13.4(H)   AST 0 - 45 U/L 14 8 10   ALT 0 - 50 U/L 26 18 20   ALBUMIN 3.4 - 5.0 g/dL - - -   WBC 4.0 - 11.0 10e9/L 4.9 6.6 6.1   RBC 3.8 - 5.2 10e12/L 4.23 4.41 4.25   HGB 11.7 - 15.7 g/dL 13.0 13.5 12.4   HCT 35.0 - 47.0 % 40.0 42.4 40.0   MCV 78 - 100 fl 95 96 94   MCHC 31.5 - 36.5 g/dL 32.5 31.8 31.0(L)   RDW 10.0 - 15.0 % 13.2 12.8 13.4    - 450 10e9/L 297 367 319   CREATININE 0.52 - 1.04 mg/dL 1.06(H) 1.04 1.02   GFR ESTIMATE, IF BLACK >60 mL/min/[1.73:m2] 68 69 71   GFR ESTIMATE >60 mL/min/[1.73:m2] 59(L) 60(L) 61   HEPATITIS C ANTIBODY NR - - -     AMAN interpretation   Date Value Ref Range Status   04/28/2018 Negative NEG^Negative Final     Comment:                                        Reference range:  <1:40  NEGATIVE  1:40 - 1:80  BORDERLINE POSITIVE  >1:80 POSITIVE           Elizabeth is a 55 year old who is being evaluated via a billable telephone visit.      What phone number would you like to be contacted at? [phone]  How would you like to obtain your AVS? [employer]    Phone call duration: 22 minutes

## 2021-01-08 ENCOUNTER — VIRTUAL VISIT (OUTPATIENT)
Dept: RHEUMATOLOGY | Facility: CLINIC | Age: 56
End: 2021-01-08
Attending: INTERNAL MEDICINE
Payer: COMMERCIAL

## 2021-01-08 DIAGNOSIS — Z79.899 ENCOUNTER FOR LONG-TERM (CURRENT) USE OF MEDICATIONS: ICD-10-CM

## 2021-01-08 DIAGNOSIS — G89.29 CHRONIC BILATERAL LOW BACK PAIN WITHOUT SCIATICA: ICD-10-CM

## 2021-01-08 DIAGNOSIS — M13.0 POLYARTHRITIS: ICD-10-CM

## 2021-01-08 DIAGNOSIS — M06.09 RHEUMATOID ARTHRITIS OF MULTIPLE SITES WITHOUT RHEUMATOID FACTOR (H): ICD-10-CM

## 2021-01-08 DIAGNOSIS — M54.50 CHRONIC BILATERAL LOW BACK PAIN WITHOUT SCIATICA: ICD-10-CM

## 2021-01-08 DIAGNOSIS — F32.A DEPRESSION, UNSPECIFIED DEPRESSION TYPE: Primary | ICD-10-CM

## 2021-01-08 DIAGNOSIS — M79.7 FIBROMYALGIA: ICD-10-CM

## 2021-01-08 PROCEDURE — 99443 PR PHYSICIAN TELEPHONE EVALUATION 21-30 MIN: CPT | Performed by: INTERNAL MEDICINE

## 2021-01-08 NOTE — LETTER
2021       RE: Elizabeth Rosenthal  [address] [place] [address]     Dear Colleague,    Thank you for referring your patient, Elizabeth Rosenthal, to the [facility] RHEUMATOLOGY [facility]INIC [place] [facility]. Please see a copy of my visit note below.    [facility]  Rheumatology Clinic-remote  Denilson Kelley MD  2021     Name: Elizabeth Rosenthal  MRN: [id number]  Age: 55 year old  : 1965  Referring provider: Referred Self    Assessment and Plan:  # Seronegative rheumatoid arthritis  There has been significant, if incomplete, benefit from methotrexate with reduced joint pain and swelling.  No examination was possible today, due to the telephone nature of the interaction in the time of the coronavirus pandemic. Lab work on 2020 showed creatinine, transaminases, and CBC all normal; CRP was minimally and stably elevated at 13.4.    Inflammatory arthropathy remains stable/improved, and persistent joint pain and stiffness have moderated, suggesting improved control of a smoldering inflammatory process. I recommended maintaining methotrexate at 20 mg weekly. While on the drug, obtain every three month LFTs, creatinine, and CBC. Use folic acid 1 mg daily to prevent mucosal side effects.     # Bilateral knee pain:  I agree that medication is not likely to provide significant benefit due to cartilage loss as the major pain generator. I agree with planned total knee replacement surgery on the left pending weight loss. I recommend continuing isometric exercises, weight management, and use of lidocaine patches, heating pad, rest for pain control, as well as gabapentin 300 mg up to tid.    # Increased headaches: Adjustment of anti-depressant medications may be helpful. I will refer to Mental Health.      Follow-up: Return to clinic in 6 months.    HPI:   Elizabeth Rosenthal presents for follow up of seronegative rheumatoid arthritis as well as fibromyalgia  "and osteoarthritis of knees and spine.  She was last seen in rheumatology in 7-2020, when inflammatory arthritis was judged improved.  Increased methotrexate at 20 mg weekly was recommended.  Gabapentin was continued at 300 mg 3 times daily.    Interval history 01-:    She reports slowly improving from months of exhaustion and coughing in the summer/Fall.  Joints are unchanged--she has chronic pain in knees--L > R; ankles, elbows. Pain is generally unaffected by activity.     She does not use ibufprofen or tylenol. She uses lidocain patches, heating pad, rest, and gabapentin for joint pain (300-600 mg daily). She had been using duloxetine for pain and for migraine HA. She developed insomnia on the latter, so she stopped cymbalta and returned to [facility]. Now the headaches are worse once again. She is concerned that medication combinations might be exacerbating HA.    She takes methotrexate 8 tabs once weekly. No HA associated.    Interval history 07-:    She had high fever and cough in 3-2020, with waxing/waning symptoms since then. Concerned that she had COVID19.    L knee \"crinkles\" when she is gardening.  Elbows, wrists, shoulders have been painful; spot on the elbow \"feels bruised\". There is visible swelling in the left elbow on the inside of the elbow.  Joint pain is constant, but generally mild, unless she is vigorously active.  Outside of her hips above the buttocks.   EARLY MORNING STIFFNESS is stilll 30-60 minutes.  She continues methotrexate 20 mg weekly; no adverse effects. She thinks it helps significantly. She supplements with 1-3 gabapentin per day; this also helps.  She gardens ~ 1 hour per day. She notes hand swelling afterwards.       Review of Systems:   Pertinent items are noted in HPI or as below, remainder of complete ROS is negative.      No recent problems with hearing or vision. No swallowing problems.   No breathing difficulty, shortness of breath, coughing, or wheezing  No " chest pain or palpitations  No heart burn, indigestion, abdominal pain, nausea, vomiting, diarrhea  No urination problems, no bloody, cloudy urine, no dysuria  No numbing, tingling, weakness  No headaches or confusion  No rashes. No easy bleeding or bruising.     Active Medications:   Current Outpatient Medications   Medication     albuterol (PROAIR HFA/PROVENTIL HFA/VENTOLIN HFA) 108 (90 Base) MCG/ACT inhaler     aspirin 81 MG tablet     buPROPion (WELLBUTRIN XL) 150 MG 24 hr tablet     cetirizine (ZYRTEC) 10 MG tablet     escitalopram (LEXAPRO) 20 MG tablet     fluticasone (FLOVENT HFA) 110 MCG/ACT inhaler     folic acid (FOLVITE) 1 MG tablet     gabapentin (NEURONTIN) 300 MG capsule     lidocaine (LIDODERM) 5 % patch     lisinopril (ZESTRIL) 10 MG tablet     methotrexate 2.5 MG tablet     pantoprazole (PROTONIX) 20 MG EC tablet     VITAMIN D, CHOLECALCIFEROL, PO     No current facility-administered medications for this visit.        Allergies:   Nuts   Celery Oil   Codeine   Contrast Dye   Food   Potatoes  Peanuts  Cantaloupe  Lettuce  Oat   Penicillins   Rice   Shellfish-Derived Products   Wheat Bran   Yeast       Past Medical History:  Allergic rhinitis    Asthma   Chronic pelvic pain in female   Depression   Depressive disorder   Gastroesophageal reflux disease   Head injury   Hyperlipidemia   Hypertension   Immunosuppression    Migraines   Morbid obesity   Obstructive sleep apnea   Reduced vision   Transient ischemic attack  Vertebral artery dissection  Iliotibial band syndrome  Right knee pain  Lumbar radicular pain  Rheumatoid arthritis of multiple sites without rheumatoid factor  Fibromyalgia  Arthritis of knee, left  Creatinine elevation     Past Surgical History:  Arthroscopy knee bilateral    Biopsy lymph node cervical    Colonoscopy 7/26/2017  Lymph node biopsy 2010  Genitourinary surgery, fallopian tube cyst 1994 and tubal ligation 1999  Lymph node removed from neck for biopsy 2011    Hysteroscopy    Tonsillectomy, bilateral 1/3/2018    Family History:    The patient's family history includes Asthma in her paternal grandmother; Breast Cancer in her mother; Cancer in her mother; Cerebrovascular Disease in her paternal grandmother; Heart Disease in her father; Mental Illness in her father; Migraines in her daughter and son; Substance Abuse in her father.    Social History:  The patient reports that she has never smoked. She has never used smokeless tobacco. She reports that she drinks alcohol. She reports that she does not use drugs.   PCP: Eveline Berry  Marital Status: Single     Physical Exam:   There were no vitals taken for this visit.   Wt Readings from Last 4 Encounters:   10/01/20 138.3 kg (305 lb)   09/16/19 138.8 kg (306 lb)   09/10/19 141.4 kg (311 lb 11.2 oz)   08/01/19 138.9 kg (306 lb 4.8 oz)     Psych: Normal judgement, orientation, memory, affect.     Laboratory:   RHEUM RESULTS Latest Ref Rng & Units 7/15/2020 8/20/2020 12/19/2020   CRP, INFLAMMATION 0.0 - 8.0 mg/L 11.4(H) 12.6(H) 13.4(H)   AST 0 - 45 U/L 14 8 10   ALT 0 - 50 U/L 26 18 20   ALBUMIN 3.4 - 5.0 g/dL - - -   WBC 4.0 - 11.0 10e9/L 4.9 6.6 6.1   RBC 3.8 - 5.2 10e12/L 4.23 4.41 4.25   HGB 11.7 - 15.7 g/dL 13.0 13.5 12.4   HCT 35.0 - 47.0 % 40.0 42.4 40.0   MCV 78 - 100 fl 95 96 94   MCHC 31.5 - 36.5 g/dL 32.5 31.8 31.0(L)   RDW 10.0 - 15.0 % 13.2 12.8 13.4    - 450 10e9/L 297 367 319   CREATININE 0.52 - 1.04 mg/dL 1.06(H) 1.04 1.02   GFR ESTIMATE, IF BLACK >60 mL/min/[1.73:m2] 68 69 71   GFR ESTIMATE >60 mL/min/[1.73:m2] 59(L) 60(L) 61   HEPATITIS C ANTIBODY NR - - -     AMAN interpretation   Date Value Ref Range Status   04/28/2018 Negative NEG^Negative Final     Comment:                                        Reference range:  <1:40  NEGATIVE  1:40 - 1:80  BORDERLINE POSITIVE  >1:80 POSITIVE           Elizabeth is a 55 year old who is being evaluated via a billable telephone visit.      What phone number would  you like to be contacted at? [phone]  How would you like to obtain your AVS? [employer]    Phone call duration: 22 minutes        Again, thank you for allowing me to participate in the care of your patient.      Sincerely,    Denilson Kelley MD

## 2021-01-10 RX ORDER — GABAPENTIN 300 MG/1
300 CAPSULE ORAL 3 TIMES DAILY
Qty: 270 CAPSULE | Refills: 3 | Status: SHIPPED | OUTPATIENT
Start: 2021-01-10 | End: 2022-12-05

## 2021-01-10 RX ORDER — FOLIC ACID 1 MG/1
1 TABLET ORAL DAILY
Qty: 90 TABLET | Refills: 2 | Status: SHIPPED | OUTPATIENT
Start: 2021-01-10 | End: 2021-09-10

## 2021-01-10 NOTE — PATIENT INSTRUCTIONS
Diagnosis/plan:  # Seronegative rheumatoid arthritis  There has been significant, if incomplete, benefit from methotrexate with reduced joint pain and swelling.  No examination was possible today, due to the telephone nature of the interaction in the time of the coronavirus pandemic. Lab work on December 19, 2020 showed creatinine, transaminases, and CBC all normal; CRP was minimally and stably elevated at 13.4.    Inflammatory arthropathy remains stable/improved, and persistent joint pain and stiffness have moderated, suggesting improved control of a smoldering inflammatory process. I recommended maintaining methotrexate at 20 mg weekly. While on the drug, obtain every three month LFTs, creatinine, and CBC. Use folic acid 1 mg daily to prevent mucosal side effects.     # Bilateral knee pain:  I agree that medication is not likely to provide significant benefit due to cartilage loss as the major pain generator. I agree with planned total knee replacement surgery on the left pending weight loss. I recommend continuing isometric exercises, weight management, and use of lidocaine patches, heating pad, rest for pain control, as well as gabapentin 300 mg up to tid.    # Increased headaches: Adjustment of anti-depressant medications may be helpful. I will refer to Mental Health.

## 2021-01-12 ENCOUNTER — MYC MEDICAL ADVICE (OUTPATIENT)
Dept: RHEUMATOLOGY | Facility: CLINIC | Age: 56
End: 2021-01-12

## 2021-01-12 ENCOUNTER — TELEPHONE (OUTPATIENT)
Dept: PSYCHIATRY | Facility: CLINIC | Age: 56
End: 2021-01-12

## 2021-01-12 DIAGNOSIS — F32.A DEPRESSION, UNSPECIFIED DEPRESSION TYPE: Primary | ICD-10-CM

## 2021-01-12 NOTE — TELEPHONE ENCOUNTER
[facility]      Patient Name:  Elizabeth Rosenthal  /Age:  1965 (55 year old)      Intervention: Patient was contacted regarding the Mental Health Referral placed by Denilson Kelley MD on 21. Patient was informed due to the long wait list at this clinic, we are currently unable to accept new patients. Patient was given the phone number for the [facility] to look into scheduling Psychiatry with another [place] location.    Status of Referral: Removed from work queue.    Plan: No further action needed at this time.      Grtechen Bañuelos,     [facility]

## 2021-02-10 DIAGNOSIS — F33.0 MAJOR DEPRESSIVE DISORDER, RECURRENT EPISODE, MILD (H): ICD-10-CM

## 2021-02-12 RX ORDER — BUPROPION HYDROCHLORIDE 150 MG/1
150 TABLET ORAL EVERY MORNING
Qty: 90 TABLET | Refills: 0 | Status: SHIPPED | OUTPATIENT
Start: 2021-02-12 | End: 2021-05-07

## 2021-02-12 NOTE — TELEPHONE ENCOUNTER
Last Clinic Visit: 3/26/20, no upcoming appointments scheduled.  Overdue for PHQ-9, last one 8/1/19.  90 day refill provided per protocol, routed to clinic for follow up

## 2021-03-22 ENCOUNTER — IMMUNIZATION (OUTPATIENT)
Dept: NURSING | Facility: CLINIC | Age: 56
End: 2021-03-22
Payer: COMMERCIAL

## 2021-03-22 PROCEDURE — 0001A PR COVID VAC PFIZER DIL RECON 30 MCG/0.3 ML IM: CPT

## 2021-03-22 PROCEDURE — 91300 PR COVID VAC PFIZER DIL RECON 30 MCG/0.3 ML IM: CPT

## 2021-04-11 ENCOUNTER — HEALTH MAINTENANCE LETTER (OUTPATIENT)
Age: 56
End: 2021-04-11

## 2021-04-12 ENCOUNTER — IMMUNIZATION (OUTPATIENT)
Dept: NURSING | Facility: CLINIC | Age: 56
End: 2021-04-12
Attending: FAMILY MEDICINE
Payer: COMMERCIAL

## 2021-04-12 PROCEDURE — 91300 PR COVID VAC PFIZER DIL RECON 30 MCG/0.3 ML IM: CPT

## 2021-04-12 PROCEDURE — 0002A PR COVID VAC PFIZER DIL RECON 30 MCG/0.3 ML IM: CPT

## 2021-05-06 ENCOUNTER — VIRTUAL VISIT (OUTPATIENT)
Dept: PSYCHIATRY | Facility: CLINIC | Age: 56
End: 2021-05-06
Attending: INTERNAL MEDICINE
Payer: COMMERCIAL

## 2021-05-06 ENCOUNTER — VIRTUAL VISIT (OUTPATIENT)
Dept: BEHAVIORAL HEALTH | Facility: CLINIC | Age: 56
End: 2021-05-06
Payer: COMMERCIAL

## 2021-05-06 DIAGNOSIS — R53.83 FATIGUE, UNSPECIFIED TYPE: ICD-10-CM

## 2021-05-06 DIAGNOSIS — F33.41 RECURRENT MAJOR DEPRESSION IN PARTIAL REMISSION (H): Primary | ICD-10-CM

## 2021-05-06 DIAGNOSIS — F33.0 MAJOR DEPRESSIVE DISORDER, RECURRENT EPISODE, MILD (H): Primary | ICD-10-CM

## 2021-05-06 PROCEDURE — 99205 OFFICE O/P NEW HI 60 MIN: CPT | Mod: 95 | Performed by: PSYCHIATRY & NEUROLOGY

## 2021-05-06 PROCEDURE — 90791 PSYCH DIAGNOSTIC EVALUATION: CPT | Mod: 52 | Performed by: SOCIAL WORKER

## 2021-05-06 RX ORDER — DULOXETIN HYDROCHLORIDE 60 MG/1
60 CAPSULE, DELAYED RELEASE ORAL DAILY
COMMUNITY
End: 2021-05-06

## 2021-05-06 SDOH — HEALTH STABILITY: MENTAL HEALTH: HOW OFTEN DO YOU HAVE 6 OR MORE DRINKS ON ONE OCCASION?: NOT ASKED

## 2021-05-06 SDOH — HEALTH STABILITY: MENTAL HEALTH: HOW MANY STANDARD DRINKS CONTAINING ALCOHOL DO YOU HAVE ON A TYPICAL DAY?: NOT ASKED

## 2021-05-06 SDOH — HEALTH STABILITY: MENTAL HEALTH: HOW OFTEN DO YOU HAVE A DRINK CONTAINING ALCOHOL?: NOT ASKED

## 2021-05-06 ASSESSMENT — PATIENT HEALTH QUESTIONNAIRE - PHQ9
SUM OF ALL RESPONSES TO PHQ QUESTIONS 1-9: 9
5. POOR APPETITE OR OVEREATING: NOT AT ALL

## 2021-05-06 ASSESSMENT — ANXIETY QUESTIONNAIRES
1. FEELING NERVOUS, ANXIOUS, OR ON EDGE: NOT AT ALL
3. WORRYING TOO MUCH ABOUT DIFFERENT THINGS: NOT AT ALL
GAD7 TOTAL SCORE: 0
2. NOT BEING ABLE TO STOP OR CONTROL WORRYING: NOT AT ALL
5. BEING SO RESTLESS THAT IT IS HARD TO SIT STILL: NOT AT ALL
6. BECOMING EASILY ANNOYED OR IRRITABLE: NOT AT ALL
7. FEELING AFRAID AS IF SOMETHING AWFUL MIGHT HAPPEN: NOT AT ALL
IF YOU CHECKED OFF ANY PROBLEMS ON THIS QUESTIONNAIRE, HOW DIFFICULT HAVE THESE PROBLEMS MADE IT FOR YOU TO DO YOUR WORK, TAKE CARE OF THINGS AT HOME, OR GET ALONG WITH OTHER PEOPLE: NOT DIFFICULT AT ALL

## 2021-05-06 ASSESSMENT — COLUMBIA-SUICIDE SEVERITY RATING SCALE - C-SSRS
5. HAVE YOU STARTED TO WORK OUT OR WORKED OUT THE DETAILS OF HOW TO KILL YOURSELF? DO YOU INTEND TO CARRY OUT THIS PLAN?: NO
1. IN THE PAST MONTH, HAVE YOU WISHED YOU WERE DEAD OR WISHED YOU COULD GO TO SLEEP AND NOT WAKE UP?: NO
5. HAVE YOU STARTED TO WORK OUT OR WORKED OUT THE DETAILS OF HOW TO KILL YOURSELF? DO YOU INTEND TO CARRY OUT THIS PLAN?: NO
1. IN THE PAST MONTH, HAVE YOU WISHED YOU WERE DEAD OR WISHED YOU COULD GO TO SLEEP AND NOT WAKE UP?: YES
4. HAVE YOU HAD THESE THOUGHTS AND HAD SOME INTENTION OF ACTING ON THEM?: NO
2. HAVE YOU ACTUALLY HAD ANY THOUGHTS OF KILLING YOURSELF?: NO
4. HAVE YOU HAD THESE THOUGHTS AND HAD SOME INTENTION OF ACTING ON THEM?: NO
2. HAVE YOU ACTUALLY HAD ANY THOUGHTS OF KILLING YOURSELF LIFETIME?: NO
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
## 2021-05-06 NOTE — Clinical Note
Eveline Corrales,    I met with Elizabeth today.  After reviewing her medication history and onset of symptoms, I don't think her duloxetine is the cause of her fatigue.  Due to drug interactions between bupropion and duloxetine, we did not make any adjustments today.  If no physical cause of her symptoms is identified, or if her depression worsens, I would be happy to help with medication adjustments.      Please feel free to contact me with questions or concerns.  Thank you for the opportunity to be involved in the care of this patient.    Regards,  Caroline Sandra MD  [facility]  [facility]
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
## 2021-05-07 RX ORDER — DULOXETIN HYDROCHLORIDE 60 MG/1
60 CAPSULE, DELAYED RELEASE ORAL DAILY
Qty: 90 CAPSULE | Refills: 0 | Status: SHIPPED | OUTPATIENT
Start: 2021-05-07 | End: 2021-07-19

## 2021-05-07 RX ORDER — BUPROPION HYDROCHLORIDE 150 MG/1
150 TABLET ORAL EVERY MORNING
Qty: 90 TABLET | Refills: 0 | Status: SHIPPED | OUTPATIENT
Start: 2021-05-07 | End: 2021-10-26

## 2021-05-07 ASSESSMENT — ANXIETY QUESTIONNAIRES: GAD7 TOTAL SCORE: 0

## 2021-05-07 NOTE — PATIENT INSTRUCTIONS
Check TSH, lab order placed.    Continue all other medications per your primary care provider.    Schedule an appointment with me in 3 months or sooner as needed.  You may call [facility] at [phone] to schedule.    [facility] Resources:      Go to the Emergency Department as needed or call after hours crisis line at [phone].      To schedule individual or family therapy, call [facility] at [phone].     Follow up with primary care provider as planned or sooner for acute medical concerns.    Call the psychiatric nurse line with medication questions or concerns at [phone].    [facility] may be used to communicate with your provider, but this is not intended to be used for emergencies.    Community Resources:      National Suicide Prevention Lifeline: 766.616.5101 (TTY: 463.621.9989). Call anytime for help.  (www.suicidepreventionlifeline.org)    National [place] on Mental Illness (www.[place].org): 315.710.2105 or 580-779-1822.     Mental Health Association (www.mentalhealth.org): 346.145.4529 or 186-905-4923.    [place] Crisis Text Line: Text [place] to 515729    Suicide LifeLine Chat: suicidepreventionlifeline.org/chat    
alteration in breathing pattern

## 2021-06-30 ENCOUNTER — OFFICE VISIT (OUTPATIENT)
Dept: INTERNAL MEDICINE | Facility: CLINIC | Age: 56
End: 2021-06-30
Payer: COMMERCIAL

## 2021-06-30 VITALS
OXYGEN SATURATION: 99 % | BODY MASS INDEX: 49.7 KG/M2 | DIASTOLIC BLOOD PRESSURE: 95 MMHG | HEART RATE: 74 BPM | WEIGHT: 293 LBS | SYSTOLIC BLOOD PRESSURE: 134 MMHG

## 2021-06-30 DIAGNOSIS — R53.83 OTHER FATIGUE: Primary | ICD-10-CM

## 2021-06-30 DIAGNOSIS — M06.09 RHEUMATOID ARTHRITIS OF MULTIPLE SITES WITHOUT RHEUMATOID FACTOR (H): ICD-10-CM

## 2021-06-30 DIAGNOSIS — G47.30 SLEEP APNEA, UNSPECIFIED TYPE: ICD-10-CM

## 2021-06-30 DIAGNOSIS — R10.84 ABDOMINAL PAIN, GENERALIZED: ICD-10-CM

## 2021-06-30 DIAGNOSIS — R53.83 OTHER FATIGUE: ICD-10-CM

## 2021-06-30 DIAGNOSIS — F33.41 RECURRENT MAJOR DEPRESSION IN PARTIAL REMISSION (H): ICD-10-CM

## 2021-06-30 DIAGNOSIS — N95.0 POST-MENOPAUSAL BLEEDING: ICD-10-CM

## 2021-06-30 DIAGNOSIS — J45.20 MILD INTERMITTENT ASTHMA WITHOUT COMPLICATION: ICD-10-CM

## 2021-06-30 DIAGNOSIS — R80.9 PROTEINURIA, UNSPECIFIED TYPE: ICD-10-CM

## 2021-06-30 DIAGNOSIS — I10 BENIGN ESSENTIAL HYPERTENSION: ICD-10-CM

## 2021-06-30 LAB
ALBUMIN SERPL-MCNC: 3.4 G/DL (ref 3.4–5)
ALBUMIN UR-MCNC: 30 MG/DL
ALP SERPL-CCNC: 102 U/L (ref 40–150)
ALT SERPL W P-5'-P-CCNC: 27 U/L (ref 0–50)
ANION GAP SERPL CALCULATED.3IONS-SCNC: 6 MMOL/L (ref 3–14)
APPEARANCE UR: ABNORMAL
AST SERPL W P-5'-P-CCNC: 17 U/L (ref 0–45)
BASOPHILS # BLD AUTO: 0.1 10E9/L (ref 0–0.2)
BASOPHILS NFR BLD AUTO: 0.8 %
BILIRUB SERPL-MCNC: 0.4 MG/DL (ref 0.2–1.3)
BILIRUB UR QL STRIP: NEGATIVE
BUN SERPL-MCNC: 13 MG/DL (ref 7–30)
CALCIUM SERPL-MCNC: 8.8 MG/DL (ref 8.5–10.1)
CHLORIDE SERPL-SCNC: 110 MMOL/L (ref 94–109)
CK SERPL-CCNC: 45 U/L (ref 30–225)
CO2 SERPL-SCNC: 29 MMOL/L (ref 20–32)
COLOR UR AUTO: YELLOW
CREAT SERPL-MCNC: 1.04 MG/DL (ref 0.52–1.04)
CRP SERPL-MCNC: 18.1 MG/L (ref 0–8)
DEPRECATED CALCIDIOL+CALCIFEROL SERPL-MC: 35 UG/L (ref 20–75)
DIFFERENTIAL METHOD BLD: NORMAL
EOSINOPHIL # BLD AUTO: 0.1 10E9/L (ref 0–0.7)
EOSINOPHIL NFR BLD AUTO: 1.9 %
ERYTHROCYTE [DISTWIDTH] IN BLOOD BY AUTOMATED COUNT: 13.9 % (ref 10–15)
ERYTHROCYTE [SEDIMENTATION RATE] IN BLOOD BY WESTERGREN METHOD: 16 MM/H (ref 0–30)
FERRITIN SERPL-MCNC: 37 NG/ML (ref 8–252)
GFR SERPL CREATININE-BSD FRML MDRD: 59 ML/MIN/{1.73_M2}
GLUCOSE SERPL-MCNC: 98 MG/DL (ref 70–99)
GLUCOSE UR STRIP-MCNC: NEGATIVE MG/DL
HCT VFR BLD AUTO: 41.5 % (ref 35–47)
HGB BLD-MCNC: 13.4 G/DL (ref 11.7–15.7)
HGB UR QL STRIP: NEGATIVE
HYALINE CASTS #/AREA URNS LPF: 1 /LPF (ref 0–2)
IMM GRANULOCYTES # BLD: 0 10E9/L (ref 0–0.4)
IMM GRANULOCYTES NFR BLD: 0.2 %
KETONES UR STRIP-MCNC: NEGATIVE MG/DL
LEUKOCYTE ESTERASE UR QL STRIP: ABNORMAL
LYMPHOCYTES # BLD AUTO: 1.4 10E9/L (ref 0.8–5.3)
LYMPHOCYTES NFR BLD AUTO: 21 %
MAGNESIUM SERPL-MCNC: 2.3 MG/DL (ref 1.6–2.3)
MCH RBC QN AUTO: 30.5 PG (ref 26.5–33)
MCHC RBC AUTO-ENTMCNC: 32.3 G/DL (ref 31.5–36.5)
MCV RBC AUTO: 94 FL (ref 78–100)
MONOCYTES # BLD AUTO: 0.6 10E9/L (ref 0–1.3)
MONOCYTES NFR BLD AUTO: 9.8 %
MUCOUS THREADS #/AREA URNS LPF: PRESENT /LPF
NEUTROPHILS # BLD AUTO: 4.3 10E9/L (ref 1.6–8.3)
NEUTROPHILS NFR BLD AUTO: 66.3 %
NITRATE UR QL: NEGATIVE
NRBC # BLD AUTO: 0 10*3/UL
NRBC BLD AUTO-RTO: 0 /100
PH UR STRIP: 6 PH (ref 5–7)
PLATELET # BLD AUTO: 315 10E9/L (ref 150–450)
POTASSIUM SERPL-SCNC: 4.2 MMOL/L (ref 3.4–5.3)
PROT SERPL-MCNC: 7.1 G/DL (ref 6.8–8.8)
RBC # BLD AUTO: 4.4 10E12/L (ref 3.8–5.2)
RBC #/AREA URNS AUTO: 1 /HPF (ref 0–2)
SODIUM SERPL-SCNC: 145 MMOL/L (ref 133–144)
SOURCE: ABNORMAL
SP GR UR STRIP: 1.02 (ref 1–1.03)
SQUAMOUS #/AREA URNS AUTO: 2 /HPF (ref 0–1)
TSH SERPL DL<=0.005 MIU/L-ACNC: 1.28 MU/L (ref 0.4–4)
UROBILINOGEN UR STRIP-MCNC: 0 MG/DL (ref 0–2)
WBC # BLD AUTO: 6.4 10E9/L (ref 4–11)
WBC #/AREA URNS AUTO: 6 /HPF (ref 0–5)

## 2021-06-30 PROCEDURE — 81001 URINALYSIS AUTO W/SCOPE: CPT | Performed by: PATHOLOGY

## 2021-06-30 PROCEDURE — 82550 ASSAY OF CK (CPK): CPT | Performed by: PATHOLOGY

## 2021-06-30 PROCEDURE — 83735 ASSAY OF MAGNESIUM: CPT | Performed by: PATHOLOGY

## 2021-06-30 PROCEDURE — 99215 OFFICE O/P EST HI 40 MIN: CPT | Performed by: INTERNAL MEDICINE

## 2021-06-30 PROCEDURE — 82306 VITAMIN D 25 HYDROXY: CPT | Performed by: PATHOLOGY

## 2021-06-30 PROCEDURE — 86140 C-REACTIVE PROTEIN: CPT | Performed by: PATHOLOGY

## 2021-06-30 PROCEDURE — 82728 ASSAY OF FERRITIN: CPT | Performed by: PATHOLOGY

## 2021-06-30 PROCEDURE — 36415 COLL VENOUS BLD VENIPUNCTURE: CPT | Performed by: PATHOLOGY

## 2021-06-30 PROCEDURE — 85652 RBC SED RATE AUTOMATED: CPT | Performed by: PATHOLOGY

## 2021-06-30 PROCEDURE — 80050 GENERAL HEALTH PANEL: CPT | Performed by: PATHOLOGY

## 2021-06-30 RX ORDER — PANTOPRAZOLE SODIUM 20 MG/1
20 TABLET, DELAYED RELEASE ORAL 2 TIMES DAILY
Qty: 180 TABLET | Refills: 3 | Status: SHIPPED | OUTPATIENT
Start: 2021-06-30 | End: 2023-02-03

## 2021-06-30 RX ORDER — LISINOPRIL 20 MG/1
20 TABLET ORAL AT BEDTIME
Qty: 100 TABLET | Refills: 3 | Status: SHIPPED | OUTPATIENT
Start: 2021-06-30 | End: 2022-07-01

## 2021-06-30 RX ORDER — ALBUTEROL SULFATE 90 UG/1
1-2 AEROSOL, METERED RESPIRATORY (INHALATION) EVERY 4 HOURS PRN
Qty: 18 G | Refills: 11 | Status: SHIPPED | OUTPATIENT
Start: 2021-06-30 | End: 2024-07-22

## 2021-06-30 ASSESSMENT — ENCOUNTER SYMPTOMS
SYNCOPE: 0
LIGHT-HEADEDNESS: 0
ALTERED TEMPERATURE REGULATION: 1
ORTHOPNEA: 0
JOINT SWELLING: 1
WEAKNESS: 0
NUMBNESS: 1
HYPERTENSION: 1
DECREASED APPETITE: 0
DISTURBANCES IN COORDINATION: 0
LEG PAIN: 1
POLYDIPSIA: 0
WEIGHT LOSS: 0
DECREASED CONCENTRATION: 0
LOSS OF CONSCIOUSNESS: 0
BACK PAIN: 0
SPEECH CHANGE: 0
INSOMNIA: 1
NERVOUS/ANXIOUS: 0
DIZZINESS: 0
INCREASED ENERGY: 0
SLEEP DISTURBANCES DUE TO BREATHING: 0
DEPRESSION: 1
HYPOTENSION: 0
FEVER: 0
STIFFNESS: 0
NIGHT SWEATS: 0
POLYPHAGIA: 0
MEMORY LOSS: 0
MUSCLE CRAMPS: 0
DECREASED LIBIDO: 0
HALLUCINATIONS: 0
FATIGUE: 1
HOT FLASHES: 0
TREMORS: 1
ARTHRALGIAS: 1
PARALYSIS: 0
PALPITATIONS: 0
NECK PAIN: 0
MYALGIAS: 0
TINGLING: 0
CHILLS: 0
HEADACHES: 1
EXERCISE INTOLERANCE: 0
PANIC: 0
SEIZURES: 0
WEIGHT GAIN: 0
MUSCLE WEAKNESS: 0

## 2021-06-30 ASSESSMENT — ANXIETY QUESTIONNAIRES
1. FEELING NERVOUS, ANXIOUS, OR ON EDGE: NOT AT ALL
GAD7 TOTAL SCORE: 1
6. BECOMING EASILY ANNOYED OR IRRITABLE: SEVERAL DAYS
5. BEING SO RESTLESS THAT IT IS HARD TO SIT STILL: NOT AT ALL
3. WORRYING TOO MUCH ABOUT DIFFERENT THINGS: NOT AT ALL
2. NOT BEING ABLE TO STOP OR CONTROL WORRYING: NOT AT ALL
7. FEELING AFRAID AS IF SOMETHING AWFUL MIGHT HAPPEN: NOT AT ALL

## 2021-06-30 ASSESSMENT — PATIENT HEALTH QUESTIONNAIRE - PHQ9
SUM OF ALL RESPONSES TO PHQ QUESTIONS 1-9: 11
5. POOR APPETITE OR OVEREATING: NOT AT ALL

## 2021-06-30 NOTE — NURSING NOTE
Chief Complaint   Patient presents with     Physical     annual physical     Hypertension     discuss BP meds, systolic ranges from 110-150       FLORESITA Herrmann at 1:20 PM on 6/30/2021
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
## 2021-07-01 LAB — HPV ABSTRACT: NORMAL

## 2021-07-01 ASSESSMENT — ANXIETY QUESTIONNAIRES: GAD7 TOTAL SCORE: 1

## 2021-07-13 ENCOUNTER — TRANSFERRED RECORDS (OUTPATIENT)
Dept: HEALTH INFORMATION MANAGEMENT | Facility: CLINIC | Age: 56
End: 2021-07-13

## 2021-07-13 ENCOUNTER — MYC MEDICAL ADVICE (OUTPATIENT)
Dept: INTERNAL MEDICINE | Facility: CLINIC | Age: 56
End: 2021-07-13

## 2021-07-13 DIAGNOSIS — F33.9 RECURRENT MAJOR DEPRESSIVE DISORDER, REMISSION STATUS UNSPECIFIED (H): ICD-10-CM

## 2021-07-14 NOTE — TELEPHONE ENCOUNTER
I would prefer primary care manage it if they are comfortable.  I saw her one on occasion and she has not follow ups scheduled with me.  If primary care is not comfortable, I can prescribe it, but she will need to schedule a follow up.    Thanks,  Caroline Sandra MD  [facility] Psychiatry  [facility]

## 2021-07-19 RX ORDER — ESCITALOPRAM OXALATE 20 MG/1
20 TABLET ORAL DAILY
Qty: 90 TABLET | Refills: 3 | Status: SHIPPED | OUTPATIENT
Start: 2021-07-19 | End: 2022-07-05

## 2021-08-04 ENCOUNTER — MYC MEDICAL ADVICE (OUTPATIENT)
Dept: INTERNAL MEDICINE | Facility: CLINIC | Age: 56
End: 2021-08-04

## 2021-08-04 DIAGNOSIS — Z12.31 ENCOUNTER FOR SCREENING MAMMOGRAM FOR BREAST CANCER: Primary | ICD-10-CM

## 2021-08-05 ENCOUNTER — TELEPHONE (OUTPATIENT)
Dept: SLEEP MEDICINE | Facility: CLINIC | Age: 56
End: 2021-08-05

## 2021-08-05 ASSESSMENT — ENCOUNTER SYMPTOMS
NECK PAIN: 0
TROUBLE SWALLOWING: 0
SMELL DISTURBANCE: 0
ARTHRALGIAS: 1
BACK PAIN: 1
CHILLS: 0
SPEECH CHANGE: 0
SORE THROAT: 0
DIZZINESS: 0
HALLUCINATIONS: 0
WEIGHT LOSS: 0
WEIGHT GAIN: 0
MYALGIAS: 1
ALTERED TEMPERATURE REGULATION: 0
MUSCLE WEAKNESS: 0
SINUS PAIN: 1
NIGHT SWEATS: 0
STIFFNESS: 1
POLYPHAGIA: 0
MUSCLE CRAMPS: 1
HEADACHES: 1
HOARSE VOICE: 0
NECK MASS: 0
TINGLING: 0
FATIGUE: 1
SINUS CONGESTION: 1
JOINT SWELLING: 1
WEAKNESS: 0
LOSS OF CONSCIOUSNESS: 0
MEMORY LOSS: 0
INCREASED ENERGY: 1
PARALYSIS: 0
FEVER: 0
TASTE DISTURBANCE: 0
TREMORS: 0
DECREASED APPETITE: 0
DISTURBANCES IN COORDINATION: 0
NUMBNESS: 0
SEIZURES: 0
POLYDIPSIA: 0

## 2021-08-05 ASSESSMENT — SLEEP AND FATIGUE QUESTIONNAIRES
HOW LIKELY ARE YOU TO NOD OFF OR FALL ASLEEP WHILE LYING DOWN TO REST IN THE AFTERNOON WHEN CIRCUMSTANCES PERMIT: HIGH CHANCE OF DOZING
HOW LIKELY ARE YOU TO NOD OFF OR FALL ASLEEP WHILE WATCHING TV: WOULD NEVER DOZE
HOW LIKELY ARE YOU TO NOD OFF OR FALL ASLEEP WHILE SITTING AND TALKING TO SOMEONE: WOULD NEVER DOZE
HOW LIKELY ARE YOU TO NOD OFF OR FALL ASLEEP WHILE SITTING INACTIVE IN A PUBLIC PLACE: WOULD NEVER DOZE
HOW LIKELY ARE YOU TO NOD OFF OR FALL ASLEEP WHILE SITTING AND READING: WOULD NEVER DOZE
HOW LIKELY ARE YOU TO NOD OFF OR FALL ASLEEP WHEN YOU ARE A PASSENGER IN A CAR FOR AN HOUR WITHOUT A BREAK: WOULD NEVER DOZE
HOW LIKELY ARE YOU TO NOD OFF OR FALL ASLEEP IN A CAR, WHILE STOPPED FOR A FEW MINUTES IN TRAFFIC: WOULD NEVER DOZE
HOW LIKELY ARE YOU TO NOD OFF OR FALL ASLEEP WHILE SITTING QUIETLY AFTER LUNCH WITHOUT ALCOHOL: WOULD NEVER DOZE

## 2021-08-05 NOTE — TELEPHONE ENCOUNTER
Reason for call:  Other   Patient called regarding (reason for call): call back  Additional comments: Patient is requesting a call back to discuss appt 8/9 patient wants to come in clinic to drop off CPAP to read      Phone number to reach patient:  Cell number on file:    Telephone Information:   Mobile [phone]       Best Time:  Anytime    Can we leave a detailed message on this number?  YES    Travel screening: Not Applicable
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
## 2021-08-06 NOTE — TELEPHONE ENCOUNTER
Spoke with patient, she was instructed to bring her cpap device into the [facility] for a DL. Patient will stop in Monday 8/9/21 morning with device.  Asha Chawla MA

## 2021-08-09 ENCOUNTER — VIRTUAL VISIT (OUTPATIENT)
Dept: SLEEP MEDICINE | Facility: CLINIC | Age: 56
End: 2021-08-09
Attending: INTERNAL MEDICINE
Payer: COMMERCIAL

## 2021-08-09 DIAGNOSIS — G47.10 HYPERSOMNIA: ICD-10-CM

## 2021-08-09 DIAGNOSIS — G47.33 OBSTRUCTIVE SLEEP APNEA: Primary | ICD-10-CM

## 2021-08-09 PROCEDURE — 99203 OFFICE O/P NEW LOW 30 MIN: CPT | Mod: GT | Performed by: INTERNAL MEDICINE

## 2021-08-09 NOTE — PATIENT INSTRUCTIONS
Equipment Instructions    We will process your PAP order and send it to a Durable Medical Equipment (DME) provider.    The medical equipment company should call you within 7 days.  If you have not heard from the company, please contact them to see if they received your order and are planning to call you.    Please call us at [phone] if you are unable to contact the medical equipment company or if they do not have the order.    If you are starting a new PAP machine, please call us after you use it the first night to let us know how it went. This call also helps us know that you received your equipment and that everything is ready. Please use our central phone number [phone]    Contact information for [email or web address] company:    [employer] [facility] Tel: [phone]

## 2021-08-10 ENCOUNTER — ANCILLARY PROCEDURE (OUTPATIENT)
Dept: MAMMOGRAPHY | Facility: CLINIC | Age: 56
End: 2021-08-10
Attending: INTERNAL MEDICINE
Payer: COMMERCIAL

## 2021-08-10 DIAGNOSIS — Z12.31 ENCOUNTER FOR SCREENING MAMMOGRAM FOR BREAST CANCER: ICD-10-CM

## 2021-08-10 PROCEDURE — 77067 SCR MAMMO BI INCL CAD: CPT

## 2021-08-10 PROCEDURE — 77067 SCR MAMMO BI INCL CAD: CPT | Mod: 26 | Performed by: RADIOLOGY

## 2021-08-25 ENCOUNTER — TRANSFERRED RECORDS (OUTPATIENT)
Dept: MULTI SPECIALTY CLINIC | Facility: CLINIC | Age: 56
End: 2021-08-25

## 2021-08-25 LAB — PAP-ABSTRACT: NORMAL

## 2021-08-30 ENCOUNTER — OFFICE VISIT (OUTPATIENT)
Dept: INTERNAL MEDICINE | Facility: CLINIC | Age: 56
End: 2021-08-30
Payer: COMMERCIAL

## 2021-08-30 ENCOUNTER — LAB (OUTPATIENT)
Dept: LAB | Facility: CLINIC | Age: 56
End: 2021-08-30
Payer: COMMERCIAL

## 2021-08-30 ENCOUNTER — TELEPHONE (OUTPATIENT)
Dept: RHEUMATOLOGY | Facility: CLINIC | Age: 56
End: 2021-08-30

## 2021-08-30 VITALS
SYSTOLIC BLOOD PRESSURE: 108 MMHG | DIASTOLIC BLOOD PRESSURE: 73 MMHG | OXYGEN SATURATION: 96 % | RESPIRATION RATE: 16 BRPM | BODY MASS INDEX: 45.99 KG/M2 | HEIGHT: 67 IN | WEIGHT: 293 LBS | HEART RATE: 85 BPM

## 2021-08-30 DIAGNOSIS — R80.9 PROTEINURIA, UNSPECIFIED TYPE: ICD-10-CM

## 2021-08-30 DIAGNOSIS — R53.83 OTHER FATIGUE: Primary | ICD-10-CM

## 2021-08-30 DIAGNOSIS — M06.09 RHEUMATOID ARTHRITIS OF MULTIPLE SITES WITHOUT RHEUMATOID FACTOR (H): Primary | ICD-10-CM

## 2021-08-30 DIAGNOSIS — M06.09 RHEUMATOID ARTHRITIS OF MULTIPLE SITES WITHOUT RHEUMATOID FACTOR (H): ICD-10-CM

## 2021-08-30 DIAGNOSIS — Z79.899 ENCOUNTER FOR LONG-TERM (CURRENT) USE OF MEDICATIONS: ICD-10-CM

## 2021-08-30 DIAGNOSIS — R53.83 OTHER FATIGUE: ICD-10-CM

## 2021-08-30 LAB
ALBUMIN UR-MCNC: 100 MG/DL
ALT SERPL W P-5'-P-CCNC: 25 U/L (ref 0–50)
ANION GAP SERPL CALCULATED.3IONS-SCNC: 7 MMOL/L (ref 3–14)
APPEARANCE UR: ABNORMAL
AST SERPL W P-5'-P-CCNC: 19 U/L (ref 0–45)
BACTERIA #/AREA URNS HPF: ABNORMAL /HPF
BILIRUB UR QL STRIP: NEGATIVE
BUN SERPL-MCNC: 19 MG/DL (ref 7–30)
CALCIUM SERPL-MCNC: 8.9 MG/DL (ref 8.5–10.1)
CHLORIDE BLD-SCNC: 109 MMOL/L (ref 94–109)
CO2 SERPL-SCNC: 28 MMOL/L (ref 20–32)
COLOR UR AUTO: YELLOW
CREAT SERPL-MCNC: 1.19 MG/DL (ref 0.52–1.04)
CRP SERPL-MCNC: 20.7 MG/L (ref 0–8)
ERYTHROCYTE [DISTWIDTH] IN BLOOD BY AUTOMATED COUNT: 14.6 % (ref 10–15)
ERYTHROCYTE [SEDIMENTATION RATE] IN BLOOD BY WESTERGREN METHOD: 16 MM/HR (ref 0–30)
GFR SERPL CREATININE-BSD FRML MDRD: 51 ML/MIN/1.73M2
GLUCOSE BLD-MCNC: 97 MG/DL (ref 70–99)
GLUCOSE UR STRIP-MCNC: NEGATIVE MG/DL
HCT VFR BLD AUTO: 38.5 % (ref 35–47)
HGB BLD-MCNC: 12.7 G/DL (ref 11.7–15.7)
HGB UR QL STRIP: NEGATIVE
HYALINE CASTS: 21 /LPF
KETONES UR STRIP-MCNC: 20 MG/DL
LEUKOCYTE ESTERASE UR QL STRIP: NEGATIVE
MCH RBC QN AUTO: 30.8 PG (ref 26.5–33)
MCHC RBC AUTO-ENTMCNC: 33 G/DL (ref 31.5–36.5)
MCV RBC AUTO: 93 FL (ref 78–100)
MUCOUS THREADS #/AREA URNS LPF: PRESENT /LPF
NITRATE UR QL: NEGATIVE
PH UR STRIP: 5 [PH] (ref 5–7)
PLATELET # BLD AUTO: 316 10E3/UL (ref 150–450)
POTASSIUM BLD-SCNC: 4 MMOL/L (ref 3.4–5.3)
RBC # BLD AUTO: 4.13 10E6/UL (ref 3.8–5.2)
RBC URINE: 1 /HPF
SODIUM SERPL-SCNC: 144 MMOL/L (ref 133–144)
SP GR UR STRIP: 1.03 (ref 1–1.03)
SQUAMOUS EPITHELIAL: 3 /HPF
TRANSITIONAL EPI: <1 /HPF
UROBILINOGEN UR STRIP-MCNC: NORMAL MG/DL
WBC # BLD AUTO: 7.6 10E3/UL (ref 4–11)
WBC URINE: 5 /HPF

## 2021-08-30 PROCEDURE — 84460 ALANINE AMINO (ALT) (SGPT): CPT | Performed by: PATHOLOGY

## 2021-08-30 PROCEDURE — 85027 COMPLETE CBC AUTOMATED: CPT | Performed by: PATHOLOGY

## 2021-08-30 PROCEDURE — 85652 RBC SED RATE AUTOMATED: CPT | Performed by: PATHOLOGY

## 2021-08-30 PROCEDURE — 84156 ASSAY OF PROTEIN URINE: CPT | Performed by: PATHOLOGY

## 2021-08-30 PROCEDURE — 80048 BASIC METABOLIC PNL TOTAL CA: CPT | Performed by: PATHOLOGY

## 2021-08-30 PROCEDURE — 81001 URINALYSIS AUTO W/SCOPE: CPT | Performed by: PATHOLOGY

## 2021-08-30 PROCEDURE — 36415 COLL VENOUS BLD VENIPUNCTURE: CPT | Performed by: PATHOLOGY

## 2021-08-30 PROCEDURE — 84450 TRANSFERASE (AST) (SGOT): CPT | Performed by: PATHOLOGY

## 2021-08-30 PROCEDURE — 86140 C-REACTIVE PROTEIN: CPT | Performed by: PATHOLOGY

## 2021-08-30 PROCEDURE — 99214 OFFICE O/P EST MOD 30 MIN: CPT | Performed by: INTERNAL MEDICINE

## 2021-08-30 ASSESSMENT — MIFFLIN-ST. JEOR: SCORE: 1998.38

## 2021-08-30 ASSESSMENT — PAIN SCALES - GENERAL: PAINLEVEL: EXTREME PAIN (8)

## 2021-08-30 NOTE — PATIENT INSTRUCTIONS
[facility] Refill Request Information:    Please contact your pharmacy regarding any request for medication refills. The [facility] prescription fax number is [phone]. Please allow three business days for routine medication refills and five business days for controlled substance medication refills.    [facility] Test Result Notification Information:    You will be notified within seven to ten day of your appointment regarding your test results. If you are on MyChart, you will be notified as soon as the provider has reviewed and signed the results.     If you need anything else, feel free to contact the [facility] at [phone].    To schedule a stress test, please call cardiology scheduling at [phone].

## 2021-08-30 NOTE — TELEPHONE ENCOUNTER
Received call from lab.  Orders placed for standing labs for Methotrexate monitoring labs.    Charline Avilez RN  R[facility]ology Clinic

## 2021-08-30 NOTE — TELEPHONE ENCOUNTER
[facility] Call Center    Phone Message    May a detailed message be left on voicemail: yes     Reason for Call: Other: Pt is at the lab right now for her lab draws and the orders for Dr Kelley are . Please update orders.      Action Taken: Message routed to:  [facility] ([facility]     Travel Screening: Not Applicable

## 2021-08-30 NOTE — NURSING NOTE
Elizabeth Rosenthal is a 56 year old female patient that presents today in clinic for the following:    Chief Complaint   Patient presents with     RECHECK     Two month follow-up       The patient's allergies and medications were reviewed as noted. A set of vitals were recorded as noted without incident. The patient does not have any other questions for the provider.    Alonso Fernando, EMT at 1:51 PM on 8/30/2021
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
## 2021-08-31 LAB
CREAT UR-MCNC: 446 MG/DL
PROT UR-MCNC: 0.24 G/L
PROT/CREAT 24H UR: 0.05 G/G CR (ref 0–0.2)

## 2021-09-07 NOTE — PROGRESS NOTES
[facility]  Rheumatology Clinic-remote  Denilson Kelley MD  09/10/2021     Name: Elizabeth Rosenthal  MRN: [id number]  Age: 56 year old  : 1965  Referring provider: Referred Self    Assessment and Plan:  # Seronegative rheumatoid arthritis  Significant it incomplete relief from symmetrical small joint predominant joint pain and swelling has occurred since starting methotrexate in .  Fatigue and nonrestorative sleep remains significant.  Exam by video today shows normal fist formation, changes of osteoarthritis in the hands, and preserved wrist, elbow, and shoulder range of motion. Lab work on 2020 showed creatinine, transaminases, and CBC all normal; CRP was minimally and stably elevated at 13.4.  In 2021, CRP was elevated to 20.    Inflammatory arthropathy remains stable/improved, and persistent joint pain and stiffness have moderated, suggesting improved control of a smoldering inflammatory process.  Ongoing morning stiffness, and debilitating fatigue suggest that a systemic inflammatory process associated with elevated CRP may be incompletely responsive to methotrexate.  We discussed how methotrexate monotherapy frequently proves insufficient for adequate control of inflammatory arthritis.  I recommend increasing methotrexate to 25 mg weekly.  I expect benefit in 4 to 5 weeks after increasing the dose.    # Bilateral knee pain:  I agree that medication is not likely to provide significant benefit due to cartilage loss as the major pain generator. I agree with planned total knee replacement surgery on the left pending weight loss. I recommend continuing isometric exercises, weight management, and use of lidocaine patches, heating pad, rest for pain control, as well as gabapentin 300 mg up to tid.    While on the drug, obtain every three month LFTs, creatinine, and CBC. Use folic acid 1 mg daily to prevent mucosal side effects.     #Shortness of breath: Cause of slowly progressive dyspnea  remains unclear.  I agree with plans to undergo echo stress testing to evaluate potential cardiac dysfunction.  I recommend a chest x-ray today.  Continue evaluation through primary care, pulmonary, and potentially cardiology to discern possible contributions from thoracic organs.    No orders of the defined types were placed in this encounter.    Follow-up: Return to clinic in 3 months.    Denilson Kelley MD  Staff Rheumatologist, [facility]      HPI:   Elizabeth Rosenthal presents for follow up of seronegative rheumatoid arthritis as well as fibromyalgia and osteoarthritis of knees and spine.  She was last seen in rheumatology in 1-2021, when inflammatory arthritis was judged improved. Continued methotrexate at 20 mg weekly was recommended.  Gabapentin was continued at 300 mg 3 times daily.    Interval history 09-:    Patient emailed 9-7-2021 with following information for this appointment:     1) elevated CRP in bloodwork - 18 on 6/30 and 20 on 8/30.  I'm usually 11  2) creatine levels have been at or slightly above the upper limits   3) slight protenuria  4) elevated blood pressure since October, 2020 (lowered with increased dose of lisiniprol)  5) I saw a neurologist who said my facial numbness is not due to migraines, but occipital neuralgia and is giving me quarterly steroid shots, which have eliminated the numbness on the right side of my face I've had the past 4 years.  I'm taking muscle relaxers for the residual tension headaches.    6) I switched back from Duloxetine to Escitalopram because the Escitalopram works better on the depression than the Duloxetine.      Symptoms:  1). Extreme fatigue - I need 9-10 hours a day, which often requires 12 hours in bed each day. I have occasional nights where I can't fall asleep at all (triggered by caffeine use to get through the prior day, or stress I will oversleep for Dr Pearl or staff meeting) I take naps at least 3-4 days a week, at least 1.5 hours, sometimes 3-4  hours.  I have trouble with an 8 hour workday. I break my day into 2-3 hours of work, then 2-3 hours of rest, and work Saturdays.  Sometimes, I'm stuck making up 8 hours on Saturday, and sleep all day Sunday to make it up.  This has been going on, with some fluctuation, since I was sick in March, 2020 with flu-like, or COVID-like symptoms.  (No tests for COVID were available back then).   2). I have a lot less energy or stamina.  If I work in my garden for a full afternoon (4 hours?) or paint my basement, or walk, or volunteer at a state fair/street fair both for the [employer] or [employer], I'm exhausted for at least a day after and have called in sick to work the day after.  In February, 2021 I checked in with a psychiatrist to see if I needed a med adjustment, and she thought I may be deconditioned, and that I needed to exercise more and track my sleep.  [facility] & got a Fitbit to track in [facility].  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  Sometimes, I would need a nap after a 1/2 hour, 1 mile walk.  Sometimes they would wake me up early when I had trouble falling asleep, and make the fatigue worse.    3) I've had more shortness of breath. I usually need asthma inhalers in August during ragweed season.  Last year, I used them July to February.  I tried Rashida caroling, and the walking between housed/blocks left me so out of breath I couldn't sing after a while. This got a little better when I increased my vitamin D in February.  Still an issue when I'm shoveling mulch or compost, or carrying groceries.  4) after 2 or more hours of painting or gardening, I'll get tremors in my left hand. Neurologist called this an 'action tremor,' not Parkinson's.   5) I'm cold a lot.  Sometimes it takes me 1/2 hour under 3 blankets to warm up enough to sleep. I wear slippers and sweaters in door, summer and winter and have a heating pad at my desk.  (I work from home).   6) I have  "a weird lump developing on my right hand (pictures attached)  7.  I have a lot of collar bone and armpit pain on the left side (not breast cancer per obgyn & mammogram 8/1/21).    8) the usual aches and pains all over, especially knees, ankles, wrists, forearms, shoulders & collarbone area.  I've been taking more gabapentin (600-900 mg/day) & lidocaine patches which help, and doing simple knee exercises recommended by Dr Patel.    9) I get sharp pains in my lower legs/shins when I lie down after a busy day, and sometimes get twitching or muscle spasms in my legs.  I also get muscle spasms & cramps in my hands after gardening/exertion.\"    Today, patient endorses fatigue and shortness of breath as key symptoms. She needs 10 hours of sleep plus naps, and still she is sometimes tired. She cannot sustain former capacity to perform 4-5 hours of chores.  She has considered duloxetine as a cause; she switched back to lexapro.   Sleep apnea has also been checked by Pulmonary; reported to be fine.  She notes no change in fatigue based upon methotrexate dosing cycle.  Gabapentin 600-900 mg  Per day has not changed. Chronic lower back/hip pain/shoulder pain continue daily, but not signifcantly worse. Other msk aches and pains are sometimes worse.  Knuckles are more swollen and hips/kness are more stiff I the morning. Early morning stiffness is ~ 2 hours.   Knees are hurting a little bit less associated with dog walking.    She traces onset of symptoms to presumed COVID19 infection in March 2020. She wonders about long-COVID19.    She is planning echo stress test     Interval history 01-:    She reports slowly improving from months of exhaustion and coughing in the summer/Fall.  Joints are unchanged--she has chronic pain in knees--L > R; ankles, elbows. Pain is generally unaffected by activity.     She does not use ibufprofen or tylenol. She uses lidocain patches, heating pad, rest, and gabapentin for joint pain " (300-600 mg daily). She had been using duloxetine for pain and for migraine HA. She developed insomnia on the latter, so she stopped cymbalta and returned to escatalopram. Now the headaches are worse once again. She is concerned that medication combinations might be exacerbating HA.    She takes methotrexate 8 tabs once weekly. No HA associated.       Review of Systems:   Pertinent items are noted in HPI or as below, remainder of complete ROS is negative.      No recent problems with hearing or vision. No swallowing problems.   No breathing difficulty, shortness of breath, coughing, or wheezing  No chest pain or palpitations  No heart burn, indigestion, abdominal pain, nausea, vomiting, diarrhea  No urination problems, no bloody, cloudy urine, no dysuria  No numbing, tingling, weakness  No headaches or confusion  No rashes. No easy bleeding or bruising.     Active Medications:   Current Outpatient Medications   Medication     albuterol (PROAIR HFA/PROVENTIL HFA/VENTOLIN HFA) 108 (90 Base) MCG/ACT inhaler     aspirin 81 MG tablet     buPROPion (WELLBUTRIN XL) 150 MG 24 hr tablet     cetirizine (ZYRTEC) 10 MG tablet     escitalopram (LEXAPRO) 20 MG tablet     fluticasone (FLOVENT HFA) 110 MCG/ACT inhaler     folic acid (FOLVITE) 1 MG tablet     gabapentin (NEURONTIN) 300 MG capsule     lidocaine (LIDODERM) 5 % patch     lisinopril (ZESTRIL) 20 MG tablet     methotrexate 2.5 MG tablet     pantoprazole (PROTONIX) 20 MG EC tablet     VITAMIN D, CHOLECALCIFEROL, PO     No current facility-administered medications for this visit.       Allergies:   Nuts   Celery Oil   Codeine   Contrast Dye   Food   Potatoes  Peanuts  Cantaloupe  Lettuce  Oat   Penicillins   Rice   Shellfish-Derived Products   Wheat Bran   Yeast       Past Medical History:  Allergic rhinitis    Asthma   Chronic pelvic pain in female   Depression   Depressive disorder   Gastroesophageal reflux disease   Head  injury   Hyperlipidemia   Hypertension   Immunosuppression    Migraines   Morbid obesity   Obstructive sleep apnea   Reduced vision   Transient ischemic attack  Vertebral artery dissection  Iliotibial band syndrome  Right knee pain  Lumbar radicular pain  Rheumatoid arthritis of multiple sites without rheumatoid factor  Fibromyalgia  Arthritis of knee, left  Creatinine elevation     Past Surgical History:  Arthroscopy knee bilateral    Biopsy lymph node cervical    Colonoscopy 7/26/2017  Lymph node biopsy 2010  Genitourinary surgery, fallopian tube cyst 1994 and tubal ligation 1999  Lymph node removed from neck for biopsy 2011   Hysteroscopy    Tonsillectomy, bilateral 1/3/2018    Family History:    The patient's family history includes Asthma in her paternal grandmother; Breast Cancer in her mother; Cancer in her mother; Cerebrovascular Disease in her paternal grandmother; Heart Disease in her father; Mental Illness in her father; Migraines in her daughter and son; Substance Abuse in her father.    Social History:  The patient reports that she has never smoked. She has never used smokeless tobacco. She reports that she drinks alcohol. She reports that she does not use drugs.   PCP: Eveline Berry  Marital Status: Single     Physical Exam:   not currently breastfeeding.  Wt Readings from Last 4 Encounters:   08/30/21 138.1 kg (304 lb 6.4 oz)   06/30/21 140.9 kg (310 lb 11.2 oz)   10/01/20 138.3 kg (305 lb)   09/16/19 138.8 kg (306 lb)     not currently breastfeeding.  Wt Readings from Last 4 Encounters:   08/30/21 138.1 kg (304 lb 6.4 oz)   06/30/21 140.9 kg (310 lb 11.2 oz)   10/01/20 138.3 kg (305 lb)   09/16/19 138.8 kg (306 lb)       Constitutional: well-developed, appearing stated age; cooperative  Eyes: nl EOM, PERRLA, vision, conjunctiva, sclera  ENT: nl external ears, nose, hearing, lips, teeth, gums, throat  No mucous membrane lesions, normal saliva pool  Neck: no mass or thyroid enlargement  Resp:  Breathing unlabored  Lymph: no cervical, supraclavicular, inguinal or epitrochlear nodes  MS: Osteoarthritis changes were present in the hands, but no inflammatory joint changes were visible at MCPs, wrists, elbows, or shoulders.  Skin: no nail pitting, alopecia, rash, nodules or lesions  Neuro: nl cranial nerves, strength, sensation, DTRs.   Psych: nl judgement, orientation, memory, affect.      Laboratory:   RHEUM RESULTS Latest Ref Rng & Units 3/22/2016 5/25/2016 8/24/2016   ALBUMIN 3.4 - 5.0 g/dL 3.5 3.4 3.2(L)   ALT 0 - 50 U/L 22 23 18   AST 0 - 45 U/L 11 16 8   AMAN INTERPRETATION NEG:Negative - - -   CK TOTAL 30 - 225 U/L - - -   CREATININE 0.52 - 1.04 mg/dL 0.91 0.93 0.97   CRP 0.0 - 8.0 mg/L - 11.0(H) 9.8(H)   GFR ESTIMATE, IF BLACK >60 mL/min/[1.73:m2] 79 77 73   GFR ESTIMATE >60 mL/min/1.73m2 65 63 60(L)   HEMATOCRIT 35.0 - 47.0 % 42.0 39.0 39.0   HEMOGLOBIN 11.7 - 15.7 g/dL 13.8 12.7 13.0   HCVAB NR - - Nonreactive   Assay performance characteristics have not been established for newborns,   infants, and children     WBC 4.0 - 11.0 10e3/uL 6.9 6.9 6.2   RBC 3.80 - 5.20 10e6/uL 4.58 4.29 4.21   RDW 10.0 - 15.0 % 12.7 13.5 13.4   MCHC 31.5 - 36.5 g/dL 32.9 32.6 33.3   MCV 78 - 100 fL 92 91 93   PLATELET COUNT 150 - 450 10e3/uL 328 322 284   ESR 0 - 30 mm/hr - - -     AMAN interpretation   Date Value Ref Range Status   04/28/2018 Negative NEG^Negative Final     Comment:                                        Reference range:  <1:40  NEGATIVE  1:40 - 1:80  BORDERLINE POSITIVE  >1:80 POSITIVE       Elizabeth is a 56 year old who is being evaluated via a billable video visit.      How would you like to obtain your AVS? MyChart    Will anyone else be joining your video visit? No      Video Start Time: 10:15 PM  Video-Visit Details    Type of service:  Video Visit    Video End Time:10:40 am    Originating Location (pt. Location): Home    Distant Location (provider location):  [facility]  [place]     Platform used for Video Visit: Karely

## 2021-09-10 ENCOUNTER — VIRTUAL VISIT (OUTPATIENT)
Dept: RHEUMATOLOGY | Facility: CLINIC | Age: 56
End: 2021-09-10
Attending: INTERNAL MEDICINE
Payer: COMMERCIAL

## 2021-09-10 ENCOUNTER — MYC MEDICAL ADVICE (OUTPATIENT)
Dept: RHEUMATOLOGY | Facility: CLINIC | Age: 56
End: 2021-09-10

## 2021-09-10 DIAGNOSIS — M54.50 CHRONIC BILATERAL LOW BACK PAIN WITHOUT SCIATICA: ICD-10-CM

## 2021-09-10 DIAGNOSIS — G89.29 CHRONIC BILATERAL LOW BACK PAIN WITHOUT SCIATICA: ICD-10-CM

## 2021-09-10 DIAGNOSIS — M79.7 FIBROMYALGIA: ICD-10-CM

## 2021-09-10 DIAGNOSIS — Z79.899 ENCOUNTER FOR LONG-TERM (CURRENT) USE OF MEDICATIONS: ICD-10-CM

## 2021-09-10 DIAGNOSIS — M13.0 POLYARTHRITIS: ICD-10-CM

## 2021-09-10 DIAGNOSIS — M06.09 RHEUMATOID ARTHRITIS OF MULTIPLE SITES WITHOUT RHEUMATOID FACTOR (H): ICD-10-CM

## 2021-09-10 PROCEDURE — 99214 OFFICE O/P EST MOD 30 MIN: CPT | Mod: GT | Performed by: INTERNAL MEDICINE

## 2021-09-10 RX ORDER — FOLIC ACID 1 MG/1
1 TABLET ORAL DAILY
Qty: 90 TABLET | Refills: 2 | Status: SHIPPED | OUTPATIENT
Start: 2021-09-10 | End: 2021-11-15

## 2021-09-10 NOTE — LETTER
9/10/2021       RE: Elizabeth Rosenthal  [address] [place] [address]     Dear Colleague,    Thank you for referring your patient, Elizabeth Rosenthal, to the [facility]HEUMATOLOGY [facility] [place] at [facility]. Please see a copy of my visit note below.    [facility]  Rheumatology Clinic-remote  Denilson Kelley MD  09/10/2021     Name: Elizabeth Rosenthal  MRN: [id number]  Age: 56 year old  : 1965  Referring provider: Referred Self    Assessment and Plan:  # Seronegative rheumatoid arthritis  Significant it incomplete relief from symmetrical small joint predominant joint pain and swelling has occurred since starting methotrexate in 2020.  Fatigue and nonrestorative sleep remains significant.  Exam by video today shows normal fist formation, changes of osteoarthritis in the hands, and preserved wrist, elbow, and shoulder range of motion. Lab work on 2020 showed creatinine, transaminases, and CBC all normal; CRP was minimally and stably elevated at 13.4.  In 2021, CRP was elevated to 20.    Inflammatory arthropathy remains stable/improved, and persistent joint pain and stiffness have moderated, suggesting improved control of a smoldering inflammatory process.  Ongoing morning stiffness, and debilitating fatigue suggest that a systemic inflammatory process associated with elevated CRP may be incompletely responsive to methotrexate.  We discussed how methotrexate monotherapy frequently proves insufficient for adequate control of inflammatory arthritis.  I recommend increasing methotrexate to 25 mg weekly.  I expect benefit in 4 to 5 weeks after increasing the dose.    # Bilateral knee pain:  I agree that medication is not likely to provide significant benefit due to cartilage loss as the major pain generator. I agree with planned total knee replacement surgery on the left pending weight loss. I recommend continuing isometric exercises,  weight management, and use of lidocaine patches, heating pad, rest for pain control, as well as gabapentin 300 mg up to tid.    While on the drug, obtain every three month LFTs, creatinine, and CBC. Use folic acid 1 mg daily to prevent mucosal side effects.     #Shortness of breath: Cause of slowly progressive dyspnea remains unclear.  I agree with plans to undergo echo stress testing to evaluate potential cardiac dysfunction.  I recommend a chest x-ray today.  Continue evaluation through primary care, pulmonary, and potentially cardiology to discern possible contributions from thoracic organs.    No orders of the defined types were placed in this encounter.    Follow-up: Return to clinic in 3 months.    Denilson Kelley MD  Staff Rheumatologist, [facility]      HPI:   Elizabeth Rosenthal presents for follow up of seronegative rheumatoid arthritis as well as fibromyalgia and osteoarthritis of knees and spine.  She was last seen in rheumatology in 1-2021, when inflammatory arthritis was judged improved. Continued methotrexate at 20 mg weekly was recommended.  Gabapentin was continued at 300 mg 3 times daily.    Interval history 09-:    Patient emailed 9-7-2021 with following information for this appointment:     1) elevated CRP in bloodwork - 18 on 6/30 and 20 on 8/30.  I'm usually 11  2) creatine levels have been at or slightly above the upper limits   3) slight protenuria  4) elevated blood pressure since October, 2020 (lowered with increased dose of lisiniprol)  5) I saw a neurologist who said my facial numbness is not due to migraines, but occipital neuralgia and is giving me quarterly steroid shots, which have eliminated the numbness on the right side of my face I've had the past 4 years.  I'm taking muscle relaxers for the residual tension headaches.    6) I switched back from Duloxetine to Escitalopram because the Escitalopram works better on the depression than the Duloxetine.      Symptoms:  1). Extreme  fatigue - I need 9-10 hours a day, which often requires 12 hours in bed each day. I have occasional nights where I can't fall asleep at all (triggered by caffeine use to get through the prior day, or stress I will oversleep for Dr Pearl or staff meeting) I take naps at least 3-4 days a week, at least 1.5 hours, sometimes 3-4 hours.  I have trouble with an 8 hour workday. I break my day into 2-3 hours of work, then 2-3 hours of rest, and work Saturdays.  Sometimes, I'm stuck making up 8 hours on Saturday, and sleep all day Sunday to make it up.  This has been going on, with some fluctuation, since I was sick in March, 2020 with flu-like, or COVID-like symptoms.  (No tests for COVID were available back then).   2). I have a lot less energy or stamina.  If I work in my garden for a full afternoon (4 hours?) or paint my basement, or walk, or volunteer at a state fair/street fair both for the [employer] or [employer], I'm exhausted for at least a day after and have called in sick to work the day after.  In February, 2021 I checked in with a psychiatrist to see if I needed a med adjustment, and she thought I may be deconditioned, and that I needed to exercise more and track my sleep.  I [facility] & got a Fitbit to track in [facility].  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  Sometimes, I would need a nap after a 1/2 hour, 1 mile walk.  Sometimes they would wake me up early when I had trouble falling asleep, and make the fatigue worse.    3) I've had more shortness of breath. I usually need asthma inhalers in August during ragweed season.  Last year, I used them July to February.  I tried Rashida burns, and the walking between housed/blocks left me so out of breath I couldn't sing after a while. This got a little better when I increased my vitamin D in February.  Still an issue when I'm shoveling mulch or compost, or carrying groceries.  4) after 2 or more hours of painting or  "gardening, I'll get tremors in my left hand. Neurologist called this an 'action tremor,' not Parkinson's.   5) I'm cold a lot.  Sometimes it takes me 1/2 hour under 3 blankets to warm up enough to sleep. I wear slippers and sweaters in door, summer and winter and have a heating pad at my desk.  (I work from home).   6) I have a weird lump developing on my right hand (pictures attached)  7.  I have a lot of collar bone and armpit pain on the left side (not breast cancer per obgyn & mammogram 8/1/21).    8) the usual aches and pains all over, especially knees, ankles, wrists, forearms, shoulders & collarbone area.  I've been taking more gabapentin (600-900 mg/day) & lidocaine patches which help, and doing simple knee exercises recommended by Dr Patel.    9) I get sharp pains in my lower legs/shins when I lie down after a busy day, and sometimes get twitching or muscle spasms in my legs.  I also get muscle spasms & cramps in my hands after gardening/exertion.\"    Today, patient endorses fatigue and shortness of breath as key symptoms. She needs 10 hours of sleep plus naps, and still she is sometimes tired. She cannot sustain former capacity to perform 4-5 hours of chores.  She has considered duloxetine as a cause; she switched back to lexapro.   Sleep apnea has also been checked by Pulmonary; reported to be fine.  She notes no change in fatigue based upon methotrexate dosing cycle.  Gabapentin 600-900 mg  Per day has not changed. Chronic lower back/hip pain/shoulder pain continue daily, but not signifcantly worse. Other msk aches and pains are sometimes worse.  Knuckles are more swollen and hips/kness are more stiff I the morning. Early morning stiffness is ~ 2 hours.   Knees are hurting a little bit less associated with dog walking.    She traces onset of symptoms to presumed COVID19 infection in March 2020. She wonders about long-COVID19.    She is planning echo stress test     Interval history 01-:    She " reports slowly improving from months of exhaustion and coughing in the summer/Fall.  Joints are unchanged--she has chronic pain in knees--L > R; ankles, elbows. Pain is generally unaffected by activity.     She does not use ibufprofen or tylenol. She uses lidocain patches, heating pad, rest, and gabapentin for joint pain (300-600 mg daily). She had been using duloxetine for pain and for migraine HA. She developed insomnia on the latter, so she stopped cymbalta and returned to escatalopram. Now the headaches are worse once again. She is concerned that medication combinations might be exacerbating HA.    She takes methotrexate 8 tabs once weekly. No HA associated.       Review of Systems:   Pertinent items are noted in HPI or as below, remainder of complete ROS is negative.      No recent problems with hearing or vision. No swallowing problems.   No breathing difficulty, shortness of breath, coughing, or wheezing  No chest pain or palpitations  No heart burn, indigestion, abdominal pain, nausea, vomiting, diarrhea  No urination problems, no bloody, cloudy urine, no dysuria  No numbing, tingling, weakness  No headaches or confusion  No rashes. No easy bleeding or bruising.     Active Medications:   Current Outpatient Medications   Medication     albuterol (PROAIR HFA/PROVENTIL HFA/VENTOLIN HFA) 108 (90 Base) MCG/ACT inhaler     aspirin 81 MG tablet     buPROPion (WELLBUTRIN XL) 150 MG 24 hr tablet     cetirizine (ZYRTEC) 10 MG tablet     escitalopram (LEXAPRO) 20 MG tablet     fluticasone (FLOVENT HFA) 110 MCG/ACT inhaler     folic acid (FOLVITE) 1 MG tablet     gabapentin (NEURONTIN) 300 MG capsule     lidocaine (LIDODERM) 5 % patch     lisinopril (ZESTRIL) 20 MG tablet     methotrexate 2.5 MG tablet     pantoprazole (PROTONIX) 20 MG EC tablet     VITAMIN D, CHOLECALCIFEROL, PO     No current facility-administered medications for this visit.       Allergies:   Nuts   Celery Oil   Codeine   Contrast  Dye   Food   Potatoes  Peanuts  Cantaloupe  Lettuce  Oat   Penicillins   Rice   Shellfish-Derived Products   Wheat Bran   Yeast       Past Medical History:  Allergic rhinitis    Asthma   Chronic pelvic pain in female   Depression   Depressive disorder   Gastroesophageal reflux disease   Head injury   Hyperlipidemia   Hypertension   Immunosuppression    Migraines   Morbid obesity   Obstructive sleep apnea   Reduced vision   Transient ischemic attack  Vertebral artery dissection  Iliotibial band syndrome  Right knee pain  Lumbar radicular pain  Rheumatoid arthritis of multiple sites without rheumatoid factor  Fibromyalgia  Arthritis of knee, left  Creatinine elevation     Past Surgical History:  Arthroscopy knee bilateral    Biopsy lymph node cervical    Colonoscopy 7/26/2017  Lymph node biopsy 2010  Genitourinary surgery, fallopian tube cyst 1994 and tubal ligation 1999  Lymph node removed from neck for biopsy 2011   Hysteroscopy    Tonsillectomy, bilateral 1/3/2018    Family History:    The patient's family history includes Asthma in her paternal grandmother; Breast Cancer in her mother; Cancer in her mother; Cerebrovascular Disease in her paternal grandmother; Heart Disease in her father; Mental Illness in her father; Migraines in her daughter and son; Substance Abuse in her father.    Social History:  The patient reports that she has never smoked. She has never used smokeless tobacco. She reports that she drinks alcohol. She reports that she does not use drugs.   PCP: Eveline Berry  Marital Status: Single     Physical Exam:   not currently breastfeeding.  Wt Readings from Last 4 Encounters:   08/30/21 138.1 kg (304 lb 6.4 oz)   06/30/21 140.9 kg (310 lb 11.2 oz)   10/01/20 138.3 kg (305 lb)   09/16/19 138.8 kg (306 lb)     not currently breastfeeding.  Wt Readings from Last 4 Encounters:   08/30/21 138.1 kg (304 lb 6.4 oz)   06/30/21 140.9 kg (310 lb 11.2 oz)   10/01/20 138.3 kg (305 lb)   09/16/19 138.8 kg  (306 lb)       Constitutional: well-developed, appearing stated age; cooperative  Eyes: nl EOM, PERRLA, vision, conjunctiva, sclera  ENT: nl external ears, nose, hearing, lips, teeth, gums, throat  No mucous membrane lesions, normal saliva pool  Neck: no mass or thyroid enlargement  Resp: Breathing unlabored  Lymph: no cervical, supraclavicular, inguinal or epitrochlear nodes  MS: Osteoarthritis changes were present in the hands, but no inflammatory joint changes were visible at MCPs, wrists, elbows, or shoulders.  Skin: no nail pitting, alopecia, rash, nodules or lesions  Neuro: nl cranial nerves, strength, sensation, DTRs.   Psych: nl judgement, orientation, memory, affect.      Laboratory:   RHEUM RESULTS Latest Ref Rng & Units 3/22/2016 5/25/2016 8/24/2016   ALBUMIN 3.4 - 5.0 g/dL 3.5 3.4 3.2(L)   ALT 0 - 50 U/L 22 23 18   AST 0 - 45 U/L 11 16 8   AMAN INTERPRETATION NEG:Negative - - -   CK TOTAL 30 - 225 U/L - - -   CREATININE 0.52 - 1.04 mg/dL 0.91 0.93 0.97   CRP 0.0 - 8.0 mg/L - 11.0(H) 9.8(H)   GFR ESTIMATE, IF BLACK >60 mL/min/[1.73:m2] 79 77 73   GFR ESTIMATE >60 mL/min/1.73m2 65 63 60(L)   HEMATOCRIT 35.0 - 47.0 % 42.0 39.0 39.0   HEMOGLOBIN 11.7 - 15.7 g/dL 13.8 12.7 13.0   HCVAB NR - - Nonreactive   Assay performance characteristics have not been established for newborns,   infants, and children     WBC 4.0 - 11.0 10e3/uL 6.9 6.9 6.2   RBC 3.80 - 5.20 10e6/uL 4.58 4.29 4.21   RDW 10.0 - 15.0 % 12.7 13.5 13.4   MCHC 31.5 - 36.5 g/dL 32.9 32.6 33.3   MCV 78 - 100 fL 92 91 93   PLATELET COUNT 150 - 450 10e3/uL 328 322 284   ESR 0 - 30 mm/hr - - -     AMAN interpretation   Date Value Ref Range Status   04/28/2018 Negative NEG^Negative Final     Comment:                                        Reference range:  <1:40  NEGATIVE  1:40 - 1:80  BORDERLINE POSITIVE  >1:80 POSITIVE       Again, thank you for allowing me to participate in the care of your patient.      Sincerely,    Denilson Kelley MD

## 2021-09-10 NOTE — PROGRESS NOTES
"Unable to add note to providers note as the chart is locked / in use.        Elizabeth is a 56 year old who is being evaluated via a billable video visit.      How would you like to obtain your AVS? MyChart    Will anyone else be joining your video visit? No  {If patient encounters technical issues they should call [phone] :[id number]}    Video Start Time: {video visit start/end time for provider to select:152948}  Video-Visit Details    Type of service:  Video Visit    Video End Time:{video visit start/end time for provider to select:152948}    Originating Location (pt. Location): {video visit patient location:281085::\"Home\"}    Distant Location (provider location):  [facility] RHEUMATOLOGY CLINIC [place]     Platform used for Video Visit: {Virtual Visit Platforms:166800::\"XbyMe\"}    "

## 2021-09-10 NOTE — PATIENT INSTRUCTIONS
Diagnosis/plan:  # Seronegative rheumatoid arthritis  There is persistent morning stiffness in hips knees and hands along with visible swelling in the fingers, despite significant improvement with methotrexate treatment.  Fatigue is greatly increased.  Recent lab work shows persistent, indeed slightly increased indicator of inflammation.  It is certainly possible that fatigue and increased joint pain are linked through systemic inflammation.  I recommend a trial of increased methotrexate to combat inflammation, and indirectly fatigue and joint pain.    I recommend increasing methotrexate to 25 mg weekly.  While on the drug, obtain every three month LFTs, creatinine, and CBC. Use folic acid 1 mg daily to prevent mucosal side effects.     #Shortness of breath: Cause of the symptom it remains unclear.  I agree with plans to undergo echo stress testing for the heart.  I recommend a chest x-ray today.  Continue evaluation through primary care, pulmonary, and potentially cardiology to discern possible contributions from thoracic organs.

## 2021-09-13 NOTE — TELEPHONE ENCOUNTER
Reviewed and responded requesting additional information.    MAIN MorrisN, RN  Rheumatology Care Coordinator  [facility]

## 2021-09-13 NOTE — TELEPHONE ENCOUNTER
Pt returned call. She did take the methotrexate on Friday and noted an upset stomach and HA within 3-6 hours after taking and lasted until Saturday. Elizabeth did not remember to try taking the tylenol 500-1000 mg up to three times daily prn for the HA and Nausea.     Elizabeth is willing to try one more time at the 10 tab dose, and use the tylenol for HA/Nausea. She will call me next Monday with an update.    MAIN MorrisN, RN  Rheumatology Care Coordinator  [facility]

## 2021-09-13 NOTE — TELEPHONE ENCOUNTER
I agree with the recommendation to use Tylenol to forestall methotrexate associated symptoms.  1 other possibility to alleviate symptoms: Take 5 tablets on Friday morning, and then 12 hours later take 5 tablets more.  That way all of the dose is still getting in within a 24-hour timeframe, but the half dose may be better tolerated in the short run.

## 2021-09-16 NOTE — TELEPHONE ENCOUNTER
Relayed Dr Kelley's additional possibility of splitting pt's methotrexate dose per below.    MAIN MorrisN, RN  Rheumatology Care Coordinator  [facility]

## 2021-09-17 ENCOUNTER — HOSPITAL ENCOUNTER (OUTPATIENT)
Dept: GENERAL RADIOLOGY | Facility: CLINIC | Age: 56
End: 2021-09-17
Attending: INTERNAL MEDICINE
Payer: COMMERCIAL

## 2021-09-17 ENCOUNTER — HOSPITAL ENCOUNTER (OUTPATIENT)
Dept: CARDIOLOGY | Facility: CLINIC | Age: 56
End: 2021-09-17
Attending: INTERNAL MEDICINE
Payer: COMMERCIAL

## 2021-09-17 DIAGNOSIS — M06.09 RHEUMATOID ARTHRITIS OF MULTIPLE SITES WITHOUT RHEUMATOID FACTOR (H): ICD-10-CM

## 2021-09-17 DIAGNOSIS — R53.83 OTHER FATIGUE: ICD-10-CM

## 2021-09-17 PROCEDURE — 94621 CARDIOPULM EXERCISE TESTING: CPT

## 2021-09-17 PROCEDURE — 71046 X-RAY EXAM CHEST 2 VIEWS: CPT | Mod: 26 | Performed by: RADIOLOGY

## 2021-09-17 PROCEDURE — 71046 X-RAY EXAM CHEST 2 VIEWS: CPT

## 2021-09-17 PROCEDURE — 94621 CARDIOPULM EXERCISE TESTING: CPT | Mod: 26 | Performed by: INTERNAL MEDICINE

## 2021-09-21 ENCOUNTER — MYC MEDICAL ADVICE (OUTPATIENT)
Dept: RHEUMATOLOGY | Facility: CLINIC | Age: 56
End: 2021-09-21

## 2021-09-21 NOTE — TELEPHONE ENCOUNTER
Returned call to pt. She took her second dose of 10 tabs of methotrexate split over 12 yours. She did vomit twice-no nausea, might happen if bending over and coughing, diarrhea until she has emptied her colon and then she is fine. Elizabeth said that these GI issues are not abnormal for her, had them prior to to this dose escalation of the methotrexate.     Pt is willing to try this dose of methotrexate for 1 month, then get her labs and see what they are, and make a decision at that time if she wants to continue this dose.    Elizabeth is wondering if she should increase her folic acid, currently taking 1 mg every day. Message forwarded to Dr Kelley and then I will get back to pt via [employer]. Pt is agreeable to this plan.    JOAN Morris, RN  Rheumatology Care Coordinator  [facility]

## 2021-09-25 ENCOUNTER — HEALTH MAINTENANCE LETTER (OUTPATIENT)
Age: 56
End: 2021-09-25

## 2021-10-06 ENCOUNTER — TELEPHONE (OUTPATIENT)
Dept: PSYCHOLOGY | Facility: CLINIC | Age: 56
End: 2021-10-06

## 2021-10-06 NOTE — TELEPHONE ENCOUNTER
Request for refill of Bupropion XL tab 150 mg 24 hr. Patient has not been seen by provider since May of 2021. Please connect with patient and have patient schedule appointment with provider for follow up.

## 2021-10-23 DIAGNOSIS — F33.41 RECURRENT MAJOR DEPRESSION IN PARTIAL REMISSION (H): ICD-10-CM

## 2021-10-24 NOTE — TELEPHONE ENCOUNTER
"buPROPion (WELLBUTRIN XL) 150 MG 24 hr tablet      Last Written Prescription Date:  5/7/2021   Last Fill Quantity: 90 tab,   # refills: 0  Last Office Visit : 8/30/2021  Future Office visit:  none    Routing refill request to provider for review/approval because:  Refill needs review- PCP requested to fill.  - last filled by psych 5/7/2021  - all prior refills by [facility]/Dr Berry ([phone])  - plan last visit with psych 5/6/2021: continue \"medications per primary care provider\"    PHQ-9 score:    PHQ 6/30/2021   PHQ-9 Total Score 11   Q9: Thoughts of better off dead/self-harm past 2 weeks Not at all             "

## 2021-10-26 ENCOUNTER — VIRTUAL VISIT (OUTPATIENT)
Dept: BEHAVIORAL HEALTH | Facility: CLINIC | Age: 56
End: 2021-10-26
Payer: COMMERCIAL

## 2021-10-26 ENCOUNTER — MYC MEDICAL ADVICE (OUTPATIENT)
Dept: PSYCHIATRY | Facility: CLINIC | Age: 56
End: 2021-10-26

## 2021-10-26 ENCOUNTER — VIRTUAL VISIT (OUTPATIENT)
Dept: PSYCHIATRY | Facility: CLINIC | Age: 56
End: 2021-10-26
Payer: COMMERCIAL

## 2021-10-26 DIAGNOSIS — F33.1 MAJOR DEPRESSIVE DISORDER, RECURRENT, MODERATE (H): Primary | ICD-10-CM

## 2021-10-26 DIAGNOSIS — R53.83 FATIGUE, UNSPECIFIED TYPE: ICD-10-CM

## 2021-10-26 DIAGNOSIS — F33.0 MAJOR DEPRESSIVE DISORDER, RECURRENT EPISODE, MILD (H): Primary | ICD-10-CM

## 2021-10-26 PROCEDURE — 99215 OFFICE O/P EST HI 40 MIN: CPT | Mod: GT | Performed by: PSYCHIATRY & NEUROLOGY

## 2021-10-26 PROCEDURE — 90832 PSYTX W PT 30 MINUTES: CPT | Mod: GT | Performed by: SOCIAL WORKER

## 2021-10-26 RX ORDER — ZOLPIDEM TARTRATE 5 MG/1
5 TABLET ORAL
Qty: 15 TABLET | Refills: 0 | Status: SHIPPED | OUTPATIENT
Start: 2021-10-26 | End: 2021-11-01

## 2021-10-26 RX ORDER — CYCLOBENZAPRINE HCL 5 MG
5 TABLET ORAL DAILY PRN
COMMUNITY
Start: 2021-08-24 | End: 2022-12-06

## 2021-10-26 RX ORDER — BUPROPION HYDROCHLORIDE 150 MG/1
150 TABLET ORAL EVERY MORNING
Qty: 90 TABLET | Refills: 1 | Status: SHIPPED | OUTPATIENT
Start: 2021-10-26 | End: 2021-12-01

## 2021-10-26 RX ORDER — BUPROPION HYDROCHLORIDE 150 MG/1
150 TABLET ORAL EVERY MORNING
Qty: 90 TABLET | Refills: 0 | Status: SHIPPED | OUTPATIENT
Start: 2021-10-26 | End: 2021-12-01

## 2021-10-26 ASSESSMENT — ANXIETY QUESTIONNAIRES
6. BECOMING EASILY ANNOYED OR IRRITABLE: NOT AT ALL
GAD7 TOTAL SCORE: 0
1. FEELING NERVOUS, ANXIOUS, OR ON EDGE: NOT AT ALL
5. BEING SO RESTLESS THAT IT IS HARD TO SIT STILL: NOT AT ALL
3. WORRYING TOO MUCH ABOUT DIFFERENT THINGS: NOT AT ALL
7. FEELING AFRAID AS IF SOMETHING AWFUL MIGHT HAPPEN: NOT AT ALL
2. NOT BEING ABLE TO STOP OR CONTROL WORRYING: NOT AT ALL
IF YOU CHECKED OFF ANY PROBLEMS ON THIS QUESTIONNAIRE, HOW DIFFICULT HAVE THESE PROBLEMS MADE IT FOR YOU TO DO YOUR WORK, TAKE CARE OF THINGS AT HOME, OR GET ALONG WITH OTHER PEOPLE: SOMEWHAT DIFFICULT

## 2021-10-26 ASSESSMENT — PATIENT HEALTH QUESTIONNAIRE - PHQ9
SUM OF ALL RESPONSES TO PHQ QUESTIONS 1-9: 16
5. POOR APPETITE OR OVEREATING: NOT AT ALL

## 2021-10-26 NOTE — PROGRESS NOTES
Collaborative Care Psychiatry Service (CCPS)  October 26, 2021    Behavioral Health Clinician Progress Note    Patient Name: Elizabeth Rosenthal      Telemedicine Visit: The patient's condition can be safely assessed and treated via synchronous audio and visual telemedicine encounter.      Reason for Telemedicine Visit: Services only offered telehealth    Originating Site (Patient Location): Patient's home    Distant Site (Provider Location): Provider Remote Setting- Home Office    Consent:  The patient/guardian has verbally consented to: the potential risks and benefits of telemedicine (video visit) versus in person care; bill my insurance or make self-payment for services provided; and responsibility for payment of non-covered services.     Mode of Communication:  Video Conference via [employer]    As the provider I attest to compliance with applicable laws and regulations related to telemedicine.         Service Type:  Individual      Service Location:   Face to Face in Home / Community     Session Start Time: 12:24p  Session End Time: 12:52p      Session Length: 16 - 37      Attendees: Client    Visit Activities (Refresh list every visit): [facility] Only    Diagnostic Assessment Date: 5/6/21  See Flowsheets for today's PHQ-9 and MEHUL-7 results  Previous PHQ-9:   PHQ-9 SCORE 5/6/2021 6/30/2021 10/26/2021   PHQ-9 Total Score MyChart - - -   PHQ-9 Total Score 9 11 16       Previous MEHUL-7:   MEHUL-7 SCORE 5/6/2021 6/30/2021 10/26/2021   Total Score - - -   Total Score 0 1 0       WHODAS  WHODAS 2.0 Total Score 5/6/2021   Total Score 19        CAGE  No flowsheet data found.      DATA  Extended Session (60+ minutes): No  Interactive Complexity: No  Crisis: No    Medication Compliance:  Yes      Chemical Use Review:   Substance Use: Chemical use reviewed, no active concerns identified      Tobacco Use: No current tobacco use.      Current Stressors / Issues:  Pt shares that she has been doing a lot of medical tests.  She is now  getting injections and she no longer has headaches as they dx'd her with neuralgia.  Feels that she is fixing things little bits at a time but still finds she struggles daily with sleep issues.  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  She says she knows that getting out to walk is good for her but then she pays for it as she has no energy or motivation after any physical activity.  Is having a hard time getting all her work done and rarely gets work started before noon.  Is on a schedule where she will work for 2-3 hours but then needs to rest for about 2 hours and then ends up working into the evening and on Saturdays.  She shares that her supervisor has talked to her about it a few times and is supportive but she is not sure how long that might continue.     Pt shares that she understands that she needs to be more active but doesn't feel that is completely it.  Wonders about fibromyalgia or chronic fatigue syndrome.    Shares that the last 3 weeks she was focusing on work and not doing much else as she knows she needs to get her hours in.  This summer she was trying to be more active with her garden and at [employer] but then was needing to sleep after doing things and work was suffering.  Reports that she feels her mood is pretty good and primarily feels down when she worries about work and if she would lose her job.  Denies anxiety symptoms.          Pt shares she took lexapro for years and years.  About 3-4 year ago she started to get headaches on the side of her head that lasted for up to 15 days.  Switched to duloxetine a couple of years ago.  Did try to go back on lexapro as she felt it helped with depressive symptoms better but headaches returned so she went off it again.  Is back on duloxetine and is taking it with wellbutrin.  Feels that the duloxetine hasn't treated the depression quite as good as the lexapro and feels the duloxetine is causing the sleepiness.    Sleep  is a big issue for her.  Some times she doesn't sleep at all at night and then has to sleep during the day which isn't good for her job.  She shares that she feels exhausted and splits her work over 6 days so that she doesn't have to work 8 full hours.  Is getting to the point where she is so exhausted that she is pushing more hours to the end of the week and having to work long hours Saturdays.  Has tried sleep hygiene and has seen a sleep specialist   Has RA and has chronic low back pain and some pain in her hands.  Also has knee issues and needs a replacement but nobody will do the surgery unless she looses weight.  Tries to garden regularly and has some projects at home she wants to work on.     Does the client have any condition that is currently presenting as a potential to harm themselves or others (severe withdrawal, serious medical condition, severe emotional/behavioral problem)? No.  Proceed with assessment.         Sleep:Having increased sleep issues since starting duloxetine.  Some nights gets little to no sleep.  Very tired during the day and often needs to nap.  Doesn't take anything for sleep.    Progress on Treatment Objective(s) / Homework:  Minimal progress - PREPARATION (Decided to change - considering how); Intervened by negotiating a change plan and determining options / strategies for behavior change, identifying triggers, exploring social supports, and working towards setting a date to begin behavior change    Motivational Interviewing    MI Intervention: Expressed Empathy/Understanding, Supported Autonomy, Collaboration, Evocation, Permission to raise concern or advise and Open-ended questions     Change Talk Expressed by the Patient: Desire to change Reasons to change    Provider Response to Change Talk: E - Evoked more info from patient about behavior change, A - Affirmed patient's thoughts, decisions, or attempts at behavior change, R - Reflected patient's change talk and S - Summarized  patient's change talk statements        Review of Symptoms per patient report:  Depression: Change in energy level and Difficulties concentrating  Antoinette:  No Symptoms  Psychosis: No Symptoms  Anxiety: No Symptoms  Panic:  No symptoms  Post Traumatic Stress Disorder:  Experienced traumatic event Experienced traumatic event mother was schizophrenic.  Pt reports probable PTSD as a child.  Denies ongoing.   Eating Disorder: No Symptoms  ADD / ADHD:  Inattentive and Distractibility  Conduct Disorder: No symptoms  Autism Spectrum Disorder: No symptoms  Obsessive Compulsive Disorder: No Symptoms      Changes in Health Issues:   Yes: has a lot of health issues.    Assessment: Current Emotional / Mental Status (status of significant symptoms):  Risk status (Self / Other harm or suicidal ideation)  Patient denies a history of suicidal ideation, suicide attempts, self-injurious behavior, homicidal ideation, homicidal behavior and and other safety concerns  Patient denies current fears or concerns for personal safety.  Patient denies current or recent suicidal ideation or behaviors.  Patient denies current or recent homicidal ideation or behaviors.  Patient denies current or recent self injurious behavior or ideation.  Patient denies other safety concerns.  A safety and risk management plan has not been developed at this time, however patient was encouraged to call [place] [phone] should there be a change in any of these risk factors.    Appearance:   Appropriate   Eye Contact:   Good   Psychomotor Behavior: Normal   Attitude:   Cooperative   Orientation:   All  Speech   Rate / Production: Normal    Volume:  Normal   Mood:    Normal  Affect:    Appropriate   Thought Content:  Clear   Thought Form:  Coherent  Logical   Insight:    Good     Diagnoses:  1. Major depressive disorder, recurrent episode, mild (H)        Collateral Reports Completed:  Not Applicable    Plan: (Homework, other):  Patient was given information about  behavioral services and encouraged to schedule a follow up appointment with the clinic [facility] in conjunction with next CCPS appointment.  She was also given information about mental health symptoms and treatment options .  CD Recommendations: No indications of CD issues.  GLADIS Mckee, [facility]      GLADIS Brewer, [facility]  October 26, 2021

## 2021-10-26 NOTE — PATIENT INSTRUCTIONS
Continue escitalopram 20 mg daily.    Continue bupropion  mg daily.    Start zolpidem 5 mg at bedtime as needed for insomnia.    Start melatonin 3 mg about 3 or 4 hours prior to bedtime.    Set a wake up time and stick with it.  Do not nap more than 20 minutes during the day. Keep a regular bedtime.     Continue all other medications per your primary care provider.    Schedule an appointment with me in 1 week or sooner as needed.  You may call [facility] at [phone] to schedule.    [place] Resources:      Go to the Emergency Department as needed or call after hours crisis line at [phone].      To schedule individual or family therapy, call [facility] at [phone].     Follow up with primary care provider as planned or sooner for acute medical concerns.    Call the psychiatric nurse line with medication questions or concerns at [phone].    [employer] may be used to communicate with your provider, but this is not intended to be used for emergencies.    Community Resources:      National Suicide Prevention Lifeline: 706.698.6625 (TTY: 453.550.2561). Call anytime for help.  (www.suicidepreventionlifeline.org)    National [place] on Mental Illness (www.savanna.org): 503.440.6368 or 584-163-9027.     Mental Health Association (www.mentalhealth.org): 108.127.7848 or 502-178-8325.    [place] Crisis Text Line: Text [place] to 590319    Suicide LifeLine Chat: suicidepreventionlifeline.org/chat

## 2021-10-26 NOTE — PROGRESS NOTES
Telemedicine Visit: The patient's condition can be safely assessed and treated via synchronous audio and visual telemedicine encounter.      Reason for Telemedicine Visit: Services only offered telehealth    Originating Site (Patient Location): Patient's home    Distant Site (Provider Location): Provider Remote Setting- Home Office    Consent:  The patient/guardian has verbally consented to: the potential risks and benefits of telemedicine (video visit) versus in person care; bill my insurance or make self-payment for services provided; and responsibility for payment of non-covered services.     Mode of Communication:  Video Conference via [employer]    Patient is in the [facility] waiting room.  Patient @ [phone]        Outpatient Psychiatric Progress Note    Name: Elizabeth Rosenthal   : 1965                    Primary Care Provider: Eveline Berry MD   Therapist: None     PHQ-9 scores:  PHQ-9 SCORE 2021 2021 10/26/2021   PHQ-9 Total Score [facility] - - -   PHQ-9 Total Score 9 11 16       MEHUL-7 scores:  MEHUL-7 SCORE 2021 2021 10/26/2021   Total Score - - -   Total Score 0 1 0       Patient Identification:  Elizabeth is a 56 year old year old female  who presents for return visit with me.  Patient attended the session alone.     Interim History:  [facility] Chelo Leigh[facility]  Pt shares that she has been doing a lot of medical tests. She is now getting injections and she no longer has headaches as they dx'd her with neuralgia. Feels that she is fixing things little bits at a time but still finds she struggles daily with sleep issues.  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 She says she knows that getting out to walk is good for her but then she pays for it as she has no energy or motivation after any physical activity. Is having a hard time getting all her work done and rarely gets work started before noon. Is on a schedule where she will work for 2-3 hours  "but then needs to rest for about 2 hours and then ends up working into the evening and on Saturdays. She shares that her supervisor has talked to her about it a few times and is supportive but she is not sure how long that might continue.  Pt shares that she understands that she needs to be more active but doesn't feel that is completely it. Wonders about fibromyalgia or chronic fatigue syndrome.  Shares that the last 3 weeks she was focusing on work and not doing much else as she knows she needs to get her hours in. This summer she was trying to be more active with her garden and at [employer] but then was needing to sleep after doing things and work was suffering.  Reports that she feels her mood is pretty good and primarily feels down when she worries about work and if she would lose her job. Denies anxiety symptoms.    Elizabeth was last seen in early May.  Since that time, she is seen several other specialists.  She saw a neurologist and was diagnosed with trigeminal neuralgia and has been having some injections which have helped with her headaches and facial numbness.  She was seen by rheumatology and was told that her CRP and creatinine levels were increased.  She is now on a higher dose of methotrexate, and her CRP levels and her pain are improving.  She saw her primary care provider and had a stress test.  She does not have any cardiac issues.  She saw her sleep specialist, and was told that her CPAP is functioning well.    Unfortunately, she is still struggling to function.  She has trouble sleeping at night, other than is sleeping all day.  She is struggling to keep up with work.  She is more depressed.  She recently took a week off and \"slept for 5 days.\"  She then worked in the garden one day and was so exhausted that she did not feel at all refreshed when she went back to work.    Right now, she reports that she goes to bed around 10 or 11 and does not fall asleep until 3 or 4 in the morning.  She gets up at 8 " AM and works for a couple of hours and sleeps for a couple of hours throughout the day.  Some days she is napping for 5 hours during the day.    After some discussion of circadian rhythms and ways to switch her days and nights back to normal, we agreed that I would refer Elizabeth to the partial hospitalization program.  She is open to the idea of taking a couple of weeks off work and participating in group therapy to try to reset her circadian clock.  In the meantime, she will talk to her supervisor and ask if she can work only 6 hours a day for the next week.  She will then go to bed a little earlier, set an alarm for 8:00, get up, shower, get dressed for work, and leave the house as if she is going to get on the bus.  She will walk around the block and come back home and go to work.  She will try to limit herself to only 30-minute naps during the day.  If she can do this for a week, she may be closer to having a normal day night cycle.  She is aware that it may take longer and that she may need more intervention for her depression.    Janny reports that in the past, she only needed Lexapro to manage her depression.  She started on bupropion when her ex- was in hospice care at her home, and has not stopped it even though its been 8 or 10 years.  We agreed to not make any changes to her antidepressants at this time, but she was given Ambien 5 mg to take at bedtime to help with sleep initiation.  Risks and benefits were reviewed.    Vital Signs:   There were no vitals taken for this visit.    Labs:  Last Comprehensive Metabolic Panel:  Sodium   Date Value Ref Range Status   08/30/2021 144 133 - 144 mmol/L Final   06/30/2021 145 (H) 133 - 144 mmol/L Final     Potassium   Date Value Ref Range Status   08/30/2021 4.0 3.4 - 5.3 mmol/L Final   06/30/2021 4.2 3.4 - 5.3 mmol/L Final     Chloride   Date Value Ref Range Status   08/30/2021 109 94 - 109 mmol/L Final   06/30/2021 110 (H) 94 - 109 mmol/L Final     Carbon  Dioxide   Date Value Ref Range Status   06/30/2021 29 20 - 32 mmol/L Final     Carbon Dioxide (CO2)   Date Value Ref Range Status   08/30/2021 28 20 - 32 mmol/L Final     Anion Gap   Date Value Ref Range Status   08/30/2021 7 3 - 14 mmol/L Final   06/30/2021 6 3 - 14 mmol/L Final     Glucose   Date Value Ref Range Status   08/30/2021 97 70 - 99 mg/dL Final   06/30/2021 98 70 - 99 mg/dL Final     Urea Nitrogen   Date Value Ref Range Status   08/30/2021 19 7 - 30 mg/dL Final   06/30/2021 13 7 - 30 mg/dL Final     Creatinine   Date Value Ref Range Status   08/30/2021 1.19 (H) 0.52 - 1.04 mg/dL Final   06/30/2021 1.04 0.52 - 1.04 mg/dL Final     GFR Estimate   Date Value Ref Range Status   08/30/2021 51 (L) >60 mL/min/1.73m2 Final     Comment:     As of July 11, 2021, eGFR is calculated by the CKD-EPI creatinine equation, without race adjustment. eGFR can be influenced by muscle mass, exercise, and diet. The reported eGFR is an estimation only and is only applicable if the renal function is stable.   06/30/2021 59 (L) >60 mL/min/[1.73_m2] Final     Comment:     Non  GFR Calc  Starting 12/18/2018, serum creatinine based estimated GFR (eGFR) will be   calculated using the Chronic Kidney Disease Epidemiology Collaboration   (CKD-EPI) equation.       Calcium   Date Value Ref Range Status   08/30/2021 8.9 8.5 - 10.1 mg/dL Final   06/30/2021 8.8 8.5 - 10.1 mg/dL Final     Lab Results   Component Value Date    WBC 7.6 08/30/2021    WBC 6.4 06/30/2021     Lab Results   Component Value Date    RBC 4.13 08/30/2021    RBC 4.40 06/30/2021     Lab Results   Component Value Date    HGB 12.7 08/30/2021    HGB 13.4 06/30/2021     Lab Results   Component Value Date    HCT 38.5 08/30/2021    HCT 41.5 06/30/2021     No components found for: MCT  Lab Results   Component Value Date    MCV 93 08/30/2021    MCV 94 06/30/2021     Lab Results   Component Value Date    MCH 30.8 08/30/2021    MCH 30.5 06/30/2021     Lab Results    Component Value Date    MCHC 33.0 08/30/2021    MCHC 32.3 06/30/2021     Lab Results   Component Value Date    RDW 14.6 08/30/2021    RDW 13.9 06/30/2021     Lab Results   Component Value Date     08/30/2021     06/30/2021       Ref Range & Units 1 mo ago 3 mo ago    CRP Inflammation 0.0 - 8.0 mg/L 20.7High   18.1High             TSH   Date Value Ref Range Status   06/30/2021 1.28 0.40 - 4.00 mU/L Final           Current Medications:  Current Outpatient Medications   Medication     albuterol (PROAIR HFA/PROVENTIL HFA/VENTOLIN HFA) 108 (90 Base) MCG/ACT inhaler     aspirin 81 MG tablet     buPROPion (WELLBUTRIN XL) 150 MG 24 hr tablet     cetirizine (ZYRTEC) 10 MG tablet     cyclobenzaprine (FLEXERIL) 5 MG tablet     escitalopram (LEXAPRO) 20 MG tablet     folic acid (FOLVITE) 1 MG tablet     gabapentin (NEURONTIN) 300 MG capsule     lidocaine (LIDODERM) 5 % patch     lisinopril (ZESTRIL) 20 MG tablet     methotrexate 2.5 MG tablet     pantoprazole (PROTONIX) 20 MG EC tablet     VITAMIN D, CHOLECALCIFEROL, PO     zolpidem (AMBIEN) 5 MG tablet     buPROPion (WELLBUTRIN XL) 150 MG 24 hr tablet     fluticasone (FLOVENT HFA) 110 MCG/ACT inhaler     No current facility-administered medications for this visit.        The [place] Prescription Monitoring Program has been reviewed and there are no concerns about diversionary activity for controlled substances at this time.      Mental Status Examination:  Elizabeth is a 56-year-old woman in some emotional distress.  Speech is clear and normal in rate and tone.  Eye contact is good over the video connection.  Grooming is fair in casual clothing.  Motor behavior is appropriate.  Affect is full.  Mood is dysphoric.  Thoughts are logical and spontaneous with no loose associations or flight of ideas.  Thought content shows no psychosis.  No suicidal thoughts.  She is alert and oriented x3.      Assessment and Plan:    ICD-10-CM    1. Major depressive disorder,  recurrent, moderate (H)  F33.1 buPROPion (WELLBUTRIN XL) 150 MG 24 hr tablet     zolpidem (AMBIEN) 5 MG tablet     MENTAL HEALTH REFERRAL  - Adult; Outpatient Treatment; Day Treatment/Dual Disorder/Partial Hospitalization Program - Assess & Treat; [facility] - Partial Hospitalization Program [phone]; We will contact you to schedule the appointment or please c...   2. Fatigue, unspecified type  R53.83        Medical comorbidities include:   Patient Active Problem List   Diagnosis     Iliotibial band syndrome     Right knee pain     Lumbar radicular pain     Polyarthritis     Depression     Benign essential hypertension     Mild intermittent asthma without complication     Morbid obesity (H)     Rheumatoid arthritis of multiple sites without rheumatoid factor (H)     Fibromyalgia     Encounter for long-term (current) use of medications     SHERIE (obstructive sleep apnea)     Morbid obesity with BMI of 45.0-49.9, adult (H)     Low back pain     Hyperlipidemia     Arthritis of knee, left     Creatinine elevation     ACP (advance care planning)     Major depressive disorder, recurrent episode, mild (H)       Treatment Plan:  Patient Instructions   Continue escitalopram 20 mg daily.    Continue bupropion  mg daily.    Start zolpidem 5 mg at bedtime as needed for insomnia.    Start melatonin 3 mg about 3 or 4 hours prior to bedtime.    Set a wake up time and stick with it.  Do not nap more than 20 minutes during the day. Keep a regular bedtime.     Continue all other medications per your primary care provider.    Schedule an appointment with me in 1 week or sooner as needed.  You may call [facility] at [phone] to schedule.    [place] Resources:      Go to the Emergency Department as needed or call after hours crisis line at [phone].      To schedule individual or family therapy, call [facility] at [phone].     Follow up with primary care provider as planned or  sooner for acute medical concerns.    Call the psychiatric nurse line with medication questions or concerns at [phone].    Labs on the [employer] may be used to communicate with your provider, but this is not intended to be used for emergencies.    Community Resources:      National Suicide Prevention Lifeline: 395.905.9198 (TTY: 266.641.7812). Call anytime for help.  (www.suicidepreventionlifeline.org)    National [employer] on Mental Illness (www.[place].org): 300.330.7536 or 847-933-6089.     Mental Health Association (www.mentalhealth.org): 377.194.9036 or 976-762-3881.    [place] Crisis Text Line: Text [place] to 493272    Suicide LifeLine Chat: suicideTORCH.sh.org/chat       Administrative Billing:   Video call duration: 53 minutes  Total time spent, including chart review and documentation: 68 minutes    Patient Status:  Patient will continue to be seen for ongoing consultation and stabilization.           As the provider I attest to compliance with applicable laws and regulations related to telemedicine.

## 2021-10-27 ASSESSMENT — ANXIETY QUESTIONNAIRES: GAD7 TOTAL SCORE: 0

## 2021-10-29 ENCOUNTER — LAB (OUTPATIENT)
Dept: LAB | Facility: CLINIC | Age: 56
End: 2021-10-29
Payer: COMMERCIAL

## 2021-10-29 DIAGNOSIS — Z79.899 ENCOUNTER FOR LONG-TERM (CURRENT) USE OF MEDICATIONS: ICD-10-CM

## 2021-10-29 DIAGNOSIS — M06.09 RHEUMATOID ARTHRITIS OF MULTIPLE SITES WITHOUT RHEUMATOID FACTOR (H): ICD-10-CM

## 2021-10-29 LAB
ALT SERPL W P-5'-P-CCNC: 31 U/L (ref 0–50)
AST SERPL W P-5'-P-CCNC: 16 U/L (ref 0–45)
CREAT SERPL-MCNC: 1.07 MG/DL (ref 0.52–1.04)
CRP SERPL-MCNC: 12.2 MG/L (ref 0–8)
ERYTHROCYTE [DISTWIDTH] IN BLOOD BY AUTOMATED COUNT: 14.1 % (ref 10–15)
GFR SERPL CREATININE-BSD FRML MDRD: 58 ML/MIN/1.73M2
HCT VFR BLD AUTO: 39 % (ref 35–47)
HGB BLD-MCNC: 12.8 G/DL (ref 11.7–15.7)
MCH RBC QN AUTO: 31 PG (ref 26.5–33)
MCHC RBC AUTO-ENTMCNC: 32.8 G/DL (ref 31.5–36.5)
MCV RBC AUTO: 94 FL (ref 78–100)
PLATELET # BLD AUTO: 297 10E3/UL (ref 150–450)
RBC # BLD AUTO: 4.13 10E6/UL (ref 3.8–5.2)
WBC # BLD AUTO: 7.3 10E3/UL (ref 4–11)

## 2021-10-29 PROCEDURE — 36415 COLL VENOUS BLD VENIPUNCTURE: CPT | Performed by: PATHOLOGY

## 2021-10-29 PROCEDURE — 84460 ALANINE AMINO (ALT) (SGPT): CPT | Performed by: PATHOLOGY

## 2021-10-29 PROCEDURE — 84450 TRANSFERASE (AST) (SGOT): CPT | Performed by: PATHOLOGY

## 2021-10-29 PROCEDURE — 85027 COMPLETE CBC AUTOMATED: CPT | Performed by: PATHOLOGY

## 2021-10-29 PROCEDURE — 82565 ASSAY OF CREATININE: CPT | Performed by: PATHOLOGY

## 2021-10-29 PROCEDURE — 86140 C-REACTIVE PROTEIN: CPT | Performed by: PATHOLOGY

## 2021-11-01 ENCOUNTER — VIRTUAL VISIT (OUTPATIENT)
Dept: BEHAVIORAL HEALTH | Facility: CLINIC | Age: 56
End: 2021-11-01
Payer: COMMERCIAL

## 2021-11-01 ENCOUNTER — VIRTUAL VISIT (OUTPATIENT)
Dept: PSYCHIATRY | Facility: CLINIC | Age: 56
End: 2021-11-01
Payer: COMMERCIAL

## 2021-11-01 DIAGNOSIS — Z79.899 ENCOUNTER FOR LONG-TERM (CURRENT) USE OF MEDICATIONS: ICD-10-CM

## 2021-11-01 DIAGNOSIS — M54.50 CHRONIC BILATERAL LOW BACK PAIN WITHOUT SCIATICA: ICD-10-CM

## 2021-11-01 DIAGNOSIS — M79.7 FIBROMYALGIA: ICD-10-CM

## 2021-11-01 DIAGNOSIS — G89.29 CHRONIC BILATERAL LOW BACK PAIN WITHOUT SCIATICA: ICD-10-CM

## 2021-11-01 DIAGNOSIS — F33.1 MAJOR DEPRESSIVE DISORDER, RECURRENT, MODERATE (H): ICD-10-CM

## 2021-11-01 DIAGNOSIS — M13.0 POLYARTHRITIS: ICD-10-CM

## 2021-11-01 DIAGNOSIS — M06.09 RHEUMATOID ARTHRITIS OF MULTIPLE SITES WITHOUT RHEUMATOID FACTOR (H): ICD-10-CM

## 2021-11-01 DIAGNOSIS — F33.0 MAJOR DEPRESSIVE DISORDER, RECURRENT EPISODE, MILD (H): Primary | ICD-10-CM

## 2021-11-01 PROCEDURE — 99214 OFFICE O/P EST MOD 30 MIN: CPT | Mod: GT | Performed by: PSYCHIATRY & NEUROLOGY

## 2021-11-01 PROCEDURE — 90832 PSYTX W PT 30 MINUTES: CPT | Mod: GT | Performed by: SOCIAL WORKER

## 2021-11-01 RX ORDER — ZOLPIDEM TARTRATE 5 MG/1
5 TABLET ORAL
Qty: 15 TABLET | Refills: 0 | Status: SHIPPED | OUTPATIENT
Start: 2021-11-01 | End: 2021-11-15

## 2021-11-01 RX ORDER — ZOLPIDEM TARTRATE 5 MG/1
5 TABLET ORAL
Qty: 15 TABLET | Refills: 0 | Status: SHIPPED | OUTPATIENT
Start: 2021-11-01 | End: 2021-11-01

## 2021-11-01 ASSESSMENT — ANXIETY QUESTIONNAIRES
5. BEING SO RESTLESS THAT IT IS HARD TO SIT STILL: NOT AT ALL
7. FEELING AFRAID AS IF SOMETHING AWFUL MIGHT HAPPEN: NOT AT ALL
GAD7 TOTAL SCORE: 0
1. FEELING NERVOUS, ANXIOUS, OR ON EDGE: NOT AT ALL
2. NOT BEING ABLE TO STOP OR CONTROL WORRYING: NOT AT ALL
IF YOU CHECKED OFF ANY PROBLEMS ON THIS QUESTIONNAIRE, HOW DIFFICULT HAVE THESE PROBLEMS MADE IT FOR YOU TO DO YOUR WORK, TAKE CARE OF THINGS AT HOME, OR GET ALONG WITH OTHER PEOPLE: NOT DIFFICULT AT ALL
6. BECOMING EASILY ANNOYED OR IRRITABLE: NOT AT ALL
3. WORRYING TOO MUCH ABOUT DIFFERENT THINGS: NOT AT ALL

## 2021-11-01 ASSESSMENT — PATIENT HEALTH QUESTIONNAIRE - PHQ9
5. POOR APPETITE OR OVEREATING: NOT AT ALL
SUM OF ALL RESPONSES TO PHQ QUESTIONS 1-9: 2

## 2021-11-01 NOTE — PATIENT INSTRUCTIONS
Continue escitalopram 20 mg daily.     Continue bupropion  mg daily.     Continue zolpidem 2.5 to 5 mg at bedtime as needed for insomnia.     Start melatonin 3 mg about 3 or 4 hours prior to bedtime.     Set a wake up time and stick with it.  Do not nap more than 20 minutes during the day. Keep a regular bedtime.     Continue all other medications per your primary care provider.    Schedule an appointment with me in 1 month or sooner as needed.  You may call [facility] at [phone] to schedule.    [place] Resources:      Go to the Emergency Department as needed or call after hours crisis line at [phone].      To schedule individual or family therapy, call [facility] at [phone].     Follow up with primary care provider as planned or sooner for acute medical concerns.    Call the psychiatric nurse line with medication questions or concerns at [phone].    BUXhart may be used to communicate with your provider, but this is not intended to be used for emergencies.    Community Resources:      National Suicide Prevention Lifeline: 478.654.7808 (TTY: 593.871.2874). Call anytime for help.  (www.suicidepreventionlifeline.org)    National [place] on Mental Illness (www.savanna.org): 234.371.8512 or 408-279-5149.     Mental Health Association (www.mentalhealth.org): 445.425.8968 or 896-710-5356.    [place] Crisis Text Line: Text [place] to 410380    Suicide LifeLine Chat: suicidepreWeixinhailifeline.org/chat

## 2021-11-01 NOTE — PROGRESS NOTES
Telemedicine Visit: The patient's condition can be safely assessed and treated via synchronous audio and visual telemedicine encounter.      Reason for Telemedicine Visit: Services only offered telehealth    Originating Site (Patient Location): Patient's home    Distant Site (Provider Location): Provider Remote Setting- Home Office    Consent:  The patient/guardian has verbally consented to: the potential risks and benefits of telemedicine (video visit) versus in person care; bill my insurance or make self-payment for services provided; and responsibility for payment of non-covered services.     Mode of Communication:  Video Conference via Pantea    Patient is in the [facility] waiting room.  Patient # [phone]        Outpatient Psychiatric Progress Note    Name: Elizabeth Rosenthal   : 1965                    Primary Care Provider: Eveline Berry MD   Therapist: None     PHQ-9 scores:  PHQ-9 SCORE 2021 10/26/2021 2021   PHQ-9 Total Score [facility] - - -   PHQ-9 Total Score 11 16 2       MEHUL-7 scores:  MEHUL-7 SCORE 2021 10/26/2021 2021   Total Score - - -   Total Score 1 0 0       Patient Identification:  Elizabeth is a 56 year old year old female  who presents for return visit with me.  Patient attended the session alone.     Interim History:  [facility]  Pt shares that she is trying to get her sleep under better control and is trying to make her sleep more on a schedule. The past week has been setting a sleeping schedule such as 8 hours, 8 hours, 12 hours alternating nights.  Did manage to cut back hours at work last week and was working 6 hour work days. Is back to 8 hour work days this week but is going to look into 7 hour days. Is trying to get to the point of not working on  or if she is that it is just 1-2 hours.  Did get outside 3 days last week with 1 day walking a lot for an appointment. Did feel it the next day but tried not to focus on it and wouldn't  let herself stay in bed. Tries to get a few minutes each morning of outside time/fresh air and has a day time lamp as well.  Does feel better overall and is hoping that with more time she will continue to feel better.    Elizabeth reports that she is doing much better than she was a week ago.  She has been taking Ambien and has been sleeping at night.  She usually falls asleep around 10 PM and is sleeping between 8 and 12 hours a day.  She is no longer sleeping during the day, although sometimes it has been difficult to avoid.    She reports that she has been getting up and getting dressed.  She has been more social recently.  She is trying to do one 20-minute task per day, and has hung some blinds that have been waiting for 6 months.  She took 2 hours off each work day last week, and ended up working only 2 hours on Saturday, which is a significant improvement.    She reports that she does have some hangover with Ambien, and would prefer not to take it over the long-term.  Her mood has been significantly better.  She has been trying to be outside for 15 to 20 minutes a day, and has been using high intensity lamp.  She would prefer to avoid having to do either partial hospitalization or intensive outpatient treatment, but knows that they are available if needed.    Vital Signs:   There were no vitals taken for this visit.    Labs:  Ref Range & Units 3 d ago 2 mo ago      ALT 0 - 50 U/L 31  25      Ref Range & Units 3 d ago 2 mo ago      AST 0 - 45 U/L 16  19      Lab Results   Component Value Date    WBC 7.3 10/29/2021    WBC 6.4 06/30/2021     Lab Results   Component Value Date    RBC 4.13 10/29/2021    RBC 4.40 06/30/2021     Lab Results   Component Value Date    HGB 12.8 10/29/2021    HGB 13.4 06/30/2021     Lab Results   Component Value Date    HCT 39.0 10/29/2021    HCT 41.5 06/30/2021     No components found for: MCT  Lab Results   Component Value Date    MCV 94 10/29/2021    MCV 94 06/30/2021     Lab Results    Component Value Date    MCH 31.0 10/29/2021    MCH 30.5 06/30/2021     Lab Results   Component Value Date    MCHC 32.8 10/29/2021    MCHC 32.3 06/30/2021     Lab Results   Component Value Date    RDW 14.1 10/29/2021    RDW 13.9 06/30/2021     Lab Results   Component Value Date     10/29/2021     06/30/2021     Ref Range & Units 3 d ago 2 mo ago      CRP Inflammation 0.0 - 8.0 mg/L 12.2High   20.7High           Current Medications:  Current Outpatient Medications   Medication     albuterol (PROAIR HFA/PROVENTIL HFA/VENTOLIN HFA) 108 (90 Base) MCG/ACT inhaler     aspirin 81 MG tablet     buPROPion (WELLBUTRIN XL) 150 MG 24 hr tablet     cetirizine (ZYRTEC) 10 MG tablet     cyclobenzaprine (FLEXERIL) 5 MG tablet     escitalopram (LEXAPRO) 20 MG tablet     fluticasone (FLOVENT HFA) 110 MCG/ACT inhaler     folic acid (FOLVITE) 1 MG tablet     gabapentin (NEURONTIN) 300 MG capsule     lidocaine (LIDODERM) 5 % patch     lisinopril (ZESTRIL) 20 MG tablet     methotrexate 2.5 MG tablet     pantoprazole (PROTONIX) 20 MG EC tablet     VITAMIN D, CHOLECALCIFEROL, PO     zolpidem (AMBIEN) 5 MG tablet     buPROPion (WELLBUTRIN XL) 150 MG 24 hr tablet     No current facility-administered medications for this visit.        The [place] Prescription Monitoring Program has been reviewed and there are no concerns about diversionary activity for controlled substances at this time.      Mental Status Examination:  Elizabeth is a 56-year-old woman who appears her stated age and in no acute distress.  Speech is clear and normal in rate and tone.  Motor behavior is appropriate.  Eye contact is good over the video connection.  Affect is full.  Mood is euthymic.  Thoughts are logical and spontaneous with no loose associations or flight of ideas.  Thought content shows no psychosis.  No suicidal thoughts.  She is alert and oriented x3.    Assessment and Plan:    ICD-10-CM    1. Polyarthritis  M13.0    2. Chronic bilateral low  back pain without sciatica  M54.50     G89.29    3. Fibromyalgia  M79.7    4. Rheumatoid arthritis of multiple sites without rheumatoid factor (H)  M06.09    5. Encounter for long-term (current) use of medications  Z79.899    6. Major depressive disorder, recurrent, moderate (H)  F33.1 zolpidem (AMBIEN) 5 MG tablet     DISCONTINUED: zolpidem (AMBIEN) 5 MG tablet     DISCONTINUED: zolpidem (AMBIEN) 5 MG tablet       Medical comorbidities include:   Patient Active Problem List   Diagnosis     Iliotibial band syndrome     Right knee pain     Lumbar radicular pain     Polyarthritis     Depression     Benign essential hypertension     Mild intermittent asthma without complication     Morbid obesity (H)     Rheumatoid arthritis of multiple sites without rheumatoid factor (H)     Fibromyalgia     Encounter for long-term (current) use of medications     SHERIE (obstructive sleep apnea)     Morbid obesity with BMI of 45.0-49.9, adult (H)     Low back pain     Hyperlipidemia     Arthritis of knee, left     Creatinine elevation     ACP (advance care planning)     Major depressive disorder, recurrent episode, mild (H)       Treatment Plan:  Patient Instructions   Continue escitalopram 20 mg daily.     Continue bupropion  mg daily.     Continue zolpidem 2.5 to 5 mg at bedtime as needed for insomnia.     Start melatonin 3 mg about 3 or 4 hours prior to bedtime.     Set a wake up time and stick with it.  Do not nap more than 20 minutes during the day. Keep a regular bedtime.     Continue all other medications per your primary care provider.    Schedule an appointment with me in 1 month or sooner as needed.  You may call [facility] at [phone] to schedule.    [place] Resources:      Go to the Emergency Department as needed or call after hours crisis line at [phone].      To schedule individual or family therapy, call [facility] at [phone].     Follow up with primary care  provider as planned or sooner for acute medical concerns.    Call the psychiatric nurse line with medication questions or concerns at [phone].    Modo Labshart may be used to communicate with your provider, but this is not intended to be used for emergencies.    Community Resources:      National Suicide Prevention Lifeline: 927.478.5222 (TTY: 817.795.5499). Call anytime for help.  (www.suicidepreventionlifeline.org)    National [place] on Mental Illness (www.[place].org): 497.487.7030 or 851-339-9625.     Mental Health Association (www.mentalhealth.org): 460.126.9052 or 214-417-0629.    [place] Crisis Text Line: Text [place] to 513283    Suicide LifeLine Chat: suicidepreOpen Source Storageline.org/chat         Administrative Billing:   Video Telephone call duration: 25 minutes   Start: 1:03 PM   Stop: 1:28 PM  Total time spent, including chart review and documentation: 38 minutes    Patient Status:  Patient will continue to be seen for ongoing consultation and stabilization.           As the provider I attest to compliance with applicable laws and regulations related to telemedicine.

## 2021-11-01 NOTE — PROGRESS NOTES
Collaborative Care Psychiatry Service (CCPS)  November 1, 2021    Behavioral Health Clinician Progress Note    Patient Name: Elizabeth Rosenthal      Telemedicine Visit: The patient's condition can be safely assessed and treated via synchronous audio and visual telemedicine encounter.      Reason for Telemedicine Visit: Services only offered telehealth    Originating Site (Patient Location): Patient's home    Distant Site (Provider Location): Provider Remote Setting- Home Office    Consent:  The patient/guardian has verbally consented to: the potential risks and benefits of telemedicine (video visit) versus in person care; bill my insurance or make self-payment for services provided; and responsibility for payment of non-covered services.     Mode of Communication:  Video Conference via XtremIO    As the provider I attest to compliance with applicable laws and regulations related to telemedicine.         Service Type:  Individual      Service Location:   Face to Face in Home / Community     Session Start Time: 12:24p  Session End Time: 12:52p      Session Length: 16 - 37      Attendees: Client    Visit Activities (Refresh list every visit): [place] [facility] Only    Diagnostic Assessment Date: 5/6/21  See Flowsheets for today's PHQ-9 and MEHUL-7 results  Previous PHQ-9:   PHQ-9 SCORE 6/30/2021 10/26/2021 11/1/2021   PHQ-9 Total Score MyChart - - -   PHQ-9 Total Score 11 16 2       Previous MEHUL-7:   MEHUL-7 SCORE 6/30/2021 10/26/2021 11/1/2021   Total Score - - -   Total Score 1 0 0       WHODAS  WHODAS 2.0 Total Score 5/6/2021   Total Score 19        CAGE  No flowsheet data found.      DATA  Extended Session (60+ minutes): No  Interactive Complexity: No  Crisis: No    Medication Compliance:  Yes      Chemical Use Review:   Substance Use: Chemical use reviewed, no active concerns identified      Tobacco Use: No current tobacco use.      Current Stressors / Issues:  Pt shares that she is trying to get her sleep under better control and  is trying to make her sleep more on a schedule.  The past week has been setting a sleeping schedule such as 8 hours, 8 hours, 12 hours alternating nights.    Did manage to cut back hours at work last week and was working 6 hour work days.  Is back to 8 hour work days this week but is going to look into 7 hour days.  Is trying to get to the point of not working on Saturdays or if she is that it is just 1-2 hours.  Did get outside 3 days last week with 1 day walking a lot for an appointment.  Did feel it the next day but tried not to focus on it and wouldn't let herself stay in bed. Tries to get a few minutes each morning of outside time/fresh air and has a day time lamp as well.    Does feel better overall and is hoping that with more time she will continue to feel better.      Progress on Treatment Objective(s) / Homework:  Minimal progress - PREPARATION (Decided to change - considering how); Intervened by negotiating a change plan and determining options / strategies for behavior change, identifying triggers, exploring social supports, and working towards setting a date to begin behavior change    Motivational Interviewing    MI Intervention: Expressed Empathy/Understanding, Supported Autonomy, Collaboration, Evocation, Permission to raise concern or advise and Open-ended questions     Change Talk Expressed by the Patient: Desire to change Committment to change Taking steps    Provider Response to Change Talk: E - Evoked more info from patient about behavior change, A - Affirmed patient's thoughts, decisions, or attempts at behavior change, R - Reflected patient's change talk and S - Summarized patient's change talk statements        Review of Symptoms per patient report:  Depression: Change in energy level and Difficulties concentrating  Antoinette:  No Symptoms  Psychosis: No Symptoms  Anxiety: No Symptoms  Panic:  No symptoms  Post Traumatic Stress Disorder:  Experienced traumatic event Experienced traumatic event  mother was schizophrenic.  Pt reports probable PTSD as a child.  Denies ongoing.   Eating Disorder: No Symptoms  ADD / ADHD:  Inattentive and Distractibility  Conduct Disorder: No symptoms  Autism Spectrum Disorder: No symptoms  Obsessive Compulsive Disorder: No Symptoms      Changes in Health Issues:   Yes: has a lot of health issues.    Assessment: Current Emotional / Mental Status (status of significant symptoms):  Risk status (Self / Other harm or suicidal ideation)  Patient denies a history of suicidal ideation, suicide attempts, self-injurious behavior, homicidal ideation, homicidal behavior and and other safety concerns  Patient denies current fears or concerns for personal safety.  Patient denies current or recent suicidal ideation or behaviors.  Patient denies current or recent homicidal ideation or behaviors.  Patient denies current or recent self injurious behavior or ideation.  Patient denies other safety concerns.  A safety and risk management plan has not been developed at this time, however patient was encouraged to call [place] [phone] should there be a change in any of these risk factors.    Appearance:   Appropriate   Eye Contact:   Good   Psychomotor Behavior: Normal   Attitude:   Cooperative   Orientation:   All  Speech   Rate / Production: Normal    Volume:  Normal   Mood:    Normal  Affect:    Appropriate   Thought Content:  Clear   Thought Form:  Coherent  Logical   Insight:    Good     Diagnoses:  1. Major depressive disorder, recurrent episode, mild (H)        Collateral Reports Completed:  Not Applicable    Plan: (Homework, other):  Patient was given information about behavioral services and encouraged to schedule a follow up appointment with the clinic [facility].  She was also given information about mental health symptoms and treatment options .  CD Recommendations: No indications of CD issues.  Chelo Leigh, [facility], [facility]      Chelo Collazo  Reese, GLADIS, [facility]  November 1, 2021

## 2021-11-02 ASSESSMENT — ANXIETY QUESTIONNAIRES: GAD7 TOTAL SCORE: 0

## 2021-11-09 DIAGNOSIS — M54.50 CHRONIC BILATERAL LOW BACK PAIN WITHOUT SCIATICA: ICD-10-CM

## 2021-11-09 DIAGNOSIS — M06.09 RHEUMATOID ARTHRITIS OF MULTIPLE SITES WITHOUT RHEUMATOID FACTOR (H): ICD-10-CM

## 2021-11-09 DIAGNOSIS — G89.29 CHRONIC BILATERAL LOW BACK PAIN WITHOUT SCIATICA: ICD-10-CM

## 2021-11-09 DIAGNOSIS — M13.0 POLYARTHRITIS: ICD-10-CM

## 2021-11-09 DIAGNOSIS — M79.7 FIBROMYALGIA: ICD-10-CM

## 2021-11-09 DIAGNOSIS — Z79.899 ENCOUNTER FOR LONG-TERM (CURRENT) USE OF MEDICATIONS: ICD-10-CM

## 2021-11-09 RX ORDER — FOLIC ACID 1 MG/1
1 TABLET ORAL DAILY
Qty: 90 TABLET | Refills: 2 | Status: CANCELLED | OUTPATIENT
Start: 2021-11-09

## 2021-11-10 NOTE — TELEPHONE ENCOUNTER
folic acid (FOLVITE) 1 MG tablet 90 tablet 2 9/10/2021  No   Sig - Route: Take 1 tablet (1 mg) by mouth daily - Oral   Patient taking differently: Take 3 mg by mouth daily         Sent to pharmacy as: Folic Acid 1 MG Oral Tablet (FOLVITE)   Class: E-Prescribe   Order: [id number]   E-Prescribing Status: Receipt confirmed by pharmacy (9/10/2021 10:44 AM CDT)       Printout Tracking    External Result Report     Pharmacy    [facility] - [place], [place] - [address], [address]

## 2021-11-15 ENCOUNTER — TELEPHONE (OUTPATIENT)
Dept: RHEUMATOLOGY | Facility: CLINIC | Age: 56
End: 2021-11-15
Payer: COMMERCIAL

## 2021-11-15 ENCOUNTER — MYC MEDICAL ADVICE (OUTPATIENT)
Dept: PSYCHIATRY | Facility: CLINIC | Age: 56
End: 2021-11-15
Payer: COMMERCIAL

## 2021-11-15 DIAGNOSIS — M13.0 POLYARTHRITIS: ICD-10-CM

## 2021-11-15 DIAGNOSIS — M79.7 FIBROMYALGIA: ICD-10-CM

## 2021-11-15 DIAGNOSIS — M06.09 RHEUMATOID ARTHRITIS OF MULTIPLE SITES WITHOUT RHEUMATOID FACTOR (H): ICD-10-CM

## 2021-11-15 DIAGNOSIS — M54.50 CHRONIC BILATERAL LOW BACK PAIN WITHOUT SCIATICA: ICD-10-CM

## 2021-11-15 DIAGNOSIS — Z79.899 ENCOUNTER FOR LONG-TERM (CURRENT) USE OF MEDICATIONS: ICD-10-CM

## 2021-11-15 DIAGNOSIS — G89.29 CHRONIC BILATERAL LOW BACK PAIN WITHOUT SCIATICA: ICD-10-CM

## 2021-11-15 RX ORDER — FOLIC ACID 1 MG/1
3 TABLET ORAL DAILY
Qty: 180 TABLET | Refills: 2 | Status: SHIPPED | OUTPATIENT
Start: 2021-11-15 | End: 2022-04-21

## 2021-11-15 NOTE — TELEPHONE ENCOUNTER
Please let Elizabeth know I have sent in a new script for additional Ambien.    Thanks,  Caroline Sandra MD  Collaborative Care Psychiatry  [facility]

## 2021-11-15 NOTE — TELEPHONE ENCOUNTER
Last Office Visit:  9/10/21  Next Enc:  1/20/21  Medication: Folic acid - dose increased to 3 tabs daily on 11/22/21  Last given: 9/10/21  Qty: 90  Lab: N/A    Refilled per protocol with dose change as directed by Dr. Kelley on 8/22/21.     Caroline Hicks RN  [facility]

## 2021-12-01 ENCOUNTER — VIRTUAL VISIT (OUTPATIENT)
Dept: PSYCHIATRY | Facility: CLINIC | Age: 56
End: 2021-12-01
Payer: COMMERCIAL

## 2021-12-01 ENCOUNTER — VIRTUAL VISIT (OUTPATIENT)
Dept: BEHAVIORAL HEALTH | Facility: CLINIC | Age: 56
End: 2021-12-01
Payer: COMMERCIAL

## 2021-12-01 DIAGNOSIS — F33.0 MAJOR DEPRESSIVE DISORDER, RECURRENT EPISODE, MILD (H): Primary | ICD-10-CM

## 2021-12-01 DIAGNOSIS — R53.83 FATIGUE, UNSPECIFIED TYPE: ICD-10-CM

## 2021-12-01 DIAGNOSIS — F33.1 MAJOR DEPRESSIVE DISORDER, RECURRENT, MODERATE (H): Primary | ICD-10-CM

## 2021-12-01 PROCEDURE — 99215 OFFICE O/P EST HI 40 MIN: CPT | Mod: GT | Performed by: PSYCHIATRY & NEUROLOGY

## 2021-12-01 PROCEDURE — 90791 PSYCH DIAGNOSTIC EVALUATION: CPT | Mod: GT | Performed by: SOCIAL WORKER

## 2021-12-01 RX ORDER — ZOLPIDEM TARTRATE 5 MG/1
5 TABLET ORAL
Qty: 30 TABLET | Refills: 0 | Status: SHIPPED | OUTPATIENT
Start: 2021-12-01 | End: 2022-01-03

## 2021-12-01 RX ORDER — ZOLPIDEM TARTRATE 5 MG/1
5 TABLET ORAL
Qty: 30 TABLET | Refills: 0 | Status: SHIPPED | OUTPATIENT
Start: 2021-12-01 | End: 2021-12-01

## 2021-12-01 RX ORDER — BUPROPION HYDROCHLORIDE 300 MG/1
300 TABLET ORAL EVERY MORNING
Qty: 30 TABLET | Refills: 2 | Status: SHIPPED | OUTPATIENT
Start: 2021-12-01 | End: 2022-01-03

## 2021-12-01 NOTE — PROGRESS NOTES
"Telemedicine Visit: The patient's condition can be safely assessed and treated via synchronous audio and visual telemedicine encounter.      Reason for Telemedicine Visit: Services only offered telehealth    Originating Site (Patient Location): Patient's home    Distant Site (Provider Location): Provider Remote Setting- Home Office    Consent:  The patient/guardian has verbally consented to: the potential risks and benefits of telemedicine (video visit) versus in person care; bill my insurance or make self-payment for services provided; and responsibility for payment of non-covered services.     Mode of Communication:  Video Conference via Sellywhere    As the provider I attest to compliance with applicable laws and regulations related to telemedicine.          Outpatient Psychiatric Progress Note    Name: Elizabeth Rosenthal   : 1965                    Primary Care Provider: Eveline Berry MD   Therapist: None     PHQ-9 scores:  PHQ-9 SCORE 10/26/2021 2021 2021   PHQ-9 Total Score MyChart - - 6 (Mild depression)   PHQ-9 Total Score 16 2 6       MEHUL-7 scores:  MEHUL-7 SCORE 10/26/2021 2021 2021   Total Score - - 1 (minimal anxiety)   Total Score 0 0 1       Patient Identification:  Elizabeth is a 56 year old year old female  who presents for return visit with me.  Patient attended the session alone.     Interim History:  [facility] Chelo Leigh[facility]  Pt shares that she ran out of her sleep medication a couple of weeks ago and has not been able to get it refilled. Has only had one night where she had 4 hours of sleep but other nights she is sleeping pretty well. Is noticing that the beginning of the week she is more productive and that by the end of the week getting in an 8 hour day in is more difficult.  Feels better overall than she had and thinks that might be related to the methotrexate increase. Shares that she was feeling \"terrible\" about 90% of the time but now is rarely feeling that " "way other than when takes on a big project or something like Thanksgiving cooking.  Is trying to spend short periods of time on projects but is finding that when starting a project it usually starts a new project that needs to be done given that her home is old and needs a lot of repairs. Doesn't have the budget to hire help at this time and feels that she can do most of what she needs to do but just needs to find the time and energy.  Is using the day lamp in the mornings especially during mandatory meetings. Feels that pushing her bedtime earlier and really trying to focus on it is also helping a bit. Does find that it is very easy to slip into old habits so is trying to stay mindful of that.  Work is going pretty well and shares that she has her annual review in a couple of weeks. Does have an appt set with [facility]Psychiatrist on 1/3/21 and will talk with Psychiatrist if that appointment should stay for that time or be rescheduled.    Elizabeth was not able to get her zolpidem filled after it was prescribed in mid November.  For some reason, there was a transmission error to the pharmacy.  We attempted to send it to the pharmacy 2 or 3 times, without success.  In the end, I sent it to her mail order pharmacy rather than the local pharmacy.    Elizabeth reports that she has been sleeping better.  Overall, she is sleeping between 7 and 8 hours a night and is not needing to sleep during the day.  Even without the zolpidem, she is able to sleep most nights, although she sometimes has difficulty falling asleep, and other times wakes up at 3 or 4 AM and cannot return to sleep.    She rates her mood today as 3 out of 10 with 10 being the best.  She is somewhat hesitant to talk about her low mood.  She reports that she is able to function.  She does report that she worries about the future, and \"cannot see any long-term goals.\"  She denies any suicidal ideation.    She had a trial of duloxetine in the past, but did not feel it was " as good as the Lexapro and did not seem to interfere with her sleep.  She is on a combination of Lexapro and bupropion right now and denies any adverse side effects.  We agreed to increase the bupropion to 300 mg daily.  She reports that it has been beneficial for her son.    Vital Signs:   There were no vitals taken for this visit.    Current Medications:  Current Outpatient Medications   Medication     albuterol (PROAIR HFA/PROVENTIL HFA/VENTOLIN HFA) 108 (90 Base) MCG/ACT inhaler     aspirin 81 MG tablet     buPROPion (WELLBUTRIN XL) 300 MG 24 hr tablet     cetirizine (ZYRTEC) 10 MG tablet     cyclobenzaprine (FLEXERIL) 5 MG tablet     escitalopram (LEXAPRO) 20 MG tablet     fluticasone (FLOVENT HFA) 110 MCG/ACT inhaler     folic acid (FOLVITE) 1 MG tablet     gabapentin (NEURONTIN) 300 MG capsule     lidocaine (LIDODERM) 5 % patch     lisinopril (ZESTRIL) 20 MG tablet     methotrexate 2.5 MG tablet     pantoprazole (PROTONIX) 20 MG EC tablet     VITAMIN D, CHOLECALCIFEROL, PO     zolpidem (AMBIEN) 5 MG tablet     No current facility-administered medications for this visit.        The [place] Prescription Monitoring Program has been reviewed and there are no concerns about diversionary activity for controlled substances at this time.      Mental Status Examination:  Elizabeth is a 56-year-old woman who appears her stated age and in mild emotional distress.  Speech is clear and normal in rate and tone.  Eye contact is fair over the video connection.  Motor behavior is appropriate.  Affect is blunted, she was tearful a couple of times briefly.  Thoughts are logical and spontaneous with some circumstantiality.  Thought content shows no psychosis.  No suicidal thoughts.  She is alert and oriented x3.    Assessment and Plan:    ICD-10-CM    1. Major depressive disorder, recurrent, moderate (H)  F33.1 DISCONTINUED: zolpidem (AMBIEN) 5 MG tablet     DISCONTINUED: zolpidem (AMBIEN) 5 MG tablet   2. Fatigue, unspecified  type  R53.83 zolpidem (AMBIEN) 5 MG tablet     DISCONTINUED: zolpidem (AMBIEN) 5 MG tablet       Medical comorbidities include:   Patient Active Problem List   Diagnosis     Iliotibial band syndrome     Right knee pain     Lumbar radicular pain     Polyarthritis     Depression     Benign essential hypertension     Mild intermittent asthma without complication     Morbid obesity (H)     Rheumatoid arthritis of multiple sites without rheumatoid factor (H)     Fibromyalgia     Encounter for long-term (current) use of medications     SHERIE (obstructive sleep apnea)     Morbid obesity with BMI of 45.0-49.9, adult (H)     Low back pain     Hyperlipidemia     Arthritis of knee, left     Creatinine elevation     ACP (advance care planning)     Major depressive disorder, recurrent episode, mild (H)     Major depressive disorder, recurrent, moderate (H)     Fatigue, unspecified type       Treatment Plan:  Patient Instructions   Continue escitalopram 20 mg daily.     Increase bupropion XL to 300 mg daily.     Continue zolpidem 2.5 to 5 mg at bedtime as needed for insomnia.     Continue melatonin 3 mg about 3 or 4 hours prior to bedtime.     Continue all other medications per your primary care provider.    Schedule an appointment with me in 4 weeks or sooner as needed.  You may call [facility] at [phone] to schedule.    [place] Resources:      Go to the Emergency Department as needed or call after hours crisis line at [phone].      To schedule individual or family therapy, call [facility] at [phone].     Follow up with primary care provider as planned or sooner for acute medical concerns.    Call the psychiatric nurse line with medication questions or concerns at [phone].    MyChart may be used to communicate with your provider, but this is not intended to be used for emergencies.    Community Resources:      National Suicide Prevention Lifeline: 675.437.1201 (TTY:  340.890.7109). Call anytime for help.  (www.suicidepreventionlifeline.org)    National [place] on Mental Illness (www.savanna.org): 413.388.2972 or 557-791-2276.     Mental Health Association (www.mentalhealth.org): 510.323.2288 or 225-367-2167.    [place] Crisis Text Line: Text [place] to 121088    Suicide LifeLine Chat: suicideGleeMaster.org/chat         Administrative Billing:   Video call duration: 34 minutes   Start: 1:03 PM   Stop: 1:37 PM  Total time spent, including chart review and documentation: 46 minutes    Patient Status:  Patient will continue to be seen for ongoing consultation and stabilization.

## 2021-12-01 NOTE — PROGRESS NOTES
Collaborative Care Psychiatry Service (CCPS)  December 1, 2021    Behavioral Health Clinician Progress Note    Patient Name: Elizabeth Rosenthal      Telemedicine Visit: The patient's condition can be safely assessed and treated via synchronous audio and visual telemedicine encounter.      Reason for Telemedicine Visit: Services only offered telehealth    Originating Site (Patient Location): Patient's home    Distant Site (Provider Location): Provider Remote Setting- Home Office    Consent:  The patient/guardian has verbally consented to: the potential risks and benefits of telemedicine (video visit) versus in person care; bill my insurance or make self-payment for services provided; and responsibility for payment of non-covered services.     Mode of Communication:  Video Conference via Wantster    As the provider I attest to compliance with applicable laws and regulations related to telemedicine.         Service Type:  Individual      Service Location:   Face to Face in Home / Community     Session Start Time: 12:20p  Session End Time: 12:47p      Session Length: 16 - 37      Attendees: Client    Visit Activities (Refresh list every visit): [facility] Only    Diagnostic Assessment Date: 5/6/21  See Flowsheets for today's PHQ-9 and MEHUL-7 results  Previous PHQ-9:   PHQ-9 SCORE 10/26/2021 11/1/2021 11/30/2021   PHQ-9 Total Score MyChart - - 6 (Mild depression)   PHQ-9 Total Score 16 2 6       Previous MEHUL-7:   MEHUL-7 SCORE 10/26/2021 11/1/2021 11/30/2021   Total Score - - 1 (minimal anxiety)   Total Score 0 0 1       WHODAS  WHODAS 2.0 Total Score 5/6/2021   Total Score 19        CAGE  No flowsheet data found.      DATA  Extended Session (60+ minutes): No  Interactive Complexity: No  Crisis: No    Medication Compliance:  Yes      Chemical Use Review:   Substance Use: Chemical use reviewed, no active concerns identified      Tobacco Use: No current tobacco use.      Current Stressors / Issues:  Pt shares that she ran out of her  "sleep medication a couple of weeks ago and has not been able to get it refilled. Has only had one night where she had 4 hours of sleep but other nights she is sleeping pretty well.  Is noticing that the beginning of the week she is more productive and that by the end of the week getting in an 8 hour day in is more difficult.     Feels better overall than she had and thinks that might be related to the methotrexate increase.  Shares that she was feeling \"terrible\" about 90% of the time but now is rarely feeling that way other than when takes on a big project or something like Thanksgiving cooking.    Is trying to spend short periods of time on projects but is finding that when starting a project it usually starts a new project that needs to be done given that her home is old and needs a lot of repairs.  Doesn't have the budget to hire help at this time and feels that she can do most of what she needs to do but just needs to find the time and energy.  Is using the day lamp in the mornings especially during mandatory meetings.  Feels that pushing her bedtime earlier and really trying to focus on it is also helping a bit.  Does find that it is very easy to slip into old habits so is trying to stay mindful of that.    Work is going pretty well and shares that she has her annual review in a couple of weeks.  Does have an appt set with [facility]/Psychiatrist on 1/3/21 and will talk with Psychiatrist if that appointment should stay for that time or be rescheduled.        Progress on Treatment Objective(s) / Homework:  Minimal progress - PREPARATION (Decided to change - considering how); Intervened by negotiating a change plan and determining options / strategies for behavior change, identifying triggers, exploring social supports, and working towards setting a date to begin behavior change    Motivational Interviewing    MI Intervention: Expressed Empathy/Understanding, Supported Autonomy, Collaboration, Evocation, Permission to " raise concern or advise and Open-ended questions     Change Talk Expressed by the Patient: Desire to change Committment to change Taking steps    Provider Response to Change Talk: E - Evoked more info from patient about behavior change, A - Affirmed patient's thoughts, decisions, or attempts at behavior change, R - Reflected patient's change talk and S - Summarized patient's change talk statements        Review of Symptoms per patient report:  Depression: Change in energy level and Difficulties concentrating  Antoinette:  No Symptoms  Psychosis: No Symptoms  Anxiety: No Symptoms  Panic:  No symptoms  Post Traumatic Stress Disorder:  Experienced traumatic event Experienced traumatic event mother was schizophrenic.  Pt reports probable PTSD as a child.  Denies ongoing.   Eating Disorder: No Symptoms  ADD / ADHD:  Inattentive and Distractibility  Conduct Disorder: No symptoms  Autism Spectrum Disorder: No symptoms  Obsessive Compulsive Disorder: No Symptoms      Changes in Health Issues:   Yes: has a lot of health issues.    Assessment: Current Emotional / Mental Status (status of significant symptoms):  Risk status (Self / Other harm or suicidal ideation)  Patient denies a history of suicidal ideation, suicide attempts, self-injurious behavior, homicidal ideation, homicidal behavior and and other safety concerns  Patient denies current fears or concerns for personal safety.  Patient denies current or recent suicidal ideation or behaviors.  Patient denies current or recent homicidal ideation or behaviors.  Patient denies current or recent self injurious behavior or ideation.  Patient denies other safety concerns.  A safety and risk management plan has not been developed at this time, however patient was encouraged to call [facility] should there be a change in any of these risk factors.    Appearance:   Appropriate   Eye Contact:   Good   Psychomotor Behavior: Normal   Attitude:   Cooperative    Orientation:   All  Speech   Rate / Production: Normal    Volume:  Normal   Mood:    Normal  Affect:    Appropriate   Thought Content:  Clear   Thought Form:  Coherent  Logical   Insight:    Good     Diagnoses:  1. Major depressive disorder, recurrent episode, mild (H)        Collateral Reports Completed:  Not Applicable    Plan: (Homework, other):  Patient was given information about behavioral services and encouraged to schedule a follow up appointment with the clinic [facility]  She was also given information about mental health symptoms and treatment options .  CD Recommendations: No indications of CD issues.  ANDRES Mckee, [facility]      ANDRES Brewer, [facility]  December 1, 2021

## 2021-12-01 NOTE — PATIENT INSTRUCTIONS
Continue escitalopram 20 mg daily.     Increase bupropion XL to 300 mg daily.     Continue zolpidem 2.5 to 5 mg at bedtime as needed for insomnia.     Continue melatonin 3 mg about 3 or 4 hours prior to bedtime.     Continue all other medications per your primary care provider.    Schedule an appointment with me in 4 weeks or sooner as needed.  You may call [facility] at [phone] to schedule.    [place] Resources:      Go to the Emergency Department as needed or call after hours crisis line at [phone].      To schedule individual or family therapy, call [facility] at [phone].     Follow up with primary care provider as planned or sooner for acute medical concerns.    Call the psychiatric nurse line with medication questions or concerns at [phone].    [place] may be used to communicate with your provider, but this is not intended to be used for emergencies.    Community Resources:      National Suicide Prevention Lifeline: 584.962.7520 (TTY: 359.674.3021). Call anytime for help.  (www.suicidepreventionlifeline.org)    National [place] on Mental Illness (www.savanna.org): 719.397.5753 or 710-312-3194.     Mental Health Association (www.mentalhealth.org): 716.582.2133 or 761-896-3181.    [place] Crisis Text Line: Text [place] to 748503    Suicide LifeLine Chat: suicidepreCollegeBrainline.org/chat

## 2021-12-06 ENCOUNTER — TELEPHONE (OUTPATIENT)
Dept: SLEEP MEDICINE | Facility: CLINIC | Age: 56
End: 2021-12-06
Payer: COMMERCIAL

## 2021-12-06 NOTE — TELEPHONE ENCOUNTER
Reason for Call:  Other prescription    Detailed comments: pt needs cpap order to be fax to [facility] [phone], due to imsurance.     Phone Number Patient can be reached at: Cell number on file:    Telephone Information:   Mobile [phone]       Best Time: any    Can we leave a detailed message on this number? YES    Call taken on 12/6/2021 at 1:54 PM by Cheyanne Rogel

## 2022-01-03 ENCOUNTER — VIRTUAL VISIT (OUTPATIENT)
Dept: BEHAVIORAL HEALTH | Facility: CLINIC | Age: 57
End: 2022-01-03
Payer: COMMERCIAL

## 2022-01-03 ENCOUNTER — VIRTUAL VISIT (OUTPATIENT)
Dept: PSYCHIATRY | Facility: CLINIC | Age: 57
End: 2022-01-03
Payer: COMMERCIAL

## 2022-01-03 VITALS — BODY MASS INDEX: 46.99 KG/M2 | WEIGHT: 293 LBS

## 2022-01-03 DIAGNOSIS — F33.0 MAJOR DEPRESSIVE DISORDER, RECURRENT EPISODE, MILD (H): Primary | ICD-10-CM

## 2022-01-03 DIAGNOSIS — R53.83 FATIGUE, UNSPECIFIED TYPE: ICD-10-CM

## 2022-01-03 DIAGNOSIS — F33.1 MAJOR DEPRESSIVE DISORDER, RECURRENT, MODERATE (H): Primary | ICD-10-CM

## 2022-01-03 PROCEDURE — 99207 PR CDG-CODE CATEGORY CHANGED: CPT | Performed by: SOCIAL WORKER

## 2022-01-03 PROCEDURE — 90832 PSYTX W PT 30 MINUTES: CPT | Mod: GT | Performed by: SOCIAL WORKER

## 2022-01-03 PROCEDURE — 99214 OFFICE O/P EST MOD 30 MIN: CPT | Mod: GT | Performed by: PSYCHIATRY & NEUROLOGY

## 2022-01-03 RX ORDER — ZOLPIDEM TARTRATE 5 MG/1
5 TABLET ORAL
Qty: 30 TABLET | Refills: 0 | Status: SHIPPED | OUTPATIENT
Start: 2022-01-03 | End: 2022-07-05

## 2022-01-03 RX ORDER — BUPROPION HYDROCHLORIDE 300 MG/1
300 TABLET ORAL EVERY MORNING
Qty: 90 TABLET | Refills: 3 | Status: SHIPPED | OUTPATIENT
Start: 2022-01-03 | End: 2023-03-02

## 2022-01-03 ASSESSMENT — ANXIETY QUESTIONNAIRES
4. TROUBLE RELAXING: NOT AT ALL
1. FEELING NERVOUS, ANXIOUS, OR ON EDGE: NOT AT ALL
7. FEELING AFRAID AS IF SOMETHING AWFUL MIGHT HAPPEN: NOT AT ALL
6. BECOMING EASILY ANNOYED OR IRRITABLE: NOT AT ALL
2. NOT BEING ABLE TO STOP OR CONTROL WORRYING: NOT AT ALL
IF YOU CHECKED OFF ANY PROBLEMS ON THIS QUESTIONNAIRE, HOW DIFFICULT HAVE THESE PROBLEMS MADE IT FOR YOU TO DO YOUR WORK, TAKE CARE OF THINGS AT HOME, OR GET ALONG WITH OTHER PEOPLE: NOT DIFFICULT AT ALL
GAD7 TOTAL SCORE: 0
3. WORRYING TOO MUCH ABOUT DIFFERENT THINGS: NOT AT ALL
5. BEING SO RESTLESS THAT IT IS HARD TO SIT STILL: NOT AT ALL

## 2022-01-03 ASSESSMENT — PATIENT HEALTH QUESTIONNAIRE - PHQ9
SUM OF ALL RESPONSES TO PHQ QUESTIONS 1-9: 4
10. IF YOU CHECKED OFF ANY PROBLEMS, HOW DIFFICULT HAVE THESE PROBLEMS MADE IT FOR YOU TO DO YOUR WORK, TAKE CARE OF THINGS AT HOME, OR GET ALONG WITH OTHER PEOPLE: SOMEWHAT DIFFICULT
10. IF YOU CHECKED OFF ANY PROBLEMS, HOW DIFFICULT HAVE THESE PROBLEMS MADE IT FOR YOU TO DO YOUR WORK, TAKE CARE OF THINGS AT HOME, OR GET ALONG WITH OTHER PEOPLE: SOMEWHAT DIFFICULT
SUM OF ALL RESPONSES TO PHQ QUESTIONS 1-9: 4

## 2022-01-03 NOTE — PROGRESS NOTES
Collaborative Care Psychiatry Service (CCPS)  January 3, 2022    Behavioral Health Clinician Progress Note    Patient Name: Elizabeth Rosenthal      Telemedicine Visit: The patient's condition can be safely assessed and treated via synchronous audio and visual telemedicine encounter.      Reason for Telemedicine Visit: Services only offered telehealth    Originating Site (Patient Location): Patient's home    Distant Site (Provider Location): Provider Remote Setting- Home Office    Consent:  The patient/guardian has verbally consented to: the potential risks and benefits of telemedicine (video visit) versus in person care; bill my insurance or make self-payment for services provided; and responsibility for payment of non-covered services.     Mode of Communication:  Video Conference via [employer] Sports    As the provider I attest to compliance with applicable laws and regulations related to telemedicine.         Service Type:  Individual      Service Location:   Face to Face in Home / Community     Session Start Time: 1:20p  Session End Time: 1:49a      Session Length: 16 - 37      Attendees: Client    Visit Activities (Refresh list every visit): [facility] Only    Diagnostic Assessment Date: 5/6/21  See Flowsheets for today's PHQ-9 and MEHUL-7 results  Previous PHQ-9:   PHQ-9 SCORE 11/30/2021 1/3/2022 1/3/2022   PHQ-9 Total Score MyChart 6 (Mild depression) - 4 (Minimal depression)   PHQ-9 Total Score 6 4 4       Previous MEHUL-7:   MEHUL-7 SCORE 11/1/2021 11/30/2021 1/3/2022   Total Score - 1 (minimal anxiety) -   Total Score 0 1 0       WHODAS  WHODAS 2.0 Total Score 5/6/2021   Total Score 19        CAGE  No flowsheet data found.      DATA  Extended Session (60+ minutes): No  Interactive Complexity: No  Crisis: No    Medication Compliance:  Yes      Chemical Use Review:   Substance Use: Chemical use reviewed, no active concerns identified      Tobacco Use: No current tobacco use.      Current Stressors / Issues:  Pt shares that she is  "doing \"a lot better\".  Has a really fragile sleep schedule and it is easy to throw it off but that she knows that she needs one and feels if thrown off she can get back on track.  Has only needed to take the sleeping medication a few nights the past month.      Feels she has more energy and is isn't napping during the day.  Is trying to eliminate processed foods from her diet the past month and isn't noticing a big change but plans to continue and make it a lifestyle change.  Has been trying to get as close to 8 hours a day in at work.  Is caught up at work and they have hired some new people so she feels that the work load is more manageable.  Has had time some weekends to go out and shop and has noticed that she isn't \"done for the day\" and needed to sleep extra when she exerts herself.     Is on a wait list to get a new CPAP and is hoping she will get one in the near future but was told that there will be a delay.        Progress on Treatment Objective(s) / Homework:  Satisfactory progress - MAINTENANCE (Working to maintain change, with risk of relapse); Intervened by continuing to positively reinforce healthy behavior choice     Motivational Interviewing    MI Intervention: Expressed Empathy/Understanding, Supported Autonomy, Collaboration, Evocation, Permission to raise concern or advise and Open-ended questions     Change Talk Expressed by the Patient: Desire to change Committment to change Taking steps    Provider Response to Change Talk: E - Evoked more info from patient about behavior change, A - Affirmed patient's thoughts, decisions, or attempts at behavior change, R - Reflected patient's change talk and S - Summarized patient's change talk statements        Review of Symptoms per patient report:  Depression: Change in energy level and Difficulties concentrating  Antoinette:  No Symptoms  Psychosis: No Symptoms  Anxiety: No Symptoms  Panic:  No symptoms  Post Traumatic Stress Disorder:  Experienced traumatic " event Experienced traumatic event mother was schizophrenic.  Pt reports probable PTSD as a child.  Denies ongoing.   Eating Disorder: No Symptoms  ADD / ADHD:  Inattentive and Distractibility  Conduct Disorder: No symptoms  Autism Spectrum Disorder: No symptoms  Obsessive Compulsive Disorder: No Symptoms      Changes in Health Issues:   Yes: has a lot of health issues.    Assessment: Current Emotional / Mental Status (status of significant symptoms):  Risk status (Self / Other harm or suicidal ideation)  Patient denies a history of suicidal ideation, suicide attempts, self-injurious behavior, homicidal ideation, homicidal behavior and and other safety concerns  Patient denies current fears or concerns for personal safety.  Patient denies current or recent suicidal ideation or behaviors.  Patient denies current or recent homicidal ideation or behaviors.  Patient denies current or recent self injurious behavior or ideation.  Patient denies other safety concerns.  A safety and risk management plan has not been developed at this time, however patient was encouraged to call [place] [phone] should there be a change in any of these risk factors.    Appearance:   Appropriate   Eye Contact:   Good   Psychomotor Behavior: Normal   Attitude:   Cooperative   Orientation:   All  Speech   Rate / Production: Normal    Volume:  Normal   Mood:    Normal  Affect:    Appropriate   Thought Content:  Clear   Thought Form:  Coherent  Logical   Insight:    Good     Diagnoses:  1. Major depressive disorder, recurrent episode, mild (H)        Collateral Reports Completed:  Not Applicable    Plan: (Homework, other):  Patient was given information about behavioral services and encouraged to schedule a follow up appointment with the clinic [facility]  She was also given information about mental health symptoms and treatment options .  CD Recommendations: No indications of CD issues.  Chelo Leigh  [facility]      Chelo Leigh, [facility], [facility]  January 3, 2022

## 2022-01-03 NOTE — PATIENT INSTRUCTIONS
Continue escitalopram 20 mg daily.     Continue bupropion  mg daily.     Continue zolpidem 2.5 to 5 mg at bedtime as needed for insomnia.     Continue melatonin 3 mg about 3 or 4 hours prior to bedtime.     Continue all other medications per your primary care provider.    Schedule an appointment with me in 6 months or sooner as needed.  You may call [facility] at [phone] to schedule.    [facility] [place] Resources:      Go to the Emergency Department as needed or call after hours crisis line at [phone].      To schedule individual or family therapy, call [facility] at [phone].     Follow up with primary care provider as planned or sooner for acute medical concerns.    Call the psychiatric nurse line with medication questions or concerns at [phone].    [employer] may be used to communicate with your provider, but this is not intended to be used for emergencies.    Community Resources:      National Suicide Prevention Lifeline: 264.225.9835 (TTY: 520.684.2348). Call anytime for help.  (www.suicidepreventionlifeline.org)    National [place] on Mental Illness (www.savanna.org): 317.762.3344 or 933-450-1933.     Mental Health Association (www.mentalhealth.org): 920.889.6039 or 141-988-5993.    [place] Crisis Text Line: Text [place] to 056901    Suicide LifeLine Chat: suicidepreTigerTextline.org/chat

## 2022-01-03 NOTE — PROGRESS NOTES
"Telemedicine Visit: The patient's condition can be safely assessed and treated via synchronous audio and visual telemedicine encounter.      Reason for Telemedicine Visit: Patient has requested telehealth visit    Originating Site (Patient Location): Patient's home    Distant Site (Provider Location): Provider Remote Setting- Home Office    Consent:  The patient/guardian has verbally consented to: the potential risks and benefits of telemedicine (video visit) versus in person care; bill my insurance or make self-payment for services provided; and responsibility for payment of non-covered services.     Mode of Communication:  Video Conference via [employer]    As the provider I attest to compliance with applicable laws and regulations related to telemedicine.      Elizabeth is a 56 year old who is being evaluated via a billable video visit.      How would you like to obtain your AVS? MyChart  If the video visit is dropped, the invitation should be resent by: Text to cell phone: [phone]  Will anyone else be joining your video visit? No           Outpatient Psychiatric Progress Note    Name: Elizabeth Rosenthal   : 1965                    Primary Care Provider: Eveline Berry MD   Therapist: None     PHQ-9 scores:  PHQ-9 SCORE 2021 1/3/2022 1/3/2022   PHQ-9 Total Score MyChart 6 (Mild depression) - 4 (Minimal depression)   PHQ-9 Total Score 6 4 4       MEHUL-7 scores:  MEHUL-7 SCORE 2021 2021 1/3/2022   Total Score - 1 (minimal anxiety) -   Total Score 0 1 0       Patient Identification:  Elizabeth is a 56 year old year old female  who presents for return visit with me.  Patient attended the session alone.     Interim History:  Per Chelo Leigh, LICSW:  Pt shares that she is doing \"a lot better\". Has a really fragile sleep schedule and it is easy to throw it off but that she knows that she needs one and feels if thrown off she can get back on track. Has only needed to take the sleeping medication " "a few nights the past month.  Feels she has more energy and is isn't napping during the day. Is trying to eliminate processed foods from her diet the past month and isn't noticing a big change but plans to continue and make it a lifestyle change.  Has been trying to get as close to 8 hours a day in at work. Is caught up at work and they have hired some new people so she feels that the work load is more manageable. Has had time some weekends to go out and shop and has noticed that she isn't \"done for the day\" and needed to sleep extra when she exerts herself.  Is on a wait list to get a new CPAP and is hoping she will get one in the near future but was told that there will be a delay.    Elizabeth reports that she is feeling back to normal.  She rates her mood as 7 or above, and says that anytime that she is above 5, that is good.  She is sleeping at night, and not napping during the day.  She is feeling much more like she will be able to work an 8-hour day without having to stop and sleep for several hours.    No adverse side effects reported.    She has been catching up on some projects around the house.  Her pain is well controlled with her current methotrexate.    She would be comfortable being return to her primary care provider, but her primary care provider is no longer available.  We agreed to meet again in about 6 months, at which time she will have found a new provider, and she will be discharged if she continues to do well.    Vital Signs:   Wt 136.1 kg (300 lb)   LMP  (Exact Date)   Breastfeeding No   BMI 46.99 kg/m        Current Medications:  Current Outpatient Medications   Medication     albuterol (PROAIR HFA/PROVENTIL HFA/VENTOLIN HFA) 108 (90 Base) MCG/ACT inhaler     aspirin 81 MG tablet     buPROPion (WELLBUTRIN XL) 300 MG 24 hr tablet     cetirizine (ZYRTEC) 10 MG tablet     cyclobenzaprine (FLEXERIL) 5 MG tablet     escitalopram (LEXAPRO) 20 MG tablet     fluticasone (FLOVENT HFA) 110 MCG/ACT " inhaler     folic acid (FOLVITE) 1 MG tablet     gabapentin (NEURONTIN) 300 MG capsule     lidocaine (LIDODERM) 5 % patch     lisinopril (ZESTRIL) 20 MG tablet     methotrexate 2.5 MG tablet     pantoprazole (PROTONIX) 20 MG EC tablet     VITAMIN D, CHOLECALCIFEROL, PO     zolpidem (AMBIEN) 5 MG tablet     No current facility-administered medications for this visit.        The [place] Prescription Monitoring Program has been reviewed and there are no concerns about diversionary activity for controlled substances at this time.      Mental Status Examination:  Elizabeth is a 56-year-old woman in no acute distress.  Speech is clear and normal in rate and tone.  Eye contact is good over the video connection.  Affect is full.  Mood is good.  Thoughts are logical and spontaneous with no loose associations or flight of ideas.  Thought content shows no psychosis.  No suicidal thoughts.  She is alert and oriented x3.    Assessment and Plan:    ICD-10-CM    1. Major depressive disorder, recurrent, moderate (H)  F33.1 buPROPion (WELLBUTRIN XL) 300 MG 24 hr tablet   2. Fatigue, unspecified type  R53.83 zolpidem (AMBIEN) 5 MG tablet       Medical comorbidities include:   Patient Active Problem List   Diagnosis     Iliotibial band syndrome     Right knee pain     Lumbar radicular pain     Polyarthritis     Depression     Benign essential hypertension     Mild intermittent asthma without complication     Morbid obesity (H)     Rheumatoid arthritis of multiple sites without rheumatoid factor (H)     Fibromyalgia     Encounter for long-term (current) use of medications     SHERIE (obstructive sleep apnea)     Morbid obesity with BMI of 45.0-49.9, adult (H)     Low back pain     Hyperlipidemia     Arthritis of knee, left     Creatinine elevation     ACP (advance care planning)     Major depressive disorder, recurrent episode, mild (H)     Major depressive disorder, recurrent, moderate (H)     Fatigue, unspecified type       Treatment  Plan:  Patient Instructions   Continue escitalopram 20 mg daily.     Continue bupropion  mg daily.     Continue zolpidem 2.5 to 5 mg at bedtime as needed for insomnia.     Continue melatonin 3 mg about 3 or 4 hours prior to bedtime.     Continue all other medications per your primary care provider.    Schedule an appointment with me in 6 months or sooner as needed.  You may call [facility] at [phone] to schedule.    [place] Resources:      Go to the Emergency Department as needed or call after hours crisis line at [phone].      To schedule individual or family therapy, call [facility] at [phone].     Follow up with primary care provider as planned or sooner for acute medical concerns.    Call the psychiatric nurse line with medication questions or concerns at [phone].    [employer] may be used to communicate with your provider, but this is not intended to be used for emergencies.    Community Resources:      National Suicide Prevention Lifeline: 187.831.1009 (TTY: 146.137.6765). Call anytime for help.  (www.suicidepreventionlifeline.org)    National [place] on Mental Illness (www.[place].org): 500.207.3780 or 540-510-9550.     Mental Health Association (www.mentalhealth.org): 615.742.8353 or 973-914-4148.    [place] Crisis Text Line: Text [place] to 578483    Suicide LifeLine Chat: suicideCubeyou.org/chat         Administrative Billing:   Video  call duration: 19 minutes   Start: 2:21 PM   Stop: 2:41 PM   Total time spent, including chart review and documentation: 32 minutes    Patient Status:  Patient will continue to be seen for ongoing consultation and stabilization.

## 2022-01-04 ASSESSMENT — PATIENT HEALTH QUESTIONNAIRE - PHQ9
SUM OF ALL RESPONSES TO PHQ QUESTIONS 1-9: 4
SUM OF ALL RESPONSES TO PHQ QUESTIONS 1-9: 4

## 2022-01-04 ASSESSMENT — ANXIETY QUESTIONNAIRES: GAD7 TOTAL SCORE: 0

## 2022-01-06 ENCOUNTER — IMMUNIZATION (OUTPATIENT)
Dept: NURSING | Facility: CLINIC | Age: 57
End: 2022-01-06
Payer: COMMERCIAL

## 2022-01-06 PROCEDURE — 91300 PR COVID VAC PFIZER DIL RECON 30 MCG/0.3 ML IM: CPT

## 2022-01-06 PROCEDURE — 0004A PR COVID VAC PFIZER DIL RECON 30 MCG/0.3 ML IM: CPT

## 2022-01-17 ENCOUNTER — LAB (OUTPATIENT)
Dept: LAB | Facility: CLINIC | Age: 57
End: 2022-01-17
Attending: INTERNAL MEDICINE
Payer: COMMERCIAL

## 2022-01-17 DIAGNOSIS — Z79.899 ENCOUNTER FOR LONG-TERM (CURRENT) USE OF MEDICATIONS: ICD-10-CM

## 2022-01-17 DIAGNOSIS — M06.09 RHEUMATOID ARTHRITIS OF MULTIPLE SITES WITHOUT RHEUMATOID FACTOR (H): ICD-10-CM

## 2022-01-17 DIAGNOSIS — R80.9 PROTEINURIA, UNSPECIFIED TYPE: ICD-10-CM

## 2022-01-17 DIAGNOSIS — R53.83 OTHER FATIGUE: ICD-10-CM

## 2022-01-17 LAB
ALBUMIN UR-MCNC: 100 MG/DL
ALT SERPL W P-5'-P-CCNC: 23 U/L (ref 0–50)
ANION GAP SERPL CALCULATED.3IONS-SCNC: 11 MMOL/L (ref 3–14)
APPEARANCE UR: ABNORMAL
AST SERPL W P-5'-P-CCNC: 14 U/L (ref 0–45)
BILIRUB UR QL STRIP: ABNORMAL
BUN SERPL-MCNC: 12 MG/DL (ref 7–30)
CALCIUM SERPL-MCNC: 8.9 MG/DL (ref 8.5–10.1)
CHLORIDE BLD-SCNC: 110 MMOL/L (ref 94–109)
CO2 SERPL-SCNC: 25 MMOL/L (ref 20–32)
COLOR UR AUTO: ABNORMAL
CREAT SERPL-MCNC: 1.08 MG/DL (ref 0.52–1.04)
CRP SERPL-MCNC: 6.8 MG/L (ref 0–8)
ERYTHROCYTE [DISTWIDTH] IN BLOOD BY AUTOMATED COUNT: 13.8 % (ref 10–15)
GFR SERPL CREATININE-BSD FRML MDRD: 60 ML/MIN/1.73M2
GLUCOSE BLD-MCNC: 103 MG/DL (ref 70–99)
GLUCOSE UR STRIP-MCNC: NEGATIVE MG/DL
HCT VFR BLD AUTO: 42.3 % (ref 35–47)
HGB BLD-MCNC: 13.4 G/DL (ref 11.7–15.7)
HGB UR QL STRIP: NEGATIVE
HYALINE CASTS: 15 /LPF
KETONES UR STRIP-MCNC: 5 MG/DL
LEUKOCYTE ESTERASE UR QL STRIP: ABNORMAL
MCH RBC QN AUTO: 30.5 PG (ref 26.5–33)
MCHC RBC AUTO-ENTMCNC: 31.7 G/DL (ref 31.5–36.5)
MCV RBC AUTO: 96 FL (ref 78–100)
MUCOUS THREADS #/AREA URNS LPF: PRESENT /LPF
NITRATE UR QL: NEGATIVE
PH UR STRIP: 5 [PH] (ref 5–7)
PLATELET # BLD AUTO: 357 10E3/UL (ref 150–450)
POTASSIUM BLD-SCNC: 3.8 MMOL/L (ref 3.4–5.3)
RBC # BLD AUTO: 4.4 10E6/UL (ref 3.8–5.2)
RBC URINE: 2 /HPF
SODIUM SERPL-SCNC: 146 MMOL/L (ref 133–144)
SP GR UR STRIP: 1.03 (ref 1–1.03)
SQUAMOUS EPITHELIAL: 5 /HPF
TRANSITIONAL EPI: 1 /HPF
UROBILINOGEN UR STRIP-MCNC: 2 MG/DL
WBC # BLD AUTO: 6.5 10E3/UL (ref 4–11)
WBC URINE: 18 /HPF

## 2022-01-17 PROCEDURE — 84460 ALANINE AMINO (ALT) (SGPT): CPT | Performed by: PATHOLOGY

## 2022-01-17 PROCEDURE — 87086 URINE CULTURE/COLONY COUNT: CPT | Performed by: INTERNAL MEDICINE

## 2022-01-17 PROCEDURE — 81001 URINALYSIS AUTO W/SCOPE: CPT | Performed by: PATHOLOGY

## 2022-01-17 PROCEDURE — 36415 COLL VENOUS BLD VENIPUNCTURE: CPT | Performed by: PATHOLOGY

## 2022-01-17 PROCEDURE — 85027 COMPLETE CBC AUTOMATED: CPT | Performed by: PATHOLOGY

## 2022-01-17 PROCEDURE — 86140 C-REACTIVE PROTEIN: CPT | Performed by: PATHOLOGY

## 2022-01-17 PROCEDURE — 84450 TRANSFERASE (AST) (SGOT): CPT | Performed by: PATHOLOGY

## 2022-01-17 PROCEDURE — 80048 BASIC METABOLIC PNL TOTAL CA: CPT | Performed by: PATHOLOGY

## 2022-01-18 LAB — BACTERIA UR CULT: NORMAL

## 2022-01-18 NOTE — PROGRESS NOTES
[facility]  Rheumatology Clinic  Denilson Kelley MD  2022     Name: Elizabeth Rosenthal  MRN: [id number]  Age: 56 year old  : 1965  Referring provider: Referred Self    Assessment and Plan:  # Seronegative rheumatoid arthritis  Patient relates significant incremental improvement in arthralgia and associated constitutional symptoms, in association with increased methotrexate dose, started in the fall .  Physical exam shows normal fist formation, changes of osteoarthritis in the hands, and preserved wrist, elbow, and shoulder range of motion. Laboratory evaluation on 2022 showed creatinine elevated, but stable at 1.08.  Transaminases, CRP, and CBC were all normal.  Urinalysis showed protein at 100 units; 18 white cells per high-powered field.    Inflammatory arthropathy is further improved, and persistent joint pain and stiffness have moderated. Improvement in morning stiffness and debilitating fatigue point towards adequate suppression of an inflammatory process with methotrexate monotherapy.  I recommend continuing methotrexate at current dose.  If symptoms remain greater than 90% suppressed for 6 to 9 months, methotrexate taper may be contemplated.    # Bilateral knee pain:  cartilage loss/degenerative disease is the major pain generator. I agree with planned total knee replacement surgery on the left pending weight loss. I recommend continuing isometric exercises, weight management, and use of lidocaine patches, heating pad, rest for pain control, as well as gabapentin 300 mg up to tid.     #Shortness of breath: Cause of slowly progressive dyspnea remains unclear, but symptoms remain stable.  Plan to quantitate proteinuria observed on several dipstick measurements in the past 6 months, in order to investigate possible nephrosis.  Continue evaluation through primary care, pulmonary, and potentially cardiology to discern possible contributions from thoracic organs.    Orders Placed This  Encounter   Procedures     Protein  random urine     Routine UA with microscopic       No orders of the defined types were placed in this encounter.    Follow-up: Return to clinic in 3 months.    Plan::  1.  Continue methotrexate 25 mg (10 tablets) once weekly.While on methotrexate:   -- Check labs every 3 months (AST/ALT, Albumin, CBC with platelets)   -- Limit alcohol intake to 2 drinks weekly; use folate 1 mg daily.  --Tylenol 500-1000 mg can be used as needed up to three times daily for nausea/headache associated with dosing.    2.  If symptom control remains complete after 3 months, reduce methotrexate to 9 tablets once weekly.  3.  Trial of discontinuing gabapentin.  4.  Reduce lisinopril to 10 mg daily.  Measure blood pressure at home at least 2 times weekly.  Review blood pressure reading record with Dr. Rojas after 3 to 4 weeks.    Denilson Kelley MD  Staff Rheumatologist, [facility]      HPI:   Elizabeth Rosenthal presents for follow up of seronegative rheumatoid arthritis as well as fibromyalgia and osteoarthritis of knees and spine.  She was last seen in rheumatology in 9-2021, when inflammatory arthritis was judged improved. Continued methotrexate at 25 mg weekly was recommended.  Gabapentin was continued at 300 mg 3 times daily.    Interval history 1-    Joints are doing well. After methotrexate dose increase 3 mos ago, she noted less pain, less stiffness since shortly after changing dose.  No early morning stiffness, no morning pain.   She has resumed more regular exercise and activity. Energy is improved for cleaning/chores.  Gabapentin 1-2 doses daily, not sure for what symptoms; fatigue and neuritic pain are less.  Recently increased buproprion to 300 mg.       Interval history 09-:    Patient emailed 9-7-2021 with following information for this appointment:     1) elevated CRP in bloodwork - 18 on 6/30 and 20 on 8/30.  I'm usually 11  2) creatine levels have been at or slightly above the  upper limits   3) slight protenuria  4) elevated blood pressure since October, 2020 (lowered with increased dose of lisiniprol)  5) I saw a neurologist who said my facial numbness is not due to migraines, but occipital neuralgia and is giving me quarterly steroid shots, which have eliminated the numbness on the right side of my face I've had the past 4 years.  I'm taking muscle relaxers for the residual tension headaches.    6) I switched back from Duloxetine to Escitalopram because the Escitalopram works better on the depression than the Duloxetine.      Symptoms:  1). Extreme fatigue - I need 9-10 hours a day, which often requires 12 hours in bed each day. I have occasional nights where I can't fall asleep at all (triggered by caffeine use to get through the prior day, or stress I will oversleep for Dr Pearl or staff meeting) I take naps at least 3-4 days a week, at least 1.5 hours, sometimes 3-4 hours.  I have trouble with an 8 hour workday. I break my day into 2-3 hours of work, then 2-3 hours of rest, and work Saturdays.  Sometimes, I'm stuck making up 8 hours on Saturday, and sleep all day Sunday to make it up.  This has been going on, with some fluctuation, since I was sick in March, 2020 with flu-like, or COVID-like symptoms.  (No tests for COVID were available back then).   2). I have a lot less energy or stamina.  If I work in my garden for a full afternoon (4 hours?) or paint my basement, or walk, or volunteer at a state fair/street fair both for the [employer] or [employer], I'm exhausted for at least a day after and have called in sick to work the day after.  In February, 2021 I checked in with a psychiatrist to see if I needed a med adjustment, and she thought I may be deconditioned, and that I needed to exercise more and track my sleep.  I traced my [facility] June on the [facility] website & got a Fitbit to track in [facility].  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  Sometimes, I would need a nap  "after a 1/2 hour, 1 mile walk.  Sometimes they would wake me up early when I had trouble falling asleep, and make the fatigue worse.    3) I've had more shortness of breath. I usually need asthma inhalers in August during ragweed season.  Last year, I used them July to February.  I tried Rashida caroling, and the walking between housed/blocks left me so out of breath I couldn't sing after a while. This got a little better when I increased my vitamin D in February.  Still an issue when I'm shoveling mulch or compost, or carrying groceries.  4) after 2 or more hours of painting or gardening, I'll get tremors in my left hand. Neurologist called this an 'action tremor,' not Parkinson's.   5) I'm cold a lot.  Sometimes it takes me 1/2 hour under 3 blankets to warm up enough to sleep. I wear slippers and sweaters in door, summer and winter and have a heating pad at my desk.  (I work from home).   6) I have a weird lump developing on my right hand (pictures attached)  7.  I have a lot of collar bone and armpit pain on the left side (not breast cancer per obgyn & mammogram 8/1/21).    8) the usual aches and pains all over, especially knees, ankles, wrists, forearms, shoulders & collarbone area.  I've been taking more gabapentin (600-900 mg/day) & lidocaine patches which help, and doing simple knee exercises recommended by Dr Patel.    9) I get sharp pains in my lower legs/shins when I lie down after a busy day, and sometimes get twitching or muscle spasms in my legs.  I also get muscle spasms & cramps in my hands after gardening/exertion.\"    Today, patient endorses fatigue and shortness of breath as key symptoms. She needs 10 hours of sleep plus naps, and still she is sometimes tired. She cannot sustain former capacity to perform 4-5 hours of chores.  She has considered duloxetine as a cause; she switched back to lexapro.   Sleep apnea has also been checked by Pulmonary; reported to be fine.  She notes no change in " fatigue based upon methotrexate dosing cycle.  Gabapentin 600-900 mg  Per day has not changed. Chronic lower back/hip pain/shoulder pain continue daily, but not signifcantly worse. Other msk aches and pains are sometimes worse.  Knuckles are more swollen and hips/kness are more stiff I the morning. Early morning stiffness is ~ 2 hours.   Knees are hurting a little bit less associated with dog walking.    She traces onset of symptoms to presumed COVID19 infection in March 2020. She wonders about long-COVID19.    She is planning echo stress test       Review of Systems:   Pertinent items are noted in HPI or as below, remainder of complete ROS is negative.      No recent problems with hearing or vision. No swallowing problems.   No breathing difficulty, shortness of breath, coughing, or wheezing  No chest pain or palpitations  No heart burn, indigestion, abdominal pain, nausea, vomiting, diarrhea  No urination problems, no bloody, cloudy urine, no dysuria  No numbing, tingling, weakness  No headaches or confusion  No rashes. No easy bleeding or bruising.     Active Medications:   Current Outpatient Medications   Medication Sig     albuterol (PROAIR HFA/PROVENTIL HFA/VENTOLIN HFA) 108 (90 Base) MCG/ACT inhaler Inhale 1-2 puffs into the lungs every 4 hours as needed for shortness of breath / dyspnea or wheezing     aspirin 81 MG tablet Take 1 tablet (81 mg) by mouth daily     buPROPion (WELLBUTRIN XL) 300 MG 24 hr tablet Take 1 tablet (300 mg) by mouth every morning     cetirizine (ZYRTEC) 10 MG tablet Take 10 mg by mouth daily     cyclobenzaprine (FLEXERIL) 5 MG tablet Take 5 mg by mouth daily as needed     escitalopram (LEXAPRO) 20 MG tablet Take 1 tablet (20 mg) by mouth daily     folic acid (FOLVITE) 1 MG tablet Take 3 tablets (3 mg) by mouth daily     gabapentin (NEURONTIN) 300 MG capsule Take 1 capsule (300 mg) by mouth 3 times daily     lidocaine (LIDODERM) 5 % patch Apply 1-3 patches onto the skin every 24 hours  as needed     lisinopril (ZESTRIL) 20 MG tablet Take 1 tablet (20 mg) by mouth At Bedtime     methotrexate 2.5 MG tablet Take 10 tablets (25 mg) by mouth every 7 days     pantoprazole (PROTONIX) 20 MG EC tablet Take 1 tablet (20 mg) by mouth 2 times daily (Patient taking differently: Take 20 mg by mouth daily )     VITAMIN D, CHOLECALCIFEROL, PO Take 2,000 Units by mouth daily     zolpidem (AMBIEN) 5 MG tablet Take 1 tablet (5 mg) by mouth nightly as needed for sleep     fluticasone (FLOVENT HFA) 110 MCG/ACT inhaler Inhale 2 puffs into the lungs 2 times daily (Patient not taking: Reported on 1/20/2022)     No current facility-administered medications for this visit.         Allergies:   Nuts   Celery Oil   Codeine   Contrast Dye   Food   Potatoes  Peanuts  Cantaloupe  Lettuce  Oat   Penicillins   Rice   Shellfish-Derived Products   Wheat Bran   Yeast       Past Medical History:  Allergic rhinitis    Asthma   Chronic pelvic pain in female   Depression   Depressive disorder   Gastroesophageal reflux disease   Head injury   Hyperlipidemia   Hypertension   Immunosuppression    Migraines   Morbid obesity   Obstructive sleep apnea   Reduced vision   Transient ischemic attack  Vertebral artery dissection  Iliotibial band syndrome  Right knee pain  Lumbar radicular pain  Rheumatoid arthritis of multiple sites without rheumatoid factor  Fibromyalgia  Arthritis of knee, left  Creatinine elevation     Past Surgical History:  Arthroscopy knee bilateral    Biopsy lymph node cervical    Colonoscopy 7/26/2017  Lymph node biopsy 2010  Genitourinary surgery, fallopian tube cyst 1994 and tubal ligation 1999  Lymph node removed from neck for biopsy 2011   Hysteroscopy    Tonsillectomy, bilateral 1/3/2018    Family History:    The patient's family history includes Asthma in her paternal grandmother; Breast Cancer in her mother; Cancer in her mother; Cerebrovascular Disease in her paternal grandmother; Heart Disease in her father;  "Mental Illness in her father; Migraines in her daughter and son; Substance Abuse in her father.    Social History:  The patient reports that she has never smoked. She has never used smokeless tobacco. She reports that she drinks alcohol. She reports that she does not use drugs.   PCP: Eveline Berry  Marital Status: Single     Physical Exam:   Blood pressure 107/72, pulse 80, temperature 98.7  F (37.1  C), temperature source Oral, height 1.702 m (5' 7\"), weight 135 kg (297 lb 9.6 oz), SpO2 98 %, not currently breastfeeding.  Wt Readings from Last 4 Encounters:   01/20/22 135 kg (297 lb 9.6 oz)   01/03/22 136.1 kg (300 lb)   08/30/21 138.1 kg (304 lb 6.4 oz)   06/30/21 140.9 kg (310 lb 11.2 oz)       Constitutional: well-developed, appearing stated age; cooperative  Eyes: nl EOM, PERRLA, vision, conjunctiva, sclera  ENT: nl external ears, nose, hearing, lips, teeth, gums, throat  No mucous membrane lesions, normal saliva pool  Neck: no mass or thyroid enlargement  Resp: Breathing unlabored  Lymph: no cervical, supraclavicular, inguinal or epitrochlear nodes  MS: Osteoarthritis changes were present in the hands, but no inflammatory joint changes were visible at MCPs, wrists, elbows, or shoulders.  Skin: no nail pitting, alopecia, rash, nodules or lesions  Neuro: nl cranial nerves, strength, sensation, DTRs.   Psych: nl judgement, orientation, memory, affect.      Laboratory:   RHEUM RESULTS Latest Ref Rng & Units 3/22/2016 5/25/2016 8/24/2016   ALBUMIN 3.4 - 5.0 g/dL 3.5 3.4 3.2(L)   ALT 0 - 50 U/L 22 23 18   AST 0 - 45 U/L 11 16 8   AMAN INTERPRETATION NEG:Negative - - -   CK TOTAL 30 - 225 U/L - - -   CREATININE 0.52 - 1.04 mg/dL 0.91 0.93 0.97   CRP 0.0 - 8.0 mg/L - 11.0(H) 9.8(H)   GFR ESTIMATE, IF BLACK >60 mL/min/[1.73:m2] 79 77 73   GFR ESTIMATE >60 mL/min/1.73m2 65 63 60(L)   HEMATOCRIT 35.0 - 47.0 % 42.0 39.0 39.0   HEMOGLOBIN 11.7 - 15.7 g/dL 13.8 12.7 13.0   HCVAB NR - - Nonreactive   Assay performance " characteristics have not been established for newborns,   infants, and children     WBC 4.0 - 11.0 10e3/uL 6.9 6.9 6.2   RBC 3.80 - 5.20 10e6/uL 4.58 4.29 4.21   RDW 10.0 - 15.0 % 12.7 13.5 13.4   MCHC 31.5 - 36.5 g/dL 32.9 32.6 33.3   MCV 78 - 100 fL 92 91 93   PLATELET COUNT 150 - 450 10e3/uL 328 322 284   ESR 0 - 30 mm/hr - - -     AMAN interpretation   Date Value Ref Range Status   04/28/2018 Negative NEG^Negative Final     Comment:                                        Reference range:  <1:40  NEGATIVE  1:40 - 1:80  BORDERLINE POSITIVE  >1:80 POSITIVE

## 2022-01-20 ENCOUNTER — OFFICE VISIT (OUTPATIENT)
Dept: RHEUMATOLOGY | Facility: CLINIC | Age: 57
End: 2022-01-20
Payer: COMMERCIAL

## 2022-01-20 VITALS
BODY MASS INDEX: 45.99 KG/M2 | TEMPERATURE: 98.7 F | HEART RATE: 80 BPM | SYSTOLIC BLOOD PRESSURE: 107 MMHG | DIASTOLIC BLOOD PRESSURE: 72 MMHG | OXYGEN SATURATION: 98 % | WEIGHT: 293 LBS | HEIGHT: 67 IN

## 2022-01-20 DIAGNOSIS — Z79.899 ENCOUNTER FOR LONG-TERM (CURRENT) USE OF MEDICATIONS: ICD-10-CM

## 2022-01-20 DIAGNOSIS — I10 HYPERTENSION, UNSPECIFIED TYPE: Primary | ICD-10-CM

## 2022-01-20 DIAGNOSIS — M79.7 FIBROMYALGIA: ICD-10-CM

## 2022-01-20 DIAGNOSIS — G89.29 CHRONIC BILATERAL LOW BACK PAIN WITHOUT SCIATICA: ICD-10-CM

## 2022-01-20 DIAGNOSIS — M13.0 POLYARTHRITIS: ICD-10-CM

## 2022-01-20 DIAGNOSIS — M54.50 CHRONIC BILATERAL LOW BACK PAIN WITHOUT SCIATICA: ICD-10-CM

## 2022-01-20 DIAGNOSIS — M06.09 RHEUMATOID ARTHRITIS OF MULTIPLE SITES WITHOUT RHEUMATOID FACTOR (H): ICD-10-CM

## 2022-01-20 PROCEDURE — 99214 OFFICE O/P EST MOD 30 MIN: CPT | Performed by: INTERNAL MEDICINE

## 2022-01-20 RX ORDER — LISINOPRIL 10 MG/1
10 TABLET ORAL DAILY
Qty: 30 TABLET | Refills: 3 | Status: SHIPPED | OUTPATIENT
Start: 2022-01-20 | End: 2022-07-01

## 2022-01-20 ASSESSMENT — MIFFLIN-ST. JEOR: SCORE: 1967.53

## 2022-01-20 ASSESSMENT — PAIN SCALES - GENERAL: PAINLEVEL: NO PAIN (0)

## 2022-01-20 NOTE — PATIENT INSTRUCTIONS
Diagnosis/plan:  # Seronegative rheumatoid arthritis  Joint pain and stiffness is markedly improved since increasing methotrexate dose 3 months ago.  Inflammatory arthritis is improved with decreased C-reactive protein and no inflammatory joint changes on physical exam.  I recommend continuing methotrexate at current dose.  If, after 3 months, complete symptom control continues, reduce methotrexate slightly until next visit.  2. Hypertension    Plan::  1.  Continue methotrexate 25 mg (10 tablets) once weekly.While on methotrexate:   -- Check labs every 3 months (AST/ALT, Albumin, CBC with platelets)   -- Limit alcohol intake to 2 drinks weekly; use folate 1 mg daily.  --Tylenol 500-1000 mg can be used as needed up to three times daily for nausea/headache associated with dosing.    2.  If symptom control remains complete after 3 months, reduce methotrexate to 9 tablets once weekly.  3.  Trial of discontinuing gabapentin.  4.  Reduce lisinopril to 10 mg daily.  Measure blood pressure at home at least 2 times weekly.  Review blood pressure reading record with Dr. Rojas after 3 to 4 weeks.

## 2022-01-20 NOTE — NURSING NOTE
"Chief Complaint   Patient presents with     RECHECK     Rheumatoid arthritis of multiple sites without rheumatoid factor       Vitals:    01/20/22 1324   BP: 107/72   BP Location: Left arm   Patient Position: Sitting   Cuff Size: Adult Large   Pulse: 80   Temp: 98.7  F (37.1  C)   TempSrc: Oral   SpO2: 98%   Weight: 135 kg (297 lb 9.6 oz)   Height: 1.702 m (5' 7\")       Body mass index is 46.61 kg/m .    Venus Park MA    "

## 2022-02-07 ENCOUNTER — TELEPHONE (OUTPATIENT)
Dept: RHEUMATOLOGY | Facility: CLINIC | Age: 57
End: 2022-02-07
Payer: COMMERCIAL

## 2022-02-07 NOTE — TELEPHONE ENCOUNTER
Pt will hold Methotrexate and will contact [facility] re: monoclonal antibodies . Pt has had sx since last week Tuesday took home test was negative then when took another Sunday was positive . Pt did have some complaints of right side rib pain under breast . No SOB other than with cough . Oxygen saturation currently 97 % and pt is monitoring . Told pt if Pain increases , does not improve , Shortness of breath that is noticeable occur She should go to the ER .Pattie Real RN

## 2022-02-07 NOTE — TELEPHONE ENCOUNTER
Patient tested positive for Covid on Saturday. She is wondering if she needs to do anything differently with her medications etc.

## 2022-02-08 NOTE — TELEPHONE ENCOUNTER
Left detailed voicemail for pt with recommendations and when to seek additional care .Pattie Real RN

## 2022-02-08 NOTE — TELEPHONE ENCOUNTER
Sorry to hear about the COVID19 diagnosis. Probable 3-2020 infection with COVID and vaccinations greatly reduce risk of severe illness; I do not think methotrexate alone significantly increases that risk. I agree with call to [facility] about antibodies, hold methotrexate for 7 days after positive test, seek care for worsening SOB or O2sat < 90%.

## 2022-04-04 DIAGNOSIS — M54.50 CHRONIC BILATERAL LOW BACK PAIN WITHOUT SCIATICA: ICD-10-CM

## 2022-04-04 DIAGNOSIS — M06.09 RHEUMATOID ARTHRITIS OF MULTIPLE SITES WITHOUT RHEUMATOID FACTOR (H): ICD-10-CM

## 2022-04-04 DIAGNOSIS — M13.0 POLYARTHRITIS: ICD-10-CM

## 2022-04-04 DIAGNOSIS — G89.29 CHRONIC BILATERAL LOW BACK PAIN WITHOUT SCIATICA: ICD-10-CM

## 2022-04-04 DIAGNOSIS — M79.7 FIBROMYALGIA: ICD-10-CM

## 2022-04-04 DIAGNOSIS — Z79.899 ENCOUNTER FOR LONG-TERM (CURRENT) USE OF MEDICATIONS: ICD-10-CM

## 2022-04-06 RX ORDER — LIDOCAINE 50 MG/G
PATCH TOPICAL
Qty: 120 PATCH | Refills: 1 | Status: SHIPPED | OUTPATIENT
Start: 2022-04-06 | End: 2023-03-02

## 2022-04-06 NOTE — TELEPHONE ENCOUNTER
LIDOCAINE  5%  PATCH  Last Written Prescription Date:   7/26/2020  Last Fill Quantity: 30,   # refills: 3  Last Office Visit :  1/20/2022  Future Office visit:  5/26/2022  Routing refill request to provider for review/approval because:  Gaps in refills.   Last filled July 2020.  Refer to Provider for review and refills per Providers orders.    Thank you     Yamini Joseph RN  Central Triage Red Flags/Med Refills

## 2022-04-19 DIAGNOSIS — M54.50 CHRONIC BILATERAL LOW BACK PAIN WITHOUT SCIATICA: ICD-10-CM

## 2022-04-19 DIAGNOSIS — G89.29 CHRONIC BILATERAL LOW BACK PAIN WITHOUT SCIATICA: ICD-10-CM

## 2022-04-19 DIAGNOSIS — M13.0 POLYARTHRITIS: ICD-10-CM

## 2022-04-19 DIAGNOSIS — M06.09 RHEUMATOID ARTHRITIS OF MULTIPLE SITES WITHOUT RHEUMATOID FACTOR (H): ICD-10-CM

## 2022-04-19 DIAGNOSIS — M79.7 FIBROMYALGIA: ICD-10-CM

## 2022-04-19 DIAGNOSIS — Z79.899 ENCOUNTER FOR LONG-TERM (CURRENT) USE OF MEDICATIONS: ICD-10-CM

## 2022-04-21 RX ORDER — FOLIC ACID 1 MG/1
3 TABLET ORAL DAILY
Qty: 180 TABLET | Refills: 3 | Status: SHIPPED | OUTPATIENT
Start: 2022-04-21 | End: 2022-12-06

## 2022-04-21 NOTE — TELEPHONE ENCOUNTER
folic acid (FOLVITE) 1 MG tablet   Take 3 tablets (3 mg) by mouth daily -      Last Written Prescription Date:  11/15/21  Last Fill Quantity: 180,   # refills: 2  Last Office Visit : 1/20/22  Future Office visit:  5/26/22    Refill to pharmacy.

## 2022-05-07 ENCOUNTER — HEALTH MAINTENANCE LETTER (OUTPATIENT)
Age: 57
End: 2022-05-07

## 2022-05-24 ENCOUNTER — LAB (OUTPATIENT)
Dept: LAB | Facility: CLINIC | Age: 57
End: 2022-05-24
Payer: COMMERCIAL

## 2022-05-24 DIAGNOSIS — Z79.899 ENCOUNTER FOR LONG-TERM (CURRENT) USE OF MEDICATIONS: ICD-10-CM

## 2022-05-24 DIAGNOSIS — M06.09 RHEUMATOID ARTHRITIS OF MULTIPLE SITES WITHOUT RHEUMATOID FACTOR (H): ICD-10-CM

## 2022-05-24 DIAGNOSIS — I10 HYPERTENSION, UNSPECIFIED TYPE: ICD-10-CM

## 2022-05-24 DIAGNOSIS — R53.83 OTHER FATIGUE: ICD-10-CM

## 2022-05-24 DIAGNOSIS — R80.9 PROTEINURIA, UNSPECIFIED TYPE: ICD-10-CM

## 2022-05-24 LAB
ALBUMIN UR-MCNC: NEGATIVE MG/DL
ALT SERPL W P-5'-P-CCNC: 19 U/L (ref 0–50)
ANION GAP SERPL CALCULATED.3IONS-SCNC: 4 MMOL/L (ref 3–14)
APPEARANCE UR: CLEAR
AST SERPL W P-5'-P-CCNC: 15 U/L (ref 0–45)
BILIRUB UR QL STRIP: NEGATIVE
BUN SERPL-MCNC: 16 MG/DL (ref 7–30)
CALCIUM SERPL-MCNC: 8.7 MG/DL (ref 8.5–10.1)
CHLORIDE BLD-SCNC: 107 MMOL/L (ref 94–109)
CO2 SERPL-SCNC: 31 MMOL/L (ref 20–32)
COLOR UR AUTO: YELLOW
CREAT SERPL-MCNC: 0.97 MG/DL (ref 0.52–1.04)
CREAT UR-MCNC: 34 MG/DL
CRP SERPL-MCNC: 13.2 MG/L (ref 0–8)
ERYTHROCYTE [DISTWIDTH] IN BLOOD BY AUTOMATED COUNT: 14.5 % (ref 10–15)
GFR SERPL CREATININE-BSD FRML MDRD: 67 ML/MIN/1.73M2
GLUCOSE BLD-MCNC: 103 MG/DL (ref 70–99)
GLUCOSE UR STRIP-MCNC: NEGATIVE MG/DL
HCT VFR BLD AUTO: 39.5 % (ref 35–47)
HGB BLD-MCNC: 12.7 G/DL (ref 11.7–15.7)
HGB UR QL STRIP: NEGATIVE
KETONES UR STRIP-MCNC: NEGATIVE MG/DL
LEUKOCYTE ESTERASE UR QL STRIP: NEGATIVE
MCH RBC QN AUTO: 30.6 PG (ref 26.5–33)
MCHC RBC AUTO-ENTMCNC: 32.2 G/DL (ref 31.5–36.5)
MCV RBC AUTO: 95 FL (ref 78–100)
NITRATE UR QL: NEGATIVE
PH UR STRIP: 6 [PH] (ref 5–7)
PLATELET # BLD AUTO: 360 10E3/UL (ref 150–450)
POTASSIUM BLD-SCNC: 4.2 MMOL/L (ref 3.4–5.3)
PROT UR-MCNC: <0.05 G/L
PROT/CREAT 24H UR: NORMAL MG/G{CREAT}
RBC # BLD AUTO: 4.15 10E6/UL (ref 3.8–5.2)
RBC URINE: 1 /HPF
SODIUM SERPL-SCNC: 142 MMOL/L (ref 133–144)
SP GR UR STRIP: 1.01 (ref 1–1.03)
SQUAMOUS EPITHELIAL: 1 /HPF
UROBILINOGEN UR STRIP-MCNC: NORMAL MG/DL
WBC # BLD AUTO: 7.1 10E3/UL (ref 4–11)
WBC URINE: 1 /HPF

## 2022-05-24 PROCEDURE — 86140 C-REACTIVE PROTEIN: CPT | Performed by: PATHOLOGY

## 2022-05-24 PROCEDURE — 84156 ASSAY OF PROTEIN URINE: CPT | Performed by: PATHOLOGY

## 2022-05-24 PROCEDURE — 84460 ALANINE AMINO (ALT) (SGPT): CPT | Performed by: PATHOLOGY

## 2022-05-24 PROCEDURE — 84450 TRANSFERASE (AST) (SGOT): CPT | Performed by: PATHOLOGY

## 2022-05-24 PROCEDURE — 36415 COLL VENOUS BLD VENIPUNCTURE: CPT | Performed by: PATHOLOGY

## 2022-05-24 PROCEDURE — 80048 BASIC METABOLIC PNL TOTAL CA: CPT | Performed by: PATHOLOGY

## 2022-05-24 PROCEDURE — 85027 COMPLETE CBC AUTOMATED: CPT | Performed by: PATHOLOGY

## 2022-05-24 PROCEDURE — 81001 URINALYSIS AUTO W/SCOPE: CPT | Performed by: PATHOLOGY

## 2022-05-24 NOTE — PROGRESS NOTES
[facility]  Rheumatology Clinic  Denilson Kelley MD  2022     Name: Elizabeth Rosenthal  MRN: [id number]  Age: 57 year old  : 1965  Referring provider: Referred Self    Assessment and Plan:  # Seronegative rheumatoid arthritis  Patient relates significant increase in diffuse arthralgia, associated with morning stiffness, and associated with new heel and ankle pain.  Physical exam shows normal fist formation, changes of osteoarthritis in the hands, and preserved wrist, elbow, and shoulder range of motion.  There is evidence of enthesitis at the right calcaneus.  Laboratory evaluation on 2022 showed creatinine elevated, but stable at 1.08.  Transaminases, CRP, and CBC were all normal.  Urinalysis showed protein at 100 units; 18 white cells per high-powered field.    Inflammatory arthropathy has worsened.  Addition of enthesitis symptoms including Achilles tendinitis to the symptom complex suggests the presence of of spondyloarthropathy.  Although methotrexate has resulted in overall improvement in control of inflammatory arthropathy, I recommend a trial of combination treatment with anti-TNF.  I recommend continuing methotrexate at current dose.  We discussed recommendation to make a trial of adalimumab 40 mg every other week.  I expect benefit from adalimumab 4 to 6 weeks.    # Bilateral knee pain:  cartilage loss/degenerative disease is the major pain generator. I agree with planned total knee replacement surgery on the left pending weight loss. I recommend continuing isometric exercises, weight management, and use of lidocaine patches, heating pad, rest for pain control, as well as gabapentin 300 mg up to tid.     #Shortness of breath: Cause of slowly progressive dyspnea remains unclear, but symptoms remain stable.  Plan to quantitate proteinuria observed on several dipstick measurements in the past 6 months, in order to investigate possible nephrosis.  Continue evaluation through primary care,  "pulmonary, and potentially cardiology to discern possible contributions from thoracic organs.    Plan  1.  Continue methotrexate 25 mg (total 10 tablets) once weekly.While on methotrexate:   -- Check labs every 3 months (AST/ALT, Albumin, CBC with platelets)   -- Limit alcohol intake to 2 drinks weekly; use folate 1 mg daily.  --Tylenol 500-1000 mg can be used as needed up to three times daily for nausea/headache associated with dosing.    2.  Make a trial of Humira 40 mg subcu every other week.  I expect benefit within 2 to 4 weeks, and maximum benefit no later than 6 weeks after exposure to Humira.  Please give rheumatology a progress report within 4 to 6 weeks.  If no improvement, consider use of abatacept (Orencia) as a different therapeutic mechanism of action for treating inflammatory arthritis with enthesitis (tendinitis).    Follow-up: Return to clinic in 4 months.    Denilson Kelley MD  Staff Rheumatologist, [facility]      HPI:   Elizabeth Rosenthal presents for follow up of seronegative rheumatoid arthritis as well as fibromyalgia and osteoarthritis of knees and spine.  She was last seen in rheumatology in 1-2022, when inflammatory arthritis was judged improved. Continued methotrexate at 25 mg weekly was recommended.  Gabapentin was continued at 300 mg 3 times daily.    Interval history May 24, 2022    She wonders whether diet changes are affecting inflammatory burden. She cut back on dairy, gluten, processed sugar and was feeling better in October 2021. She had COVID19 in 2-2022 and had sore throat and cough, fatigue, sick for 3 weeks, and started eating ice cream. Her CRP shot up.  She did have mAb infusions for COVID19. She states that she has been \"sick for 18 months\" since probable COVID19 infection in 2-2020    Her sleep schedule and mood have stabilized with use buproprion and work reduction.     She has painful elbows, wrists, knees, andkles on a daily basis. +.- visible swelling    Exercise tolerance is " low, but is better than during the 18 months after presumed COVID19.    Sleep quality at night is improved, but sleep is still non-restorative. Early morning stiffness is ~ 1 hour, and she is fatigued.     Joint pain is generally worse with use, especially wrists with prolonged activity.    Gabapentin and lidocaine patches and rest give some relief from pain.    She does have increased stomach upset at methotrexate 10 mg weekly. She takes folate 3 mg daily.  Shortness of breath symptoms are stable.      Interval history 1-    Joints are doing well. After methotrexate dose increase 3 mos ago, she noted less pain, less stiffness since shortly after changing dose.  No early morning stiffness, no morning pain.   She has resumed more regular exercise and activity. Energy is improved for cleaning/chores.  Gabapentin 1-2 doses daily, not sure for what symptoms; fatigue and neuritic pain are less.  Recently increased buproprion to 300 mg.        Review of Systems:   Pertinent items are noted in HPI or as below, remainder of complete ROS is negative.      No recent problems with hearing or vision. No swallowing problems.   No breathing difficulty, shortness of breath, coughing, or wheezing  No chest pain or palpitations  No heart burn, indigestion, abdominal pain, nausea, vomiting, diarrhea  No urination problems, no bloody, cloudy urine, no dysuria  No numbing, tingling, weakness  No headaches or confusion  No rashes. No easy bleeding or bruising.     Active Medications:   Current Outpatient Medications   Medication Sig     adalimumab (HUMIRA *CF*) 40 MG/0.4ML pen kit Inject 0.4 mLs (40 mg) Subcutaneous every 14 days Hold for signs of infection, then seek medical attention.     aspirin 81 MG tablet Take 1 tablet (81 mg) by mouth daily     buPROPion (WELLBUTRIN XL) 300 MG 24 hr tablet Take 1 tablet (300 mg) by mouth every morning     cetirizine (ZYRTEC) 10 MG tablet Take 10 mg by mouth daily     escitalopram (LEXAPRO)  20 MG tablet Take 1 tablet (20 mg) by mouth daily     folic acid (FOLVITE) 1 MG tablet Take 3 tablets (3 mg) by mouth daily     gabapentin (NEURONTIN) 300 MG capsule Take 1 capsule (300 mg) by mouth 3 times daily     lidocaine (LIDODERM) 5 % patch Apply 1-3 patches onto the skin every 24 hours as needed ** Patches may be left on up to 12 hours in a 24 hour period**     lisinopril (ZESTRIL) 10 MG tablet Take 1 tablet (10 mg) by mouth daily     methotrexate 2.5 MG tablet Take 10 tablets (25 mg) by mouth every 7 days     pantoprazole (PROTONIX) 20 MG EC tablet Take 1 tablet (20 mg) by mouth 2 times daily (Patient taking differently: Take 20 mg by mouth daily)     VITAMIN D, CHOLECALCIFEROL, PO Take 2,000 Units by mouth daily     zolpidem (AMBIEN) 5 MG tablet Take 1 tablet (5 mg) by mouth nightly as needed for sleep     albuterol (PROAIR HFA/PROVENTIL HFA/VENTOLIN HFA) 108 (90 Base) MCG/ACT inhaler Inhale 1-2 puffs into the lungs every 4 hours as needed for shortness of breath / dyspnea or wheezing (Patient not taking: Reported on 5/26/2022)     cyclobenzaprine (FLEXERIL) 5 MG tablet Take 5 mg by mouth daily as needed (Patient not taking: Reported on 5/26/2022)     fluticasone (FLOVENT HFA) 110 MCG/ACT inhaler Inhale 2 puffs into the lungs 2 times daily (Patient not taking: No sig reported)     lisinopril (ZESTRIL) 20 MG tablet Take 1 tablet (20 mg) by mouth At Bedtime (Patient not taking: Reported on 5/26/2022)     No current facility-administered medications for this visit.         Allergies:   Nuts   Celery Oil   Codeine   Contrast Dye   Food   Potatoes  Peanuts  Cantaloupe  Lettuce  Oat   Penicillins   Rice   Shellfish-Derived Products   Wheat Bran   Yeast       Past Medical History:  Allergic rhinitis    Asthma   Chronic pelvic pain in female   Depression   Depressive disorder   Gastroesophageal reflux disease   Head injury   Hyperlipidemia   Hypertension   Immunosuppression    Migraines   Morbid obesity  "  Obstructive sleep apnea   Reduced vision   Transient ischemic attack  Vertebral artery dissection  Iliotibial band syndrome  Right knee pain  Lumbar radicular pain  Rheumatoid arthritis of multiple sites without rheumatoid factor  Fibromyalgia  Arthritis of knee, left  Creatinine elevation     Past Surgical History:  Arthroscopy knee bilateral    Biopsy lymph node cervical    Colonoscopy 7/26/2017  Lymph node biopsy 2010  Genitourinary surgery, fallopian tube cyst 1994 and tubal ligation 1999  Lymph node removed from neck for biopsy 2011   Hysteroscopy    Tonsillectomy, bilateral 1/3/2018    Family History:    The patient's family history includes Asthma in her paternal grandmother; Breast Cancer in her mother; Cancer in her mother; Cerebrovascular Disease in her paternal grandmother; Heart Disease in her father; Mental Illness in her father; Migraines in her daughter and son; Substance Abuse in her father.    Social History:  The patient reports that she has never smoked. She has never used smokeless tobacco. She reports that she drinks alcohol. She reports that she does not use drugs.   PCP: Eveline Berry  Marital Status: Single     Physical Exam:   Blood pressure 114/80, pulse 81, height 1.702 m (5' 7\"), weight 137.9 kg (304 lb 1.6 oz), SpO2 96 %, not currently breastfeeding.  Wt Readings from Last 4 Encounters:   05/26/22 137.9 kg (304 lb 1.6 oz)   01/20/22 135 kg (297 lb 9.6 oz)   01/03/22 136.1 kg (300 lb)   08/30/21 138.1 kg (304 lb 6.4 oz)       Constitutional: severe obesity, appearing stated age; cooperative  Eyes: nl EOM, PERRLA, vision, conjunctiva, sclera  ENT: nl external ears, nose, hearing, lips, teeth, gums, throat  No mucous membrane lesions, normal saliva pool  Neck: no mass or thyroid enlargement  Resp: Breathing unlabored  Lymph: no cervical, supraclavicular, inguinal or epitrochlear nodes  MS: Mild Osteoarthritis changes were present in the hands, but no inflammatory joint changes at " MCPs, wrists, elbows, or shoulders, ankles, or toes. + tenderness at R Achilles insertion and R medial ankle  Skin: no nail pitting, alopecia, rash, nodules or lesions  Neuro: nl cranial nerves, strength, sensation, DTRs.   Psych: nl judgement, orientation, memory, affect.      Laboratory:   RHEUM RESULTS Latest Ref Rng & Units 3/22/2016 5/25/2016 8/24/2016   ALBUMIN 3.4 - 5.0 g/dL 3.5 3.4 3.2(L)   ALT 0 - 50 U/L 22 23 18   AST 0 - 45 U/L 11 16 8   AMAN INTERPRETATION NEG:Negative - - -   CK TOTAL 30 - 225 U/L - - -   CREATININE 0.52 - 1.04 mg/dL 0.91 0.93 0.97   CRP 0.0 - 8.0 mg/L - 11.0(H) 9.8(H)   GFR ESTIMATE, IF BLACK >60 mL/min/[1.73:m2] 79 77 73   GFR ESTIMATE >60 mL/min/1.73m2 65 63 60(L)   HEMATOCRIT 35.0 - 47.0 % 42.0 39.0 39.0   HEMOGLOBIN 11.7 - 15.7 g/dL 13.8 12.7 13.0   HCVAB NR - - Nonreactive   Assay performance characteristics have not been established for newborns,   infants, and children     WBC 4.0 - 11.0 10e3/uL 6.9 6.9 6.2   RBC 3.80 - 5.20 10e6/uL 4.58 4.29 4.21   RDW 10.0 - 15.0 % 12.7 13.5 13.4   MCHC 31.5 - 36.5 g/dL 32.9 32.6 33.3   MCV 78 - 100 fL 92 91 93   PLATELET COUNT 150 - 450 10e3/uL 328 322 284   ESR 0 - 30 mm/hr - - -     AMAN interpretation   Date Value Ref Range Status   04/28/2018 Negative NEG^Negative Final     Comment:                                        Reference range:  <1:40  NEGATIVE  1:40 - 1:80  BORDERLINE POSITIVE  >1:80 POSITIVE

## 2022-05-26 ENCOUNTER — OFFICE VISIT (OUTPATIENT)
Dept: RHEUMATOLOGY | Facility: CLINIC | Age: 57
End: 2022-05-26
Payer: COMMERCIAL

## 2022-05-26 VITALS
OXYGEN SATURATION: 96 % | SYSTOLIC BLOOD PRESSURE: 114 MMHG | DIASTOLIC BLOOD PRESSURE: 80 MMHG | BODY MASS INDEX: 45.99 KG/M2 | WEIGHT: 293 LBS | HEART RATE: 81 BPM | HEIGHT: 67 IN

## 2022-05-26 DIAGNOSIS — M06.09 RHEUMATOID ARTHRITIS OF MULTIPLE SITES WITHOUT RHEUMATOID FACTOR (H): Primary | ICD-10-CM

## 2022-05-26 DIAGNOSIS — Z79.899 ENCOUNTER FOR LONG-TERM (CURRENT) USE OF MEDICATIONS: ICD-10-CM

## 2022-05-26 DIAGNOSIS — M54.50 CHRONIC BILATERAL LOW BACK PAIN WITHOUT SCIATICA: ICD-10-CM

## 2022-05-26 DIAGNOSIS — G89.29 CHRONIC BILATERAL LOW BACK PAIN WITHOUT SCIATICA: ICD-10-CM

## 2022-05-26 DIAGNOSIS — M13.0 POLYARTHRITIS: ICD-10-CM

## 2022-05-26 DIAGNOSIS — M79.7 FIBROMYALGIA: ICD-10-CM

## 2022-05-26 PROCEDURE — 99214 OFFICE O/P EST MOD 30 MIN: CPT | Performed by: INTERNAL MEDICINE

## 2022-05-26 ASSESSMENT — PAIN SCALES - GENERAL: PAINLEVEL: SEVERE PAIN (6)

## 2022-05-26 NOTE — PATIENT INSTRUCTIONS
Inflammatory arthritis:    Plan: continue methotrexate and make a trial of humira.    Plan:  1.  Continue methotrexate 25 mg (total 10 tablets) once weekly.While on methotrexate:   -- Check labs every 3 months (AST/ALT, Albumin, CBC with platelets)   -- Limit alcohol intake to 2 drinks weekly; use folate 1 mg daily.  --Tylenol 500-1000 mg can be used as needed up to three times daily for nausea/headache associated with dosing.    2.  Make a trial of Humira 40 mg subcu every other week.  I expect benefit within 2 to 4 weeks, and maximum benefit no later than 6 weeks after exposure to Humira.  Please give rheumatology a progress report within 4 to 6 weeks.  If no improvement, consider use of abatacept (Orencia) as a different therapeutic mechanism of action for treating inflammatory arthritis with enthesitis (tendinitis).

## 2022-05-26 NOTE — NURSING NOTE
"Chief Complaint   Patient presents with     RECHECK     Hypertension, unspecified type +5 more       Vitals:    05/26/22 1325   BP: 114/80   BP Location: Left arm   Patient Position: Sitting   Cuff Size: Adult Large   Pulse: 81   SpO2: 96%   Weight: 137.9 kg (304 lb 1.6 oz)   Height: 1.702 m (5' 7\")       Body mass index is 47.63 kg/m .    [place], ICVF    "

## 2022-06-01 DIAGNOSIS — M06.09 RHEUMATOID ARTHRITIS OF MULTIPLE SITES WITHOUT RHEUMATOID FACTOR (H): ICD-10-CM

## 2022-07-01 ENCOUNTER — OFFICE VISIT (OUTPATIENT)
Dept: INTERNAL MEDICINE | Facility: CLINIC | Age: 57
End: 2022-07-01
Payer: COMMERCIAL

## 2022-07-01 VITALS
HEIGHT: 67 IN | BODY MASS INDEX: 45.99 KG/M2 | OXYGEN SATURATION: 100 % | TEMPERATURE: 97.5 F | SYSTOLIC BLOOD PRESSURE: 126 MMHG | DIASTOLIC BLOOD PRESSURE: 86 MMHG | RESPIRATION RATE: 18 BRPM | HEART RATE: 84 BPM | WEIGHT: 293 LBS

## 2022-07-01 DIAGNOSIS — Z23 HIGH PRIORITY FOR 2019-NCOV VACCINE: ICD-10-CM

## 2022-07-01 DIAGNOSIS — N95.0 PMB (POSTMENOPAUSAL BLEEDING): ICD-10-CM

## 2022-07-01 DIAGNOSIS — Z00.01 ENCOUNTER FOR GENERAL ADULT MEDICAL EXAMINATION WITH ABNORMAL FINDINGS: Primary | ICD-10-CM

## 2022-07-01 DIAGNOSIS — E66.01 MORBID OBESITY WITH BMI OF 45.0-49.9, ADULT (H): ICD-10-CM

## 2022-07-01 DIAGNOSIS — Z12.31 ENCOUNTER FOR SCREENING MAMMOGRAM FOR BREAST CANCER: ICD-10-CM

## 2022-07-01 DIAGNOSIS — G47.33 OSA (OBSTRUCTIVE SLEEP APNEA): ICD-10-CM

## 2022-07-01 DIAGNOSIS — M06.09 RHEUMATOID ARTHRITIS OF MULTIPLE SITES WITHOUT RHEUMATOID FACTOR (H): ICD-10-CM

## 2022-07-01 DIAGNOSIS — F33.9 RECURRENT MAJOR DEPRESSIVE DISORDER, REMISSION STATUS UNSPECIFIED (H): ICD-10-CM

## 2022-07-01 DIAGNOSIS — I10 HYPERTENSION GOAL BP (BLOOD PRESSURE) < 140/90: ICD-10-CM

## 2022-07-01 DIAGNOSIS — J45.20 MILD INTERMITTENT ASTHMA WITHOUT COMPLICATION: ICD-10-CM

## 2022-07-01 DIAGNOSIS — Z00.00 ROUTINE GENERAL MEDICAL EXAMINATION AT A HEALTH CARE FACILITY: ICD-10-CM

## 2022-07-01 DIAGNOSIS — F33.41 RECURRENT MAJOR DEPRESSION IN PARTIAL REMISSION (H): ICD-10-CM

## 2022-07-01 PROCEDURE — 99214 OFFICE O/P EST MOD 30 MIN: CPT | Mod: 25 | Performed by: INTERNAL MEDICINE

## 2022-07-01 PROCEDURE — 99396 PREV VISIT EST AGE 40-64: CPT | Mod: 25 | Performed by: INTERNAL MEDICINE

## 2022-07-01 PROCEDURE — 84305 ASSAY OF SOMATOMEDIN: CPT | Performed by: INTERNAL MEDICINE

## 2022-07-01 PROCEDURE — 36415 COLL VENOUS BLD VENIPUNCTURE: CPT | Performed by: INTERNAL MEDICINE

## 2022-07-01 PROCEDURE — 0054A COVID-19,PF,PFIZER (12+ YRS): CPT | Performed by: INTERNAL MEDICINE

## 2022-07-01 PROCEDURE — 91305 COVID-19,PF,PFIZER (12+ YRS): CPT | Performed by: INTERNAL MEDICINE

## 2022-07-01 RX ORDER — LISINOPRIL 10 MG/1
10 TABLET ORAL DAILY
Qty: 90 TABLET | Refills: 3 | Status: SHIPPED | OUTPATIENT
Start: 2022-07-01 | End: 2023-07-21

## 2022-07-01 ASSESSMENT — ENCOUNTER SYMPTOMS
DYSURIA: 0
SORE THROAT: 0
HEMATURIA: 0
FEVER: 0
WEAKNESS: 0
SHORTNESS OF BREATH: 0
CONSTIPATION: 0
CHILLS: 0
EYE PAIN: 0
NERVOUS/ANXIOUS: 0
COUGH: 0
MYALGIAS: 0
DIARRHEA: 1
JOINT SWELLING: 1
PARESTHESIAS: 0
NAUSEA: 0
ABDOMINAL PAIN: 0
ARTHRALGIAS: 1
DIZZINESS: 0
HEARTBURN: 0
PALPITATIONS: 0
FREQUENCY: 0
HEADACHES: 0
HEMATOCHEZIA: 0

## 2022-07-01 ASSESSMENT — PATIENT HEALTH QUESTIONNAIRE - PHQ9
10. IF YOU CHECKED OFF ANY PROBLEMS, HOW DIFFICULT HAVE THESE PROBLEMS MADE IT FOR YOU TO DO YOUR WORK, TAKE CARE OF THINGS AT HOME, OR GET ALONG WITH OTHER PEOPLE: SOMEWHAT DIFFICULT
SUM OF ALL RESPONSES TO PHQ QUESTIONS 1-9: 6
SUM OF ALL RESPONSES TO PHQ QUESTIONS 1-9: 6

## 2022-07-01 ASSESSMENT — ASTHMA QUESTIONNAIRES: ACT_TOTALSCORE: 25
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
## 2022-07-01 NOTE — PATIENT INSTRUCTIONS
See about getting the breast screening recommendation from your obgyn    Follow up with OBGYN for the repeat TVUS      Call to schedule imaging  OR 8 : mammogram.     24 hour urine test    ============================================================    Preventive Health Recommendations  Female Ages 50 - 64    Yearly exam: See your health care provider every year in order to  Review health changes.   Discuss preventive care.    Review your medicines if your doctor has prescribed any.    Get a Pap test every three years (unless you have an abnormal result and your provider advises testing more often).  If you get Pap tests with HPV test, you only need to test every 5 years, unless you have an abnormal result.   You do not need a Pap test if your uterus was removed (hysterectomy) and you have not had cancer.  You should be tested each year for STDs (sexually transmitted diseases) if you're at risk.   Have a mammogram every 1 to 2 years.  Have a colonoscopy at age 50, or have a yearly FIT test (stool test). These exams screen for colon cancer.    Have a cholesterol test every 5 years, or more often if advised.  Have a diabetes test (fasting glucose) every three years. If you are at risk for diabetes, you should have this test more often.   If you are at risk for osteoporosis (brittle bone disease), think about having a bone density scan (DEXA).    Shots: Get a flu shot each year. Get a tetanus shot every 10 years.    Nutrition:   Eat at least 5 servings of fruits and vegetables each day.  Eat whole-grain bread, whole-wheat pasta and brown rice instead of white grains and rice.  Get adequate Calcium and Vitamin D.     Lifestyle  Exercise at least 150 minutes a week (30 minutes a day, 5 days a week). This will help you control your weight and prevent disease.  Limit alcohol to one drink per day.  No smoking.   Wear sunscreen to prevent skin cancer.   See your dentist every six months for an exam  and cleaning.  See your eye doctor every 1 to 2 years.

## 2022-07-01 NOTE — LETTER
My Asthma Action Plan    Name: Elizabeth Rosenthal   YOB: 1965  Date: 7/1/2022   My doctor: Laurel Bhakta MD   My clinic: [facility]        My Rescue Medicine:   Albuterol inhaler (Proair/Ventolin/Proventil HFA)  2-4 puffs EVERY 4 HOURS as needed. Use a spacer if recommended by your provider.   My Asthma Severity:   Intermittent / Exercise Induced  Know your asthma triggers: smoke and upper respiratory infections             GREEN ZONE   Good Control    I feel good    No cough or wheeze    Can work, sleep and play without asthma symptoms       Take your asthma control medicine every day.     1. If exercise triggers your asthma, take your rescue medication    15 minutes before exercise or sports, and    During exercise if you have asthma symptoms  2. Spacer to use with inhaler: If you have a spacer, make sure to use it with your inhaler             YELLOW ZONE Getting Worse  I have ANY of these:    I do not feel good    Cough or wheeze    Chest feels tight    Wake up at night   1. Keep taking your Green Zone medications  2. Start taking your rescue medicine:    every 20 minutes for up to 1 hour. Then every 4 hours for 24-48 hours.  3. If you stay in the Yellow Zone for more than 12-24 hours, contact your doctor.  4. If you do not return to the Green Zone in 12-24 hours or you get worse, start taking your oral steroid medicine if prescribed by your provider.           RED ZONE Medical Alert - Get Help  I have ANY of these:    I feel awful    Medicine is not helping    Breathing getting harder    Trouble walking or talking    Nose opens wide to breathe       1. Take your rescue medicine NOW  2. If your provider has prescribed an oral steroid medicine, start taking it NOW  3. Call your doctor NOW  4. If you are still in the Red Zone after 20 minutes and you have not reached your doctor:    Take your rescue medicine again and    Call 911 or go to the emergency room right away    See  your regular doctor within 2 weeks of an Emergency Room or Urgent Care visit for follow-up treatment.          Annual Reminders:  Meet with Asthma Educator,  Flu Shot in the Fall, consider Pneumonia Vaccination for patients with asthma (aged 19 and older).    Pharmacy:    [facility], [place] - [address]  [facility]  ([facility]IV[facility]) - [place], [place] - [address]   [id number] - [place], [place] - [address] AT [address] OF [address] [place] [address] [facility] - [place], [place] - [address]    Electronically signed by Laurel Bhakta MD   Date: 07/01/22                    Asthma Triggers  How To Control Things That Make Your Asthma Worse    Triggers are things that make your asthma worse.  Look at the list below to help you find your triggers and   what you can do about them. You can help prevent asthma flare-ups by staying away from your triggers.      Trigger                                                          What you can do   Cigarette Smoke  Tobacco smoke can make asthma worse. Do not allow smoking in your home, car or around you.  Be sure no one smokes at a child s day care or school.  If you smoke, ask your health care provider for ways to help you quit.  Ask family members to quit too.  Ask your health care provider for a referral to Quit Plan to help you quit smoking, or call 8-116-606-PLAN.     Colds, Flu, Bronchitis  These are common triggers of asthma. Wash your hands often.  Don t touch your eyes, nose or mouth.  Get a flu shot every year.     Dust Mites  These are tiny bugs that live in cloth or carpet. They are too small to see. Wash sheets and blankets in hot water every week.   Encase pillows and mattress in dust mite proof covers.  Avoid having carpet if you can. If you have carpet, vacuum weekly.   Use a dust mask and HEPA vacuum.   Pollen and Outdoor Mold  Some people are allergic to trees, grass, or weed  pollen, or molds. Try to keep your windows closed.  Limit time out doors when pollen count is high.   Ask you health care provider about taking medicine during allergy season.     Animal Dander  Some people are allergic to skin flakes, urine or saliva from pets with fur or feathers. Keep pets with fur or feathers out of your home.    If you can t keep the pet outdoors, then keep the pet out of your bedroom.  Keep the bedroom door closed.  Keep pets off cloth furniture and away from stuffed toys.     Mice, Rats, and Cockroaches  Some people are allergic to the waste from these pests.   Cover food and garbage.  Clean up spills and food crumbs.  Store grease in the refrigerator.   Keep food out of the bedroom.   Indoor Mold  This can be a trigger if your home has high moisture. Fix leaking faucets, pipes, or other sources of water.   Clean moldy surfaces.  Dehumidify basement if it is damp and smelly.   Smoke, Strong Odors, and Sprays  These can reduce air quality. Stay away from strong odors and sprays, such as perfume, powder, hair spray, paints, smoke incense, paint, cleaning products, candles and new carpet.   Exercise or Sports  Some people with asthma have this trigger. Be active!  Ask your doctor about taking medicine before sports or exercise to prevent symptoms.    Warm up for 5-10 minutes before and after sports or exercise.     Other Triggers of Asthma  Cold air:  Cover your nose and mouth with a scarf.  Sometimes laughing or crying can be a trigger.  Some medicines and food can trigger asthma.

## 2022-07-01 NOTE — LETTER
My Depression Action Plan  Name: Elizabeth Rosenthal   Date of Birth 1965  Date: 7/1/2022    My doctor: Eveline Berry   My clinic: [facility]  [address]  [place] [address]  [phone]          GREEN    ZONE   Good Control    What it looks like:     Things are going generally well. You have normal ups and downs. You may even feel depressed from time to time, but bad moods usually last less than a day.   What you need to do:  1. Continue to care for yourself (see self care plan)  2. Check your depression survival kit and update it as needed  3. Follow your physician s recommendations including any medication.  4. Do not stop taking medication unless you consult with your physician first.           YELLOW         ZONE Getting Worse    What it looks like:     Depression is starting to interfere with your life.     It may be hard to get out of bed; you may be starting to isolate yourself from others.    Symptoms of depression are starting to last most all day and this has happened for several days.     You may have suicidal thoughts but they are not constant.   What you need to do:     1. Call your care team. Your response to treatment will improve if you keep your care team informed of your progress. Yellow periods are signs an adjustment may need to be made.     2. Continue your self-care.  Just get dressed and ready for the day.  Don't give yourself time to talk yourself out of it.    3. Talk to someone in your support network.    4. Open up your Depression Self-Care Plan/Wellness Kit.           RED    ZONE Medical Alert - Get Help    What it looks like:     Depression is seriously interfering with your life.     You may experience these or other symptoms: You can t get out of bed most days, can t work or engage in other necessary activities, you have trouble taking care of basic hygiene, or basic responsibilities, thoughts of suicide or death that will not go  away, self-injurious behavior.     What you need to do:  1. Call your care team and request a same-day appointment. If they are not available (weekends or after hours) call your local crisis line, emergency room or 911.          Depression Self-Care Plan / Wellness Kit    Many people find that medication and therapy are helpful treatments for managing depression. In addition, making small changes to your everyday life can help to boost your mood and improve your wellbeing. Below are some tips for you to consider. Be sure to talk with your medical provider and/or behavioral health consultant if your symptoms are worsening or not improving.     Sleep   Sleep hygiene  means all of the habits that support good, restful sleep. It includes maintaining a consistent bedtime and wake time, using your bedroom only for sleeping or sex, and keeping the bedroom dark and free of distractions like a computer, smartphone, or television.     Develop a Healthy Routine  Maintain good hygiene. Get out of bed in the morning, make your bed, brush your teeth, take a shower, and get dressed. Don t spend too much time viewing media that makes you feel stressed. Find time to relax each day.    Exercise  Get some form of exercise every day. This will help reduce pain and release endorphins, the  feel good  chemicals in your brain. It can be as simple as just going for a walk or doing some gardening, anything that will get you moving.      Diet  Strive to eat healthy foods, including fruits and vegetables. Drink plenty of water. Avoid excessive sugar, caffeine, alcohol, and other mood-altering substances.     Stay Connected with Others  Stay in touch with friends and family members.    Manage Your Mood  Try deep breathing, massage therapy, biofeedback, or meditation. Take part in fun activities when you can. Try to find something to smile about each day.     Psychotherapy  Be open to working with a therapist if your provider recommends it.      Medication  Be sure to take your medication as prescribed. Most anti-depressants need to be taken every day. It usually takes several weeks for medications to work. Not all medicines work for all people. It is important to follow-up with your provider to make sure you have a treatment plan that is working for you. Do not stop your medication abruptly without first discussing it with your provider.    Crisis Resources   These hotlines are for both adults and children. They and are open 24 hours a day, 7 days a week unless noted otherwise.      National Suicide Prevention Lifeline   9-805-708-TALK (5056)      Crisis Text Line    www.crisistextline.org  Text HOME to 677408 from anywhere in the [place], anytime, about any type of crisis. A live, trained crisis counselor will receive the text and respond quickly.      [place] Lifeline for LGBTQ Youth  A national crisis intervention and suicide lifeline for LGBTQ youth under 25. Provides a safe place to talk without judgement. Call 1-738.506.7434; text START to 490025 or visit www.thetrevorproject.org to talk to a trained counselor.      For [facility] crisis numbers, visit the [facility] website at:  https://[place].gov/dhs/people-we-serve/adults/health-care/mental-health/resources/crisis-contacts.jsp

## 2022-07-05 ENCOUNTER — VIRTUAL VISIT (OUTPATIENT)
Dept: BEHAVIORAL HEALTH | Facility: CLINIC | Age: 57
End: 2022-07-05
Payer: COMMERCIAL

## 2022-07-05 ENCOUNTER — VIRTUAL VISIT (OUTPATIENT)
Dept: PSYCHIATRY | Facility: CLINIC | Age: 57
End: 2022-07-05
Payer: COMMERCIAL

## 2022-07-05 DIAGNOSIS — F33.40 RECURRENT MAJOR DEPRESSION IN REMISSION (H): ICD-10-CM

## 2022-07-05 DIAGNOSIS — R53.83 FATIGUE, UNSPECIFIED TYPE: ICD-10-CM

## 2022-07-05 DIAGNOSIS — F33.0 MAJOR DEPRESSIVE DISORDER, RECURRENT EPISODE, MILD (H): Primary | ICD-10-CM

## 2022-07-05 LAB — IGF-I BLD-MCNC: 136 NG/ML (ref 46–238)

## 2022-07-05 PROCEDURE — 99215 OFFICE O/P EST HI 40 MIN: CPT | Mod: GT | Performed by: PSYCHIATRY & NEUROLOGY

## 2022-07-05 PROCEDURE — 90832 PSYTX W PT 30 MINUTES: CPT | Mod: GT | Performed by: SOCIAL WORKER

## 2022-07-05 RX ORDER — BUPROPION HYDROCHLORIDE 300 MG/1
300 TABLET ORAL EVERY MORNING
Qty: 90 TABLET | Refills: 3 | Status: CANCELLED | OUTPATIENT
Start: 2022-07-05

## 2022-07-05 RX ORDER — ESCITALOPRAM OXALATE 20 MG/1
20 TABLET ORAL DAILY
Qty: 90 TABLET | Refills: 3 | Status: SHIPPED | OUTPATIENT
Start: 2022-07-05 | End: 2023-07-21

## 2022-07-05 RX ORDER — ZOLPIDEM TARTRATE 5 MG/1
5 TABLET ORAL
Qty: 30 TABLET | Refills: 0 | Status: SHIPPED | OUTPATIENT
Start: 2022-07-05 | End: 2023-07-10

## 2022-07-05 ASSESSMENT — ANXIETY QUESTIONNAIRES
3. WORRYING TOO MUCH ABOUT DIFFERENT THINGS: NOT AT ALL
GAD7 TOTAL SCORE: 0
1. FEELING NERVOUS, ANXIOUS, OR ON EDGE: NOT AT ALL
6. BECOMING EASILY ANNOYED OR IRRITABLE: NOT AT ALL
GAD7 TOTAL SCORE: 0
2. NOT BEING ABLE TO STOP OR CONTROL WORRYING: NOT AT ALL
7. FEELING AFRAID AS IF SOMETHING AWFUL MIGHT HAPPEN: NOT AT ALL
5. BEING SO RESTLESS THAT IT IS HARD TO SIT STILL: NOT AT ALL
IF YOU CHECKED OFF ANY PROBLEMS ON THIS QUESTIONNAIRE, HOW DIFFICULT HAVE THESE PROBLEMS MADE IT FOR YOU TO DO YOUR WORK, TAKE CARE OF THINGS AT HOME, OR GET ALONG WITH OTHER PEOPLE: NOT DIFFICULT AT ALL

## 2022-07-05 ASSESSMENT — PATIENT HEALTH QUESTIONNAIRE - PHQ9
SUM OF ALL RESPONSES TO PHQ QUESTIONS 1-9: 2
5. POOR APPETITE OR OVEREATING: NOT AT ALL

## 2022-07-05 NOTE — PROGRESS NOTES
[facility] Collaborative Care Psychiatry Service   July 5, 2022      Behavioral Health Clinician Progress Note    Patient Name: Elizabeth Rosenthal           Service Type:  Individual      Service Location:   MyChart / Email (patient reached)     Session Start Time: 8:46a  Session End Time: 9:05a      Session Length: 16 - 37      Attendees: Client     Service Modality:  Video Visit:      Provider verified identity through the following two step process.  Patient provided:  Patient photo    Telemedicine Visit: The patient's condition can be safely assessed and treated via synchronous audio and visual telemedicine encounter.      Reason for Telemedicine Visit: Services only offered telehealth    Originating Site (Patient Location): Patient's home    Distant Site (Provider Location): Provider Remote Setting- Home Office    Consent:  The patient/guardian has verbally consented to: the potential risks and benefits of telemedicine (video visit) versus in person care; bill my insurance or make self-payment for services provided; and responsibility for payment of non-covered services.     Patient would like the video invitation sent by:  My Chart    Mode of Communication:  Video Conference via [employer]    As the provider I attest to compliance with applicable laws and regulations related to telemedicine.    Visit Activities (Refresh list every visit): [facility] Only    Diagnostic Assessment Date: 5/6/21- DA updated will be completed at next visit if pt continues with [facility].  Treatment Plan Review Date: 10/5/22  See Flowsheets for today's PHQ-9 and MEHUL-7 results  Previous PHQ-9:   PHQ-9 SCORE 1/3/2022 7/1/2022 7/5/2022   PHQ-9 Total Score [facility] 4 (Minimal depression) 6 (Mild depression) -   PHQ-9 Total Score 4 6 2     Previous MEHUL-7:   MEHUL-7 SCORE 11/30/2021 1/3/2022 7/5/2022   Total Score 1 (minimal anxiety) - -   Total Score 1 0 0       ESTER LEVEL:  No flowsheet data found.    DATA  Extended Session (60+ minutes):  No  Interactive Complexity: No  Crisis: No  [facility] Patient: No    Treatment Objective(s) Addressed in This Session:  Target Behavior(s): energy/motivation    Depressed Mood: Feel less tired and more energy during the day     Current Stressors / Issues:  Pt shares that she is doing pretty good since her last visit 6 months ago.  Shares that she still can't seem to get an 8 hour work day in although gets 40 hours in a week just not between the hours of 9a-5p.  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    Son did move out with friends and she now has 2 roommates which she says is going well.    Is fostering a dog currently and has done this off and on for the past 5 years.    Did have COVID again this past February but feels that she recovered well.  Ran out of Ambien last month and would like it refilled to have on hand should she need to take one.  Did establish care with a new PCP (Dr. Bhakta) and shares that she is pleased with this provider.      Progress on Treatment Objective(s) / Homework:  Stable - ACTION (Actively working towards change); Intervened by reinforcing change plan / affirming steps taken    Motivational Interviewing    MI Intervention: Supported Autonomy, Collaboration, Evocation, Permission to raise concern or advise, Open-ended questions and Reflections: simple and complex     Change Talk Expressed by the Patient: Taking steps    Provider Response to Change Talk: E - Evoked more info from patient about behavior change, A - Affirmed patient's thoughts, decisions, or attempts at behavior change, R - Reflected patient's change talk and S - Summarized patient's change talk statements      Care Plan review completed: Yes    Medication Review:  No changes to current psychiatric medication(s)    Medication Compliance:  Yes    Changes in Health Issues:   None reported    Chemical Use  Review:   Substance Use: Chemical use reviewed, no active concerns identified      Tobacco Use: No current tobacco use.      Assessment: Current Emotional / Mental Status (status of significant symptoms):  Risk status (Self / Other harm or suicidal ideation)  Patient denies a history of suicidal ideation, suicide attempts, self-injurious behavior, homicidal ideation, homicidal behavior and and other safety concerns  Patient denies current fears or concerns for personal safety.  Patient denies current or recent suicidal ideation or behaviors.  Patient denies current or recent homicidal ideation or behaviors.  Patient denies current or recent self injurious behavior or ideation.  Patient denies other safety concerns.  A safety and risk management plan has not been developed at this time, however patient was encouraged to call [place] [phone] should there be a change in any of these risk factors.    Appearance:   Appropriate   Eye Contact:   Good   Psychomotor Behavior: Normal   Attitude:   Cooperative   Orientation:   All  Speech   Rate / Production: Normal    Volume:  Normal   Mood:    Normal  Affect:    Appropriate   Thought Content:  Clear   Thought Form:  Coherent  Logical   Insight:    Good     Diagnoses:  1. Major depressive disorder, recurrent episode, mild (H)        Collateral Reports Completed:  Communicated with: [facility] psychiatrist    Plan: (Homework, other):  Patient was given information about behavioral services and encouraged to schedule a follow up appointment with the clinic [facility] as needed.  She was also given information about mental health symptoms and treatment options .  Plan is for pt to return to PCP for medication management at this time. CD Recommendations: No indications of CD issues.  Chelo Leigh, GLADIS, [facility]          NERIS Brewer, [facility], [facility]  July 5, 2022

## 2022-07-05 NOTE — Clinical Note
Antoni Barragan,  I met with Elizabeth today.  She seems to be doing fairly well, although she had COVID recently and is fatigued.  Her mood seems to be good with her current medications, and she feels comfortable being return to her care for ongoing medication management.  Please feel free to contact me with questions or concerns.  Thank you for the opportunity to be involved in the care of this patient.  Regards, Caroline Sandra MD Collaborative Care Psychiatry [facility]

## 2022-07-05 NOTE — PROGRESS NOTES
Telemedicine Visit: The patient's condition can be safely assessed and treated via synchronous audio and visual telemedicine encounter.      Reason for Telemedicine Visit: Services only offered telehealth    Originating Site (Patient Location): Patient's home    Distant Site (Provider Location): Provider Remote Setting- Home Office    Consent:  The patient/guardian has verbally consented to: the potential risks and benefits of telemedicine (video visit) versus in person care; bill my insurance or make self-payment for services provided; and responsibility for payment of non-covered services.     Mode of Communication:  Video Conference via [employer]    As the provider I attest to compliance with applicable laws and regulations related to telemedicine.    Elizabeth is a 57 year old who is being evaluated via a billable video visit.      How would you like to obtain your AVS? MyChart  If the video visit is dropped, the invitation should be resent by: Text to cell phone: [phone]  Will anyone else be joining your video visit? No         Outpatient Psychiatric Progress Note    Name: Elizabeth Rosenthal   : 1965                    Primary Care Provider: Luarel Bhakta MD   Therapist: None     PHQ-9 scores:  PHQ-9 SCORE 1/3/2022 2022 2022   PHQ-9 Total Score MyChart 4 (Minimal depression) 6 (Mild depression) -   PHQ-9 Total Score 4 6 2       MEHUL-7 scores:  MEHUL-7 SCORE 2021 1/3/2022 2022   Total Score 1 (minimal anxiety) - -   Total Score 1 0 0       Patient Identification:  Elizabeth is a 57 year old year old female  who presents for return visit with me.  Patient attended the session alone.     Interim History:  Per Chelo Leigh, LICSW:  Pt shares that she is doing pretty good since her last visit 6 months ago.  Shares that she still can't seem to get an 8 hour work day in although gets 40 hours in a week just not between the hours of 9a-5p.  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  Son did move out with friends and she now has 2 roommates which she says is going well.  Is fostering a dog currently and has done this off and on for the past 5 years.  Did have COVID again this past February but feels that she recovered well.  Ran out of Ambien last month and would like it refilled to have on hand should she need to take one.  Did establish care with a new PCP (Dr. Bhakta) and shares that she is pleased with this provider.    Elizabeth reports that she is not depressed and reports that her mood is \"perfectly fine.\"    She is suspicious that she had COVID in March 2020 and was exhausted from June 2020 through October 2021.  Unfortunately she had COVID again in February 2022, and now \"back to exhaustion.  She is not sleeping as much as she was back in 2021 but is taking a nap for couple of hours about once a week.  She also uses Ambien to help her sleep 2 or 3 times a month when she is struggling with insomnia.    She is still fatigued a lot during the day.  She has some difficulty concentrating and also has physical fatigue and pain.  She will often work for a couple of hours and then need to lay down and rest, although she is no longer sleeping during her rest breaks.    She was informed of the long COVID clinic, but declined referral.    We agreed that she could be returned to her primary care provider for ongoing medication management.    Vital Signs:   There were no vitals taken for this visit.    Labs:  Lab Results   Component Value Date    WBC 7.1 05/24/2022    WBC 6.4 06/30/2021     Lab Results   Component Value Date    RBC 4.15 05/24/2022    RBC 4.40 06/30/2021     Lab Results   Component Value Date    HGB 12.7 05/24/2022    HGB 13.4 06/30/2021     Lab Results   Component Value Date    HCT 39.5 05/24/2022    HCT 41.5 06/30/2021     No components found for: MCT  Lab Results "   Component Value Date    MCV 95 05/24/2022    MCV 94 06/30/2021     Lab Results   Component Value Date    MCH 30.6 05/24/2022    MCH 30.5 06/30/2021     Lab Results   Component Value Date    MCHC 32.2 05/24/2022    MCHC 32.3 06/30/2021     Lab Results   Component Value Date    RDW 14.5 05/24/2022    RDW 13.9 06/30/2021     Lab Results   Component Value Date     05/24/2022     06/30/2021     Last Comprehensive Metabolic Panel:  Sodium   Date Value Ref Range Status   05/24/2022 142 133 - 144 mmol/L Final   06/30/2021 145 (H) 133 - 144 mmol/L Final     Potassium   Date Value Ref Range Status   05/24/2022 4.2 3.4 - 5.3 mmol/L Final   06/30/2021 4.2 3.4 - 5.3 mmol/L Final     Chloride   Date Value Ref Range Status   05/24/2022 107 94 - 109 mmol/L Final   06/30/2021 110 (H) 94 - 109 mmol/L Final     Carbon Dioxide   Date Value Ref Range Status   06/30/2021 29 20 - 32 mmol/L Final     Carbon Dioxide (CO2)   Date Value Ref Range Status   05/24/2022 31 20 - 32 mmol/L Final     Anion Gap   Date Value Ref Range Status   05/24/2022 4 3 - 14 mmol/L Final   06/30/2021 6 3 - 14 mmol/L Final     Glucose   Date Value Ref Range Status   05/24/2022 103 (H) 70 - 99 mg/dL Final   06/30/2021 98 70 - 99 mg/dL Final     Urea Nitrogen   Date Value Ref Range Status   05/24/2022 16 7 - 30 mg/dL Final   06/30/2021 13 7 - 30 mg/dL Final     Creatinine   Date Value Ref Range Status   05/24/2022 0.97 0.52 - 1.04 mg/dL Final   06/30/2021 1.04 0.52 - 1.04 mg/dL Final     GFR Estimate   Date Value Ref Range Status   05/24/2022 67 >60 mL/min/1.73m2 Final     Comment:     Effective December 21, 2021 eGFRcr in adults is calculated using the 2021 CKD-EPI creatinine equation which includes age and gender (Brianne et al., NEJM, DOI: 10.1056/GHOZlq0463765)   06/30/2021 59 (L) >60 mL/min/[1.73_m2] Final     Comment:     Non  GFR Calc  Starting 12/18/2018, serum creatinine based estimated GFR (eGFR) will be   calculated using the  Chronic Kidney Disease Epidemiology Collaboration   (CKD-EPI) equation.       Calcium   Date Value Ref Range Status   05/24/2022 8.7 8.5 - 10.1 mg/dL Final   06/30/2021 8.8 8.5 - 10.1 mg/dL Final     TSH   Date Value Ref Range Status   06/30/2021 1.28 0.40 - 4.00 mU/L Final         Current Medications:  Current Outpatient Medications   Medication     adalimumab (HUMIRA *CF*) 40 MG/0.4ML pen kit     albuterol (PROAIR HFA/PROVENTIL HFA/VENTOLIN HFA) 108 (90 Base) MCG/ACT inhaler     aspirin 81 MG tablet     buPROPion (WELLBUTRIN XL) 300 MG 24 hr tablet     cetirizine (ZYRTEC) 10 MG tablet     cyclobenzaprine (FLEXERIL) 5 MG tablet     escitalopram (LEXAPRO) 20 MG tablet     fluticasone (FLOVENT HFA) 110 MCG/ACT inhaler     folic acid (FOLVITE) 1 MG tablet     gabapentin (NEURONTIN) 300 MG capsule     lidocaine (LIDODERM) 5 % patch     lisinopril (ZESTRIL) 10 MG tablet     methotrexate 2.5 MG tablet     pantoprazole (PROTONIX) 20 MG EC tablet     VITAMIN D, CHOLECALCIFEROL, PO     zolpidem (AMBIEN) 5 MG tablet     No current facility-administered medications for this visit.        The [place] Prescription Monitoring Program has been reviewed and there are no concerns about diversionary activity for controlled substances at this time.      Mental Status Examination:  Elizabeth is a 57-year-old woman in no acute distress.  Speech is clear and normal in rate and tone.  Eye contact is fair over the video connection.  Affect is full.  Mood is euthymic.  Thoughts are logical and spontaneous with no loose associations or flight of ideas.  Thought content shows no psychosis.  No suicidal thoughts.  She is alert and oriented x3.    Assessment and Plan:    ICD-10-CM    1. Recurrent major depression in remission (H)  F33.40    2. Fatigue, unspecified type  R53.83 zolpidem (AMBIEN) 5 MG tablet       Medical comorbidities include:   Patient Active Problem List   Diagnosis     Iliotibial band syndrome     Right knee pain     Lumbar  radicular pain     Polyarthritis     Depression     Hypertension goal BP (blood pressure) < 140/90     Mild intermittent asthma without complication     Morbid obesity (H)     Rheumatoid arthritis of multiple sites without rheumatoid factor (H)     Fibromyalgia     Encounter for long-term (current) use of medications     SHERIE (obstructive sleep apnea)     Morbid obesity with BMI of 45.0-49.9, adult (H)     Low back pain     Hyperlipidemia     Arthritis of knee, left     Creatinine elevation     ACP (advance care planning)     Major depressive disorder, recurrent episode, mild (H)     Major depressive disorder, recurrent, moderate (H)     Fatigue, unspecified type       Treatment Plan:  Patient Instructions   Continue escitalopram 20 mg daily.     Continue bupropion  mg daily.     Continue zolpidem 2.5 to 5 mg at bedtime as needed for insomnia.     Continue melatonin 3 mg about 3 or 4 hours prior to bedtime.     Continue all other medications per your primary care provider.    Per our conversation, you are graduating from the [facility] program back to the care of your primary care provider.  Please follow up with them in three to six months.  All future refill requests should go to them.    It has been a pleasure working with you, best wishes for the future!    [facility] Resources:      Go to the Emergency Department as needed or call after hours crisis line at [phone].      To schedule individual or family therapy, call [facility] at [phone].     Follow up with primary care provider as planned or sooner for acute medical concerns.    Call the psychiatric nurse line with medication questions or concerns at [phone].    MyChart may be used to communicate with your provider, but this is not intended to be used for emergencies.    Community Resources:      National Suicide Prevention Lifeline: 189.272.1660 (TTY: 253.511.5295). Call anytime for help.   (www.suicidepreventionlifeline.org)    National [place] on Mental Illness (www.[place].org): 745-921-9914 or 045-586-9302.     Mental Health Association (www.mentalhealth.org): 312.943.9732 or 149-282-0575.    [place] Crisis Text Line: Text [place] to 133015    Suicide LifeLine Chat: suicideSportsMEDIA Technology.org/chat         Administrative Billing:   Video call duration: 32 minutes   Start: 9:23 AM   Stop: 9:55 AM  Total time spent, including chart review and documentation: 45 minutes    Patient Status:  The patient is being returned to the referring provider for ongoing care and medication prescribing.  The patient can be referred back to this service for further consultation as needed.

## 2022-07-20 PROCEDURE — 82542 COL CHROMOTOGRAPHY QUAL/QUAN: CPT | Mod: 90 | Performed by: INTERNAL MEDICINE

## 2022-07-20 PROCEDURE — 82530 CORTISOL FREE: CPT | Mod: 90 | Performed by: INTERNAL MEDICINE

## 2022-07-20 PROCEDURE — 99000 SPECIMEN HANDLING OFFICE-LAB: CPT | Performed by: INTERNAL MEDICINE

## 2022-07-26 LAB
COLLECT DURATION TIME UR: 24 H
CORTIS 24H UR-MRATE: 12 MCG/24 H (ref 3.5–45)
CORTISONE 24H UR-MRATE: 42 MCG/24 H (ref 17–129)
SPECIMEN VOL 24H UR: 1300 ML

## 2022-07-28 NOTE — RESULT ENCOUNTER NOTE
Elizabeth Rosenthal    14 hour cortisol is normal.   Thank you for checking     Sincerely,       OK RAY M.D.

## 2022-08-29 NOTE — PATIENT INSTRUCTIONS
CHIEF COMPLAINT:  Raf Quezada is here today for Subsequent Annual Medicare Wellness Visit.    Medication verified and med list updated  Denies Latex allergy or symptoms of Latex sensitivity.    Refills needed today?  No          Patient would like communication of their results via:     Cell Phone:   Telephone Information:   Mobile [phone]     Okay to leave a message containing results? Yes     Cholesterol (mg/dL)   Date Value   04/05/2021 134     HDL (mg/dL)   Date Value   04/05/2021 66     No components found for: CHOLHDLRATIO  Triglycerides (mg/dL)   Date Value   04/05/2021 66     LDL (mg/dL)   Date Value   04/05/2021 55      Glucose (mg/dL)   Date Value   07/11/2022 112 (H)     Prostate Specific Antigen (ng/mL)   Date Value   08/17/2021 0.47         Health Maintenance Due   Topic Date Due   • Diabetes Eye Exam  Never done   • COVID-19 Vaccine (5 - Booster for Pfizer series) 06/14/2022   • Traditional Medicare- Medicare Wellness Visit  08/04/2022   • Depression Screening  08/04/2022   • Diabetes A1C  09/14/2022   • Colorectal Cancer Risk - Colonoscopy  11/06/2022       Staff message sent to Dr Appiah's pool for recall colonoscopy       6 b.) How many servings of High Fiber / Whole Grain Foods to you have each day ( 1 serving = 1 cup cold cereal, 1/2 cup cooked cereal, 1 slice bread): None     7b.) Do you feel unsteady when standing or walking?: Yes     13.) Do you need help with any of the following activities?: Get to places outside of walking distance (can't drive alone, or take a bus/taxi alone)           Recent Review Flowsheet Data     Date 8/29/2022    Adult PHQ 2 Score 0    Adult PHQ 2 Interpretation No further screening needed    Little interest or pleasure in activity? Not at all    Feeling down, depressed or hopeless? Not at all          DEPRESSION ASSESSMENT/PLAN:  Depression screening is negative no further plan needed.       Continue escitalopram 20 mg daily.     Continue bupropion  mg daily.     Continue zolpidem 2.5 to 5 mg at bedtime as needed for insomnia.     Continue melatonin 3 mg about 3 or 4 hours prior to bedtime.     Continue all other medications per your primary care provider.    Per our conversation, you are graduating from the [facility] program back to the care of your primary care provider.  Please follow up with them in three to six months.  All future refill requests should go to them.    It has been a pleasure working with you, best wishes for the future!    Meta Resources:    Go to the Emergency Department as needed or call after hours crisis line at [phone].    To schedule individual or family therapy, call [facility] at [phone].   Follow up with primary care provider as planned or sooner for acute medical concerns.  Call the psychiatric nurse line with medication questions or concerns at [phone].  OtherInboxhart may be used to communicate with your provider, but this is not intended to be used for emergencies.    Community Resources:    National Suicide Prevention Lifeline: 704.634.6389 (TTY: 379.515.4591). Call anytime for help.  (www.suicidepreventionlifeline.org)  National [place] on Mental Illness (www.[place].org): 837.968.3409 or 047-595-8406.   Mental Health Association (www.mentalhealth.org): 781.354.6773 or 561-433-6370.  [place] Crisis Text Line: Text [place] to 868205  Suicide LifeLine Chat: suicidepreventionlifeline.org/chat

## 2022-11-11 DIAGNOSIS — M06.09 RHEUMATOID ARTHRITIS OF MULTIPLE SITES WITHOUT RHEUMATOID FACTOR (H): ICD-10-CM

## 2022-11-16 RX ORDER — ADALIMUMAB 40MG/0.4ML
40 KIT SUBCUTANEOUS
Qty: 0.8 ML | Refills: 5 | Status: SHIPPED | OUTPATIENT
Start: 2022-11-16 | End: 2022-12-06

## 2022-11-16 NOTE — TELEPHONE ENCOUNTER
Humira Pen 40 MG/0.4ML Subcutaneous Pen-injector Kit  Last Written Prescription Date:  5/31/2022  Last Fill Quantity: 0.8,   # refills: 5  Last Office Visit :  5/26/2022  Future Office visit:   12/2/2022  0.8 mL, 5 Refills sent to pharm for Pt care.       Yamini Joseph RN  Central Triage Red Flags/Med Refills

## 2022-12-02 ENCOUNTER — TELEPHONE (OUTPATIENT)
Dept: RHEUMATOLOGY | Facility: CLINIC | Age: 57
End: 2022-12-02

## 2022-12-02 ENCOUNTER — LAB (OUTPATIENT)
Dept: LAB | Facility: CLINIC | Age: 57
End: 2022-12-02
Payer: COMMERCIAL

## 2022-12-02 ENCOUNTER — ANCILLARY PROCEDURE (OUTPATIENT)
Dept: MAMMOGRAPHY | Facility: CLINIC | Age: 57
End: 2022-12-02
Attending: INTERNAL MEDICINE
Payer: COMMERCIAL

## 2022-12-02 DIAGNOSIS — Z79.899 ENCOUNTER FOR LONG-TERM (CURRENT) USE OF MEDICATIONS: Primary | ICD-10-CM

## 2022-12-02 DIAGNOSIS — Z12.31 ENCOUNTER FOR SCREENING MAMMOGRAM FOR BREAST CANCER: ICD-10-CM

## 2022-12-02 DIAGNOSIS — Z79.899 ENCOUNTER FOR LONG-TERM (CURRENT) USE OF MEDICATIONS: ICD-10-CM

## 2022-12-02 DIAGNOSIS — Z11.4 SCREENING FOR HIV (HUMAN IMMUNODEFICIENCY VIRUS): Primary | ICD-10-CM

## 2022-12-02 DIAGNOSIS — M06.09 RHEUMATOID ARTHRITIS OF MULTIPLE SITES WITHOUT RHEUMATOID FACTOR (H): ICD-10-CM

## 2022-12-02 LAB
ERYTHROCYTE [DISTWIDTH] IN BLOOD BY AUTOMATED COUNT: 14.7 % (ref 10–15)
HCT VFR BLD AUTO: 40 % (ref 35–47)
HGB BLD-MCNC: 12.8 G/DL (ref 11.7–15.7)
MCH RBC QN AUTO: 30.9 PG (ref 26.5–33)
MCHC RBC AUTO-ENTMCNC: 32 G/DL (ref 31.5–36.5)
MCV RBC AUTO: 97 FL (ref 78–100)
PLATELET # BLD AUTO: 345 10E3/UL (ref 150–450)
RBC # BLD AUTO: 4.14 10E6/UL (ref 3.8–5.2)
WBC # BLD AUTO: 6.9 10E3/UL (ref 4–11)

## 2022-12-02 PROCEDURE — 84460 ALANINE AMINO (ALT) (SGPT): CPT

## 2022-12-02 PROCEDURE — 85027 COMPLETE CBC AUTOMATED: CPT

## 2022-12-02 PROCEDURE — 77067 SCR MAMMO BI INCL CAD: CPT | Mod: TC | Performed by: RADIOLOGY

## 2022-12-02 PROCEDURE — 36415 COLL VENOUS BLD VENIPUNCTURE: CPT

## 2022-12-02 PROCEDURE — 84450 TRANSFERASE (AST) (SGOT): CPT

## 2022-12-02 PROCEDURE — 82040 ASSAY OF SERUM ALBUMIN: CPT

## 2022-12-02 PROCEDURE — 82565 ASSAY OF CREATININE: CPT

## 2022-12-02 NOTE — TELEPHONE ENCOUNTER
Call to patient to discuss upcoming appointment and lab work. Discussed appreciation of flexibility and patience with re-scheduling.  Entered every 3 months labs, can be done at any [facility] lab.  Discussed that I will confirm with Dr. Kelley when to schedule her follow-up for.    All questions answered for now.    Caroline Cook, MAINN, RN  RN Care Coordinator Rheumatology

## 2022-12-02 NOTE — TELEPHONE ENCOUNTER
Patient's appointment with Dr. Kelley today has been cancelled due to Dr. Kelley being out.  She doesn't think she is suppose to wait until next available 6/30/2023 to see him.  She is also wondering about labs.  She did stop in to have a lab draw this morning and they didn't have orders.

## 2022-12-03 LAB
ALBUMIN SERPL BCG-MCNC: 3.9 G/DL (ref 3.5–5.2)
ALT SERPL W P-5'-P-CCNC: 19 U/L (ref 10–35)
AST SERPL W P-5'-P-CCNC: 19 U/L (ref 10–35)
CREAT SERPL-MCNC: 1.16 MG/DL (ref 0.51–0.95)
GFR SERPL CREATININE-BSD FRML MDRD: 54 ML/MIN/1.73M2

## 2022-12-04 DIAGNOSIS — Z79.899 ENCOUNTER FOR LONG-TERM (CURRENT) USE OF MEDICATIONS: ICD-10-CM

## 2022-12-04 DIAGNOSIS — M06.09 RHEUMATOID ARTHRITIS OF MULTIPLE SITES WITHOUT RHEUMATOID FACTOR (H): ICD-10-CM

## 2022-12-04 DIAGNOSIS — M13.0 POLYARTHRITIS: ICD-10-CM

## 2022-12-04 DIAGNOSIS — M79.7 FIBROMYALGIA: ICD-10-CM

## 2022-12-04 DIAGNOSIS — M54.50 CHRONIC BILATERAL LOW BACK PAIN WITHOUT SCIATICA: ICD-10-CM

## 2022-12-04 DIAGNOSIS — G89.29 CHRONIC BILATERAL LOW BACK PAIN WITHOUT SCIATICA: ICD-10-CM

## 2022-12-05 ENCOUNTER — TELEPHONE (OUTPATIENT)
Dept: RHEUMATOLOGY | Facility: CLINIC | Age: 57
End: 2022-12-05

## 2022-12-05 NOTE — PROGRESS NOTES
Elizabeth is a 57 year old who is being evaluated via a billable video visit.      Patient denies any changes since echeck-in regarding medication and allergies and states all information entered during echeck-in remains accurate.    How would you like to obtain your AVS? MyChart  If the video visit is dropped, the invitation should be resent by: Text to cell phone: [phone]  Will anyone else be joining your video visit? No        Video-Visit Details    Video Start Time: 9:25 AM    Type of service:  Video Visit    Video End Time: 0945 AM    Originating Location (pt. Location): Home        Distant Location (provider location):  Off-site    Platform used for Video Visit: [facility]  Rheumatology Clinic  Denilson Kelley MD  2022     Name: Elizabeth Rosenthal  MRN: [id number]  Age: 57 year old  : 1965  Referring provider: Referred Self    Assessment and Plan:  # Seronegative rheumatoid arthritis  Patient relates reduction/stabilization in diffuse arthralgia, associated with morning stiffness, and reduced heel and ankle pain.  Vdieo exam shows normal fist formation, changes of osteoarthritis in the hands, and preserved wrist, elbow, and shoulder range of motion.  Laboratory evaluation on 2022 showed Cr 1.16, CBC normal.  LFT WNL.    Inflammatory arthropathy is mildly improved with specific relief of enthesitis symptoms including Achilles tendinitis.  I think that patient is benefiting from combination therapy, with addition of adalimumab to methotrexate in summer 2022.  I recommend continuing combination treatment, albeit with a slightly lower dose of methotrexate to reduce gastrointestinal symptoms adverse effects.    # Bilateral knee pain:  cartilage loss/degenerative disease remains major pain generator. I agree with planned total knee replacement surgery on the left pending weight loss. I recommend continuing isometric exercises, weight management, and use of lidocaine patches, heating  "pad, rest for pain control, as well as gabapentin 300 mg up to tid.     #Shortness of breath: Did not discuss today. Cause of slowly progressive dyspnea remains unclear, but symptoms remain stable. Continue evaluation through primary care, pulmonary, and potentially cardiology to discern possible contributions from thoracic organs.    Plan  1.  Continue methotrexate 20 mg (total 8 tablets) once weekly.While on methotrexate:   -- Check labs every 3 months (AST/ALT, Albumin, CBC with platelets)   -- Limit alcohol intake to 2 drinks weekly; use folate 1 mg daily.  --Tylenol 500-1000 mg can be used as needed up to three times daily for nausea/headache associated with dosing.    2.  Continue Humira 40 mg subcu every other week.    Follow-up: Return to clinic in 6 months.    Denilson Kelley MD  Staff Rheumatologist, [facility]      HPI:   Elizabeth Rosenthal presents for follow up of seronegative rheumatoid arthritis as well as fibromyalgia and osteoarthritis of knees and spine.  She was last seen in rheumatology in 5-2022, when inflammatory arthritis was judged worse.  Recommendation was to add Humira to methotrexate treatment.    Interval history Dec 6, 2022    She feels mildly improved with addition of humira to methotrexate. Initially, she had more joint pain for 3 months after startign humira.  Former pain in her heels is much improved. Hands and wrists are \"a little better\".  Knees/shoulders/ankles are most often affected bu t not severely. She still has trouble with ascending stairs, raking leaves, standing for prolonged periods.  She is not sure whether reduced stamina is due to long COVID or incomplete control of rheumatic disease.  Early morning stiffness is negligible, but she has lingering stiffness for about 1 hour in the mornings. She does not use OTC.     Humira 40 mg every other week.  Methotrexate 8 tabs weekly; she reduced from 10 tabs due to GI symptoms. Using folic acid.    Interval history May 24, " "2022    She wonders whether diet changes are affecting inflammatory burden. She cut back on dairy, gluten, processed sugar and was feeling better in October 2021. She had COVID19 in 2-2022 and had sore throat and cough, fatigue, sick for 3 weeks, and started eating ice cream. Her CRP shot up.  She did have mAb infusions for COVID19. She states that she has been \"sick for 18 months\" since probable COVID19 infection in 2-2020    Her sleep schedule and mood have stabilized with use buproprion and work reduction.     She has painful elbows, wrists, knees, andkles on a daily basis. +.- visible swelling    Exercise tolerance is low, but is better than during the 18 months after presumed COVID19.    Sleep quality at night is improved, but sleep is still non-restorative. Early morning stiffness is ~ 1 hour, and she is fatigued.     Joint pain is generally worse with use, especially wrists with prolonged activity.    Gabapentin and lidocaine patches and rest give some relief from pain.    She does have increased stomach upset at methotrexate 10 mg weekly. She takes folate 3 mg daily.  Shortness of breath symptoms are stable.      Interval history 1-    Joints are doing well. After methotrexate dose increase 3 mos ago, she noted less pain, less stiffness since shortly after changing dose.  No early morning stiffness, no morning pain.   She has resumed more regular exercise and activity. Energy is improved for cleaning/chores.  Gabapentin 1-2 doses daily, not sure for what symptoms; fatigue and neuritic pain are less.  Recently increased buproprion to 300 mg.        Review of Systems:   Pertinent items are noted in HPI or as below, remainder of complete ROS is negative.      No recent problems with hearing or vision. No swallowing problems.   No breathing difficulty, shortness of breath, coughing, or wheezing  No chest pain or palpitations  No heart burn, indigestion, abdominal pain, nausea, vomiting, diarrhea  No " urination problems, no bloody, cloudy urine, no dysuria  No numbing, tingling, weakness  No headaches or confusion  No rashes. No easy bleeding or bruising.     Active Medications:   Current Outpatient Medications   Medication Sig     adalimumab (HUMIRA *CF* PEN) 40 MG/0.4ML pen kit Inject 0.4 mLs (40 mg) Subcutaneous every 14 days     albuterol (PROAIR HFA/PROVENTIL HFA/VENTOLIN HFA) 108 (90 Base) MCG/ACT inhaler Inhale 1-2 puffs into the lungs every 4 hours as needed for shortness of breath / dyspnea or wheezing     aspirin 81 MG tablet Take 1 tablet (81 mg) by mouth daily     buPROPion (WELLBUTRIN XL) 300 MG 24 hr tablet Take 1 tablet (300 mg) by mouth every morning     cetirizine (ZYRTEC) 10 MG tablet Take 10 mg by mouth daily     cyclobenzaprine (FLEXERIL) 5 MG tablet Take 5 mg by mouth daily as needed     escitalopram (LEXAPRO) 20 MG tablet Take 1 tablet (20 mg) by mouth daily     fluticasone (FLOVENT HFA) 110 MCG/ACT inhaler Inhale 2 puffs into the lungs 2 times daily     folic acid (FOLVITE) 1 MG tablet Take 3 tablets (3 mg) by mouth daily     gabapentin (NEURONTIN) 300 MG capsule Take 1 capsule (300 mg) by mouth 3 times daily     lidocaine (LIDODERM) 5 % patch Apply 1-3 patches onto the skin every 24 hours as needed ** Patches may be left on up to 12 hours in a 24 hour period**     lisinopril (ZESTRIL) 10 MG tablet Take 1 tablet (10 mg) by mouth daily     methotrexate 2.5 MG tablet Take 10 tablets (25 mg) by mouth every 7 days     pantoprazole (PROTONIX) 20 MG EC tablet Take 1 tablet (20 mg) by mouth 2 times daily (Patient taking differently: Take 20 mg by mouth daily)     VITAMIN D, CHOLECALCIFEROL, PO Take 2,000 Units by mouth daily     zolpidem (AMBIEN) 5 MG tablet Take 1 tablet (5 mg) by mouth nightly as needed for sleep     No current facility-administered medications for this visit.         Allergies:   Nuts   Celery Oil   Codeine   Contrast  Dye   Food   Potatoes  Peanuts  Cantaloupe  Lettuce  Oat   Penicillins   Rice   Shellfish-Derived Products   Wheat Bran   Yeast       Past Medical History:  Allergic rhinitis    Asthma   Chronic pelvic pain in female   Depression   Depressive disorder   Gastroesophageal reflux disease   Head injury   Hyperlipidemia   Hypertension   Immunosuppression    Migraines   Morbid obesity   Obstructive sleep apnea   Reduced vision   Transient ischemic attack  Vertebral artery dissection  Iliotibial band syndrome  Right knee pain  Lumbar radicular pain  Rheumatoid arthritis of multiple sites without rheumatoid factor  Fibromyalgia  Arthritis of knee, left  Creatinine elevation     Past Surgical History:  Arthroscopy knee bilateral    Biopsy lymph node cervical    Colonoscopy 7/26/2017  Lymph node biopsy 2010  Genitourinary surgery, fallopian tube cyst 1994 and tubal ligation 1999  Lymph node removed from neck for biopsy 2011   Hysteroscopy    Tonsillectomy, bilateral 1/3/2018    Family History:    The patient's family history includes Asthma in her paternal grandmother; Breast Cancer in her mother; Cancer in her mother; Cerebrovascular Disease in her paternal grandmother; Heart Disease in her father; Mental Illness in her father; Migraines in her daughter and son; Substance Abuse in her father.    Social History:  The patient reports that she has never smoked. She has never used smokeless tobacco. She reports that she drinks alcohol. She reports that she does not use drugs.   PCP: Eveline Berry  Marital Status: Single     Physical Exam:   not currently breastfeeding.  Wt Readings from Last 4 Encounters:   07/01/22 138.7 kg (305 lb 12.8 oz)   05/26/22 137.9 kg (304 lb 1.6 oz)   01/20/22 135 kg (297 lb 9.6 oz)   01/03/22 136.1 kg (300 lb)       Constitutional: severe obesity, appearing stated age; cooperative  Eyes: nl EOM, PERRLA, vision, conjunctiva, sclera  ENT: nl external ears, nose, hearing, lips, teeth, gums,  throat  Neck: no mass or thyroid enlargement  Resp: Breathing unlabored  Lymph: no cervical, supraclavicular, inguinal or epitrochlear nodes  MS: Mild Osteoarthritis changes were present in the hands, but no inflammatory joint changes at MCPs, wrists, elbows, or shoulders.  Skin: no nail pitting, alopecia, rash, nodules or lesions  Neuro: nl cranial nerves  Psych: nl judgement, orientation, memory, affect.      Laboratory:   RHEUM RESULTS Latest Ref Rng & Units 3/22/2016 5/25/2016 8/24/2016   ALBUMIN 3.4 - 5.0 g/dL 3.5 3.4 3.2(L)   ALT 10 - 35 U/L 22 23 18   AST 10 - 35 U/L 11 16 8   AMAN INTERPRETATION NEG:Negative - - -   CK TOTAL 30 - 225 U/L - - -   CREATININE 0.51 - 0.95 mg/dL 0.91 0.93 0.97   CRP 0.0 - 8.0 mg/L - 11.0(H) 9.8(H)   GFR ESTIMATE, IF BLACK >60 mL/min/[1.73:m2] 79 77 73   GFR ESTIMATE >60 mL/min/1.73m2 65 63 60(L)   HEMATOCRIT 35.0 - 47.0 % 42.0 39.0 39.0   HEMOGLOBIN 11.7 - 15.7 g/dL 13.8 12.7 13.0   HCVAB NR - - Nonreactive   Assay performance characteristics have not been established for newborns,   infants, and children     WBC 4.0 - 11.0 10e3/uL 6.9 6.9 6.2   RBC 3.80 - 5.20 10e6/uL 4.58 4.29 4.21   RDW 10.0 - 15.0 % 12.7 13.5 13.4   MCHC 31.5 - 36.5 g/dL 32.9 32.6 33.3   MCV 78 - 100 fL 92 91 93   PLATELET COUNT 150 - 450 10e3/uL 328 322 284   ESR 0 - 30 mm/hr - - -     AMAN interpretation   Date Value Ref Range Status   04/28/2018 Negative NEG^Negative Final     Comment:                                        Reference range:  <1:40  NEGATIVE  1:40 - 1:80  BORDERLINE POSITIVE  >1:80 POSITIVE

## 2022-12-05 NOTE — TELEPHONE ENCOUNTER
Multiple calls between this RN and patient.   Confirmed video visit for tomorrow at 0915.    All questions answered.    JOAN Liang, RN  RN Care Coordinator Rheumatology

## 2022-12-06 ENCOUNTER — TELEPHONE (OUTPATIENT)
Dept: RHEUMATOLOGY | Facility: CLINIC | Age: 57
End: 2022-12-06

## 2022-12-06 ENCOUNTER — VIRTUAL VISIT (OUTPATIENT)
Dept: RHEUMATOLOGY | Facility: CLINIC | Age: 57
End: 2022-12-06
Attending: INTERNAL MEDICINE
Payer: COMMERCIAL

## 2022-12-06 DIAGNOSIS — Z79.899 ENCOUNTER FOR LONG-TERM (CURRENT) USE OF MEDICATIONS: ICD-10-CM

## 2022-12-06 DIAGNOSIS — M06.09 RHEUMATOID ARTHRITIS OF MULTIPLE SITES WITHOUT RHEUMATOID FACTOR (H): ICD-10-CM

## 2022-12-06 DIAGNOSIS — M54.50 CHRONIC BILATERAL LOW BACK PAIN WITHOUT SCIATICA: ICD-10-CM

## 2022-12-06 DIAGNOSIS — G89.29 CHRONIC BILATERAL LOW BACK PAIN WITHOUT SCIATICA: ICD-10-CM

## 2022-12-06 DIAGNOSIS — M79.7 FIBROMYALGIA: ICD-10-CM

## 2022-12-06 DIAGNOSIS — M13.0 POLYARTHRITIS: ICD-10-CM

## 2022-12-06 PROCEDURE — 99214 OFFICE O/P EST MOD 30 MIN: CPT | Mod: GT | Performed by: INTERNAL MEDICINE

## 2022-12-06 RX ORDER — FOLIC ACID 1 MG/1
TABLET ORAL
Qty: 270 TABLET | Refills: 3 | Status: SHIPPED | OUTPATIENT
Start: 2022-12-06 | End: 2023-09-28

## 2022-12-06 RX ORDER — ADALIMUMAB 40MG/0.4ML
40 KIT SUBCUTANEOUS
Qty: 0.8 ML | Refills: 5 | Status: SHIPPED | OUTPATIENT
Start: 2022-12-06 | End: 2023-06-30

## 2022-12-06 NOTE — PATIENT INSTRUCTIONS
Diagnosis: Inflammatory arthritis, modestly improved with combination treatment.    Plan  1.  Continue methotrexate 20 mg (total 8 tablets) once weekly.While on methotrexate:   -- Check labs every 3 months (AST/ALT, Albumin, CBC with platelets)   -- Limit alcohol intake to 2 drinks weekly; use folate 1 mg daily.  --Tylenol 500-1000 mg can be used as needed up to three times daily for nausea/headache associated with dosing.    2.  Continue Humira 40 mg subcu every other week.

## 2022-12-06 NOTE — LETTER
2022       RE: Elizabeth Rosenthal  [address] [place] [address]     Dear Colleague,    Thank you for referring your patient, Elizabeth Rosenthal, to the [facility] RHEUMATOLOGY [facility]INIC [place] [facility]. Please see a copy of my visit note below.    Elizabeth is a 57 year old who is being evaluated via a billable video visit.      Patient denies any changes since echeck-in regarding medication and allergies and states all information entered during echeck-in remains accurate.    How would you like to obtain your AVS? MyChart  If the video visit is dropped, the invitation should be resent by: Text to cell phone: [phone]  Will anyone else be joining your video visit? No        Video-Visit Details    Video Start Time: 9:25 AM    Type of service:  Video Visit    Video End Time: 0945 AM    Originating Location (pt. Location): Home        Distant Location (provider location):  Off-site    Platform used for Video Visit: [facility]  Rheumatology Clinic  Denilson Kelley MD  2022     Name: Elizabeth Rosenthal  MRN: [id number]  Age: 57 year old  : 1965  Referring provider: Referred Self    Assessment and Plan:  # Seronegative rheumatoid arthritis  Patient relates reduction/stabilization in diffuse arthralgia, associated with morning stiffness, and reduced heel and ankle pain.  Vdieo exam shows normal fist formation, changes of osteoarthritis in the hands, and preserved wrist, elbow, and shoulder range of motion.  Laboratory evaluation on 2022 showed Cr 1.16, CBC normal.  LFT WNL.    Inflammatory arthropathy is mildly improved with specific relief of enthesitis symptoms including Achilles tendinitis.  I think that patient is benefiting from combination therapy, with addition of adalimumab to methotrexate in summer 2022.  I recommend continuing combination treatment, albeit with a slightly lower dose of methotrexate to reduce  "gastrointestinal symptoms adverse effects.    # Bilateral knee pain:  cartilage loss/degenerative disease remains major pain generator. I agree with planned total knee replacement surgery on the left pending weight loss. I recommend continuing isometric exercises, weight management, and use of lidocaine patches, heating pad, rest for pain control, as well as gabapentin 300 mg up to tid.     #Shortness of breath: Did not discuss today. Cause of slowly progressive dyspnea remains unclear, but symptoms remain stable. Continue evaluation through primary care, pulmonary, and potentially cardiology to discern possible contributions from thoracic organs.    Plan  1.  Continue methotrexate 20 mg (total 8 tablets) once weekly.While on methotrexate:   -- Check labs every 3 months (AST/ALT, Albumin, CBC with platelets)   -- Limit alcohol intake to 2 drinks weekly; use folate 1 mg daily.  --Tylenol 500-1000 mg can be used as needed up to three times daily for nausea/headache associated with dosing.    2.  Continue Humira 40 mg subcu every other week.    Follow-up: Return to clinic in 6 months.    Denilson Kelley MD  Staff Rheumatologist, [facility]      HPI:   Elizabeth Rosenthal presents for follow up of seronegative rheumatoid arthritis as well as fibromyalgia and osteoarthritis of knees and spine.  She was last seen in rheumatology in 5-2022, when inflammatory arthritis was judged worse.  Recommendation was to add Humira to methotrexate treatment.    Interval history Dec 6, 2022    She feels mildly improved with addition of humira to methotrexate. Initially, she had more joint pain for 3 months after startign humira.  Former pain in her heels is much improved. Hands and wrists are \"a little better\".  Knees/shoulders/ankles are most often affected bu t not severely. She still has trouble with ascending stairs, raking leaves, standing for prolonged periods.  She is not sure whether reduced stamina is due to long COVID or incomplete " "control of rheumatic disease.  Early morning stiffness is negligible, but she has lingering stiffness for about 1 hour in the mornings. She does not use OTC.     Humira 40 mg every other week.  Methotrexate 8 tabs weekly; she reduced from 10 tabs due to GI symptoms. Using folic acid.    Interval history May 24, 2022    She wonders whether diet changes are affecting inflammatory burden. She cut back on dairy, gluten, processed sugar and was feeling better in October 2021. She had COVID19 in 2-2022 and had sore throat and cough, fatigue, sick for 3 weeks, and started eating ice cream. Her CRP shot up.  She did have mAb infusions for COVID19. She states that she has been \"sick for 18 months\" since probable COVID19 infection in 2-2020    Her sleep schedule and mood have stabilized with use buproprion and work reduction.     She has painful elbows, wrists, knees, andkles on a daily basis. +.- visible swelling    Exercise tolerance is low, but is better than during the 18 months after presumed COVID19.    Sleep quality at night is improved, but sleep is still non-restorative. Early morning stiffness is ~ 1 hour, and she is fatigued.     Joint pain is generally worse with use, especially wrists with prolonged activity.    Gabapentin and lidocaine patches and rest give some relief from pain.    She does have increased stomach upset at methotrexate 10 mg weekly. She takes folate 3 mg daily.  Shortness of breath symptoms are stable.      Interval history 1-    Joints are doing well. After methotrexate dose increase 3 mos ago, she noted less pain, less stiffness since shortly after changing dose.  No early morning stiffness, no morning pain.   She has resumed more regular exercise and activity. Energy is improved for cleaning/chores.  Gabapentin 1-2 doses daily, not sure for what symptoms; fatigue and neuritic pain are less.  Recently increased buproprion to 300 mg.        Review of Systems:   Pertinent items are noted " in HPI or as below, remainder of complete ROS is negative.      No recent problems with hearing or vision. No swallowing problems.   No breathing difficulty, shortness of breath, coughing, or wheezing  No chest pain or palpitations  No heart burn, indigestion, abdominal pain, nausea, vomiting, diarrhea  No urination problems, no bloody, cloudy urine, no dysuria  No numbing, tingling, weakness  No headaches or confusion  No rashes. No easy bleeding or bruising.     Active Medications:   Current Outpatient Medications   Medication Sig     adalimumab (HUMIRA *CF* PEN) 40 MG/0.4ML pen kit Inject 0.4 mLs (40 mg) Subcutaneous every 14 days     albuterol (PROAIR HFA/PROVENTIL HFA/VENTOLIN HFA) 108 (90 Base) MCG/ACT inhaler Inhale 1-2 puffs into the lungs every 4 hours as needed for shortness of breath / dyspnea or wheezing     aspirin 81 MG tablet Take 1 tablet (81 mg) by mouth daily     buPROPion (WELLBUTRIN XL) 300 MG 24 hr tablet Take 1 tablet (300 mg) by mouth every morning     cetirizine (ZYRTEC) 10 MG tablet Take 10 mg by mouth daily     cyclobenzaprine (FLEXERIL) 5 MG tablet Take 5 mg by mouth daily as needed     escitalopram (LEXAPRO) 20 MG tablet Take 1 tablet (20 mg) by mouth daily     fluticasone (FLOVENT HFA) 110 MCG/ACT inhaler Inhale 2 puffs into the lungs 2 times daily     folic acid (FOLVITE) 1 MG tablet Take 3 tablets (3 mg) by mouth daily     gabapentin (NEURONTIN) 300 MG capsule Take 1 capsule (300 mg) by mouth 3 times daily     lidocaine (LIDODERM) 5 % patch Apply 1-3 patches onto the skin every 24 hours as needed ** Patches may be left on up to 12 hours in a 24 hour period**     lisinopril (ZESTRIL) 10 MG tablet Take 1 tablet (10 mg) by mouth daily     methotrexate 2.5 MG tablet Take 10 tablets (25 mg) by mouth every 7 days     pantoprazole (PROTONIX) 20 MG EC tablet Take 1 tablet (20 mg) by mouth 2 times daily (Patient taking differently: Take 20 mg by mouth daily)     VITAMIN D, CHOLECALCIFEROL, PO  Take 2,000 Units by mouth daily     zolpidem (AMBIEN) 5 MG tablet Take 1 tablet (5 mg) by mouth nightly as needed for sleep     No current facility-administered medications for this visit.         Allergies:   Nuts   Celery Oil   Codeine   Contrast Dye   Food   Potatoes  Peanuts  Cantaloupe  Lettuce  Oat   Penicillins   Rice   Shellfish-Derived Products   Wheat Bran   Yeast       Past Medical History:  Allergic rhinitis    Asthma   Chronic pelvic pain in female   Depression   Depressive disorder   Gastroesophageal reflux disease   Head injury   Hyperlipidemia   Hypertension   Immunosuppression    Migraines   Morbid obesity   Obstructive sleep apnea   Reduced vision   Transient ischemic attack  Vertebral artery dissection  Iliotibial band syndrome  Right knee pain  Lumbar radicular pain  Rheumatoid arthritis of multiple sites without rheumatoid factor  Fibromyalgia  Arthritis of knee, left  Creatinine elevation     Past Surgical History:  Arthroscopy knee bilateral    Biopsy lymph node cervical    Colonoscopy 7/26/2017  Lymph node biopsy 2010  Genitourinary surgery, fallopian tube cyst 1994 and tubal ligation 1999  Lymph node removed from neck for biopsy 2011   Hysteroscopy    Tonsillectomy, bilateral 1/3/2018    Family History:    The patient's family history includes Asthma in her paternal grandmother; Breast Cancer in her mother; Cancer in her mother; Cerebrovascular Disease in her paternal grandmother; Heart Disease in her father; Mental Illness in her father; Migraines in her daughter and son; Substance Abuse in her father.    Social History:  The patient reports that she has never smoked. She has never used smokeless tobacco. She reports that she drinks alcohol. She reports that she does not use drugs.   PCP: Eveline Berry  Marital Status: Single     Physical Exam:   not currently breastfeeding.  Wt Readings from Last 4 Encounters:   07/01/22 138.7 kg (305 lb 12.8 oz)   05/26/22 137.9 kg (304 lb 1.6 oz)    01/20/22 135 kg (297 lb 9.6 oz)   01/03/22 136.1 kg (300 lb)       Constitutional: severe obesity, appearing stated age; cooperative  Eyes: nl EOM, PERRLA, vision, conjunctiva, sclera  ENT: nl external ears, nose, hearing, lips, teeth, gums, throat  Neck: no mass or thyroid enlargement  Resp: Breathing unlabored  Lymph: no cervical, supraclavicular, inguinal or epitrochlear nodes  MS: Mild Osteoarthritis changes were present in the hands, but no inflammatory joint changes at MCPs, wrists, elbows, or shoulders.  Skin: no nail pitting, alopecia, rash, nodules or lesions  Neuro: nl cranial nerves  Psych: nl judgement, orientation, memory, affect.      Laboratory:   RHEUM RESULTS Latest Ref Rng & Units 3/22/2016 5/25/2016 8/24/2016   ALBUMIN 3.4 - 5.0 g/dL 3.5 3.4 3.2(L)   ALT 10 - 35 U/L 22 23 18   AST 10 - 35 U/L 11 16 8   AMAN INTERPRETATION NEG:Negative - - -   CK TOTAL 30 - 225 U/L - - -   CREATININE 0.51 - 0.95 mg/dL 0.91 0.93 0.97   CRP 0.0 - 8.0 mg/L - 11.0(H) 9.8(H)   GFR ESTIMATE, IF BLACK >60 mL/min/[1.73:m2] 79 77 73   GFR ESTIMATE >60 mL/min/1.73m2 65 63 60(L)   HEMATOCRIT 35.0 - 47.0 % 42.0 39.0 39.0   HEMOGLOBIN 11.7 - 15.7 g/dL 13.8 12.7 13.0   HCVAB NR - - Nonreactive   Assay performance characteristics have not been established for newborns,   infants, and children     WBC 4.0 - 11.0 10e3/uL 6.9 6.9 6.2   RBC 3.80 - 5.20 10e6/uL 4.58 4.29 4.21   RDW 10.0 - 15.0 % 12.7 13.5 13.4   MCHC 31.5 - 36.5 g/dL 32.9 32.6 33.3   MCV 78 - 100 fL 92 91 93   PLATELET COUNT 150 - 450 10e3/uL 328 322 284   ESR 0 - 30 mm/hr - - -     AMAN interpretation   Date Value Ref Range Status   04/28/2018 Negative NEG^Negative Final     Comment:                                        Reference range:  <1:40  NEGATIVE  1:40 - 1:80  BORDERLINE POSITIVE  >1:80 POSITIVE             Again, thank you for allowing me to participate in the care of your patient.      Sincerely,    Denilson Kelley MD

## 2022-12-06 NOTE — TELEPHONE ENCOUNTER
Spoke with patient. She would like a call back at a later time. She was in the middle of a training session for work.     Please schedule pt for a 6 month IN-Person follow up with Dr. Kelley

## 2022-12-26 ENCOUNTER — HEALTH MAINTENANCE LETTER (OUTPATIENT)
Age: 57
End: 2022-12-26

## 2022-12-29 ENCOUNTER — TELEPHONE (OUTPATIENT)
Dept: INTERNAL MEDICINE | Facility: CLINIC | Age: 57
End: 2022-12-29

## 2022-12-29 NOTE — TELEPHONE ENCOUNTER
Please abstract the following data from this visit with this patient into the appropriate field in Epic:    Tests that can be patient reported without a hard copy:        Other Tests found in the patient's chart through Chart Review/Care Everywhere:    Pap smear done by this group [facility] this date: 7/1/21 and 8/25/21 and HPV done by this group [facility] on this date: 8/25/21    Note to Abstraction: If this section is blank, no results were found via Chart Review/Care Everywhere.

## 2023-01-13 ENCOUNTER — TELEPHONE (OUTPATIENT)
Dept: FAMILY MEDICINE | Facility: CLINIC | Age: 58
End: 2023-01-13

## 2023-01-13 DIAGNOSIS — S82.891A ANKLE FRACTURE, RIGHT, CLOSED, INITIAL ENCOUNTER: Primary | ICD-10-CM

## 2023-01-13 RX ORDER — OXYCODONE HYDROCHLORIDE 5 MG/1
5 TABLET ORAL EVERY 6 HOURS PRN
Qty: 12 TABLET | Refills: 0 | Status: SHIPPED | OUTPATIENT
Start: 2023-01-13 | End: 2023-01-16

## 2023-01-13 NOTE — TELEPHONE ENCOUNTER
Reason for Call:  Other prescription    Detailed comments: Patient called because she broke her leg on Wednesday she went to the hospital and they prescribe Oxycodone 5 mg tablet but she wants to know if the provider can send her a medication refill for the weekend. Please call back     Phone Number Patient can be reached at: Home number on file [phone] (home)    Best Time: Any time     Can we leave a detailed message on this number? YES    Call taken on 1/13/2023 at 1:49 PM by Nereyda Mckay

## 2023-01-13 NOTE — TELEPHONE ENCOUNTER
"Forwarding to provider to review/advise regarding prescription request.     Care Everywhere updated. Pt seen at [facility] emergency department 01/11/2023. She was given 5 tablets of oxyCODONE (ROXICODONE) 5 mg oral tablet.   Per ED notes, \"Use Tylenol and ibuprofen as needed for pain control. I prescribed 5 tablets of oxycodone for severe breakthrough pain. Only the use this as needed.\"    Pt would want to use [facility] [address].    Seeing orthopedic provider on Monday. States that she won't die if she doesn't get it, but might whine a little in pain.    Licha Matute RN   [facility]  "

## 2023-01-13 NOTE — TELEPHONE ENCOUNTER
"Spoke with patient and gave PCP message:    \"Of course.   rx sent  Please discuss a typical bowel regimen to be heeded during this therapy\"    Encouraged fluid intake and OTC use of stool softeners/laxatives with use of constipating medication.    Patient verbalized understanding     Juanita Smith RN  [facility]    "

## 2023-01-31 ENCOUNTER — TELEPHONE (OUTPATIENT)
Dept: RHEUMATOLOGY | Facility: CLINIC | Age: 58
End: 2023-01-31
Payer: COMMERCIAL

## 2023-01-31 NOTE — TELEPHONE ENCOUNTER
Message left following up that [facility] has been attempting to reach you and they sent a message to us.    Caroline Cook, BSN, RN  RN Care Coordinator Rheumatology

## 2023-02-03 DIAGNOSIS — R10.84 ABDOMINAL PAIN, GENERALIZED: ICD-10-CM

## 2023-02-03 RX ORDER — PANTOPRAZOLE SODIUM 20 MG/1
20 TABLET, DELAYED RELEASE ORAL 2 TIMES DAILY
Qty: 180 TABLET | Refills: 1 | Status: SHIPPED | OUTPATIENT
Start: 2023-02-03 | End: 2023-07-31

## 2023-02-03 NOTE — TELEPHONE ENCOUNTER
Pending Prescriptions:                       Disp   Refills    pantoprazole (PROTONIX) 20 MG EC tablet   180 ta*3            Sig: Take 1 tablet (20 mg) by mouth 2 times daily

## 2023-03-01 DIAGNOSIS — M54.50 CHRONIC BILATERAL LOW BACK PAIN WITHOUT SCIATICA: ICD-10-CM

## 2023-03-01 DIAGNOSIS — M79.7 FIBROMYALGIA: ICD-10-CM

## 2023-03-01 DIAGNOSIS — M06.09 RHEUMATOID ARTHRITIS OF MULTIPLE SITES WITHOUT RHEUMATOID FACTOR (H): ICD-10-CM

## 2023-03-01 DIAGNOSIS — Z79.899 ENCOUNTER FOR LONG-TERM (CURRENT) USE OF MEDICATIONS: ICD-10-CM

## 2023-03-01 DIAGNOSIS — M13.0 POLYARTHRITIS: ICD-10-CM

## 2023-03-01 DIAGNOSIS — G89.29 CHRONIC BILATERAL LOW BACK PAIN WITHOUT SCIATICA: ICD-10-CM

## 2023-03-02 DIAGNOSIS — F33.1 MAJOR DEPRESSIVE DISORDER, RECURRENT, MODERATE (H): ICD-10-CM

## 2023-03-02 RX ORDER — BUPROPION HYDROCHLORIDE 300 MG/1
300 TABLET ORAL EVERY MORNING
Qty: 90 TABLET | Refills: 3 | Status: SHIPPED | OUTPATIENT
Start: 2023-03-02 | End: 2023-07-21

## 2023-03-02 RX ORDER — LIDOCAINE 50 MG/G
PATCH TOPICAL
Qty: 240 PATCH | Refills: 2 | Status: SHIPPED | OUTPATIENT
Start: 2023-03-02

## 2023-03-02 NOTE — TELEPHONE ENCOUNTER
Pending Prescriptions:                       Disp   Refills    buPROPion (WELLBUTRIN XL) 300 MG 24 hr ta*90 tab*3            Sig: Take 1 tablet (300 mg) by mouth every morning

## 2023-03-02 NOTE — TELEPHONE ENCOUNTER
Message left for patient to call this RN back to discuss this refill.  Will also send MyChart message.    JOAN Liang, RN  RN Care Coordinator Rheumatology

## 2023-03-02 NOTE — TELEPHONE ENCOUNTER
Patient returned call to this RN.  She has been using the patches for many years.   She used them when she waiting for her Humira.   She uses them when she wants to be proactive with activity.   She uses them probably 1-2 xweek.  Patient uses the patches on her lower back, hips, knees, ankles, wrists and shoulders.  Patient stated that it is hard to decipher her pain, feels it is rheumatologic pain.    Discussed scheduling follow-up with Dr. Kelley, patient will call to schedule.  Confirmed she has standing lab orders in her chart.  Patient will have labs drawn in March.    All questions answered.    Will route update to Dr. Kelley for review.    JOAN Liang, RN  RN Care Coordinator Rheumatology

## 2023-03-02 NOTE — TELEPHONE ENCOUNTER
Medication/Dose: lidocaine (LIDODERM) 5 % patch    Last Written : 4/6/22  Last Quantity: 120 patches, # refills: 1  Last Office Visit :  12/6/22  Pending appointment: none on file     Prescription set up and routed to provider.    Jose A Sanabria, MAINN, RN  [facility] Refill Team

## 2023-03-02 NOTE — TELEPHONE ENCOUNTER
Please explore the nature of the symptoms for which patient is continuing to use lidocaine patches with her.  Usually chronic pain management with topicals like this is done through primary care or pain clinic.  I am happy to continue prescribing if there is rheumatic disease cause for the pain that is driving the need for lidocaine patches.

## 2023-03-03 ENCOUNTER — TELEPHONE (OUTPATIENT)
Dept: RHEUMATOLOGY | Facility: CLINIC | Age: 58
End: 2023-03-03
Payer: COMMERCIAL

## 2023-03-07 NOTE — TELEPHONE ENCOUNTER
Central Prior Authorization Team   Phone: [phone]      PA Initiation    Medication: lidocaine (LIDODERM) 5 % patch  Insurance Company: [employer] ([facility]) - Phone [phone] Fax [phone]  Pharmacy Filling the Rx: [facility] - [place], [place] - [address]   Filling Pharmacy Phone: [phone]  Filling Pharmacy Fax:    Start Date: 3/7/2023         No lymphadedenopathy

## 2023-06-06 ENCOUNTER — TELEPHONE (OUTPATIENT)
Dept: RHEUMATOLOGY | Facility: CLINIC | Age: 58
End: 2023-06-06
Payer: COMMERCIAL

## 2023-06-06 NOTE — TELEPHONE ENCOUNTER
PA Initiation    Medication: HUMIRA *CF* PEN 40 MG/0.4ML SC PNKT  Insurance Company: [employer] ([facility]) - Phone [phone] Fax [phone]  Pharmacy Filling the Rx: [facility] - [place], [place] - [address] [facility]  Filling Pharmacy Phone:    Filling Pharmacy Fax:    Start Date: 6/6/2023    [id number]

## 2023-06-12 NOTE — TELEPHONE ENCOUNTER
Prior Authorization Approval    Medication: HUMIRA *CF* PEN 40 MG/0.4ML SC PNKT  Authorization Effective Date: 6/12/2023  Authorization Expiration Date: 6/6/2024  Approved Dose/Quantity: [id number]  Reference #: [id number]   Insurance Company: [employer] ([facility]) - Phone [phone] Fax [phone]  Expected CoPay:       CoPay Card Available:      Financial Assistance Needed: no  Which Pharmacy is filling the prescription: [facility] - [address]  Pharmacy Notified: Yes  Patient Notified: Yes

## 2023-06-28 DIAGNOSIS — M06.09 RHEUMATOID ARTHRITIS OF MULTIPLE SITES WITHOUT RHEUMATOID FACTOR (H): ICD-10-CM

## 2023-06-30 RX ORDER — ADALIMUMAB 40MG/0.4ML
KIT SUBCUTANEOUS
Qty: 0.8 ML | Refills: 5 | Status: SHIPPED | OUTPATIENT
Start: 2023-06-30 | End: 2023-09-28

## 2023-07-10 ENCOUNTER — OFFICE VISIT (OUTPATIENT)
Dept: SLEEP MEDICINE | Facility: CLINIC | Age: 58
End: 2023-07-10
Payer: COMMERCIAL

## 2023-07-10 VITALS
DIASTOLIC BLOOD PRESSURE: 56 MMHG | BODY MASS INDEX: 47.09 KG/M2 | SYSTOLIC BLOOD PRESSURE: 94 MMHG | HEART RATE: 80 BPM | WEIGHT: 293 LBS | HEIGHT: 66 IN

## 2023-07-10 DIAGNOSIS — G47.09 OTHER INSOMNIA: ICD-10-CM

## 2023-07-10 DIAGNOSIS — G47.33 OBSTRUCTIVE SLEEP APNEA: Primary | ICD-10-CM

## 2023-07-10 PROCEDURE — 99214 OFFICE O/P EST MOD 30 MIN: CPT

## 2023-07-10 RX ORDER — ZOLPIDEM TARTRATE 5 MG/1
5 TABLET ORAL
Qty: 30 TABLET | Refills: 0 | Status: SHIPPED | OUTPATIENT
Start: 2023-07-10 | End: 2024-02-02

## 2023-07-10 ASSESSMENT — SLEEP AND FATIGUE QUESTIONNAIRES
HOW LIKELY ARE YOU TO NOD OFF OR FALL ASLEEP WHILE SITTING INACTIVE IN A PUBLIC PLACE: WOULD NEVER DOZE
HOW LIKELY ARE YOU TO NOD OFF OR FALL ASLEEP WHEN YOU ARE A PASSENGER IN A CAR FOR AN HOUR WITHOUT A BREAK: MODERATE CHANCE OF DOZING
HOW LIKELY ARE YOU TO NOD OFF OR FALL ASLEEP WHILE WATCHING TV: WOULD NEVER DOZE
HOW LIKELY ARE YOU TO NOD OFF OR FALL ASLEEP IN A CAR, WHILE STOPPED FOR A FEW MINUTES IN TRAFFIC: WOULD NEVER DOZE
HOW LIKELY ARE YOU TO NOD OFF OR FALL ASLEEP WHILE SITTING AND TALKING TO SOMEONE: WOULD NEVER DOZE
HOW LIKELY ARE YOU TO NOD OFF OR FALL ASLEEP WHILE SITTING AND READING: SLIGHT CHANCE OF DOZING
HOW LIKELY ARE YOU TO NOD OFF OR FALL ASLEEP WHILE SITTING QUIETLY AFTER LUNCH WITHOUT ALCOHOL: SLIGHT CHANCE OF DOZING
HOW LIKELY ARE YOU TO NOD OFF OR FALL ASLEEP WHILE LYING DOWN TO REST IN THE AFTERNOON WHEN CIRCUMSTANCES PERMIT: MODERATE CHANCE OF DOZING

## 2023-07-10 NOTE — PATIENT INSTRUCTIONS

## 2023-07-10 NOTE — PROGRESS NOTES
Sleep Apnea - Follow-up Visit:    Impression/Plan:  (G47.33) Obstructive sleep apnea (primary encounter diagnosis)  Comment: Elizabeth presents to the sleep clinic for follow-up of her severe sleep apnea managed with CPAP. She is tolerating CPAP well. Daytime symptoms are improved. She uses her CPAP ever night and feels benefit. She says prior to CPAP she felt she had to sleep all day. She is 100% compliant and her residual AHI is 6.5 events/hr. Leak is normal at 8.7 L/min. She is tolerating the pressure. She cleans her mask regularly and replaces her supplies around every 6 months.  Plan: Comprehensive DME        Continue on Auto-PAP 7-12 cm H2O. An order was placed today for new supplies and a replacement machine. Her machine is recalled, and Elizabeth reports that she has registered her machine and been in contact with [employer]. We reviewed recommended cleaning and replacement schedule for equipment. Her slightly elevated AHI is not concerning. 2.6 events/hr are central apneas that are likely transitional or related to her being awake with her CPAP on based on when they are occuring in the night. She reports wearing her CPAP for a while prior to falling asleep or in the morning.     (G47.09) Other insomnia  Comment: Elizabeth occasionally has trouble falling asleep and maintaining sleep since having COVID in February 2022. She reports that she thinks she also had long COVID in March 2020. She usually can fall asleep but will then wake around 3-4 AM and not be able to go back to sleep. Her psychiatrist had previously prescribed a 30-day supply of zolpidem 5 mg tablets This was last prescribed 07/05/2022, and Elizabeth reports that she just ran out. She only takes the zolpidem 2-3 times per month when she is struggling with insomnia. She regularly takes 3 mg of melatonin that typically helps.    Plan: I will renew a 30-day prescription for zolpidem 5 mg tablets with no refills. She can take 2.5-5 mg as needed. We  discussed behavioral changes she can make that can help with insomnia including avoiding naps, stimulus control, going to bed when sleepy, and not spending too much time in bed. Elizabeth reports that she attended a CBT-I appointment with her daughter in the past. She has had pain medications such as oxycodone in the past for knee related issues. Informed her that she can never take the zolpidem with pain medications shall she be prescribed something for pain in the future. She was understanding of this.    Elizabeth Rosenthal will follow up in about 1 year(s) or sooner if a CPAP compliance visit with her new machine is required.     33 minutes spent with patient, all of which were spent face-to-face counseling, consulting, coordinating plan of care.      CC:  Laurel Bhakta MD      History of Present Illness:  Chief Complaint   Patient presents with     Sleep Apnea     Elizabeth Rosenthal presents for follow-up of her severe sleep apnea, managed with CPAP. She was last seen virtually by Dr. Puga on 08/09/2021. She is in need of a new machine. Hers is recalled and over ten years old she believes. She is also in need of a new mask because hers is torn. She thinks she had long COVID. She was having a lot of sleep disturbance, shortness of breath, and post exertional malaise. This has gotten a lot better. She was prescribed zolpidem in the meantime to help with some insomnia. She just takes the zolpidem as needed now. She said a 30 day prescription will last her 9+ months. She will rest for a few hours almost every day but only naps once per week. She doesn't think she has more trouble falling asleep on the days she naps. She takes melatonin 3 mg every night at 8 PM. She takes 300 mg of gabapentin typically once daily. I asked her if the gabapentin helps her sleep at night, but she reports that it does not. She has been purchasing supplies on the Internet. She cleans her mask regularly. She replaces supplies around every six  months.      She was diagnosed on 04/26/2012 at [facility] at 261 lbs. AHI 38.6 and RDI 49.1. She had a full night titration on 05/08/2012 that showed CPAP 7 cm H2O was effective. She slept laterally and supine during the titration and REM was obtained.     No specialty comments available.    Do you use a CPAP Machine at home: Yes  Overall, on a scale of 0-10 how would you rate your CPAP (0 poor, 10 great): 9    What type of mask do you use: Nasal Pillow  Is your mask comfortable: Yes  If not, why:      Is your mask leaking: Yes  If yes, where do you feel it: has a tear  How many night per week does the mask leak (0-7): 7 fine when in tact    Do you notice snoring with mask on: No  Do you notice gasping arousals with mask on: No  Are you having significant oral or nasal dryness: No  Is the pressure setting comfortable:  Yes.   If not, why:     She doesn't use the humidifier. She does not get any skin breakdown.    What is your typical bedtime: 11  How long does it take you to go to sleep on PAP therapy: varies  What time do you typically get out of bed for the day: 7:30 am  How many hours on average per night are you using PAP therapy: 8  How many hours are you sleeping per night: 8  Do you feel well rested in the morning: Yes    Respironics REMstar Auto (System One 60 series)   Auto-PAP 7-12 cmH2O 30 day usage data: 06/10/2023-07/09/2023  100% of days with > 4 hours of use. 0/30 days with no use.   Average use 9 hours 12 minutes per day.   Average leak 8.7 LPM.  Average % of night in large leak 2.2%. CPAP 90% pressure 9 cm. AHI 6.5 events per hour (CA 2.6, OA 0.8, Hypopnea 3.1).     EPWORTH SLEEPINESS SCALE         7/10/2023     8:40 AM    [place] Sleepiness Scale ( MARTHA Watson  1990-1997[facility] [place]/English - Final version - 21 Nov 07 - [facility] [place].)   Sitting and reading Slight chance of dozing   Watching TV Would never doze   Sitting, inactive in a public place (e.g. a theatre or a meeting) Would never doze    As a passenger in a car for an hour without a break Moderate chance of dozing   Lying down to rest in the afternoon when circumstances permit Moderate chance of dozing   Sitting and talking to someone Would never doze   Sitting quietly after a lunch without alcohol Slight chance of dozing   In a car, while stopped for a few minutes in traffic Would never doze   Haywood Score (MC) 6   Haywood Score (Sleep) 6       INSOMNIA SEVERITY INDEX (JAY)          7/10/2023     8:36 AM   Insomnia Severity Index (JAY)   Difficulty falling asleep 2   Difficulty staying asleep 1   Problems waking up too early 0   How SATISFIED/DISSATISFIED are you with your CURRENT sleep pattern? 2   How NOTICEABLE to others do you think your sleep problem is in terms of impairing the quality of your life? 2   How WORRIED/DISTRESSED are you about your current sleep problem? 1   To what extent do you consider your sleep problem to INTERFERE with your daily functioning (e.g. daytime fatigue, mood, ability to function at work/daily chores, concentration, memory, mood, etc.) CURRENTLY? 2   JAY Total Score 10       Guidelines for Scoring/Interpretation:  Total score categories:  0-7 = No clinically significant insomnia   8-14 = Subthreshold insomnia   15-21 = Clinical insomnia (moderate severity)  22-28 = Clinical insomnia (severe)  Used via courtesy of www.[email or web address].[place].gov with permission from Finn You PhD., [facility]      Past medical/surgical history, family history, social history, medications and allergies were reviewed.        Problem List:  Patient Active Problem List    Diagnosis Date Noted     Major depressive disorder, recurrent, moderate (H) 12/01/2021     Priority: Medium     Fatigue, unspecified type 12/01/2021     Priority: Medium     Major depressive disorder, recurrent episode, mild (H) 09/17/2019     Priority: Medium     ACP (advance care planning) 01/18/2018     Priority: Medium     Creatinine elevation 12/21/2017      "Priority: Medium     Arthritis of knee, left 07/21/2016     Priority: Medium     Hyperlipidemia      Priority: Medium     SHERIE (obstructive sleep apnea)      Priority: Medium     has cpap       Morbid obesity with BMI of 45.0-49.9, adult (H)      Priority: Medium     Low back pain      Priority: Medium     Rheumatoid arthritis of multiple sites without rheumatoid factor (H) 05/25/2016     Priority: Medium     Fibromyalgia 05/25/2016     Priority: Medium     Encounter for long-term (current) use of medications 05/25/2016     Priority: Medium     Polyarthritis 03/22/2016     Priority: Medium     Depression 03/22/2016     Priority: Medium     Hypertension goal BP (blood pressure) < 140/90 03/22/2016     Priority: Medium     Mild intermittent asthma without complication 03/22/2016     Priority: Medium     Morbid obesity (H) 03/22/2016     Priority: Medium     Iliotibial band syndrome 01/31/2011     Priority: Medium     Right knee pain 01/31/2011     Priority: Medium     Lumbar radicular pain 01/31/2011     Priority: Medium        BP 94/56   Pulse 80   Ht 1.676 m (5' 5.98\")   Wt 133.6 kg (294 lb 9.6 oz)   BMI 47.57 kg/m      "

## 2023-07-14 ASSESSMENT — ENCOUNTER SYMPTOMS
BREAST MASS: 0
HEADACHES: 1
DIZZINESS: 1

## 2023-07-14 ASSESSMENT — ASTHMA QUESTIONNAIRES: ACT_TOTALSCORE: 22

## 2023-07-21 ENCOUNTER — OFFICE VISIT (OUTPATIENT)
Dept: FAMILY MEDICINE | Facility: CLINIC | Age: 58
End: 2023-07-21
Payer: COMMERCIAL

## 2023-07-21 VITALS
DIASTOLIC BLOOD PRESSURE: 77 MMHG | HEART RATE: 78 BPM | SYSTOLIC BLOOD PRESSURE: 121 MMHG | WEIGHT: 293 LBS | BODY MASS INDEX: 47.09 KG/M2 | OXYGEN SATURATION: 96 % | HEIGHT: 66 IN

## 2023-07-21 DIAGNOSIS — E66.01 MORBID OBESITY WITH BMI OF 45.0-49.9, ADULT (H): ICD-10-CM

## 2023-07-21 DIAGNOSIS — Z13.220 SCREENING FOR HYPERLIPIDEMIA: ICD-10-CM

## 2023-07-21 DIAGNOSIS — F33.1 MAJOR DEPRESSIVE DISORDER, RECURRENT, MODERATE (H): ICD-10-CM

## 2023-07-21 DIAGNOSIS — R73.9 ELEVATED BLOOD SUGAR: ICD-10-CM

## 2023-07-21 DIAGNOSIS — F33.1 MODERATE EPISODE OF RECURRENT MAJOR DEPRESSIVE DISORDER (H): ICD-10-CM

## 2023-07-21 DIAGNOSIS — N18.31 CHRONIC KIDNEY DISEASE, STAGE 3A (H): ICD-10-CM

## 2023-07-21 DIAGNOSIS — Z79.899 ENCOUNTER FOR LONG-TERM (CURRENT) USE OF MEDICATIONS: ICD-10-CM

## 2023-07-21 DIAGNOSIS — Z00.00 ROUTINE GENERAL MEDICAL EXAMINATION AT A HEALTH CARE FACILITY: Primary | ICD-10-CM

## 2023-07-21 DIAGNOSIS — M06.09 RHEUMATOID ARTHRITIS OF MULTIPLE SITES WITHOUT RHEUMATOID FACTOR (H): ICD-10-CM

## 2023-07-21 DIAGNOSIS — I10 HYPERTENSION GOAL BP (BLOOD PRESSURE) < 140/90: ICD-10-CM

## 2023-07-21 LAB
ALBUMIN SERPL BCG-MCNC: 4 G/DL (ref 3.5–5.2)
ALT SERPL W P-5'-P-CCNC: 9 U/L (ref 0–50)
ANION GAP SERPL CALCULATED.3IONS-SCNC: 10 MMOL/L (ref 7–15)
AST SERPL W P-5'-P-CCNC: 23 U/L (ref 0–45)
BUN SERPL-MCNC: 17.8 MG/DL (ref 8–23)
CALCIUM SERPL-MCNC: 9.3 MG/DL (ref 8.6–10)
CHLORIDE SERPL-SCNC: 105 MMOL/L (ref 98–107)
CHOLEST SERPL-MCNC: 238 MG/DL
CREAT SERPL-MCNC: 1.25 MG/DL (ref 0.51–0.95)
DEPRECATED HCO3 PLAS-SCNC: 26 MMOL/L (ref 22–29)
ERYTHROCYTE [DISTWIDTH] IN BLOOD BY AUTOMATED COUNT: 15.1 % (ref 10–15)
GFR SERPL CREATININE-BSD FRML MDRD: 49 ML/MIN/1.73M2
GLUCOSE SERPL-MCNC: 88 MG/DL (ref 70–99)
HBA1C MFR BLD: 5.9 % (ref 0–5.6)
HCT VFR BLD AUTO: 39.2 % (ref 35–47)
HDLC SERPL-MCNC: 62 MG/DL
HGB BLD-MCNC: 12.9 G/DL (ref 11.7–15.7)
LDLC SERPL CALC-MCNC: 153 MG/DL
MCH RBC QN AUTO: 30.4 PG (ref 26.5–33)
MCHC RBC AUTO-ENTMCNC: 32.9 G/DL (ref 31.5–36.5)
MCV RBC AUTO: 93 FL (ref 78–100)
NONHDLC SERPL-MCNC: 176 MG/DL
PLATELET # BLD AUTO: 303 10E3/UL (ref 150–450)
POTASSIUM SERPL-SCNC: 4.3 MMOL/L (ref 3.4–5.3)
RBC # BLD AUTO: 4.24 10E6/UL (ref 3.8–5.2)
SODIUM SERPL-SCNC: 141 MMOL/L (ref 136–145)
TRIGL SERPL-MCNC: 114 MG/DL
WBC # BLD AUTO: 5.4 10E3/UL (ref 4–11)

## 2023-07-21 PROCEDURE — 36415 COLL VENOUS BLD VENIPUNCTURE: CPT | Performed by: PHYSICIAN ASSISTANT

## 2023-07-21 PROCEDURE — 82040 ASSAY OF SERUM ALBUMIN: CPT | Performed by: PHYSICIAN ASSISTANT

## 2023-07-21 PROCEDURE — 84450 TRANSFERASE (AST) (SGOT): CPT | Performed by: PHYSICIAN ASSISTANT

## 2023-07-21 PROCEDURE — 80061 LIPID PANEL: CPT | Performed by: PHYSICIAN ASSISTANT

## 2023-07-21 PROCEDURE — 84460 ALANINE AMINO (ALT) (SGPT): CPT | Performed by: PHYSICIAN ASSISTANT

## 2023-07-21 PROCEDURE — 80048 BASIC METABOLIC PNL TOTAL CA: CPT | Performed by: PHYSICIAN ASSISTANT

## 2023-07-21 PROCEDURE — 83036 HEMOGLOBIN GLYCOSYLATED A1C: CPT | Performed by: PHYSICIAN ASSISTANT

## 2023-07-21 PROCEDURE — 99214 OFFICE O/P EST MOD 30 MIN: CPT | Mod: 25 | Performed by: PHYSICIAN ASSISTANT

## 2023-07-21 PROCEDURE — 0121A COVID-19 BIVALENT 12+ (PFIZER): CPT | Performed by: PHYSICIAN ASSISTANT

## 2023-07-21 PROCEDURE — 85027 COMPLETE CBC AUTOMATED: CPT | Performed by: PHYSICIAN ASSISTANT

## 2023-07-21 PROCEDURE — 99396 PREV VISIT EST AGE 40-64: CPT | Mod: 25 | Performed by: PHYSICIAN ASSISTANT

## 2023-07-21 PROCEDURE — 91312 COVID-19 BIVALENT 12+ (PFIZER): CPT | Performed by: PHYSICIAN ASSISTANT

## 2023-07-21 RX ORDER — ESCITALOPRAM OXALATE 20 MG/1
20 TABLET ORAL DAILY
Qty: 90 TABLET | Refills: 3 | Status: SHIPPED | OUTPATIENT
Start: 2023-07-21 | End: 2024-07-22

## 2023-07-21 RX ORDER — BUPROPION HYDROCHLORIDE 300 MG/1
300 TABLET ORAL EVERY MORNING
Qty: 90 TABLET | Refills: 3 | Status: SHIPPED | OUTPATIENT
Start: 2023-07-21 | End: 2024-07-22

## 2023-07-21 RX ORDER — LISINOPRIL 10 MG/1
10 TABLET ORAL DAILY
Qty: 90 TABLET | Refills: 3 | Status: SHIPPED | OUTPATIENT
Start: 2023-07-21 | End: 2024-07-22

## 2023-07-21 ASSESSMENT — ENCOUNTER SYMPTOMS
HEADACHES: 1
BREAST MASS: 0
DIZZINESS: 1

## 2023-07-21 ASSESSMENT — PATIENT HEALTH QUESTIONNAIRE - PHQ9
SUM OF ALL RESPONSES TO PHQ QUESTIONS 1-9: 2
SUM OF ALL RESPONSES TO PHQ QUESTIONS 1-9: 2
10. IF YOU CHECKED OFF ANY PROBLEMS, HOW DIFFICULT HAVE THESE PROBLEMS MADE IT FOR YOU TO DO YOUR WORK, TAKE CARE OF THINGS AT HOME, OR GET ALONG WITH OTHER PEOPLE: SOMEWHAT DIFFICULT

## 2023-07-21 NOTE — PROGRESS NOTES
SUBJECTIVE:   CC: Elizabeth is an 58 year old who presents for preventive health visit.       7/21/2023     1:33 PM   Additional Questions   Roomed by shaka     Healthy Habits:     Getting at least 3 servings of Calcium per day:  Yes    Bi-annual eye exam:  Yes    Dental care twice a year:  Yes    Sleep apnea or symptoms of sleep apnea:  Sleep apnea    Diet:  Other    Frequency of exercise:  1 day/week    Duration of exercise:  15-30 minutes    Taking medications regularly:  Yes    Medication side effects:  Lightheadedness    Additional concerns today:  No      Previously a Dr Bhakta patient.     Recent right ankle trimalleolar fracture. Had this fixed at [facility] [place].     Sees Denilson Kelley - rheumatology.    Colonoscopy - needed 07/2024  Pap - needed 08/2026  Mammo - getting yearly, last 12/2022    Declines shingles, pneumo vaccines.     Today's PHQ-9 Score:       7/21/2023     1:16 PM   PHQ-9 SCORE   PHQ-9 Total Score MyChart 2 (Minimal depression)   PHQ-9 Total Score 2                   Have you ever done Advance Care Planning? (For example, a Health Directive, POLST, or a discussion with a medical provider or your loved ones about your wishes): Yes, advance care planning is on file.    Social History     Tobacco Use     Smoking status: Never     Smokeless tobacco: Never   Substance Use Topics     Alcohol use: Yes     Comment: Jacqueline haywood             7/14/2023     9:58 AM   Alcohol Use   Prescreen: >3 drinks/day or >7 drinks/week? No     Reviewed orders with patient.  Reviewed health maintenance and updated orders accordingly - Yes  BP Readings from Last 3 Encounters:   07/21/23 121/77   07/10/23 94/56   07/01/22 126/86    Wt Readings from Last 3 Encounters:   07/21/23 134.6 kg (296 lb 12.8 oz)   07/10/23 133.6 kg (294 lb 9.6 oz)   07/01/22 138.7 kg (305 lb 12.8 oz)                    Breast Cancer Screening:    FHS-7:       8/10/2021    10:30 AM 7/1/2022    10:58 AM 12/2/2022     7:35 AM 7/14/2023    10:01 AM    Breast CA Risk Assessment (FHS-7)   Did any of your first-degree relatives have breast or ovarian cancer? Yes Yes Yes Yes   Did any of your relatives have bilateral breast cancer? No Unknown No Yes   Did any man in your family have breast cancer? No No No No   Did any woman in your family have breast and ovarian cancer? No Yes No Yes   Did any woman in your family have breast cancer before age 50 y? No No No No   Do you have 2 or more relatives with breast and/or ovarian cancer? Yes Yes Yes Yes   Do you have 2 or more relatives with breast and/or bowel cancer? No Yes No No       Mammogram Screening: Recommended mammography every 1-2 years with patient discussion and risk factor consideration  Pertinent mammograms are reviewed under the imaging tab.    History of abnormal Pap smear: NO - age 30-65 PAP every 5 years with negative HPV co-testing recommended      Latest Ref Rng & Units 6/1/2017     8:15 AM 6/1/2017     7:46 AM   PAP / HPV   PAP (Historical)   NIL    HPV 16 DNA NEG Negative     HPV 18 DNA NEG Negative     Other HR HPV NEG Negative       Reviewed and updated as needed this visit by clinical staff   Tobacco  Allergies  Meds              Reviewed and updated as needed this visit by Provider                     Review of Systems   Eyes: Positive for visual disturbance.   Breasts:  Negative for tenderness, breast mass and discharge.   Genitourinary: Negative for pelvic pain, vaginal bleeding and vaginal discharge.   Neurological: Positive for dizziness and headaches.     CONSTITUTIONAL: NEGATIVE for fever, chills, change in weight  INTEGUMENTARY/SKIN: NEGATIVE for worrisome rashes, moles or lesions  EYES: NEGATIVE for vision changes or irritation  ENT: NEGATIVE for ear, mouth and throat problems  RESP: NEGATIVE for significant cough or SOB  BREAST: NEGATIVE for masses, tenderness or discharge  CV: NEGATIVE for chest pain, palpitations or peripheral edema  GI: NEGATIVE for nausea, abdominal pain,  "heartburn, or change in bowel habits  : NEGATIVE for unusual urinary or vaginal symptoms. No vaginal bleeding.  MUSCULOSKELETAL: NEGATIVE for significant arthralgias or myalgia  NEURO: NEGATIVE for weakness, dizziness or paresthesias  PSYCHIATRIC: NEGATIVE for changes in mood or affect      OBJECTIVE:   /77 (BP Location: Right arm, Patient Position: Sitting, Cuff Size: Adult Large)   Pulse 78   Ht 1.676 m (5' 6\")   Wt 134.6 kg (296 lb 12.8 oz)   SpO2 96%   BMI 47.90 kg/m    Physical Exam  GENERAL: healthy, alert and no distress  EYES: Eyes grossly normal to inspection, PERRL and conjunctivae and sclerae normal  HENT: ear canals and TM's normal, nose and mouth without ulcers or lesions  NECK: no adenopathy, no asymmetry, masses, or scars and thyroid normal to palpation  RESP: lungs clear to auscultation - no rales, rhonchi or wheezes  CV: regular rate and rhythm, normal S1 S2, no S3 or S4, no murmur, click or rub, no peripheral edema and peripheral pulses strong  ABDOMEN: soft, nontender, no hepatosplenomegaly, no masses and bowel sounds normal  MS: no gross musculoskeletal defects noted, no edema  SKIN: no suspicious lesions or rashes  NEURO: Normal strength and tone, mentation intact and speech normal  PSYCH: mentation appears normal, affect normal/bright    Diagnostic Test Results:  Labs reviewed in Epic    ASSESSMENT/PLAN:   1. Routine general medical examination at a health care facility  Well adult.     2. Chronic kidney disease, stage 3a (H)  GFR around 55 for last few years. Will recheck. Discussed GFR, creatinine with patient.   - Basic metabolic panel  (Ca, Cl, CO2, Creat, Gluc, K, Na, BUN); Future  - Basic metabolic panel  (Ca, Cl, CO2, Creat, Gluc, K, Na, BUN)    3. Major depressive disorder, recurrent, moderate (H)  Refilled. Stable.   - buPROPion (WELLBUTRIN XL) 300 MG 24 hr tablet; Take 1 tablet (300 mg) by mouth every morning  Dispense: 90 tablet; Refill: 3    4. Hypertension goal BP " "(blood pressure) < 140/90  BP within goal. Refilled.   - lisinopril (ZESTRIL) 10 MG tablet; Take 1 tablet (10 mg) by mouth daily  Dispense: 90 tablet; Refill: 3    5. Moderate episode of recurrent major depressive disorder (H)  Refilled.   - escitalopram (LEXAPRO) 20 MG tablet; Take 1 tablet (20 mg) by mouth daily  Dispense: 90 tablet; Refill: 3    6. Screening for hyperlipidemia  Screen  - Lipid panel reflex to direct LDL Fasting; Future  - Lipid panel reflex to direct LDL Fasting    7. Elevated blood sugar  Screen.  - Hemoglobin A1c; Future  - Hemoglobin A1c    8. Rheumatoid arthritis of multiple sites without rheumatoid factor (H)  Labs ordered by rheumatology  - CBC with platelets  - AST  - ALT  - Albumin level    9. Encounter for long-term (current) use of medications  Labs ordered by rheumatology  - CBC with platelets  - AST  - ALT  - Albumin level     10. Morbid Obesity with BMI of 45-49.9, adult (H)  Discussed GLP-1s. She'll look into insurance and let me know if interested. Has tried several diets. Needing to lose weight before having total knee done.     Patient has been advised of split billing requirements and indicates understanding: Yes      COUNSELING:  Reviewed preventive health counseling, as reflected in patient instructions      BMI:   Estimated body mass index is 47.9 kg/m  as calculated from the following:    Height as of this encounter: 1.676 m (5' 6\").    Weight as of this encounter: 134.6 kg (296 lb 12.8 oz).   Weight management plan: Discussed healthy diet and exercise guidelines      She reports that she has never smoked. She has never used smokeless tobacco.          Elisha Marie PA-C  [facility]  Answers for HPI/ROS submitted by the patient on 7/21/2023  If you checked off any problems, how difficult have these problems made it for you to do your work, take care of things at home, or get along with other people?: Somewhat difficult  PHQ9 TOTAL SCORE: 2      "

## 2023-07-21 NOTE — PATIENT INSTRUCTIONS
Wegovy, Ozempic, Mounjaro, Saxenda      Patient Education          Preventive Health Recommendations  Female Ages 50 - 64    Yearly exam: See your health care provider every year in order to  Review health changes.   Discuss preventive care.    Review your medicines if your doctor has prescribed any.    Get a Pap test every three years (unless you have an abnormal result and your provider advises testing more often).  If you get Pap tests with HPV test, you only need to test every 5 years, unless you have an abnormal result.   You do not need a Pap test if your uterus was removed (hysterectomy) and you have not had cancer.  You should be tested each year for STDs (sexually transmitted diseases) if you're at risk.   Have a mammogram every 1 to 2 years.  Have a colonoscopy at age 50, or have a yearly FIT test (stool test). These exams screen for colon cancer.    Have a cholesterol test every 5 years, or more often if advised.  Have a diabetes test (fasting glucose) every three years. If you are at risk for diabetes, you should have this test more often.   If you are at risk for osteoporosis (brittle bone disease), think about having a bone density scan (DEXA).    Shots: Get a flu shot each year. Get a tetanus shot every 10 years.    Nutrition:   Eat at least 5 servings of fruits and vegetables each day.  Eat whole-grain bread, whole-wheat pasta and brown rice instead of white grains and rice.  Get adequate Calcium and Vitamin D.     Lifestyle  Exercise at least 150 minutes a week (30 minutes a day, 5 days a week). This will help you control your weight and prevent disease.  Limit alcohol to one drink per day.  No smoking.   Wear sunscreen to prevent skin cancer.   See your dentist every six months for an exam and cleaning.  See your eye doctor every 1 to 2 years.

## 2023-07-27 ENCOUNTER — DOCUMENTATION ONLY (OUTPATIENT)
Dept: SLEEP MEDICINE | Facility: CLINIC | Age: 58
End: 2023-07-27
Payer: COMMERCIAL

## 2023-07-27 DIAGNOSIS — G47.33 OBSTRUCTIVE SLEEP APNEA: Primary | ICD-10-CM

## 2023-07-27 NOTE — PROGRESS NOTES
Patient was offered choice of vendor and chose [facility].  Patient Elizabeth Rosenthal was set up at [place] on July 27, 2023. Patient received a Resmed Airsense 10 Pressures were set at  7-12 cm H2O.   Patient s ramp is 5 cm H2O for Auto and FLEX/EPR is 2.  Patient received a Resmed Mask name: FITZGERALD FX  Pillow mask size Standard, heated tubing and heated humidifier.  Patient has the following compliance requirements: none    Janette Paulino

## 2023-07-29 DIAGNOSIS — R10.84 ABDOMINAL PAIN, GENERALIZED: ICD-10-CM

## 2023-07-31 RX ORDER — PANTOPRAZOLE SODIUM 20 MG/1
TABLET, DELAYED RELEASE ORAL
Qty: 180 TABLET | Refills: 0 | Status: SHIPPED | OUTPATIENT
Start: 2023-07-31 | End: 2023-12-04

## 2023-09-28 ENCOUNTER — LAB (OUTPATIENT)
Dept: LAB | Facility: CLINIC | Age: 58
End: 2023-09-28
Payer: COMMERCIAL

## 2023-09-28 ENCOUNTER — OFFICE VISIT (OUTPATIENT)
Dept: RHEUMATOLOGY | Facility: CLINIC | Age: 58
End: 2023-09-28
Payer: COMMERCIAL

## 2023-09-28 VITALS
RESPIRATION RATE: 16 BRPM | HEART RATE: 76 BPM | HEIGHT: 66 IN | SYSTOLIC BLOOD PRESSURE: 123 MMHG | OXYGEN SATURATION: 98 % | DIASTOLIC BLOOD PRESSURE: 88 MMHG | BODY MASS INDEX: 45.24 KG/M2 | WEIGHT: 281.5 LBS

## 2023-09-28 DIAGNOSIS — M13.0 POLYARTHRITIS: ICD-10-CM

## 2023-09-28 DIAGNOSIS — Z79.899 ENCOUNTER FOR LONG-TERM (CURRENT) USE OF MEDICATIONS: ICD-10-CM

## 2023-09-28 DIAGNOSIS — M06.09 RHEUMATOID ARTHRITIS OF MULTIPLE SITES WITHOUT RHEUMATOID FACTOR (H): ICD-10-CM

## 2023-09-28 DIAGNOSIS — G89.29 CHRONIC BILATERAL LOW BACK PAIN WITHOUT SCIATICA: ICD-10-CM

## 2023-09-28 DIAGNOSIS — M54.50 CHRONIC BILATERAL LOW BACK PAIN WITHOUT SCIATICA: ICD-10-CM

## 2023-09-28 DIAGNOSIS — H53.9 VISUAL CHANGES: Primary | ICD-10-CM

## 2023-09-28 DIAGNOSIS — M79.7 FIBROMYALGIA: ICD-10-CM

## 2023-09-28 DIAGNOSIS — H53.9 VISUAL CHANGES: ICD-10-CM

## 2023-09-28 LAB
ALBUMIN SERPL BCG-MCNC: 4.1 G/DL (ref 3.5–5.2)
ALT SERPL W P-5'-P-CCNC: 15 U/L (ref 0–50)
AST SERPL W P-5'-P-CCNC: 19 U/L (ref 0–45)
CREAT SERPL-MCNC: 1.17 MG/DL (ref 0.51–0.95)
EGFRCR SERPLBLD CKD-EPI 2021: 53 ML/MIN/1.73M2
ERYTHROCYTE [SEDIMENTATION RATE] IN BLOOD BY WESTERGREN METHOD: 10 MM/HR (ref 0–30)

## 2023-09-28 PROCEDURE — 86038 ANTINUCLEAR ANTIBODIES: CPT

## 2023-09-28 PROCEDURE — 86140 C-REACTIVE PROTEIN: CPT

## 2023-09-28 PROCEDURE — 82040 ASSAY OF SERUM ALBUMIN: CPT

## 2023-09-28 PROCEDURE — 84460 ALANINE AMINO (ALT) (SGPT): CPT

## 2023-09-28 PROCEDURE — 36415 COLL VENOUS BLD VENIPUNCTURE: CPT

## 2023-09-28 PROCEDURE — 99214 OFFICE O/P EST MOD 30 MIN: CPT | Performed by: INTERNAL MEDICINE

## 2023-09-28 PROCEDURE — 84450 TRANSFERASE (AST) (SGOT): CPT

## 2023-09-28 PROCEDURE — 82565 ASSAY OF CREATININE: CPT

## 2023-09-28 PROCEDURE — 85652 RBC SED RATE AUTOMATED: CPT

## 2023-09-28 PROCEDURE — 86431 RHEUMATOID FACTOR QUANT: CPT

## 2023-09-28 RX ORDER — ADALIMUMAB 40MG/0.4ML
KIT SUBCUTANEOUS
Qty: 0.8 ML | Refills: 6 | Status: SHIPPED | OUTPATIENT
Start: 2023-09-28 | End: 2024-03-11

## 2023-09-28 RX ORDER — GABAPENTIN 300 MG/1
300 CAPSULE ORAL DAILY
Qty: 90 CAPSULE | Refills: 2 | Status: SHIPPED | OUTPATIENT
Start: 2023-09-28

## 2023-09-28 RX ORDER — FOLIC ACID 1 MG/1
3000 TABLET ORAL DAILY
Qty: 270 TABLET | Refills: 3 | Status: SHIPPED | OUTPATIENT
Start: 2023-09-28

## 2023-09-28 ASSESSMENT — PAIN SCALES - GENERAL: PAINLEVEL: NO PAIN (0)

## 2023-09-28 NOTE — PATIENT INSTRUCTIONS
Diagnosis:   Inflammatory arthritis, significantly improved with combination treatment. Plan continue treatment.  Transient blurry vision, unclear cause. Plan repeat serologies associated with autoimmune disease (AMAN, Rheumatoid factor, inflammatory marker)    Plan  1.  Continue methotrexate 20 mg (total 8 tablets) once weekly.While on methotrexate:   -- Check labs every 3 months (AST/ALT, Albumin, CBC with platelets)   -- Limit alcohol intake to 2 drinks weekly; use folate 1 mg daily.  --Tylenol 500-1000 mg can be used as needed up to three times daily for nausea/headache associated with dosing.    2.  Continue Humira 40 mg subcu every other week.  3. Bloodwork for AMAN, RF, ESR, CRP  4. Followup with Ophthalmology, primary care regarding visual change

## 2023-09-28 NOTE — NURSING NOTE
"Chief Complaint   Patient presents with    RECHECK     Rheumatoid arthritis of multiple sites without rheumatoid factor (H) +4 more       Vitals:    09/28/23 1515   BP: 123/88   BP Location: Left arm   Patient Position: Sitting   Cuff Size: Adult Large   Pulse: 76   Resp: 16   SpO2: 98%   Weight: 127.7 kg (281 lb 8 oz)   Height: 1.676 m (5' 6\")       Body mass index is 45.44 kg/m .    [place], ICVF    "

## 2023-09-28 NOTE — PROGRESS NOTES
[facility]  Rheumatology Clinic  Denilson Kelley MD  2023     Name: Elizabeth Rosenthal  MRN: [id number]  Age: 58 year old  : 1965  Referring provider: Referred Self    Assessment and Plan:  # Seronegative rheumatoid arthritis  Patient relates reduction/stabilization in diffuse arthralgia, associated with morning stiffness, and reduced heel and ankle pain.  Exam shows normal fist formation, mild changes of osteoarthritis in the hands, and preserved wrist, elbow, and shoulder range of motion.   Data: Laboratory evaluation  showed Cr 1.25, CBC normal.  LFT WNL.  LFTs pending again today.    Discussion: inflammatory arthropathy is modestly improved with specific relief of enthesitis symptoms including Achilles tendinitis and reduced daily pain and increased mobility in shoulders, elbows, hips, and feet.  I think that patient is benefiting from combination therapy, with addition of adalimumab to methotrexate in May 2022.  I recommend continuing combination treatment.    # Bilateral knee pain:  cartilage loss/degenerative disease remains major pain generator. I agree with planned total knee replacement surgery on the left pending weight loss. I recommend continuing isometric exercises, weight management, and use of lidocaine patches, heating pad, rest for pain control, as well as gabapentin 300 mg up to tid.     #Shortness of breath: Did not discuss today. Cause of slowly progressive dyspnea remains unclear, but symptoms remain stable. Continue evaluation through primary care, pulmonary, and potentially cardiology to discern possible contributions from thoracic organs.    # CKD3: Decreased GFR is mild, and comparable to measurements from .  Recommend avoiding nonsteroidals and nephrotoxic medications.  Monitor blood pressure and discuss potential relationship between blood pressure and kidney disease with primary care.    # Transient blurry vision: CNS imaging with CT scan and angio negative  "for stroke or bleed; glucose normal.  I have low suspicion of an autoimmune cause or medication related change, but agree with patient that autoimmune exocrinopathy is an occasional cause of blurry vision.  Patient has minimal sicca or dry eye symptoms.  I will recheck AMAN (negative in 2018) and rheumatoid factor (negative in 2015).  I recommend patient seek evaluation from ophthalmology to determine whether intrinsic ocular disease may be a cause.    Plan  1.  Continue methotrexate 20 mg (total 8 tablets) once weekly.While on methotrexate:   -- Check labs every 3 months (AST/ALT, Albumin, CBC with platelets)   -- Limit alcohol intake to 2 drinks weekly; use folate 1 mg daily.  --Tylenol 500-1000 mg can be used as needed up to three times daily for nausea/headache associated with dosing.    2.  Continue Humira 40 mg subcu every other week.  3. Bloodwork for AMAN, RF, ESR, CRP  4. Followup with Ophthalmology, primary care regarding visual change    Follow-up: Return to clinic in 6-7 months.    Denilson Kelley MD  Staff Rheumatologist, [facility]    Orders Placed This Encounter   Procedures    Rheumatoid factor    CRP inflammation    Erythrocyte sedimentation rate auto    Anti Nuclear Joan IgG by IFA with Reflex    CBC with platelets    AST    ALT    Albumin level    Creatinine         HPI:   Elizabeth Rosenthal presents for follow up of seronegative rheumatoid arthritis as well as fibromyalgia and osteoarthritis of knees and spine.  She was last seen in rheumatology in , when inflammatory arthritis was judged improved.  Recommendation was to continue combination Humira to methotrexate.    Interval history September 28, 2023    She had an episode of blurry vision, R > L eye, started suddenly 1 week ago.  No HA, no eye pain. She was working from home, suddenly could not read, she still could see colors/shapes.  Seen in urgent care, scanned for \"stroke\" syndrome--negative so she was discharged.  She contacted cataract " "physician and retinal specialist. She was not evaluated but She was told to put in eye drops. In 48 hours, she started to improve; by 72 hours, she had normal vision.    She also notes waxing waning tenderness in the cheek areas.   She denies dry mouth, but she notes occasional choking when eating.  She denies marked eye dryness/itching/redness.    Her joints are better since she started humira, especially shopulders, elbows, hips, feet, low back pain. Still with soft tissue pain along thighs.   Early morning stiffness remains ~ 1 hour.    Humira 40 mg every other week.  Methotrexate 8 tabs weekly; she reduced from 10 tabs due to GI symptoms. Using folic acid.    Interval history Dec 6, 2022    She feels mildly improved with addition of humira to methotrexate. Initially, she had more joint pain for 3 months after startign humira.  Former pain in her heels is much improved. Hands and wrists are \"a little better\".  Knees/shoulders/ankles are most often affected bu t not severely. She still has trouble with ascending stairs, raking leaves, standing for prolonged periods.  She is not sure whether reduced stamina is due to long COVID or incomplete control of rheumatic disease.  Early morning stiffness is negligible, but she has lingering stiffness for about 1 hour in the mornings. She does not use OTC.     Humira 40 mg every other week.  Methotrexate 8 tabs weekly; she reduced from 10 tabs due to GI symptoms. Using folic acid.    Interval history May 24, 2022    She wonders whether diet changes are affecting inflammatory burden. She cut back on dairy, gluten, processed sugar and was feeling better in October 2021. She had COVID19 in 2-2022 and had sore throat and cough, fatigue, sick for 3 weeks, and started eating ice cream. Her CRP shot up.  She did have mAb infusions for COVID19. She states that she has been \"sick for 18 months\" since probable COVID19 infection in 2-2020    Her sleep schedule and mood have stabilized " with use buproprion and work reduction.     She has painful elbows, wrists, knees, andkles on a daily basis. +.- visible swelling    Exercise tolerance is low, but is better than during the 18 months after presumed COVID19.    Sleep quality at night is improved, but sleep is still non-restorative. Early morning stiffness is ~ 1 hour, and she is fatigued.     Joint pain is generally worse with use, especially wrists with prolonged activity.    Gabapentin and lidocaine patches and rest give some relief from pain.    She does have increased stomach upset at methotrexate 10 mg weekly. She takes folate 3 mg daily.  Shortness of breath symptoms are stable.      Interval history 1-    Joints are doing well. After methotrexate dose increase 3 mos ago, she noted less pain, less stiffness since shortly after changing dose.  No early morning stiffness, no morning pain.   She has resumed more regular exercise and activity. Energy is improved for cleaning/chores.  Gabapentin 1-2 doses daily, not sure for what symptoms; fatigue and neuritic pain are less.  Recently increased buproprion to 300 mg.        Review of Systems:   Pertinent items are noted in HPI or as below, remainder of complete ROS is negative.      No recent problems with hearing or vision. No swallowing problems.   No breathing difficulty, shortness of breath, coughing, or wheezing  No chest pain or palpitations  No heart burn, indigestion, abdominal pain, nausea, vomiting, diarrhea  No urination problems, no bloody, cloudy urine, no dysuria  No numbing, tingling, weakness  No headaches or confusion  No rashes. No easy bleeding or bruising.     Active Medications:   Current Outpatient Medications   Medication Sig    aspirin 81 MG tablet Take 1 tablet (81 mg) by mouth daily    buPROPion (WELLBUTRIN XL) 300 MG 24 hr tablet Take 1 tablet (300 mg) by mouth every morning    escitalopram (LEXAPRO) 20 MG tablet Take 1 tablet (20 mg) by mouth daily    folic acid  (FOLVITE) 1 MG tablet TAKE 3 TABLETS BY MOUTH  DAILY (Patient taking differently: 3 mg)    gabapentin (NEURONTIN) 300 MG capsule Take 1 capsule (300 mg) by mouth 3 times daily (Patient taking differently: Take 300 mg by mouth daily)    HUMIRA *CF* PEN 40 MG/0.4ML pen kit INJECT 40MG SUBCUTANEOUSLY EVERY 2 WEEKS    lidocaine (LIDODERM) 5 % patch APPLY 1 TO 3 PATCHES ONTO THE SKIN EVERY 24 HOURS AS NEEDED. ** PATCHES MAY BE LEFT ON UP TO 12 HOURS IN A 24  HOUR PERIOD**    lisinopril (ZESTRIL) 10 MG tablet Take 1 tablet (10 mg) by mouth daily    methotrexate 2.5 MG tablet Take 8 tablets (20 mg) by mouth every 7 days    VITAMIN D, CHOLECALCIFEROL, PO Take 2,000 Units by mouth daily    zolpidem (AMBIEN) 5 MG tablet Take 1 tablet (5 mg) by mouth nightly as needed for sleep Take 0.5-1 tablet (2.5-5 mg) by mouth at bedtime as needed for sleep.    albuterol (PROAIR HFA/PROVENTIL HFA/VENTOLIN HFA) 108 (90 Base) MCG/ACT inhaler Inhale 1-2 puffs into the lungs every 4 hours as needed for shortness of breath / dyspnea or wheezing    cetirizine (ZYRTEC) 10 MG tablet Take 10 mg by mouth daily    fluticasone (FLOVENT HFA) 110 MCG/ACT inhaler Inhale 2 puffs into the lungs 2 times daily (Patient not taking: Reported on 7/21/2023)    pantoprazole (PROTONIX) 20 MG EC tablet TAKE 1 TABLET BY MOUTH TWICE  DAILY (Patient not taking: Reported on 9/28/2023)     No current facility-administered medications for this visit.         Allergies:   Nuts   Celery Oil   Codeine   Contrast Dye   Food   Potatoes  Peanuts  Cantaloupe  Lettuce  Oat   Penicillins   Rice   Shellfish-Derived Products   Wheat Bran   Yeast       Past Medical History:  Allergic rhinitis    Asthma   Chronic pelvic pain in female   Depression   Depressive disorder   Gastroesophageal reflux disease   Head injury   Hyperlipidemia   Hypertension   Immunosuppression    Migraines   Morbid obesity   Obstructive sleep apnea   Reduced vision   Transient ischemic attack  Vertebral  "artery dissection  Iliotibial band syndrome  Right knee pain  Lumbar radicular pain  Rheumatoid arthritis of multiple sites without rheumatoid factor  Fibromyalgia  Arthritis of knee, left  Creatinine elevation     Past Surgical History:  Arthroscopy knee bilateral    Biopsy lymph node cervical    Colonoscopy 7/26/2017  Lymph node biopsy 2010  Genitourinary surgery, fallopian tube cyst 1994 and tubal ligation 1999  Lymph node removed from neck for biopsy 2011   Hysteroscopy    Tonsillectomy, bilateral 1/3/2018    Family History:    The patient's family history includes Asthma in her paternal grandmother; Breast Cancer in her mother; Cancer in her mother; Cerebrovascular Disease in her paternal grandmother; Heart Disease in her father; Mental Illness in her father; Migraines in her daughter and son; Substance Abuse in her father.    Social History:  The patient reports that she has never smoked. She has never used smokeless tobacco. She reports that she drinks alcohol. She reports that she does not use drugs.   PCP: Eveline Berry  Marital Status: Single     Physical Exam:   Blood pressure 123/88, pulse 76, resp. rate 16, height 1.676 m (5' 6\"), weight 127.7 kg (281 lb 8 oz), SpO2 98 %, not currently breastfeeding.  Wt Readings from Last 4 Encounters:   09/28/23 127.7 kg (281 lb 8 oz)   07/21/23 134.6 kg (296 lb 12.8 oz)   07/10/23 133.6 kg (294 lb 9.6 oz)   07/01/22 138.7 kg (305 lb 12.8 oz)       Constitutional: severe obesity, appearing stated age; cooperative  Eyes: nl EOM, PERRLA, vision, conjunctiva, sclera  ENT: nl external ears, nose, hearing, lips, teeth, gums, throat; no parotid tenderness or enlargement.  Anterior salivary pool normal.  Neck: no mass or thyroid enlargement  Resp: Breathing unlabored  Lymph: no cervical, supraclavicular, inguinal or epitrochlear nodes  MS: Mild Osteoarthritis changes were present in the hands, but no inflammatory joint changes at MCPs, wrists, elbows, or " shoulders.  Skin: no nail pitting, alopecia, rash, nodules or lesions  Neuro: nl cranial nerves  Psych: nl judgement, orientation, memory, affect.      Laboratory:       Latest Ref Rng & Units 5/24/2022     5:18 PM 12/2/2022     2:18 PM 7/21/2023     2:45 PM   RHEUM RESULTS   Albumin 3.5 - 5.2 g/dL  3.9  4.0    ALT 0 - 50 U/L 19  19  9    AST 0 - 45 U/L 15  19  23    Creatinine 0.51 - 0.95 mg/dL 0.97  1.16  1.25    CRP Inflammation 0.0 - 8.0 mg/L 13.2      GFR Estimate >60 mL/min/1.73m2 67  54  49    Hematocrit 35.0 - 47.0 % 39.5  40.0  39.2    Hemoglobin 11.7 - 15.7 g/dL 12.7  12.8  12.9    WBC 4.0 - 11.0 10e3/uL 7.1  6.9  5.4    RBC Count 3.80 - 5.20 10e6/uL 4.15  4.14  4.24    RDW 10.0 - 15.0 % 14.5  14.7  15.1    MCHC 31.5 - 36.5 g/dL 32.2  32.0  32.9    MCV 78 - 100 fL 95  97  93    Platelet Count 150 - 450 10e3/uL 360  345  303      AMAN interpretation   Date Value Ref Range Status   04/28/2018 Negative NEG^Negative Final     Comment:                                        Reference range:  <1:40  NEGATIVE  1:40 - 1:80  BORDERLINE POSITIVE  >1:80 POSITIVE

## 2023-09-29 ENCOUNTER — VIRTUAL VISIT (OUTPATIENT)
Dept: FAMILY MEDICINE | Facility: CLINIC | Age: 58
End: 2023-09-29
Payer: COMMERCIAL

## 2023-09-29 DIAGNOSIS — H53.8 BLURRED VISION: Primary | ICD-10-CM

## 2023-09-29 LAB — CRP SERPL-MCNC: 5.66 MG/L

## 2023-09-29 PROCEDURE — 99442 PR PHYSICIAN TELEPHONE EVALUATION 11-20 MIN: CPT | Performed by: PHYSICIAN ASSISTANT

## 2023-09-29 NOTE — PROGRESS NOTES
Elizabeth is a 58 year old who is being evaluated via a billable telephone visit.      What phone number would you like to be contacted at? [phone]   How would you like to obtain your AVS? [place]    Distant Location (provider location):  On-site    Assessment & Plan     Blurred vision  Can obtain an MRI to rule out MS. Discussed with patient that there may not be a specific diagnosis to the several symptoms she has had in the last decade. If blurry vision returns, see if eye drops improve this again. Can return to ophthalmologist as well if recurring.   - MR Brain w/o Contrast; Future                 Elisha Marie PA-C  [facility]    Subjective   Elizabeth is a 58 year old, presenting for the following health issues:  History of Present Illness (Would like to discuss MS and Shogrens, facial numbness, MRI)        9/29/2023    11:41 AM   Additional Questions   Roomed by shaka       History of Present Illness       Reason for visit:  MS and Shogrens concerns    She eats 4 or more servings of fruits and vegetables daily.She consumes 0 sweetened beverage(s) daily.She exercises with enough effort to increase her heart rate 9 or less minutes per day.  She exercises with enough effort to increase her heart rate 3 or less days per week. She is missing 1 dose(s) of medications per week.  She is not taking prescribed medications regularly due to remembering to take.     Patient is concerned about symptoms she has had over the last several years. She recently was seen in the ED for blurry vision. This did seem to get better with eye drops. She has a history of facial numbness of unknown cause. She is worried about something like Sjogrens or MS.   She saw her endocrinologist yesterday who is obtaining some labs.       Cataract in left eye (very small). H/o retinal hemorrhage in right eye several years ago. Normally sees [facility]. [facility] - was seen there this  year            Review of Systems   Constitutional, HEENT, cardiovascular, pulmonary, gi and gu systems are negative, except as otherwise noted.      Objective             Physical Exam   healthy, alert, and no distress  PSYCH: Alert and oriented times 3; coherent speech, normal   rate and volume, able to articulate logical thoughts, able   to abstract reason, no tangential thoughts, no hallucinations   or delusions  Her affect is normal  RESP: No cough, no audible wheezing, able to talk in full sentences  Remainder of exam unable to be completed due to telephone visits                Phone call duration: 16 minutes

## 2023-10-02 LAB
ANA SER QL IF: NEGATIVE
RHEUMATOID FACT SER NEPH-ACNC: 7 IU/ML

## 2023-10-04 ENCOUNTER — ANCILLARY PROCEDURE (OUTPATIENT)
Dept: MRI IMAGING | Facility: CLINIC | Age: 58
End: 2023-10-04
Attending: PHYSICIAN ASSISTANT
Payer: COMMERCIAL

## 2023-10-04 DIAGNOSIS — H53.8 BLURRED VISION: ICD-10-CM

## 2023-10-04 PROCEDURE — 70551 MRI BRAIN STEM W/O DYE: CPT | Performed by: RADIOLOGY

## 2023-10-05 ENCOUNTER — TELEPHONE (OUTPATIENT)
Dept: FAMILY MEDICINE | Facility: CLINIC | Age: 58
End: 2023-10-05
Payer: COMMERCIAL

## 2023-10-05 NOTE — TELEPHONE ENCOUNTER
Patient had same result and no change in this from her MRI back in 2018.   She had this worked up by ophthalmology at that time.    Can do a virtual visit if she'd like to discuss further.     Elisha Marie PA-C

## 2023-10-05 NOTE — TELEPHONE ENCOUNTER
"Pt calling regarding MRI results. Pt noted \"incidental partially empty sella\" on MRI. Pt states she is paranoid regarding results. Pt googled empty sella syndrome and saw that this can cause visual disturbances. Pt saw that this could be pituitary gland damage and should be seen by endocrinology and be prescribed a supplement. She also noted that empty sella syndrome can cause fatigue, reduce libido (thought this was from her Lexapro) and weight gain.   Has this already been ruled out? Should she have an appt with PCP to discuss results? Please advise.    Ivania Ernandez RN  [facility]    "

## 2023-10-06 NOTE — TELEPHONE ENCOUNTER
Writer called patient and relayed message from PCP, patient states understanding. Writer offered to assist in scheduling virtual appointment to discuss further, patient states she will not do a virtual visit at this time but will call with any further questions or concerns if anything comes up.    JOAN Wood RN  [facility]

## 2023-10-18 ENCOUNTER — TRANSFERRED RECORDS (OUTPATIENT)
Dept: HEALTH INFORMATION MANAGEMENT | Facility: CLINIC | Age: 58
End: 2023-10-18
Payer: COMMERCIAL

## 2023-10-27 ENCOUNTER — MYC MEDICAL ADVICE (OUTPATIENT)
Dept: FAMILY MEDICINE | Facility: CLINIC | Age: 58
End: 2023-10-27
Payer: COMMERCIAL

## 2023-11-02 ENCOUNTER — OFFICE VISIT (OUTPATIENT)
Dept: FAMILY MEDICINE | Facility: CLINIC | Age: 58
End: 2023-11-02
Payer: COMMERCIAL

## 2023-11-02 VITALS
HEART RATE: 75 BPM | OXYGEN SATURATION: 98 % | BODY MASS INDEX: 45.35 KG/M2 | WEIGHT: 282.2 LBS | DIASTOLIC BLOOD PRESSURE: 69 MMHG | HEIGHT: 66 IN | TEMPERATURE: 98.1 F | SYSTOLIC BLOOD PRESSURE: 109 MMHG

## 2023-11-02 DIAGNOSIS — Z01.818 PREOP GENERAL PHYSICAL EXAM: Primary | ICD-10-CM

## 2023-11-02 DIAGNOSIS — N18.31 CHRONIC KIDNEY DISEASE, STAGE 3A (H): ICD-10-CM

## 2023-11-02 DIAGNOSIS — G43.109 OPHTHALMIC MIGRAINE: ICD-10-CM

## 2023-11-02 DIAGNOSIS — M17.12 PRIMARY OSTEOARTHRITIS OF LEFT KNEE: ICD-10-CM

## 2023-11-02 LAB
ANION GAP SERPL CALCULATED.3IONS-SCNC: 10 MMOL/L (ref 7–15)
BUN SERPL-MCNC: 15.6 MG/DL (ref 8–23)
CALCIUM SERPL-MCNC: 9 MG/DL (ref 8.6–10)
CHLORIDE SERPL-SCNC: 108 MMOL/L (ref 98–107)
CREAT SERPL-MCNC: 1.12 MG/DL (ref 0.51–0.95)
CREAT UR-MCNC: 437 MG/DL
DEPRECATED HCO3 PLAS-SCNC: 25 MMOL/L (ref 22–29)
EGFRCR SERPLBLD CKD-EPI 2021: 56 ML/MIN/1.73M2
ERYTHROCYTE [DISTWIDTH] IN BLOOD BY AUTOMATED COUNT: 13.6 % (ref 10–15)
GLUCOSE SERPL-MCNC: 100 MG/DL (ref 70–99)
HBA1C MFR BLD: 5.5 % (ref 0–5.6)
HCT VFR BLD AUTO: 40.4 % (ref 35–47)
HGB BLD-MCNC: 13.1 G/DL (ref 11.7–15.7)
MCH RBC QN AUTO: 30.7 PG (ref 26.5–33)
MCHC RBC AUTO-ENTMCNC: 32.4 G/DL (ref 31.5–36.5)
MCV RBC AUTO: 95 FL (ref 78–100)
MICROALBUMIN UR-MCNC: 42.3 MG/L
MICROALBUMIN/CREAT UR: 9.68 MG/G CR (ref 0–25)
PLATELET # BLD AUTO: 316 10E3/UL (ref 150–450)
POTASSIUM SERPL-SCNC: 4.3 MMOL/L (ref 3.4–5.3)
RBC # BLD AUTO: 4.27 10E6/UL (ref 3.8–5.2)
SODIUM SERPL-SCNC: 143 MMOL/L (ref 135–145)
WBC # BLD AUTO: 5.5 10E3/UL (ref 4–11)

## 2023-11-02 PROCEDURE — 80048 BASIC METABOLIC PNL TOTAL CA: CPT | Performed by: PHYSICIAN ASSISTANT

## 2023-11-02 PROCEDURE — 36415 COLL VENOUS BLD VENIPUNCTURE: CPT | Performed by: PHYSICIAN ASSISTANT

## 2023-11-02 PROCEDURE — 82043 UR ALBUMIN QUANTITATIVE: CPT | Performed by: PHYSICIAN ASSISTANT

## 2023-11-02 PROCEDURE — 99214 OFFICE O/P EST MOD 30 MIN: CPT | Mod: 25 | Performed by: PHYSICIAN ASSISTANT

## 2023-11-02 PROCEDURE — 82570 ASSAY OF URINE CREATININE: CPT | Performed by: PHYSICIAN ASSISTANT

## 2023-11-02 PROCEDURE — 83036 HEMOGLOBIN GLYCOSYLATED A1C: CPT | Performed by: PHYSICIAN ASSISTANT

## 2023-11-02 PROCEDURE — 85027 COMPLETE CBC AUTOMATED: CPT | Performed by: PHYSICIAN ASSISTANT

## 2023-11-02 PROCEDURE — 93000 ELECTROCARDIOGRAM COMPLETE: CPT | Performed by: PHYSICIAN ASSISTANT

## 2023-11-02 RX ORDER — SUMATRIPTAN 50 MG/1
50 TABLET, FILM COATED ORAL
Qty: 18 TABLET | Refills: 0 | Status: SHIPPED | OUTPATIENT
Start: 2023-11-02 | End: 2024-07-22

## 2023-11-02 NOTE — PATIENT INSTRUCTIONS
Preparing for Your Surgery  Getting started  A nurse will call you to review your health history and instructions. They will give you an arrival time based on your scheduled surgery time. Please be ready to share:  Your doctor's clinic name and phone number  Your medical, surgical, and anesthesia history  A list of allergies and sensitivities  A list of medicines, including herbal treatments and over-the-counter drugs  Whether the patient has a legal guardian (ask how to send us the papers in advance)  Please tell us if you're pregnant--or if there's any chance you might be pregnant. Some surgeries may injure a fetus (unborn baby), so they require a pregnancy test. Surgeries that are safe for a fetus don't always need a test, and you can choose whether to have one.   If you have a child who's having surgery, please ask for a copy of Preparing for Your Child's Surgery.    Preparing for surgery  Within 10 to 30 days of surgery: Have a pre-op exam (sometimes called an H&P, or History and Physical). This can be done at a clinic or pre-operative center.  If you're having a , you may not need this exam. Talk to your care team.  At your pre-op exam, talk to your care team about all medicines you take. If you need to stop any medicines before surgery, ask when to start taking them again.  We do this for your safety. Many medicines can make you bleed too much during surgery. Some change how well surgery (anesthesia) drugs work.  Call your insurance company to let them know you're having surgery. (If you don't have insurance, call [phone].)  Call your clinic if there's any change in your health. This includes signs of a cold or flu (sore throat, runny nose, cough, rash, fever). It also includes a scrape or scratch near the surgery site.  If you have questions on the day of surgery, call your hospital or surgery center.  Eating and drinking guidelines  For your safety: Unless your surgeon tells you otherwise,  follow the guidelines below.  Eat and drink as usual until 8 hours before you arrive for surgery. After that, no food or milk.  Drink clear liquids until 2 hours before you arrive. These are liquids you can see through, like water, Gatorade, and Propel Water. They also include plain black coffee and tea (no cream or milk), candy, and breath mints. You can spit out gum when you arrive.  If you drink alcohol: Stop drinking it the night before surgery.  If your care team tells you to take medicine on the morning of surgery, it's okay to take it with a sip of water.  Preventing infection  Shower or bathe the night before and morning of your surgery. Follow the instructions your clinic gave you. (If no instructions, use regular soap.)  Don't shave or clip hair near your surgery site. We'll remove the hair if needed.  Don't smoke or vape the morning of surgery. You may chew nicotine gum up to 2 hours before surgery. A nicotine patch is okay.  Note: Some surgeries require you to completely quit smoking and nicotine. Check with your surgeon.  Your care team will make every effort to keep you safe from infection. We will:  Clean our hands often with soap and water (or an alcohol-based hand rub).  Clean the skin at your surgery site with a special soap that kills germs.  Give you a special gown to keep you warm. (Cold raises the risk of infection.)  Wear special hair covers, masks, gowns and gloves during surgery.  Give antibiotic medicine, if prescribed. Not all surgeries need antibiotics.  What to bring on the day of surgery  Photo ID and insurance card  Copy of your health care directive, if you have one  Glasses and hearing aids (bring cases)  You can't wear contacts during surgery  Inhaler and eye drops, if you use them (tell us about these when you arrive)  CPAP machine or breathing device, if you use them  A few personal items, if spending the night  If you have . . .  A pacemaker, ICD (cardiac defibrillator) or other  implant: Bring the ID card.  An implanted stimulator: Bring the remote control.  A legal guardian: Bring a copy of the certified (court-stamped) guardianship papers.  Please remove any jewelry, including body piercings. Leave jewelry and other valuables at home.  If you're going home the day of surgery  You must have a responsible adult drive you home. They should stay with you overnight as well.  If you don't have someone to stay with you, and you aren't safe to go home alone, we may keep you overnight. Insurance often won't pay for this.  After surgery  If it's hard to control your pain or you need more pain medicine, please call your surgeon's office.  Questions?   If you have any questions for your care team, list them here: _________________________________________________________________________________________________________________________________________________________________________ ____________________________________ ____________________________________ ____________________________________  For informational purposes only. Not to replace the advice of your health care provider. Copyright   2003, 2019 [facility]. All rights reserved. Clinically reviewed by Mariangel Helm MD. [employer] [id number] - REV 12/22.
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
## 2023-12-01 ENCOUNTER — TELEPHONE (OUTPATIENT)
Dept: FAMILY MEDICINE | Facility: CLINIC | Age: 58
End: 2023-12-01

## 2023-12-01 DIAGNOSIS — Z96.659 STATUS POST TOTAL KNEE REPLACEMENT, UNSPECIFIED LATERALITY: Primary | ICD-10-CM

## 2023-12-01 NOTE — TELEPHONE ENCOUNTER
Home Care is calling regarding an established patient with [facility].       Requesting orders from: Elisha Marie  Provider is following patient: No       Orders Requested    Physical Therapy  Request for initial certification (first set of orders)   Frequency:  2x/wk for 1 wks  then 3x/wk for 1 wks      Occupational Therapy  Request for initial evaluation and treatment (one time) (first set of orders)     Information was gathered and will be sent to provider for review.  RN will contact Home Care with information after provider review.  Information was gathered and will be sent to provider to confirm provider will be following patient.  RN will contact Home Care with information after provider review.  Confirmed ok to leave a detailed message with call back.  Contact information confirmed and updated as needed.    Juanita Smith RN

## 2023-12-02 NOTE — TELEPHONE ENCOUNTER
I'm not sure what she is needing as I haven't seen her since her surgery - total knee.   It looks like there are home care orders from orthopedic surgeon (Osvaldo 11/29/23).     Elisha Marie PA-C

## 2023-12-04 RX ORDER — HYDROXYZINE HYDROCHLORIDE 25 MG/1
25 TABLET, FILM COATED ORAL EVERY 6 HOURS PRN
COMMUNITY
Start: 2023-11-29 | End: 2024-07-26

## 2023-12-04 RX ORDER — OXYCODONE HYDROCHLORIDE 5 MG/1
5 TABLET ORAL EVERY 6 HOURS PRN
COMMUNITY
Start: 2023-11-29 | End: 2024-07-22

## 2023-12-04 NOTE — TELEPHONE ENCOUNTER
Elisha,    Spoke with nurse and provided with verbal. Nurse also reporting interactions with meds. Sending to PCP as FYI unless provider feels pt needs med adjusted aspirin and methotrexate.     Thanks,  NELI Quiñonez  [facility]

## 2023-12-04 NOTE — TELEPHONE ENCOUNTER
Ortho cannot follow patient for home care orders - home care needs a primary care provider to follow     Juanita Smith RN  [facility]

## 2023-12-05 ENCOUNTER — MEDICAL CORRESPONDENCE (OUTPATIENT)
Dept: HEALTH INFORMATION MANAGEMENT | Facility: CLINIC | Age: 58
End: 2023-12-05
Payer: COMMERCIAL

## 2023-12-11 ENCOUNTER — TELEPHONE (OUTPATIENT)
Dept: FAMILY MEDICINE | Facility: CLINIC | Age: 58
End: 2023-12-11
Payer: COMMERCIAL

## 2023-12-11 ENCOUNTER — MEDICAL CORRESPONDENCE (OUTPATIENT)
Dept: HEALTH INFORMATION MANAGEMENT | Facility: CLINIC | Age: 58
End: 2023-12-11
Payer: COMMERCIAL

## 2023-12-11 NOTE — TELEPHONE ENCOUNTER
Reason for Call:  Form, our goal is to have forms completed with 72 hours, however, some forms may require a visit or additional information.    Type of letter, form or note:  Home Health Certification    Who is the form from?: Home care    Where did the form come from: form was faxed in    What clinic location was the form placed at?: [facility]    Where the form was placed: Given to physician    What number is listed as a contact on the form?: [phone]         Call taken on 12/11/2023 at 11:16 AM by Olivia Barron   [employer] Team

## 2023-12-12 ENCOUNTER — TELEPHONE (OUTPATIENT)
Dept: FAMILY MEDICINE | Facility: CLINIC | Age: 58
End: 2023-12-12
Payer: COMMERCIAL

## 2023-12-12 NOTE — TELEPHONE ENCOUNTER
FYI - Status Update    Who is Calling: Company calling:   [facility]   Teo calling     Update: Patient ended up getting PT from another company.     Does caller want a call/response back: Karin Carmona -    [facility]

## 2023-12-28 ENCOUNTER — MEDICAL CORRESPONDENCE (OUTPATIENT)
Dept: HEALTH INFORMATION MANAGEMENT | Facility: CLINIC | Age: 58
End: 2023-12-28
Payer: COMMERCIAL

## 2024-02-02 ENCOUNTER — OFFICE VISIT (OUTPATIENT)
Dept: FAMILY MEDICINE | Facility: CLINIC | Age: 59
End: 2024-02-02
Payer: COMMERCIAL

## 2024-02-02 VITALS
OXYGEN SATURATION: 98 % | HEIGHT: 66 IN | WEIGHT: 275 LBS | RESPIRATION RATE: 18 BRPM | TEMPERATURE: 98.2 F | HEART RATE: 78 BPM | DIASTOLIC BLOOD PRESSURE: 84 MMHG | SYSTOLIC BLOOD PRESSURE: 127 MMHG | BODY MASS INDEX: 44.2 KG/M2

## 2024-02-02 DIAGNOSIS — M06.09 RHEUMATOID ARTHRITIS OF MULTIPLE SITES WITHOUT RHEUMATOID FACTOR (H): ICD-10-CM

## 2024-02-02 DIAGNOSIS — J45.21 MILD INTERMITTENT ASTHMA WITH EXACERBATION: Primary | ICD-10-CM

## 2024-02-02 LAB
ALBUMIN SERPL BCG-MCNC: 3.7 G/DL (ref 3.5–5.2)
ALT SERPL W P-5'-P-CCNC: 9 U/L (ref 0–50)
AST SERPL W P-5'-P-CCNC: 18 U/L (ref 0–45)
CREAT SERPL-MCNC: 1.02 MG/DL (ref 0.51–0.95)
EGFRCR SERPLBLD CKD-EPI 2021: 63 ML/MIN/1.73M2
ERYTHROCYTE [DISTWIDTH] IN BLOOD BY AUTOMATED COUNT: 13.4 % (ref 10–15)
HCT VFR BLD AUTO: 39.1 % (ref 35–47)
HGB BLD-MCNC: 12.8 G/DL (ref 11.7–15.7)
MCH RBC QN AUTO: 30.9 PG (ref 26.5–33)
MCHC RBC AUTO-ENTMCNC: 32.7 G/DL (ref 31.5–36.5)
MCV RBC AUTO: 94 FL (ref 78–100)
PLATELET # BLD AUTO: 318 10E3/UL (ref 150–450)
RBC # BLD AUTO: 4.14 10E6/UL (ref 3.8–5.2)
WBC # BLD AUTO: 6.9 10E3/UL (ref 4–11)

## 2024-02-02 PROCEDURE — 36415 COLL VENOUS BLD VENIPUNCTURE: CPT | Performed by: FAMILY MEDICINE

## 2024-02-02 PROCEDURE — 85027 COMPLETE CBC AUTOMATED: CPT | Performed by: FAMILY MEDICINE

## 2024-02-02 PROCEDURE — 99214 OFFICE O/P EST MOD 30 MIN: CPT | Mod: 25 | Performed by: FAMILY MEDICINE

## 2024-02-02 PROCEDURE — 84460 ALANINE AMINO (ALT) (SGPT): CPT | Performed by: FAMILY MEDICINE

## 2024-02-02 PROCEDURE — 90715 TDAP VACCINE 7 YRS/> IM: CPT | Performed by: FAMILY MEDICINE

## 2024-02-02 PROCEDURE — 82040 ASSAY OF SERUM ALBUMIN: CPT | Performed by: FAMILY MEDICINE

## 2024-02-02 PROCEDURE — 82565 ASSAY OF CREATININE: CPT | Performed by: FAMILY MEDICINE

## 2024-02-02 PROCEDURE — 90471 IMMUNIZATION ADMIN: CPT | Performed by: FAMILY MEDICINE

## 2024-02-02 PROCEDURE — 84450 TRANSFERASE (AST) (SGOT): CPT | Performed by: FAMILY MEDICINE

## 2024-02-02 RX ORDER — BUDESONIDE AND FORMOTEROL FUMARATE DIHYDRATE 80; 4.5 UG/1; UG/1
2 AEROSOL RESPIRATORY (INHALATION) 2 TIMES DAILY
Qty: 10.2 G | Refills: 0 | Status: SHIPPED | OUTPATIENT
Start: 2024-02-02 | End: 2024-07-22

## 2024-02-02 RX ORDER — PREDNISONE 20 MG/1
40 TABLET ORAL DAILY
Qty: 10 TABLET | Refills: 0 | Status: SHIPPED | OUTPATIENT
Start: 2024-02-02 | End: 2024-02-07

## 2024-02-02 ASSESSMENT — PATIENT HEALTH QUESTIONNAIRE - PHQ9
10. IF YOU CHECKED OFF ANY PROBLEMS, HOW DIFFICULT HAVE THESE PROBLEMS MADE IT FOR YOU TO DO YOUR WORK, TAKE CARE OF THINGS AT HOME, OR GET ALONG WITH OTHER PEOPLE: SOMEWHAT DIFFICULT
SUM OF ALL RESPONSES TO PHQ QUESTIONS 1-9: 2
SUM OF ALL RESPONSES TO PHQ QUESTIONS 1-9: 2

## 2024-02-02 ASSESSMENT — ASTHMA QUESTIONNAIRES
QUESTION_1 LAST FOUR WEEKS HOW MUCH OF THE TIME DID YOUR ASTHMA KEEP YOU FROM GETTING AS MUCH DONE AT WORK, SCHOOL OR AT HOME: NONE OF THE TIME
ACT_TOTALSCORE: 18
QUESTION_5 LAST FOUR WEEKS HOW WOULD YOU RATE YOUR ASTHMA CONTROL: WELL CONTROLLED
QUESTION_3 LAST FOUR WEEKS HOW OFTEN DID YOUR ASTHMA SYMPTOMS (WHEEZING, COUGHING, SHORTNESS OF BREATH, CHEST TIGHTNESS OR PAIN) WAKE YOU UP AT NIGHT OR EARLIER THAN USUAL IN THE MORNING: FOUR OR MORE NIGHTS A WEEK
QUESTION_2 LAST FOUR WEEKS HOW OFTEN HAVE YOU HAD SHORTNESS OF BREATH: NOT AT ALL
QUESTION_4 LAST FOUR WEEKS HOW OFTEN HAVE YOU USED YOUR RESCUE INHALER OR NEBULIZER MEDICATION (SUCH AS ALBUTEROL): TWO OR THREE TIMES PER WEEK
ACT_TOTALSCORE: 18

## 2024-02-02 NOTE — PROGRESS NOTES
"  Assessment & Plan   Problem List Items Addressed This Visit       Rheumatoid arthritis of multiple sites without rheumatoid factor (H)    Relevant Medications    predniSONE (DELTASONE) 20 MG tablet    budesonide-formoterol (SYMBICORT) 80-4.5 MCG/ACT Inhaler     Other Visit Diagnoses       Mild intermittent asthma with exacerbation    -  Primary    Relevant Medications    predniSONE (DELTASONE) 20 MG tablet    budesonide-formoterol (SYMBICORT) 80-4.5 MCG/ACT Inhaler           We discussed her asthma action plan, and I added symbicort for her to take during \"yellow\" times when she is sick with the common cold etc         BMI  Estimated body mass index is 43.88 kg/m  as calculated from the following:    Height as of this encounter: 1.686 m (5' 6.38\").    Weight as of this encounter: 124.7 kg (275 lb).         Karthik Johnson is a 58 year old, presenting for the following health issues:  URI        2/2/2024     9:03 AM   Additional Questions   Roomed by Radha MCCALL CMA     History of Present Illness       Reason for visit:  Cough  Symptom onset:  3-4 weeks ago    She eats 2-3 servings of fruits and vegetables daily.She consumes 0 sweetened beverage(s) daily.She exercises with enough effort to increase her heart rate 10 to 19 minutes per day.  She exercises with enough effort to increase her heart rate 3 or less days per week. She is missing 1 dose(s) of medications per week.           Objective    /84 (BP Location: Left arm, Patient Position: Chair, Cuff Size: Adult Large)   Pulse 78   Temp 98.2  F (36.8  C) (Oral)   Resp 18   Ht 1.686 m (5' 6.38\")   Wt 124.7 kg (275 lb)   SpO2 98%   BMI 43.88 kg/m    Body mass index is 43.88 kg/m .  Physical Exam   GENERAL: alert and no distress  RESP: +wheezes bilaterally, no asymmetry            Signed Electronically by: KALA CHILD DO    "

## 2024-03-11 ENCOUNTER — TELEPHONE (OUTPATIENT)
Dept: RHEUMATOLOGY | Facility: CLINIC | Age: 59
End: 2024-03-11
Payer: COMMERCIAL

## 2024-03-11 DIAGNOSIS — M06.09 RHEUMATOID ARTHRITIS OF MULTIPLE SITES WITHOUT RHEUMATOID FACTOR (H): ICD-10-CM

## 2024-03-11 RX ORDER — ADALIMUMAB 40MG/0.4ML
KIT SUBCUTANEOUS
Qty: 0.8 ML | Refills: 6 | Status: SHIPPED | OUTPATIENT
Start: 2024-03-11 | End: 2024-07-26

## 2024-03-11 RX ORDER — ADALIMUMAB 40MG/0.4ML
KIT SUBCUTANEOUS
Qty: 0.8 ML | Refills: 6 | Status: CANCELLED | OUTPATIENT
Start: 2024-03-11

## 2024-03-11 NOTE — TELEPHONE ENCOUNTER
Requested Prescriptions   Pending Prescriptions Disp Refills    adalimumab (HUMIRA *CF* PEN) 40 MG/0.4ML pen kit 0.8 mL 6     Sig: INJECT 40MG SUBCUTANEOUSLY EVERY 2 WEEKS       There is no refill protocol information for this order            Last Written Prescription Date:  9/28/2023  Last Fill Quantity: 0.8 mL,  # refills: 6   Last office visit: 9/28/2023 ; last virtual visit: Visit date not found with prescribing provider:  Denilson Kelley MD    Future Office Visit: 5/2/2024  Next 5 appointments (look out 90 days)      May 02, 2024 11:00 AM  (Arrive by 10:45 AM)  Return Visit with Denilson Kelley MD  [facility] ([facility] ) [address] [place] [id number] [phone]

## 2024-03-11 NOTE — TELEPHONE ENCOUNTER
Last Written Prescription Date:  9/28/23  Last Fill Quantity: 0.8ml,  # refills: 6   Last office visit: 9/28/2023 ; last virtual visit: Visit date not found with prescribing provider:  Warren   Future Office Visit:   Next 5 appointments (look out 90 days)      May 02, 2024 11:00 AM  (Arrive by 10:45 AM)  Return Visit with Denilson Kelley MD  [facility] ([facility] ) [address] [place] [id number]  [phone]             Keri Haider RN

## 2024-03-14 NOTE — TELEPHONE ENCOUNTER
Patient is eligible to fill with [facility].  Patient currently filling with [facility], left message for patient to offer [facility].     No pt response to message

## 2024-05-23 ENCOUNTER — ANCILLARY PROCEDURE (OUTPATIENT)
Dept: MAMMOGRAPHY | Facility: CLINIC | Age: 59
End: 2024-05-23
Attending: PHYSICIAN ASSISTANT
Payer: COMMERCIAL

## 2024-05-23 ENCOUNTER — TELEPHONE (OUTPATIENT)
Dept: RHEUMATOLOGY | Facility: CLINIC | Age: 59
End: 2024-05-23

## 2024-05-23 DIAGNOSIS — Z12.31 VISIT FOR SCREENING MAMMOGRAM: ICD-10-CM

## 2024-05-23 PROCEDURE — 77067 SCR MAMMO BI INCL CAD: CPT | Mod: TC | Performed by: RADIOLOGY

## 2024-05-23 NOTE — TELEPHONE ENCOUNTER
PA Initiation    Medication: HUMIRA *CF* PEN 40 MG/0.4ML SC PNKT  Insurance Company: [employer] ([facility]) - Phone [phone] Fax [phone]  Pharmacy Filling the Rx: [facility] - [place], [place] - [address] [place] [address]  Filling Pharmacy Phone:    Filling Pharmacy Fax:    Start Date: 5/23/2024    Key: [id number]

## 2024-05-24 ENCOUNTER — TRANSFERRED RECORDS (OUTPATIENT)
Dept: HEALTH INFORMATION MANAGEMENT | Facility: CLINIC | Age: 59
End: 2024-05-24
Payer: COMMERCIAL

## 2024-05-30 NOTE — TELEPHONE ENCOUNTER
Prior Authorization Approval    Medication: HUMIRA *CF* PEN 40 MG/0.4ML SC PNKT  Authorization Effective Date: 5/23/2024  Authorization Expiration Date: 5/23/2025  Approved Dose/Quantity: 2 pens per 28 days  Reference #: Key: [id number]   Insurance Company: [employer] ([facility]) - Phone [phone] Fax [phone]  Expected CoPay: $    CoPay Card Available:      Financial Assistance Needed: Unknown  Which Pharmacy is filling the prescription: [facility] - [address]  Pharmacy Notified: Not needed  Patient Notified: Not needed

## 2024-07-10 ENCOUNTER — TELEPHONE (OUTPATIENT)
Dept: RHEUMATOLOGY | Facility: CLINIC | Age: 59
End: 2024-07-10
Payer: COMMERCIAL

## 2024-07-10 NOTE — TELEPHONE ENCOUNTER
MTM referral from: [facility] visit (referral by provider)    MTM referral outreach attempt #2 on July 10, 2024 at 9:24 AM      Outcome: Patient not reachable after several attempts, routed to Pharmacist Team/Provider as an FYI    Use HBC for the carrier/Plan on the flowsheet      [employer] Message Sent    GERRY Tatum

## 2024-07-16 NOTE — TELEPHONE ENCOUNTER
MTM Referral outreach attempt #3 was made on 07/16/2024.       Outcome: Sent patient MyChart message explaining more in-depth what MTM is and how we can best support them. Attached ability to schedule from message, as well as offered opportunity to call me directly for help with scheduling.

## 2024-07-22 ENCOUNTER — OFFICE VISIT (OUTPATIENT)
Dept: FAMILY MEDICINE | Facility: CLINIC | Age: 59
End: 2024-07-22
Payer: COMMERCIAL

## 2024-07-22 VITALS
HEART RATE: 75 BPM | TEMPERATURE: 98.1 F | BODY MASS INDEX: 46.93 KG/M2 | OXYGEN SATURATION: 98 % | WEIGHT: 292 LBS | SYSTOLIC BLOOD PRESSURE: 132 MMHG | DIASTOLIC BLOOD PRESSURE: 84 MMHG | RESPIRATION RATE: 16 BRPM | HEIGHT: 66 IN

## 2024-07-22 DIAGNOSIS — M06.09 RHEUMATOID ARTHRITIS OF MULTIPLE SITES WITHOUT RHEUMATOID FACTOR (H): ICD-10-CM

## 2024-07-22 DIAGNOSIS — J45.20 MILD INTERMITTENT ASTHMA WITHOUT COMPLICATION: ICD-10-CM

## 2024-07-22 DIAGNOSIS — M72.2 PLANTAR FASCIITIS OF LEFT FOOT: ICD-10-CM

## 2024-07-22 DIAGNOSIS — Z12.11 SCREEN FOR COLON CANCER: ICD-10-CM

## 2024-07-22 DIAGNOSIS — R53.83 OTHER FATIGUE: ICD-10-CM

## 2024-07-22 DIAGNOSIS — G43.109 OPHTHALMIC MIGRAINE: ICD-10-CM

## 2024-07-22 DIAGNOSIS — R73.03 PREDIABETES: ICD-10-CM

## 2024-07-22 DIAGNOSIS — E78.5 HYPERLIPIDEMIA, UNSPECIFIED HYPERLIPIDEMIA TYPE: ICD-10-CM

## 2024-07-22 DIAGNOSIS — I10 HYPERTENSION GOAL BP (BLOOD PRESSURE) < 140/90: ICD-10-CM

## 2024-07-22 DIAGNOSIS — R42 VERTIGO: ICD-10-CM

## 2024-07-22 DIAGNOSIS — E66.01 MORBID OBESITY WITH BMI OF 45.0-49.9, ADULT (H): ICD-10-CM

## 2024-07-22 DIAGNOSIS — F33.1 MAJOR DEPRESSIVE DISORDER, RECURRENT, MODERATE (H): ICD-10-CM

## 2024-07-22 DIAGNOSIS — Z00.00 ROUTINE GENERAL MEDICAL EXAMINATION AT A HEALTH CARE FACILITY: Primary | ICD-10-CM

## 2024-07-22 DIAGNOSIS — N18.31 CHRONIC KIDNEY DISEASE, STAGE 3A (H): ICD-10-CM

## 2024-07-22 DIAGNOSIS — M79.18 INTERCOSTAL MUSCLE PAIN: ICD-10-CM

## 2024-07-22 LAB
ALBUMIN SERPL BCG-MCNC: 3.9 G/DL (ref 3.5–5.2)
ALP SERPL-CCNC: 82 U/L (ref 40–150)
ALT SERPL W P-5'-P-CCNC: 10 U/L (ref 0–50)
ANION GAP SERPL CALCULATED.3IONS-SCNC: 9 MMOL/L (ref 7–15)
AST SERPL W P-5'-P-CCNC: 18 U/L (ref 0–45)
BILIRUB SERPL-MCNC: 0.3 MG/DL
BUN SERPL-MCNC: 13.7 MG/DL (ref 8–23)
CALCIUM SERPL-MCNC: 9 MG/DL (ref 8.8–10.4)
CHLORIDE SERPL-SCNC: 107 MMOL/L (ref 98–107)
CHOLEST SERPL-MCNC: 210 MG/DL
CREAT SERPL-MCNC: 1.06 MG/DL (ref 0.51–0.95)
EGFRCR SERPLBLD CKD-EPI 2021: 60 ML/MIN/1.73M2
ERYTHROCYTE [DISTWIDTH] IN BLOOD BY AUTOMATED COUNT: 13.5 % (ref 10–15)
FASTING STATUS PATIENT QL REPORTED: YES
FASTING STATUS PATIENT QL REPORTED: YES
FERRITIN SERPL-MCNC: 42 NG/ML (ref 11–328)
GLUCOSE SERPL-MCNC: 104 MG/DL (ref 70–99)
HBA1C MFR BLD: 5.8 % (ref 0–5.6)
HCO3 SERPL-SCNC: 27 MMOL/L (ref 22–29)
HCT VFR BLD AUTO: 38.5 % (ref 35–47)
HDLC SERPL-MCNC: 69 MG/DL
HGB BLD-MCNC: 12.8 G/DL (ref 11.7–15.7)
LDLC SERPL CALC-MCNC: 122 MG/DL
MCH RBC QN AUTO: 31 PG (ref 26.5–33)
MCHC RBC AUTO-ENTMCNC: 33.2 G/DL (ref 31.5–36.5)
MCV RBC AUTO: 93 FL (ref 78–100)
NONHDLC SERPL-MCNC: 141 MG/DL
PLATELET # BLD AUTO: 317 10E3/UL (ref 150–450)
POTASSIUM SERPL-SCNC: 4.4 MMOL/L (ref 3.4–5.3)
PROT SERPL-MCNC: 6.6 G/DL (ref 6.4–8.3)
RBC # BLD AUTO: 4.13 10E6/UL (ref 3.8–5.2)
SODIUM SERPL-SCNC: 143 MMOL/L (ref 135–145)
TRIGL SERPL-MCNC: 97 MG/DL
TSH SERPL DL<=0.005 MIU/L-ACNC: 1.05 UIU/ML (ref 0.3–4.2)
WBC # BLD AUTO: 5.1 10E3/UL (ref 4–11)

## 2024-07-22 PROCEDURE — 80061 LIPID PANEL: CPT | Performed by: PHYSICIAN ASSISTANT

## 2024-07-22 PROCEDURE — 91320 SARSCV2 VAC 30MCG TRS-SUC IM: CPT | Performed by: PHYSICIAN ASSISTANT

## 2024-07-22 PROCEDURE — 80053 COMPREHEN METABOLIC PANEL: CPT | Performed by: PHYSICIAN ASSISTANT

## 2024-07-22 PROCEDURE — 99396 PREV VISIT EST AGE 40-64: CPT | Mod: 25 | Performed by: PHYSICIAN ASSISTANT

## 2024-07-22 PROCEDURE — 82728 ASSAY OF FERRITIN: CPT | Performed by: PHYSICIAN ASSISTANT

## 2024-07-22 PROCEDURE — 90480 ADMN SARSCOV2 VAC 1/ONLY CMP: CPT | Performed by: PHYSICIAN ASSISTANT

## 2024-07-22 PROCEDURE — 36415 COLL VENOUS BLD VENIPUNCTURE: CPT | Performed by: PHYSICIAN ASSISTANT

## 2024-07-22 PROCEDURE — 99214 OFFICE O/P EST MOD 30 MIN: CPT | Mod: 25 | Performed by: PHYSICIAN ASSISTANT

## 2024-07-22 PROCEDURE — 85027 COMPLETE CBC AUTOMATED: CPT | Performed by: PHYSICIAN ASSISTANT

## 2024-07-22 PROCEDURE — 84443 ASSAY THYROID STIM HORMONE: CPT | Performed by: PHYSICIAN ASSISTANT

## 2024-07-22 PROCEDURE — 83036 HEMOGLOBIN GLYCOSYLATED A1C: CPT | Performed by: PHYSICIAN ASSISTANT

## 2024-07-22 RX ORDER — LISINOPRIL 10 MG/1
10 TABLET ORAL DAILY
Qty: 90 TABLET | Refills: 3 | Status: SHIPPED | OUTPATIENT
Start: 2024-07-22

## 2024-07-22 RX ORDER — MECLIZINE HYDROCHLORIDE 25 MG/1
1 TABLET ORAL 3 TIMES DAILY
COMMUNITY
Start: 2024-04-28

## 2024-07-22 RX ORDER — ESCITALOPRAM OXALATE 5 MG/1
15 TABLET ORAL DAILY
Qty: 270 TABLET | Refills: 1 | Status: SHIPPED | OUTPATIENT
Start: 2024-07-22

## 2024-07-22 RX ORDER — BUPROPION HYDROCHLORIDE 300 MG/1
300 TABLET ORAL EVERY MORNING
Qty: 90 TABLET | Refills: 3 | Status: SHIPPED | OUTPATIENT
Start: 2024-07-22

## 2024-07-22 RX ORDER — ESCITALOPRAM OXALATE 20 MG/1
20 TABLET ORAL DAILY
Qty: 90 TABLET | Refills: 3 | Status: SHIPPED | OUTPATIENT
Start: 2024-07-22 | End: 2024-07-22 | Stop reason: DRUGHIGH

## 2024-07-22 RX ORDER — SUMATRIPTAN 50 MG/1
50 TABLET, FILM COATED ORAL
Qty: 18 TABLET | Refills: 1 | Status: SHIPPED | OUTPATIENT
Start: 2024-07-22

## 2024-07-22 RX ORDER — ALBUTEROL SULFATE 90 UG/1
1-2 AEROSOL, METERED RESPIRATORY (INHALATION) EVERY 4 HOURS PRN
Qty: 18 G | Refills: 11 | Status: SHIPPED | OUTPATIENT
Start: 2024-07-22

## 2024-07-22 SDOH — HEALTH STABILITY: PHYSICAL HEALTH: ON AVERAGE, HOW MANY DAYS PER WEEK DO YOU ENGAGE IN MODERATE TO STRENUOUS EXERCISE (LIKE A BRISK WALK)?: 3 DAYS

## 2024-07-22 ASSESSMENT — ASTHMA QUESTIONNAIRES
QUESTION_2 LAST FOUR WEEKS HOW OFTEN HAVE YOU HAD SHORTNESS OF BREATH: NOT AT ALL
QUESTION_3 LAST FOUR WEEKS HOW OFTEN DID YOUR ASTHMA SYMPTOMS (WHEEZING, COUGHING, SHORTNESS OF BREATH, CHEST TIGHTNESS OR PAIN) WAKE YOU UP AT NIGHT OR EARLIER THAN USUAL IN THE MORNING: NOT AT ALL
QUESTION_4 LAST FOUR WEEKS HOW OFTEN HAVE YOU USED YOUR RESCUE INHALER OR NEBULIZER MEDICATION (SUCH AS ALBUTEROL): ONCE A WEEK OR LESS
QUESTION_5 LAST FOUR WEEKS HOW WOULD YOU RATE YOUR ASTHMA CONTROL: WELL CONTROLLED
ACT_TOTALSCORE: 23
ACT_TOTALSCORE: 23
QUESTION_1 LAST FOUR WEEKS HOW MUCH OF THE TIME DID YOUR ASTHMA KEEP YOU FROM GETTING AS MUCH DONE AT WORK, SCHOOL OR AT HOME: NONE OF THE TIME

## 2024-07-22 ASSESSMENT — ANXIETY QUESTIONNAIRES
6. BECOMING EASILY ANNOYED OR IRRITABLE: NOT AT ALL
GAD7 TOTAL SCORE: 0
8. IF YOU CHECKED OFF ANY PROBLEMS, HOW DIFFICULT HAVE THESE MADE IT FOR YOU TO DO YOUR WORK, TAKE CARE OF THINGS AT HOME, OR GET ALONG WITH OTHER PEOPLE?: SOMEWHAT DIFFICULT
3. WORRYING TOO MUCH ABOUT DIFFERENT THINGS: NOT AT ALL
2. NOT BEING ABLE TO STOP OR CONTROL WORRYING: NOT AT ALL
GAD7 TOTAL SCORE: 0
5. BEING SO RESTLESS THAT IT IS HARD TO SIT STILL: NOT AT ALL
1. FEELING NERVOUS, ANXIOUS, OR ON EDGE: NOT AT ALL
7. FEELING AFRAID AS IF SOMETHING AWFUL MIGHT HAPPEN: NOT AT ALL
4. TROUBLE RELAXING: NOT AT ALL
7. FEELING AFRAID AS IF SOMETHING AWFUL MIGHT HAPPEN: NOT AT ALL
IF YOU CHECKED OFF ANY PROBLEMS ON THIS QUESTIONNAIRE, HOW DIFFICULT HAVE THESE PROBLEMS MADE IT FOR YOU TO DO YOUR WORK, TAKE CARE OF THINGS AT HOME, OR GET ALONG WITH OTHER PEOPLE: SOMEWHAT DIFFICULT
GAD7 TOTAL SCORE: 0

## 2024-07-22 ASSESSMENT — SOCIAL DETERMINANTS OF HEALTH (SDOH): HOW OFTEN DO YOU GET TOGETHER WITH FRIENDS OR RELATIVES?: ONCE A WEEK

## 2024-07-22 ASSESSMENT — PATIENT HEALTH QUESTIONNAIRE - PHQ9
SUM OF ALL RESPONSES TO PHQ QUESTIONS 1-9: 3
10. IF YOU CHECKED OFF ANY PROBLEMS, HOW DIFFICULT HAVE THESE PROBLEMS MADE IT FOR YOU TO DO YOUR WORK, TAKE CARE OF THINGS AT HOME, OR GET ALONG WITH OTHER PEOPLE: SOMEWHAT DIFFICULT
SUM OF ALL RESPONSES TO PHQ QUESTIONS 1-9: 3

## 2024-07-22 ASSESSMENT — PAIN SCALES - GENERAL: PAINLEVEL: SEVERE PAIN (7)

## 2024-07-22 NOTE — PROGRESS NOTES
Preventive Care Visit  [facility] LARA Marie PA-C, Family Medicine  Jul 22, 2024      Assessment & Plan     Routine general medical examination at a health care facility  Well adult.     Intercostal muscle pain  Discussed. Very focal area of discomfort on exam. Appears musculoskeletal. Encouraged her to follow up if not resolving.     Plantar fasciitis of left foot  Discussed several treatment methods including shoe wear, activity restriction.    Major depressive disorder, recurrent, moderate (H)  Decreasing lexapro from 20 mg to 15 mg. She'll let me know if having worsening symptoms. Consider decreasing again to 10 mg if doing well.   - buPROPion (WELLBUTRIN XL) 300 MG 24 hr tablet; Take 1 tablet (300 mg) by mouth every morning  - escitalopram (LEXAPRO) 5 MG tablet; Take 3 tablets (15 mg) by mouth daily    Hypertension goal BP (blood pressure) < 140/90  Refilled. Stable.   - lisinopril (ZESTRIL) 10 MG tablet; Take 1 tablet (10 mg) by mouth daily  - Comprehensive metabolic panel (BMP + Alb, Alk Phos, ALT, AST, Total. Bili, TP); Future  - Comprehensive metabolic panel (BMP + Alb, Alk Phos, ALT, AST, Total. Bili, TP)    Vertigo  Ophthalmic migraine  Working with ENT, neurology on persistent symptoms. Reports relief with sumatriptan so refilled.   - SUMAtriptan (IMITREX) 50 MG tablet; Take 1 tablet (50 mg) by mouth at onset of headache for migraine May repeat in 2 hours. Max 4 tablets/24 hours.    Mild intermittent asthma without complication  Refilled.   - albuterol (PROAIR HFA/PROVENTIL HFA/VENTOLIN HFA) 108 (90 Base) MCG/ACT inhaler; Inhale 1-2 puffs into the lungs every 4 hours as needed for shortness of breath or wheezing    Hyperlipidemia, unspecified hyperlipidemia type  Screen.  - Lipid panel reflex to direct LDL Non-fasting; Future  - Lipid panel reflex to direct LDL Non-fasting    Rheumatoid arthritis of multiple sites without rheumatoid factor (H)  Stable. Sees rheumatology.  -  "CBC with platelets    Morbid obesity with BMI of 45.0-49.9, adult (H)  Stable. Encouraged healthy diet, increased activity.    Chronic kidney disease, stage 3a (H)  Stable. GFR 57 04/2024, Has been steady around 50-60.     Screen for colon cancer  Screen.  - Colonoscopy Screening  Referral; Future    Prediabetes  Recheck.    Other fatigue  Eval persistent fatigue.  - CBC with platelets; Future  - Hemoglobin A1c; Future  - Ferritin; Future  - TSH with free T4 reflex; Future  - Hemoglobin A1c  - Ferritin  - TSH with free T4 reflex            BMI  Estimated body mass index is 46.43 kg/m  as calculated from the following:    Height as of this encounter: 1.689 m (5' 6.5\").    Weight as of this encounter: 132.5 kg (292 lb).   Weight management plan: Discussed healthy diet and exercise guidelines    Counseling  Appropriate preventive services were addressed with this patient via screening, questionnaire, or discussion as appropriate for fall prevention, nutrition, physical activity, Tobacco-use cessation, weight loss and cognition.  Checklist reviewing preventive services available has been given to the patient.  Reviewed patient's diet, addressing concerns and/or questions.   She is at risk for lack of exercise and has been provided with information to increase physical activity for the benefit of her well-being.           Karthik Johnson is a 59 year old, presenting for the following:  Physical        7/22/2024     8:14 AM   Additional Questions   Roomed by [facility] Care Directive  Patient has a Health Care Directive on file  Advance care planning document is on file and is current.    HPI    Left sided shoulder/upper chest. About a month.     Left heel pain at times.     Vertigo - saw neurology, ENT. Ent follow up in Nov, Neurology follow up next month.   Imitrex does seem to help - would like a refill today.                 7/22/2024   General Health   How would you rate your overall physical " health? Good   Feel stress (tense, anxious, or unable to sleep) Not at all            7/22/2024   Nutrition   Three or more servings of calcium each day? Yes   Diet: Other   If other, please elaborate: food allergies   How many servings of fruit and vegetables per day? (!) 2-3   How many sweetened beverages each day? 0-1            7/22/2024   Exercise   Days per week of moderate/strenous exercise 3 days            7/22/2024   Social Factors   Frequency of gathering with friends or relatives Once a week   Worry food won't last until get money to buy more No   Food not last or not have enough money for food? No   Do you have housing? (Housing is defined as stable permanent housing and does not include staying ouside in a car, in a tent, in an abandoned building, in an overnight shelter, or couch-surfing.) No   Are you worried about losing your housing? No   Lack of transportation? No   Unable to get utilities (heat,electricity)? No   Want help with housing or utility concern? No      (!) HOUSING CONCERN PRESENT      7/22/2024   Fall Risk   Fallen 2 or more times in the past year? No   Trouble with walking or balance? Yes   Gait Speed Test (Document in seconds) 3.1   Gait Speed Test Interpretation Less than or equal to 5.00 seconds - PASS             7/22/2024   Dental   Dentist two times every year? Yes             Today's PHQ-9 Score:       7/22/2024     8:09 AM   PHQ-9 SCORE   PHQ-9 Total Score MyChart 3 (Minimal depression)   PHQ-9 Total Score 3         7/22/2024   Substance Use   Alcohol more than 3/day or more than 7/wk No   Do you use any other substances recreationally? No        Social History     Tobacco Use    Smoking status: Never     Passive exposure: Never    Smokeless tobacco: Never   Vaping Use    Vaping status: Never Used   Substance Use Topics    Alcohol use: Yes     Comment: Bery infrequent    Drug use: No           5/23/2024   LAST FHS-7 RESULTS   1st degree relative breast or ovarian cancer Yes  "  Any relative bilateral breast cancer No   Any male have breast cancer No   Any ONE woman have BOTH breast AND ovarian cancer No   Any woman with breast cancer before 50yrs No   2 or more relatives with breast AND/OR ovarian cancer Yes   2 or more relatives with breast AND/OR bowel cancer Yes                     7/22/2024   One time HIV Screening   Previous HIV test? No          7/22/2024   STI Screening   New sexual partner(s) since last STI/HIV test? No        History of abnormal Pap smear: No - age 30- 64 PAP with HPV every 5 years recommended        Latest Ref Rng & Units 8/25/2021     8:15 AM 6/1/2017     8:15 AM 6/1/2017     7:46 AM   PAP / HPV   PAP (Historical)    NIL    HPV 16 DNA NEG  Negative     HPV 18 DNA NEG  Negative     Other HR HPV NEG  Negative     PAP-ABSTRACT  See Scanned Document             This result is from an external source.     ASCVD Risk   The 10-year ASCVD risk score (Cecilio BALDWIN, et al., 2019) is: 5%    Values used to calculate the score:      Age: 59 years      Sex: Female      Is Non- : No      Diabetic: No      Tobacco smoker: No      Systolic Blood Pressure: 132 mmHg      Is BP treated: Yes      HDL Cholesterol: 62 mg/dL      Total Cholesterol: 238 mg/dL           Reviewed and updated as needed this visit by Provider                          Review of Systems  Constitutional, neuro, ENT, endocrine, pulmonary, cardiac, gastrointestinal, genitourinary, musculoskeletal, integument and psychiatric systems are negative, except as otherwise noted.     Objective    Exam  /84   Pulse 75   Temp 98.1  F (36.7  C) (Temporal)   Resp 16   Ht 1.689 m (5' 6.5\")   Wt 132.5 kg (292 lb)   SpO2 98%   BMI 46.43 kg/m     Estimated body mass index is 46.43 kg/m  as calculated from the following:    Height as of this encounter: 1.689 m (5' 6.5\").    Weight as of this encounter: 132.5 kg (292 lb).    Physical Exam  GENERAL: alert and no distress  EYES: Eyes " grossly normal to inspection, PERRL and conjunctivae and sclerae normal  HENT: ear canals and TM's normal, nose and mouth without ulcers or lesions  NECK: no adenopathy, no asymmetry, masses, or scars  RESP: lungs clear to auscultation - no rales, rhonchi or wheezes  CV: regular rate and rhythm, normal S1 S2, no S3 or S4, no murmur, click or rub, no peripheral edema  ABDOMEN: soft, nontender, no hepatosplenomegaly, no masses and bowel sounds normal  MS: no gross musculoskeletal defects noted, no edema  SKIN: no suspicious lesions or rashes  NEURO: Normal strength and tone, mentation intact and speech normal  PSYCH: mentation appears normal, affect normal/bright        Signed Electronically by: Elisha Marie PA-C

## 2024-07-22 NOTE — PATIENT INSTRUCTIONS
Patient Education   Preventive Care Advice   This is general advice given by our system to help you stay healthy. However, your care team may have specific advice just for you. Please talk to your care team about your preventive care needs.  Nutrition  Eat 5 or more servings of fruits and vegetables each day.  Try wheat bread, brown rice and whole grain pasta (instead of white bread, rice, and pasta).  Get enough calcium and vitamin D. Check the label on foods and aim for 100% of the RDA (recommended daily allowance).  Lifestyle  Exercise at least 150 minutes each week  (30 minutes a day, 5 days a week).  Do muscle strengthening activities 2 days a week. These help control your weight and prevent disease.  No smoking.  Wear sunscreen to prevent skin cancer.  Have a dental exam and cleaning every 6 months.  Yearly exams  See your health care team every year to talk about:  Any changes in your health.  Any medicines your care team has prescribed.  Preventive care, family planning, and ways to prevent chronic diseases.  Shots (vaccines)   HPV shots (up to age 26), if you've never had them before.  Hepatitis B shots (up to age 59), if you've never had them before.  COVID-19 shot: Get this shot when it's due.  Flu shot: Get a flu shot every year.  Tetanus shot: Get a tetanus shot every 10 years.  Pneumococcal, hepatitis A, and RSV shots: Ask your care team if you need these based on your risk.  Shingles shot (for age 50 and up)  General health tests  Diabetes screening:  Starting at age 35, Get screened for diabetes at least every 3 years.  If you are younger than age 35, ask your care team if you should be screened for diabetes.  Cholesterol test: At age 39, start having a cholesterol test every 5 years, or more often if advised.  Bone density scan (DEXA): At age 50, ask your care team if you should have this scan for osteoporosis (brittle bones).  Hepatitis C: Get tested at least once in your life.  STIs (sexually  transmitted infections)  Before age 24: Ask your care team if you should be screened for STIs.  After age 24: Get screened for STIs if you're at risk. You are at risk for STIs (including HIV) if:  You are sexually active with more than one person.  You don't use condoms every time.  You or a partner was diagnosed with a sexually transmitted infection.  If you are at risk for HIV, ask about PrEP medicine to prevent HIV.  Get tested for HIV at least once in your life, whether you are at risk for HIV or not.  Cancer screening tests  Cervical cancer screening: If you have a cervix, begin getting regular cervical cancer screening tests starting at age 21.  Breast cancer scan (mammogram): If you've ever had breasts, begin having regular mammograms starting at age 40. This is a scan to check for breast cancer.  Colon cancer screening: It is important to start screening for colon cancer at age 45.  Have a colonoscopy test every 10 years (or more often if you're at risk) Or, ask your provider about stool tests like a FIT test every year or Cologuard test every 3 years.  To learn more about your testing options, visit:   .  For help making a decision, visit:   https://bit.ly/tq33027.  Prostate cancer screening test: If you have a prostate, ask your care team if a prostate cancer screening test (PSA) at age 55 is right for you.  Lung cancer screening: If you are a current or former smoker ages 50 to 80, ask your care team if ongoing lung cancer screenings are right for you.  For informational purposes only. Not to replace the advice of your health care provider. Copyright   2023 [facility]. All rights reserved. Clinically reviewed by the [facility] Program. [employer] [id number] - REV 01/24.  Preventing Falls: Care Instructions  Injuries and health problems such as trouble walking or poor eyesight can increase your risk of falling. So can some medicines. But there are things you can do to help  "prevent falls. You can exercise to get stronger. You can also arrange your home to make it safer.    Talk to your doctor about the medicines you take. Ask if any of them increase the risk of falls and whether they can be changed or stopped.   Try to exercise regularly. It can help improve your strength and balance. This can help lower your risk of falling.     Practice fall safety and prevention.    Wear low-heeled shoes that fit well and give your feet good support. Talk to your doctor if you have foot problems that make this hard.  Carry a cellphone or wear a medical alert device that you can use to call for help.  Use stepladders instead of chairs to reach high objects. Don't climb if you're at risk for falls. Ask for help, if needed.  Wear the correct eyeglasses, if you need them.    Make your home safer.    Remove rugs, cords, clutter, and furniture from walkways.  Keep your house well lit. Use night-lights in hallways and bathrooms.  Install and use sturdy handrails on stairways.  Wear nonskid footwear, even inside. Don't walk barefoot or in socks without shoes.    Be safe outside.    Use handrails, curb cuts, and ramps whenever possible.  Keep your hands free by using a shoulder bag or backpack.  Try to walk in well-lit areas. Watch out for uneven ground, changes in pavement, and debris.  Be careful in the winter. Walk on the grass or gravel when sidewalks are slippery. Use de-icer on steps and walkways. Add non-slip devices to shoes.    Put grab bars and nonskid mats in your shower or tub and near the toilet. Try to use a shower chair or bath bench when bathing.   Get into a tub or shower by putting in your weaker leg first. Get out with your strong side first. Have a phone or medical alert device in the bathroom with you.   Where can you learn more?  Go to https://www.[employer].net/patiented  Enter G117 in the search box to learn more about \"Preventing Falls: Care Instructions.\"  Current as of: July 17, " 2023               Content Version: 14.0    5087-5703 [employer].   Care instructions adapted under license by your healthcare professional. If you have questions about a medical condition or this instruction, always ask your healthcare professional. [employer] disclaims any warranty or liability for your use of this information.

## 2024-07-23 ENCOUNTER — TELEPHONE (OUTPATIENT)
Dept: RHEUMATOLOGY | Facility: CLINIC | Age: 59
End: 2024-07-23
Payer: COMMERCIAL

## 2024-07-23 NOTE — TELEPHONE ENCOUNTER
Please select the one best answer for the patient's ability at this time     Dress yourself including tying shoelaces and doing buttons?    With some difficulty: 0.33   Get in and out of bed   With some difficulty: 0.33   Lift a full cup or glass to your mouth  With out difficulty: 0   Walk outdoors on flat ground    With out difficulty: 0   Wash and dry your whole body    With out difficulty: 0   Bend down to  clothing from the ground    With much difficulty: 0.66   Turn regular faucets on and off    With some difficulty: 0.33   Get out of a car, bus, train, airplane   Unable to do: 0.99  Walk 2 miles or 3 kilometers, if you wish?    With some difficulty: 0.33   Participate in recreational activities and sports as you would like if you wish?   Unable to do: 0.99  How much pain have you had because of your condition in the last week? Please indicate how severe your pain has been on a scale of 0-10 (with 0 being no pain at all and 10 being pain as worse as it could be)  4- pain gets worse as she comes close to needing a dose  Considering all the ways in which illness and health conditions may affect you at this time, please indicate how you are doing on the scale of 0-10 (with 0 being very well and 10 being very poor)  4- pt states fatigue is the biggest sx right now    Score: 11.96  0-3 Near remission  3.1-6 Low  6.1-12 Moderate  12.1-30 High

## 2024-07-23 NOTE — TELEPHONE ENCOUNTER
Called pt to [facility] RAPID-3 for upcoming MTM appointment on 7/26    Pt did not answer, but I LVM and sent MyC

## 2024-07-23 NOTE — TELEPHONE ENCOUNTER
Pt called back to complete RAPID-3 questionnaire. Assessment was completed and routed to Elena GARRETT

## 2024-07-26 ENCOUNTER — VIRTUAL VISIT (OUTPATIENT)
Dept: RHEUMATOLOGY | Facility: CLINIC | Age: 59
End: 2024-07-26
Attending: INTERNAL MEDICINE
Payer: COMMERCIAL

## 2024-07-26 DIAGNOSIS — M06.09 RHEUMATOID ARTHRITIS OF MULTIPLE SITES WITHOUT RHEUMATOID FACTOR (H): Primary | ICD-10-CM

## 2024-07-26 DIAGNOSIS — Z71.85 VACCINE COUNSELING: ICD-10-CM

## 2024-07-26 RX ORDER — ADALIMUMAB-ADAZ 40 MG/.4ML
0.4 INJECTION, SOLUTION SUBCUTANEOUS
Qty: 0.8 ML | Refills: 5 | Status: SHIPPED | OUTPATIENT
Start: 2024-07-26

## 2024-07-26 NOTE — PROGRESS NOTES
Medication Therapy Management (MTM) Encounter    ASSESSMENT:                            Medication Adherence/Access: Patient needs to switch from Humira to biosimilar per insurance. Insurance prefers Hadlima, Hyrimoz, or Amjevita.    Rheumatoid arthritis: Patient's symptoms are stable on Humira, insurance requiring switch to biosimilar as above. Discussed we would not expect a difference in effectiveness or side effects with switch to biosimilar and education including administration provided today.     Vaccine counseling: Indicated for Shingrix per ACIP recommendations, patient declines this and future pneumonia vaccines. Encouraged flu and COVID boosters in the fall.    PLAN:                            Once approved, switch Humira to Hyrimoz when due for next dose. Report any changes in symptoms to rheumatology department.    Follow-up: 6 months routine. Declines CMR today as she recently saw primary care, will conduct CMR at follow up. Will review 11/5/24 provider note to ensure no changes with biosimilar switch.    SUBJECTIVE/OBJECTIVE:                          Elizabeth Rosenthal is a 59 year old female called for an initial visit. She was referred to me from Denilson Kelley MD.      Reason for visit: Humira biosimilar switch.    Allergies/ADRs: Reviewed in chart  Past Medical History: Reviewed in chart  Tobacco: She reports that she has never smoked. She has never been exposed to tobacco smoke. She has never used smokeless tobacco.  Alcohol: not assessed today    Medication Adherence/Access: Patient needs to switch from Humira to biosimilar per insurance. Insurance prefers Hadlima, Hyrimoz, or Amjevita. See below for details.    Rheumatoid arthritis:   Humira 40 mg every 14 days  Methotrexate 20 mg weekly  Folic acid 3 mg daily    Patient's symptoms are stable on Humira + methotrexate. She received a letter from insurance with notification of need for switch to a Humira biosimilar. She is open to switching, notes she  will likely need copay assistance. No specific questions or concerns today and phone call was limited by her work schedule.    Component      Latest Ref Rng 7/22/2024  9:34 AM   Sodium      135 - 145 mmol/L 143    Potassium      3.4 - 5.3 mmol/L 4.4    Carbon Dioxide (CO2)      22 - 29 mmol/L 27    Anion Gap      7 - 15 mmol/L 9    Urea Nitrogen      8.0 - 23.0 mg/dL 13.7    Creatinine      0.51 - 0.95 mg/dL 1.06 (H)    GFR Estimate      >60 mL/min/1.73m2 60 (L)    Calcium      8.8 - 10.4 mg/dL 9.0    Chloride      98 - 107 mmol/L 107    Glucose      70 - 99 mg/dL 104 (H)    Alkaline Phosphatase      40 - 150 U/L 82    AST      0 - 45 U/L 18    ALT      0 - 50 U/L 10    Protein Total      6.4 - 8.3 g/dL 6.6    Albumin      3.5 - 5.2 g/dL 3.9    Bilirubin Total      <=1.2 mg/dL 0.3    Patient Fasting? Yes    WBC      4.0 - 11.0 10e3/uL 5.1    RBC Count      3.80 - 5.20 10e6/uL 4.13    Hemoglobin      11.7 - 15.7 g/dL 12.8    Hematocrit      35.0 - 47.0 % 38.5    MCV      78 - 100 fL 93    MCH      26.5 - 33.0 pg 31.0    MCHC      31.5 - 36.5 g/dL 33.2    RDW      10.0 - 15.0 % 13.5    Platelet Count      150 - 450 10e3/uL 317       Vaccine counseling: Patient declines Shingrix and is avoiding additional pneumonia vaccines due to side effects from last vaccine.    Immunization History   Administered Date(s) Administered    COVID-19 12+ (2023-24) (Pfizer) 07/22/2024    COVID-19 Bivalent 12+ (Pfizer) 07/21/2023    COVID-19 MONOVALENT 12+ (Pfizer) 03/22/2021, 04/12/2021, 01/06/2022    COVID-19 Monovalent 12+ (Pfizer 2022) 07/01/2022    Influenza (IIV3) PF 11/07/2016    Influenza Vaccine >6 months,quad, PF 10/01/2020    Pneumo Conj 13-V (2010&after) 10/01/2020    TDAP (Adacel,Boostrix) 01/08/2014, 02/02/2024       Today's Vitals: There were no vitals taken for this visit.  ----------------    I spent 20 minutes with this patient today. All changes were made via collaborative practice agreement with Denilson Kelley MD. A  copy of the visit note was provided to the patient's provider(s).    A summary of these recommendations was sent via [employer].    Elena Lewis, PharmD  Medication Therapy Management Pharmacist  [facility]     Telemedicine Visit Details  Type of service:  Telephone visit  Start Time: 1320  End Time:  1340     Medication Therapy Recommendations  Rheumatoid arthritis of multiple sites without rheumatoid factor (H)    Current Medication: adalimumab (HUMIRA *CF* PEN) 40 MG/0.4ML pen kit   Rationale: Cannot afford medication product - Cost - Adherence   Recommendation: Change Medication - Formulary change, insurance requires switch from Humira to biosimilar   Status: Accepted per CPA

## 2024-07-26 NOTE — Clinical Note
7/26/2024       RE: Elizabeth Rosenthal  [address]  [place] [address]     Dear Colleague,    Thank you for referring your patient, Elizabeth Rosenthal, to the [facility] [place] at [facility]. Please see a copy of my visit note below.    Medication Therapy Management (MTM) Encounter    ASSESSMENT:                            Medication Adherence/Access: {adherencechoices:311688}    ***:  ***      PLAN:                            ***    Follow-up: {followuptest2:512013}    SUBJECTIVE/OBJECTIVE:                          Elizabeth Rosenthal is a 59 year old female called for an initial visit. She was referred to me from Denilson Kelley MD.      Reason for visit: Humira biosimilar switch.    Allergies/ADRs: Reviewed in chart  Past Medical History: Reviewed in chart  Tobacco: She reports that she has never smoked. She has never been exposed to tobacco smoke. She has never used smokeless tobacco.  Alcohol: not assessed today    Medication Adherence/Access: ***    Rheumatoid arthritis:   Humira 40 mg every 14 days  Methotrexate 20 mg weekly  Folic acid 3 mg daily    ***    Vaccine counseling: Patient declines Shingrix and is avoiding additional pneumonia vaccines due to side effects from last vaccine.    Immunization History   Administered Date(s) Administered    COVID-19 12+ (2023-24) (Pfizer) 07/22/2024    COVID-19 Bivalent 12+ (Pfizer) 07/21/2023    COVID-19 MONOVALENT 12+ (Pfizer) 03/22/2021, 04/12/2021, 01/06/2022    COVID-19 Monovalent 12+ (Pfizer 2022) 07/01/2022    Influenza (IIV3) PF 11/07/2016    Influenza Vaccine >6 months,quad, PF 10/01/2020    Pneumo Conj 13-V (2010&after) 10/01/2020    TDAP (Adacel,Boostrix) 01/08/2014, 02/02/2024       Today's Vitals: There were no vitals taken for this visit.  ----------------    I spent 20 minutes with this patient today. All changes were made via collaborative practice agreement with Denilson Kelley  MD. A copy of the visit note was provided to the patient's provider(s).    A summary of these recommendations was sent via [employer].    Elena Lewis, PharmD  Medication Therapy Management Pharmacist  [facility]     Telemedicine Visit Details  Type of service:  Telephone visit  Start Time: 1320  End Time:  1340     Medication Therapy Recommendations  No medication therapy recommendations to display     Medication Therapy Management (MTM) Encounter    ASSESSMENT:                            Medication Adherence/Access: Patient needs to switch from Humira to biosimilar per insurance. Insurance prefers Hadlima, Hyrimoz, or Amjevita.    Rheumatoid arthritis: Patient's symptoms are stable on Humira, insurance requiring switch to biosimilar as above. Discussed we would not expect a difference in effectiveness or side effects with switch to biosimilar and education including administration provided today.     Vaccine counseling: Indicated for Shingrix per ACIP recommendations, patient declines this and future pneumonia vaccines. Encouraged flu and COVID boosters in the fall.    PLAN:                            Once approved, switch Humira to Hyrimoz when due for next dose. Report any changes in symptoms to rheumatology department.    Follow-up: 6 months routine. Declines CMR today as she recently saw primary care, will conduct CMR at follow up. Will review 11/5/24 provider note to ensure no changes with biosimilar switch.    SUBJECTIVE/OBJECTIVE:                          Elizabeth Rosenthal is a 59 year old female called for an initial visit. She was referred to me from Denilson Kelley MD.      Reason for visit: Humira biosimilar switch.    Allergies/ADRs: Reviewed in chart  Past Medical History: Reviewed in chart  Tobacco: She reports that she has never smoked. She has never been exposed to tobacco smoke. She has never used smokeless tobacco.  Alcohol: not assessed today    Medication Adherence/Access:  Patient needs to switch from Humira to biosimilar per insurance. Insurance prefers Hadlima, Hyrimoz, or Amjevita. See below for details.    Rheumatoid arthritis:   Humira 40 mg every 14 days  Methotrexate 20 mg weekly  Folic acid 3 mg daily    Patient's symptoms are stable on Humira + methotrexate. She received a letter from insurance with notification of need for switch to a Humira biosimilar. She is open to switching, notes she will likely need copay assistance. No specific questions or concerns today and phone call was limited by her work schedule.    Component      Latest Ref Rng 7/22/2024  9:34 AM   Sodium      135 - 145 mmol/L 143    Potassium      3.4 - 5.3 mmol/L 4.4    Carbon Dioxide (CO2)      22 - 29 mmol/L 27    Anion Gap      7 - 15 mmol/L 9    Urea Nitrogen      8.0 - 23.0 mg/dL 13.7    Creatinine      0.51 - 0.95 mg/dL 1.06 (H)    GFR Estimate      >60 mL/min/1.73m2 60 (L)    Calcium      8.8 - 10.4 mg/dL 9.0    Chloride      98 - 107 mmol/L 107    Glucose      70 - 99 mg/dL 104 (H)    Alkaline Phosphatase      40 - 150 U/L 82    AST      0 - 45 U/L 18    ALT      0 - 50 U/L 10    Protein Total      6.4 - 8.3 g/dL 6.6    Albumin      3.5 - 5.2 g/dL 3.9    Bilirubin Total      <=1.2 mg/dL 0.3    Patient Fasting? Yes    WBC      4.0 - 11.0 10e3/uL 5.1    RBC Count      3.80 - 5.20 10e6/uL 4.13    Hemoglobin      11.7 - 15.7 g/dL 12.8    Hematocrit      35.0 - 47.0 % 38.5    MCV      78 - 100 fL 93    MCH      26.5 - 33.0 pg 31.0    MCHC      31.5 - 36.5 g/dL 33.2    RDW      10.0 - 15.0 % 13.5    Platelet Count      150 - 450 10e3/uL 317       Vaccine counseling: Patient declines Shingrix and is avoiding additional pneumonia vaccines due to side effects from last vaccine.    Immunization History   Administered Date(s) Administered     COVID-19 12+ (2023-24) (Pfizer) 07/22/2024     COVID-19 Bivalent 12+ (Pfizer) 07/21/2023     COVID-19 MONOVALENT 12+ (Pfizer) 03/22/2021, 04/12/2021, 01/06/2022      COVID-19 Monovalent 12+ (Pfizer 2022) 07/01/2022     Influenza (IIV3) PF 11/07/2016     Influenza Vaccine >6 months,quad, PF 10/01/2020     Pneumo Conj 13-V (2010&after) 10/01/2020     TDAP (Adacel,Boostrix) 01/08/2014, 02/02/2024       Today's Vitals: There were no vitals taken for this visit.  ----------------    I spent 20 minutes with this patient today. All changes were made via collaborative practice agreement with Denilson Kelley MD. A copy of the visit note was provided to the patient's provider(s).    A summary of these recommendations was sent via [employer].    Elena Lewis, PharmD  Medication Therapy Management Pharmacist  [facility]     Telemedicine Visit Details  Type of service:  Telephone visit  Start Time: 1320  End Time:  1340     Medication Therapy Recommendations  Rheumatoid arthritis of multiple sites without rheumatoid factor (H)    Current Medication: adalimumab (HUMIRA *CF* PEN) 40 MG/0.4ML pen kit   Rationale: Cannot afford medication product - Cost - Adherence   Recommendation: Change Medication - Formulary change, insurance requires switch from Humira to biosimilar   Status: Accepted per CPA                Again, thank you for allowing me to participate in the care of your patient.      Sincerely,    Elena Lewis, [facility]

## 2024-07-26 NOTE — PATIENT INSTRUCTIONS
"Recommendations from today's MTM visit:                                                      Once approved, switch Humira to Hyrimoz when due for next dose. Report any changes in symptoms to rheumatology department.    Follow-up: 11/5/24 with provider, 6 months with pharmacist.    It was great speaking with you today.  I value your experience and would be very thankful for your time in providing feedback in our clinic survey. In the next few days, you may receive an email or text message from [facility] [employer] with a link to a survey related to your  clinical pharmacist.\"     To schedule another MTM appointment, please call the clinic directly or you may call the MTM scheduling line at [phone] or toll-free at [phone].     My Clinical Pharmacist's contact information:                                                      Please feel free to contact me with any questions or concerns you have.      Elena Lewis, PharmD  Medication Therapy Management Pharmacist  [facility]    "

## 2024-09-26 ENCOUNTER — TELEPHONE (OUTPATIENT)
Dept: GASTROENTEROLOGY | Facility: CLINIC | Age: 59
End: 2024-09-26
Payer: COMMERCIAL

## 2024-09-26 NOTE — TELEPHONE ENCOUNTER
"Endoscopy Scheduling Screen    Have you had any respiratory illness or flu-like symptoms in the last 10 days?  No    What is your communication preference for Instructions and/or Bowel Prep?   MyChart    What insurance is in the chart?  Other:  [facility]    Ordering/Referring Provider: Frank   (If ordering provider performs procedure, schedule with ordering provider unless otherwise instructed. )    BMI: Estimated body mass index is 46.43 kg/m  as calculated from the following:    Height as of 7/22/24: 1.689 m (5' 6.5\").    Weight as of 7/22/24: 132.5 kg (292 lb).     Sedation Ordered  moderate sedation.   If patient BMI > 50 do not schedule in [facility].    If patient BMI > 45 do not schedule at [facility].    Are you taking methadone or Suboxone?  NO, No RN review required.    Have you been diagnosed and are being treated for severe PTSD or severe anxiety?  NO, No RN review required.    Are you taking any prescription medications for pain 3 or more times per week?   NO, No RN review required.    Do you have a history of malignant hyperthermia?  No    (Females) Are you currently pregnant?        Have you been diagnosed or told you have pulmonary hypertension?   No    Do you have an LVAD?  No    Have you been told you have moderate to severe sleep apnea?  Yes. Do you use a CPAP? Yes. (RN Review required for scheduling unless scheduling in Hospital.)     Have you been told you have COPD, asthma, or any other lung disease?  Yes     What breathing problems do you have?  Asthma     Do you use home oxygen?  No    Have your breathing problems required an ED visit or hospitalization in the last year?  No.    Do you have any heart conditions?  No     Have you ever had or are you waiting for an organ transplant?  No. Continue scheduling, no site restrictions.    Have you had a stroke or transient ischemic attack (TIA aka \"mini stroke\" in the last 6 months?   No    Have you been diagnosed with or been told you have cirrhosis of the " "liver?   No.    Are you currently on dialysis?   No    Do you need assistance transferring?   No    BMI: Estimated body mass index is 46.43 kg/m  as calculated from the following:    Height as of 7/22/24: 1.689 m (5' 6.5\").    Weight as of 7/22/24: 132.5 kg (292 lb).     Is patients BMI > 40 and scheduling location [facility]?  Yes (If MAC sedation is ordered, schedule PAC eval)    Do you take an injectable or oral medication for weight loss or diabetes (excluding insulin)?  No    Do you take the medication Naltrexone?  No    Do you take blood thinners?  No       Prep   Are you currently on dialysis or do you have chronic kidney disease?  No    Do you have a diagnosis of diabetes?  No    Do you have a diagnosis of cystic fibrosis (CF)?  No    On a regular basis do you go 3 -5 days between bowel movements?  No    BMI > 40?  Yes (Extended Prep)    Preferred Pharmacy:      [facility]/pharmacy #[id number] - [address] AT [facility] OF [address] [place] [id number]  Phone: [phone] Fax: [phone]    Final Scheduling Details     Procedure scheduled  Colonoscopy    Surgeon:  Sarah     Date of procedure:  12/18/24     Pre-OP / PAC:   No - Not required for this site.    Location  [facility] - Per exclusion criteria.    Sedation   Moderate Sedation - Per order.      Patient Reminders:   You will receive a call from a Nurse to review instructions and health history.  This assessment must be completed prior to your procedure.  Failure to complete the Nurse assessment may result in the procedure being cancelled.      On the day of your procedure, please designate an adult(s) who can drive you home stay with you for the next 24 hours. The medicines used in the exam will make you sleepy. You will not be able to drive.      You cannot take public transportation, ride share services, or non-medical taxi service without a responsible caregiver.  Medical transport services are allowed with the requirement that a " responsible caregiver will receive you at your destination.  We require that drivers and caregivers are confirmed prior to your procedure.

## 2024-10-07 ENCOUNTER — OFFICE VISIT (OUTPATIENT)
Dept: FAMILY MEDICINE | Facility: CLINIC | Age: 59
End: 2024-10-07
Payer: COMMERCIAL

## 2024-10-07 VITALS
DIASTOLIC BLOOD PRESSURE: 79 MMHG | HEART RATE: 80 BPM | RESPIRATION RATE: 20 BRPM | WEIGHT: 293 LBS | TEMPERATURE: 99.1 F | OXYGEN SATURATION: 98 % | HEIGHT: 66 IN | SYSTOLIC BLOOD PRESSURE: 120 MMHG | BODY MASS INDEX: 47.09 KG/M2

## 2024-10-07 DIAGNOSIS — Z20.7 EXPOSURE TO PARASITIC DISEASE: Primary | ICD-10-CM

## 2024-10-07 DIAGNOSIS — M72.2 PLANTAR FASCIITIS: ICD-10-CM

## 2024-10-07 DIAGNOSIS — M06.09 RHEUMATOID ARTHRITIS OF MULTIPLE SITES WITHOUT RHEUMATOID FACTOR (H): ICD-10-CM

## 2024-10-07 DIAGNOSIS — N18.31 CHRONIC KIDNEY DISEASE, STAGE 3A (H): ICD-10-CM

## 2024-10-07 LAB
ERYTHROCYTE [DISTWIDTH] IN BLOOD BY AUTOMATED COUNT: 13.8 % (ref 10–15)
HCT VFR BLD AUTO: 39.4 % (ref 35–47)
HGB BLD-MCNC: 13 G/DL (ref 11.7–15.7)
MCH RBC QN AUTO: 30.4 PG (ref 26.5–33)
MCHC RBC AUTO-ENTMCNC: 33 G/DL (ref 31.5–36.5)
MCV RBC AUTO: 92 FL (ref 78–100)
PLATELET # BLD AUTO: 334 10E3/UL (ref 150–450)
RBC # BLD AUTO: 4.27 10E6/UL (ref 3.8–5.2)
WBC # BLD AUTO: 6.2 10E3/UL (ref 4–11)

## 2024-10-07 PROCEDURE — 85027 COMPLETE CBC AUTOMATED: CPT | Performed by: PHYSICIAN ASSISTANT

## 2024-10-07 PROCEDURE — 36415 COLL VENOUS BLD VENIPUNCTURE: CPT | Performed by: PHYSICIAN ASSISTANT

## 2024-10-07 PROCEDURE — 90472 IMMUNIZATION ADMIN EACH ADD: CPT | Performed by: PHYSICIAN ASSISTANT

## 2024-10-07 PROCEDURE — 91320 SARSCV2 VAC 30MCG TRS-SUC IM: CPT | Performed by: PHYSICIAN ASSISTANT

## 2024-10-07 PROCEDURE — 90471 IMMUNIZATION ADMIN: CPT | Performed by: PHYSICIAN ASSISTANT

## 2024-10-07 PROCEDURE — 87177 OVA AND PARASITES SMEARS: CPT | Performed by: PHYSICIAN ASSISTANT

## 2024-10-07 PROCEDURE — 90673 RIV3 VACCINE NO PRESERV IM: CPT | Performed by: PHYSICIAN ASSISTANT

## 2024-10-07 PROCEDURE — 84450 TRANSFERASE (AST) (SGOT): CPT | Performed by: PHYSICIAN ASSISTANT

## 2024-10-07 PROCEDURE — 99214 OFFICE O/P EST MOD 30 MIN: CPT | Mod: 25 | Performed by: PHYSICIAN ASSISTANT

## 2024-10-07 PROCEDURE — 90678 RSV VACC PREF BIVALENT IM: CPT | Performed by: PHYSICIAN ASSISTANT

## 2024-10-07 PROCEDURE — 82565 ASSAY OF CREATININE: CPT | Performed by: PHYSICIAN ASSISTANT

## 2024-10-07 PROCEDURE — 87209 SMEAR COMPLEX STAIN: CPT | Performed by: PHYSICIAN ASSISTANT

## 2024-10-07 PROCEDURE — 82040 ASSAY OF SERUM ALBUMIN: CPT | Performed by: PHYSICIAN ASSISTANT

## 2024-10-07 PROCEDURE — 84460 ALANINE AMINO (ALT) (SGPT): CPT | Performed by: PHYSICIAN ASSISTANT

## 2024-10-07 PROCEDURE — 90480 ADMN SARSCOV2 VAC 1/ONLY CMP: CPT | Performed by: PHYSICIAN ASSISTANT

## 2024-10-07 PROCEDURE — 90677 PCV20 VACCINE IM: CPT | Performed by: PHYSICIAN ASSISTANT

## 2024-10-07 NOTE — PROGRESS NOTES
"  Assessment & Plan     Exposure to parasitic disease  Stool parasitology exam pending.   - Ova and Parasite Exam Routine; Future  - Ova and Parasite Exam Routine    Chronic kidney disease, stage 3a (H)  Stable.     Plantar fasciitis  Provided heel cup to wear in shoe for next 2-3 weeks. Discontinue if pain worsens.  - Ankle/Foot Bracing Supplies Order Heel Cup; Left                Subjective   Elizabeth is a 59 year old, presenting for the following health issues:  Other (Exposure to parasite infected puppy x 1 month)      10/7/2024     2:09 PM   Additional Questions   Roomed by An V.         10/7/2024     2:09 PM   Patient Reported Additional Medications   Patient reports taking the following new medications none     History of Present Illness       Reason for visit:  Exposure to parasite infected puppy for a month  Symptom onset:  More than a month  Symptoms include:  A little loose stools and itching  Symptom intensity:  Mild  Symptom progression:  Staying the same  Had these symptoms before:  No  What makes it worse:  No  What makes it better:  No She is missing 6 dose(s) of medications per week.               Had foster puppy for 1 month with known tapeworm. Remainder of puppies parasite exam pending. Hives on neck and chest a few weeks ago. Loose stools on and off for a few weeks. No abdominal pain, weight loss, fevers or chills. Has many food allergies.           Objective    /79   Pulse 80   Temp 99.1  F (37.3  C) (Temporal)   Resp 20   Ht 1.68 m (5' 6.14\")   Wt 134.5 kg (296 lb 9.6 oz)   SpO2 98%   BMI 47.67 kg/m    Body mass index is 47.67 kg/m .  Physical Exam  Vitals reviewed.   Constitutional:       General: She is not in acute distress.     Appearance: Normal appearance.   Cardiovascular:      Rate and Rhythm: Normal rate and regular rhythm.      Heart sounds: No murmur heard.  Pulmonary:      Effort: Pulmonary effort is normal.      Breath sounds: Normal breath sounds. No wheezing, rhonchi " or rales.   Abdominal:      General: Bowel sounds are normal.      Palpations: Abdomen is soft.      Tenderness: There is no abdominal tenderness.   Skin:     General: Skin is warm and dry.      Findings: No rash.   Neurological:      Mental Status: She is alert.   Psychiatric:         Mood and Affect: Mood normal.         Behavior: Behavior normal.                  JENNY Meek   Signed Electronically by: Elisha Marie PA-C

## 2024-10-08 LAB
ALBUMIN SERPL BCG-MCNC: 3.9 G/DL (ref 3.5–5.2)
ALT SERPL W P-5'-P-CCNC: 16 U/L (ref 0–50)
AST SERPL W P-5'-P-CCNC: 22 U/L (ref 0–45)
CREAT SERPL-MCNC: 1.1 MG/DL (ref 0.51–0.95)
EGFRCR SERPLBLD CKD-EPI 2021: 57 ML/MIN/1.73M2
O+P STL MICRO: NEGATIVE
SPECIMEN TYPE: NORMAL

## 2024-11-03 NOTE — PROGRESS NOTES
[facility]  Rheumatology Clinic  Denilson Kelley MD  2024     Name: Elizabeth Rosenthal  MRN: [id number]  Age: 59 year old  : 1965  Referring provider: Referred Self    Assessment and Plan:  # Seronegative rheumatoid arthritis  Patient relates reduction/stabilization in diffuse arthralgia, associated with morning stiffness, and reduced heel and ankle pain.  Exam shows normal fist formation, mild changes of osteoarthritis in the hands, and preserved wrist, elbow, and shoulder range of motion.   Data: In 2024, creatinine was elevated at 1.10, albumin, transaminases, and CBC were normal;    Discussion: inflammatory arthropathy is modestly improved with specific relief of enthesitis symptoms including Achilles tendinitis and reduced daily pain and increased mobility in shoulders, elbows, hips, and feet.  I think that patient is benefiting from combination therapy, with addition of adalimumab to methotrexate in May 2022.  I recommend continuing combination treatment.    # Bilateral knee pain: Significantly improved status post total knee replacement on the left.    #Shortness of breath: Did not discuss today. Cause of slowly progressive dyspnea remains unclear, but symptoms remain stable. Continue evaluation through primary care, pulmonary, and potentially cardiology to discern possible contributions from thoracic organs.    # CKD3: Decreased GFR is mild, and comparable to measurements from 2019.  Recommend avoiding nonsteroidals and nephrotoxic medications.  Monitor blood pressure and discuss potential relationship between blood pressure and kidney disease with primary care.    # Migraine HA/dizziness/TIA symptoms    We discussed    Diagnosis:   Inflammatory arthritis, stable with combination treatment. Plan continue treatment.  Migraine headaches: Modest improvement with Humira, and with migraine prophylaxis.  3 osteoarthritis, knees, status post total knee replacement, left.    Plan  1.   Continue methotrexate 20 mg (total 8 tablets) once weekly. While on methotrexate:   -- Check labs every 3 months (AST/ALT, Albumin, CBC with platelets)   -- Limit alcohol intake to 2 drinks weekly; use folate 1 mg daily.  --Tylenol 500-1000 mg can be used as needed up to three times daily for nausea/headache associated with dosing.    2.  Continue adalimumab 40 mg subcu every other week.    Follow-up: Return to clinic in 6-7 months.    Denilson Kelley MD  Staff Rheumatologist, [facility] orders of the defined types were placed in this encounter.    On the day of the encounter, a total of 30 minutes was spent in chart review, and in counseling and coordination of care, regarding the patient's complex medical problem of seronegative inflammatory arthritis, high risk medication.    The longitudinal plan of care for the diagnosis(es)/condition(s) as documented were addressed during this visit. Due to the added complexity in care, I will continue to support Elizabeth in the subsequent management and with ongoing continuity of care.      HPI:   Elizabeth Rosenthal presents for follow up of seronegative rheumatoid arthritis as well as fibromyalgia and osteoarthritis of knees and spine.  She was last seen in rheumatology in 9-2023, when inflammatory arthritis was judged improved.  Recommendation was to continue combination Humira and methotrexate.    Interval history November 5, 2024    Had knee surgery in . She stopped humira, and stayed off it due to recurrent infection.    She had increased HA and dizziness/TIA in 4-24; workup showed only hearing loss. She started migraine preventative, and HUMIRA, with improvement in migraine symptoms. Imitrex helps her but migraines are still frequent. Adjustment in neurontin and tylenol have helped.    Her joints are better since she started humira, especially shopulders, elbows, hips, feet, low back pain. Hands, shoulders remain painful, but better on medication. She continues  "injecting biosilimar every 14 days.  She is currently painting her house in stages.    Plantar fasciitis on the L has been flaring. She has changed shoegear.    Early morning stiffness remains ~ 1 hour.    Rx:   Adalimumab 40 mg every other week.  Methotrexate 8 tabs weekly; she reduced from 10 tabs due to GI symptoms. Using folic acid 3 tabs daily.    Interval history September 28, 2023    She had an episode of blurry vision, R > L eye, started suddenly 1 week ago.  No HA, no eye pain. She was working from home, suddenly could not read, she still could see colors/shapes.  Seen in urgent care, scanned for \"stroke\" syndrome--negative so she was discharged.  She contacted cataract physician and retinal specialist. She was not evaluated but She was told to put in eye drops. In 48 hours, she started to improve; by 72 hours, she had normal vision.    She also notes waxing waning tenderness in the cheek areas.   She denies dry mouth, but she notes occasional choking when eating.  She denies marked eye dryness/itching/redness.    Her joints are better since she started humira, especially shopulders, elbows, hips, feet, low back pain. Still with soft tissue pain along thighs.   Early morning stiffness remains ~ 1 hour.    Humira 40 mg every other week.  Methotrexate 8 tabs weekly; she reduced from 10 tabs due to GI symptoms. Using folic acid.    Interval history Dec 6, 2022    She feels mildly improved with addition of humira to methotrexate. Initially, she had more joint pain for 3 months after startign humira.  Former pain in her heels is much improved. Hands and wrists are \"a little better\".  Knees/shoulders/ankles are most often affected bu t not severely. She still has trouble with ascending stairs, raking leaves, standing for prolonged periods.  She is not sure whether reduced stamina is due to long COVID or incomplete control of rheumatic disease.  Early morning stiffness is negligible, but she has lingering " "stiffness for about 1 hour in the mornings. She does not use OTC.     Humira 40 mg every other week.  Methotrexate 8 tabs weekly; she reduced from 10 tabs due to GI symptoms. Using folic acid.    Interval history May 24, 2022    She wonders whether diet changes are affecting inflammatory burden. She cut back on dairy, gluten, processed sugar and was feeling better in October 2021. She had COVID19 in 2-2022 and had sore throat and cough, fatigue, sick for 3 weeks, and started eating ice cream. Her CRP shot up.  She did have mAb infusions for COVID19. She states that she has been \"sick for 18 months\" since probable COVID19 infection in 2-2020    Her sleep schedule and mood have stabilized with use buproprion and work reduction.     She has painful elbows, wrists, knees, andkles on a daily basis. +.- visible swelling    Exercise tolerance is low, but is better than during the 18 months after presumed COVID19.    Sleep quality at night is improved, but sleep is still non-restorative. Early morning stiffness is ~ 1 hour, and she is fatigued.     Joint pain is generally worse with use, especially wrists with prolonged activity.    Gabapentin and lidocaine patches and rest give some relief from pain.    She does have increased stomach upset at methotrexate 10 mg weekly. She takes folate 3 mg daily.  Shortness of breath symptoms are stable.      Interval history 1-    Joints are doing well. After methotrexate dose increase 3 mos ago, she noted less pain, less stiffness since shortly after changing dose.  No early morning stiffness, no morning pain.   She has resumed more regular exercise and activity. Energy is improved for cleaning/chores.  Gabapentin 1-2 doses daily, not sure for what symptoms; fatigue and neuritic pain are less.  Recently increased buproprion to 300 mg.        Review of Systems:   Pertinent items are noted in HPI or as below, remainder of complete ROS is negative.      No recent problems with " hearing or vision. No swallowing problems.   No breathing difficulty, shortness of breath, coughing, or wheezing  No chest pain or palpitations  No heart burn, indigestion, abdominal pain, nausea, vomiting, diarrhea  No urination problems, no bloody, cloudy urine, no dysuria  No numbing, tingling, weakness  No headaches or confusion  No rashes. No easy bleeding or bruising.     Active Medications:   Current Outpatient Medications   Medication Sig Dispense Refill    Adalimumab-adaz 40 MG/0.4ML SOAJ Inject 0.4 mLs subcutaneously every 14 days 0.8 mL 5    albuterol (PROAIR HFA/PROVENTIL HFA/VENTOLIN HFA) 108 (90 Base) MCG/ACT inhaler Inhale 1-2 puffs into the lungs every 4 hours as needed for shortness of breath or wheezing 18 g 11    aspirin 81 MG tablet Take 1 tablet (81 mg) by mouth daily 30 tablet OTC    buPROPion (WELLBUTRIN XL) 300 MG 24 hr tablet Take 1 tablet (300 mg) by mouth every morning 90 tablet 3    cetirizine (ZYRTEC) 10 MG tablet Take 10 mg by mouth daily      escitalopram (LEXAPRO) 5 MG tablet Take 3 tablets (15 mg) by mouth daily 270 tablet 1    folic acid (FOLVITE) 1 MG tablet Take 3 tablets (3,000 mcg) by mouth daily 270 tablet 3    gabapentin (NEURONTIN) 300 MG capsule Take 1 capsule (300 mg) by mouth daily 90 capsule 2    lidocaine (LIDODERM) 5 % patch APPLY 1 TO 3 PATCHES ONTO THE SKIN EVERY 24 HOURS AS NEEDED. ** PATCHES MAY BE LEFT ON UP TO 12 HOURS IN A 24  HOUR PERIOD** 240 patch 2    lisinopril (ZESTRIL) 10 MG tablet Take 1 tablet (10 mg) by mouth daily 90 tablet 3    meclizine (ANTIVERT) 25 MG tablet Take 1 tablet by mouth 3 times daily (Patient not taking: Reported on 10/7/2024)      methotrexate 2.5 MG tablet Take 8 tablets (20 mg) by mouth every 7 days 96 tablet 3    SUMAtriptan (IMITREX) 50 MG tablet Take 1 tablet (50 mg) by mouth at onset of headache for migraine May repeat in 2 hours. Max 4 tablets/24 hours. (Patient not taking: Reported on 10/7/2024) 18 tablet 1    VITAMIN D,  CHOLECALCIFEROL, PO Take 2,000 Units by mouth daily       No current facility-administered medications for this visit.         Allergies:   Nuts   Celery Oil   Codeine   Contrast Dye   Food   Potatoes  Peanuts  Cantaloupe  Lettuce  Oat   Penicillins   Rice   Shellfish-Derived Products   Wheat Bran   Yeast       Past Medical History:  Allergic rhinitis    Asthma   Chronic pelvic pain in female   Depression   Depressive disorder   Gastroesophageal reflux disease   Head injury   Hyperlipidemia   Hypertension   Immunosuppression    Migraines   Morbid obesity   Obstructive sleep apnea   Reduced vision   Transient ischemic attack  Vertebral artery dissection  Iliotibial band syndrome  Right knee pain  Lumbar radicular pain  Rheumatoid arthritis of multiple sites without rheumatoid factor  Fibromyalgia  Arthritis of knee, left  Creatinine elevation     Past Surgical History:  Arthroscopy knee bilateral    Biopsy lymph node cervical    Colonoscopy 7/26/2017  Lymph node biopsy 2010  Genitourinary surgery, fallopian tube cyst 1994 and tubal ligation 1999  Lymph node removed from neck for biopsy 2011   Hysteroscopy    Tonsillectomy, bilateral 1/3/2018    Family History:    The patient's family history includes Asthma in her paternal grandmother; Breast Cancer in her mother; Cancer in her mother; Cerebrovascular Disease in her paternal grandmother; Heart Disease in her father; Mental Illness in her father; Migraines in her daughter and son; Substance Abuse in her father.    Social History:  The patient reports that she has never smoked. She has never used smokeless tobacco. She reports that she drinks alcohol. She reports that she does not use drugs.   PCP: Eveline Berry  Marital Status: Single     Physical Exam:   not currently breastfeeding.  Wt Readings from Last 4 Encounters:   10/07/24 134.5 kg (296 lb 9.6 oz)   07/22/24 132.5 kg (292 lb)   02/02/24 124.7 kg (275 lb)   11/02/23 128 kg (282 lb 3.2 oz)        Constitutional: severe obesity, appearing stated age; cooperative  Eyes: nl EOM, PERRLA, vision, conjunctiva, sclera  ENT: nl external ears, nose, hearing, lips, teeth, gums, throat; no parotid tenderness or enlargement.  Anterior salivary pool normal.  Neck: no mass or thyroid enlargement  Resp: Breathing unlabored  Lymph: no cervical, supraclavicular, inguinal or epitrochlear nodes  MS: Mild Osteoarthritis changes were present in the hands, but no inflammatory joint changes at MCPs, wrists, elbows, or shoulders.  Skin: no nail pitting, alopecia, rash, nodules or lesions  Neuro: nl cranial nerves  Psych: nl judgement, orientation, memory, affect.      Laboratory:       Latest Ref Rng & Units 2/2/2024     9:29 AM 7/22/2024     9:34 AM 10/7/2024     3:13 PM   RHEUM RESULTS   Albumin 3.5 - 5.2 g/dL 3.7  3.9  3.9    ALT 0 - 50 U/L 9  10  16    AST 0 - 45 U/L 18  18  22    Creatinine 0.51 - 0.95 mg/dL 1.02  1.06  1.10    GFR Estimate >60 mL/min/1.73m2 63  60  57    Hematocrit 35.0 - 47.0 % 39.1  38.5  39.4    Hemoglobin 11.7 - 15.7 g/dL 12.8  12.8  13.0    WBC 4.0 - 11.0 10e3/uL 6.9  5.1  6.2    RBC Count 3.80 - 5.20 10e6/uL 4.14  4.13  4.27    RDW 10.0 - 15.0 % 13.4  13.5  13.8    MCHC 31.5 - 36.5 g/dL 32.7  33.2  33.0    MCV 78 - 100 fL 94  93  92    Platelet Count 150 - 450 10e3/uL 318  317  334      AMAN interpretation   Date Value Ref Range Status   04/28/2018 Negative NEG^Negative Final     Comment:                                        Reference range:  <1:40  NEGATIVE  1:40 - 1:80  BORDERLINE POSITIVE  >1:80 POSITIVE     In February 2024, creatinine, albumin Eminase's, CBC were all normal.

## 2024-11-05 ENCOUNTER — OFFICE VISIT (OUTPATIENT)
Dept: RHEUMATOLOGY | Facility: CLINIC | Age: 59
End: 2024-11-05
Payer: COMMERCIAL

## 2024-11-05 VITALS
HEART RATE: 84 BPM | BODY MASS INDEX: 49.18 KG/M2 | DIASTOLIC BLOOD PRESSURE: 80 MMHG | OXYGEN SATURATION: 96 % | WEIGHT: 293 LBS | SYSTOLIC BLOOD PRESSURE: 119 MMHG

## 2024-11-05 DIAGNOSIS — M54.50 CHRONIC BILATERAL LOW BACK PAIN WITHOUT SCIATICA: ICD-10-CM

## 2024-11-05 DIAGNOSIS — G89.29 CHRONIC BILATERAL LOW BACK PAIN WITHOUT SCIATICA: ICD-10-CM

## 2024-11-05 DIAGNOSIS — M79.7 FIBROMYALGIA: ICD-10-CM

## 2024-11-05 DIAGNOSIS — Z79.899 ENCOUNTER FOR LONG-TERM (CURRENT) USE OF MEDICATIONS: ICD-10-CM

## 2024-11-05 DIAGNOSIS — M06.09 RHEUMATOID ARTHRITIS OF MULTIPLE SITES WITHOUT RHEUMATOID FACTOR (H): ICD-10-CM

## 2024-11-05 DIAGNOSIS — M13.0 POLYARTHRITIS: ICD-10-CM

## 2024-11-05 PROCEDURE — G2211 COMPLEX E/M VISIT ADD ON: HCPCS | Performed by: INTERNAL MEDICINE

## 2024-11-05 PROCEDURE — 99214 OFFICE O/P EST MOD 30 MIN: CPT | Performed by: INTERNAL MEDICINE

## 2024-11-05 RX ORDER — METHOTREXATE 2.5 MG/1
20 TABLET ORAL
Qty: 96 TABLET | Refills: 2 | Status: SHIPPED | OUTPATIENT
Start: 2024-11-05

## 2024-11-05 RX ORDER — GABAPENTIN 300 MG/1
300 CAPSULE ORAL DAILY
Qty: 90 CAPSULE | Refills: 2 | Status: SHIPPED | OUTPATIENT
Start: 2024-11-05

## 2024-11-05 RX ORDER — FOLIC ACID 1 MG/1
3000 TABLET ORAL DAILY
Qty: 270 TABLET | Refills: 3 | Status: SHIPPED | OUTPATIENT
Start: 2024-11-05

## 2024-11-05 RX ORDER — ADALIMUMAB-ADAZ 40 MG/.4ML
40 INJECTION, SOLUTION SUBCUTANEOUS
Qty: 0.8 ML | Refills: 7 | Status: SHIPPED | OUTPATIENT
Start: 2024-11-05

## 2024-11-05 ASSESSMENT — PAIN SCALES - GENERAL: PAINLEVEL_OUTOF10: SEVERE PAIN (7)

## 2024-11-05 NOTE — PATIENT INSTRUCTIONS
Diagnosis:   Inflammatory arthritis, stable with combination treatment. Plan continue treatment.  Migraine headaches: Modest improvement with Humira, and with migraine prophylaxis.  3 osteoarthritis, knees, status post total knee replacement, left.    Plan  1.  Continue methotrexate 20 mg (total 8 tablets) once weekly. While on methotrexate:   -- Check labs every 3 months (AST/ALT, Albumin, CBC with platelets)   -- Limit alcohol intake to 2 drinks weekly; use folate 1 mg daily.  --Tylenol 500-1000 mg can be used as needed up to three times daily for nausea/headache associated with dosing.    2.  Continue adalimumab 40 mg subcu every other week.

## 2024-11-08 ENCOUNTER — TELEPHONE (OUTPATIENT)
Dept: RHEUMATOLOGY | Facility: CLINIC | Age: 59
End: 2024-11-08
Payer: COMMERCIAL

## 2024-11-08 NOTE — TELEPHONE ENCOUNTER
Per test claim, unbranded version of Hyrimoz, adalimumab-adaz, does not require a prior authorization. Pharmacy is unable to change without a new RX. Routing to clinic to re-send RX, liaison will ensure not to fill for unbranded generic is in RX note.

## 2024-11-19 NOTE — NURSING NOTE
Medications Phoned  to Pharmacy [] yes [x]no  Name of Pharmacist:  List Medications, including dose, quantity and instructions     Medications ordered this visit were e-scribed.  Verified by order class [x] yes  [] no  Bupropion 300mg  Zolpidem 5mg    Medication changes or discontinuations were communicated to patient's pharmacy: [] yes  [x] no     Dictation completed at time of chart check: [x] yes  [] no     I have checked the documentation for today s encounters and the above information has been reviewed and completed.        Nelson Naranjo MA January 4, 2022 12:16 PM     MD Valdez

## 2024-12-18 ENCOUNTER — HOSPITAL ENCOUNTER (OUTPATIENT)
Facility: CLINIC | Age: 59
Discharge: HOME | End: 2024-12-18
Attending: INTERNAL MEDICINE | Admitting: INTERNAL MEDICINE
Payer: COMMERCIAL

## 2024-12-18 VITALS
RESPIRATION RATE: 13 BRPM | OXYGEN SATURATION: 97 % | DIASTOLIC BLOOD PRESSURE: 91 MMHG | SYSTOLIC BLOOD PRESSURE: 144 MMHG | HEART RATE: 71 BPM

## 2024-12-18 LAB — COLONOSCOPY: NORMAL

## 2024-12-18 PROCEDURE — 45378 DIAGNOSTIC COLONOSCOPY: CPT | Performed by: INTERNAL MEDICINE

## 2024-12-18 PROCEDURE — G0121 COLON CA SCRN NOT HI RSK IND: HCPCS | Performed by: INTERNAL MEDICINE

## 2024-12-18 PROCEDURE — 250N000011 HC RX IP 250 OP 636: Performed by: INTERNAL MEDICINE

## 2024-12-18 PROCEDURE — G0500 MOD SEDAT ENDO SERVICE >5YRS: HCPCS | Performed by: INTERNAL MEDICINE

## 2024-12-18 RX ORDER — ONDANSETRON 4 MG/1
4 TABLET, ORALLY DISINTEGRATING ORAL EVERY 6 HOURS PRN
Status: DISCONTINUED | OUTPATIENT
Start: 2024-12-18 | End: 2024-12-18 | Stop reason: HOSPADM

## 2024-12-18 RX ORDER — ONDANSETRON 2 MG/ML
4 INJECTION INTRAMUSCULAR; INTRAVENOUS
Status: DISCONTINUED | OUTPATIENT
Start: 2024-12-18 | End: 2024-12-18 | Stop reason: HOSPADM

## 2024-12-18 RX ORDER — NALOXONE HYDROCHLORIDE 0.4 MG/ML
0.4 INJECTION, SOLUTION INTRAMUSCULAR; INTRAVENOUS; SUBCUTANEOUS
Status: DISCONTINUED | OUTPATIENT
Start: 2024-12-18 | End: 2024-12-18 | Stop reason: HOSPADM

## 2024-12-18 RX ORDER — FENTANYL CITRATE 50 UG/ML
INJECTION, SOLUTION INTRAMUSCULAR; INTRAVENOUS PRN
Status: DISCONTINUED | OUTPATIENT
Start: 2024-12-18 | End: 2024-12-18 | Stop reason: HOSPADM

## 2024-12-18 RX ORDER — PROCHLORPERAZINE MALEATE 5 MG/1
10 TABLET ORAL EVERY 6 HOURS PRN
Status: DISCONTINUED | OUTPATIENT
Start: 2024-12-18 | End: 2024-12-18 | Stop reason: HOSPADM

## 2024-12-18 RX ORDER — FLUMAZENIL 0.1 MG/ML
0.2 INJECTION, SOLUTION INTRAVENOUS
Status: DISCONTINUED | OUTPATIENT
Start: 2024-12-18 | End: 2024-12-18 | Stop reason: HOSPADM

## 2024-12-18 RX ORDER — NALOXONE HYDROCHLORIDE 0.4 MG/ML
0.2 INJECTION, SOLUTION INTRAMUSCULAR; INTRAVENOUS; SUBCUTANEOUS
Status: DISCONTINUED | OUTPATIENT
Start: 2024-12-18 | End: 2024-12-18 | Stop reason: HOSPADM

## 2024-12-18 RX ORDER — ONDANSETRON 2 MG/ML
4 INJECTION INTRAMUSCULAR; INTRAVENOUS EVERY 6 HOURS PRN
Status: DISCONTINUED | OUTPATIENT
Start: 2024-12-18 | End: 2024-12-18 | Stop reason: HOSPADM

## 2024-12-18 RX ORDER — LIDOCAINE 40 MG/G
CREAM TOPICAL
Status: DISCONTINUED | OUTPATIENT
Start: 2024-12-18 | End: 2024-12-18 | Stop reason: HOSPADM

## 2024-12-18 ASSESSMENT — ACTIVITIES OF DAILY LIVING (ADL)
ADLS_ACUITY_SCORE: 41
ADLS_ACUITY_SCORE: 41

## 2024-12-18 NOTE — OR NURSING
Colonoscopy, no interventions.  Pt wanted to try procedure without sedation, but within a couple minutes of starting requested sedation.  Pt required high amounts of 02 during procedure.  Pt tolerated procedure.
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
## 2025-01-23 ENCOUNTER — TELEPHONE (OUTPATIENT)
Dept: PHARMACY | Facility: CLINIC | Age: 60
End: 2025-01-23
Payer: COMMERCIAL

## 2025-01-23 NOTE — TELEPHONE ENCOUNTER
Pt is due for follow up with Elena GARRETT     Call placed to pt to initiate scheduling on 1/23/25    Outcome: LVM and MyC      *next follow up outreach attempt will be on/around February 23 as second attempt*

## 2025-02-26 ENCOUNTER — TELEPHONE (OUTPATIENT)
Dept: PHARMACY | Facility: CLINIC | Age: 60
End: 2025-02-26
Payer: COMMERCIAL

## 2025-02-26 NOTE — TELEPHONE ENCOUNTER
Pt is due for follow up with Elena GARRETT    Call placed to pt to initiate scheduling on 2/26/25    Outcome: LVM      *Due to this being second unsucessful attempt, discharge letter will be sent*

## 2025-03-26 ENCOUNTER — VIRTUAL VISIT (OUTPATIENT)
Dept: PHARMACY | Facility: CLINIC | Age: 60
End: 2025-03-26
Attending: INTERNAL MEDICINE
Payer: COMMERCIAL

## 2025-03-26 DIAGNOSIS — Z71.85 VACCINE COUNSELING: ICD-10-CM

## 2025-03-26 DIAGNOSIS — M06.09 RHEUMATOID ARTHRITIS OF MULTIPLE SITES WITHOUT RHEUMATOID FACTOR (H): Primary | ICD-10-CM

## 2025-03-26 DIAGNOSIS — M79.7 FIBROMYALGIA: ICD-10-CM

## 2025-03-26 DIAGNOSIS — Z79.899 ENCOUNTER FOR LONG-TERM (CURRENT) USE OF MEDICATIONS: ICD-10-CM

## 2025-03-26 DIAGNOSIS — M13.0 POLYARTHRITIS: ICD-10-CM

## 2025-03-26 DIAGNOSIS — M54.50 CHRONIC BILATERAL LOW BACK PAIN WITHOUT SCIATICA: ICD-10-CM

## 2025-03-26 DIAGNOSIS — G89.29 CHRONIC BILATERAL LOW BACK PAIN WITHOUT SCIATICA: ICD-10-CM

## 2025-03-26 NOTE — PROGRESS NOTES
Medication Therapy Management (MTM) Encounter    ASSESSMENT:                            Medication Adherence/Access: Need to determine which biosimilar insurance requires, will consult with liaison and update order accordingly.    Rheumatoid arthritis: Patient is experiencing increased joint pain due to gap in Hyrimoz of > 3 months for likely insurance formulary change. Would benefit from restarting as soon as possible. Overdue for methotrexate labs, encouraged her to have standing labs drawn for methotrexate monitoring. Will pass along her requests for gabapentin and lidocaine refills to provider. Encouraged her to call as soon as possible about being seen by Dr. Kelley before she moves out of state.    Vaccine counseling: Indicated for Shingrix per ACIP recommendations, patient declines this and future pneumonia vaccines.    PLAN:                            Writer will connect with liaison re: updating adalimumab order to insurance's preferred biosimilar so patient can restart medication.  Please have your standing labs updated at your earliest convenience.  Please call the rheumatology department to see if you can get in to see Dr. Kelley before you move out of state.  Continue methotrexate 20 mg weekly.  Continue folic acid 3 mg daily.  Continue lidocaine 5% patch as needed for pain.  Continue gabapentin 300 mg daily.    Follow-up: Ensure resolution of adalimumab access concern. Patient is moving out of state 5/2025 so writer will be unable to see her for virtual visits going forward.    SUBJECTIVE/OBJECTIVE:                          Elizabeth Rosenthal is a 59 year old female called for a follow up visit. She was referred to me from Denilson Kelley MD.      Reason for visit: 6 month follow up for Hyrimoz, methotrexate.    Allergies/ADRs: Reviewed in chart  Past Medical History: Reviewed in chart  Tobacco: She reports that she has never smoked. She has never been exposed to tobacco smoke. She has never used smokeless  "tobacco.  Alcohol: not assessed today    Medication Adherence/Access: Patient has been off Hyrimoz for > 3 months, see details below.    Rheumatoid arthritis:   Hyrimoz 40 mg every 14 days  Methotrexate 20 mg weekly  Folic acid 3 mg daily    Patient switched from Humira to biosimilar Hyrimoz in July 2024. This was going well until January 2025, when the pharmacy told her she was no longer able to fill Hyrimoz. She believes the formulary changed and brand Humira or a different biosimilar is now preferred. She mentions today that she does not usually take adalimumab in January anyway due to respiratory infection season, so she didn't realize she couldn't fill it until February. She is still taking methotrexate and has not missed any doses. Does endorse increased \"aches and pains\" especially in wrists, elbows, and shoulders. Typically would also flare in legs and knees but not noticing this as much. Note she is moving to [place] at the end of May and would like to see Dr. Kelley once more before she moves. She also requests refills for her gabapentin, methotrexate, and lidocaine patches.    Labs last updated 10/2024    Vaccine counseling: Patient declines Shingrix and is avoiding additional pneumonia vaccines due to side effects from last vaccine.    Immunization History   Administered Date(s) Administered    COVID-19 12+ (Pfizer) 07/22/2024, 10/07/2024    COVID-19 Bivalent 12+ (Pfizer) 07/21/2023    COVID-19 MONOVALENT 12+ (Pfizer) 03/22/2021, 04/12/2021, 01/06/2022    COVID-19 Monovalent 12+ (Pfizer 2022) 07/01/2022    Influenza (IIV3) PF 11/07/2016    Influenza Vaccine >6 months,quad, PF 10/01/2020    Influenza Vaccine Trivalent (FluBlok) 10/07/2024    Pneumo Conj 13-V (2010&after) 10/01/2020    Pneumococcal 20 valent Conjugate (Prevnar 20) 10/07/2024    RSV (Abrysvo) 10/07/2024    TDAP (Adacel,Boostrix) 01/08/2014, 02/02/2024       Today's Vitals: There were no vitals taken for this " visit.  ----------------    I spent 25 minutes with this patient today. All changes were made via collaborative practice agreement with Denilson Kelley MD. A copy of the visit note was provided to the patient's provider(s).    A summary of these recommendations was sent via [employer].    Elena Lewis, PharmD  Medication Therapy Management Pharmacist  [facility]     Telemedicine Visit Details  Type of service:  Telephone visit  Start Time: 1500  End Time: 1425     Medication Therapy Recommendations  Rheumatoid arthritis of multiple sites without rheumatoid factor (H)   1 Current Medication: adalimumab-adaz (HYRIMOZ) 40 MG/0.4ML auto-injector kit   Current Medication Sig: Inject 0.4 mLs (40 mg) subcutaneously every 14 days.   Rationale: Cannot afford medication product - Cost - Adherence   Recommendation: Change Medication Formulation  - Need to change to preferred product per insurance   Status: Accepted per CPA   Identified Date: 3/26/2025 Completed Date: 3/26/2025

## 2025-03-26 NOTE — Clinical Note
I called Elizabeth for a 6 month med check in, unfortunately she has been off her adalimumab since January. Appears there may have been a formulary change, I forwarded to liaison to look into this and see what needs to be sent. She will be moving out of state at the end of May.  She is requesting refills on methotrexate, gabapentin, and lidocaine. I asked that she update standing labs for methotrexate, are you okay with gabapentin and lidocaine being sent? Looks like they were last ordered by you.  Let me know, thanks!

## 2025-03-26 NOTE — PATIENT INSTRUCTIONS
"Recommendations from today's MTM visit:                                                      Writer will connect with liaison re: updating adalimumab order to insurance's preferred biosimilar so patient can restart medication.  Please have your standing labs updated at your earliest convenience.  Please call the rheumatology department to see if you can get in to see Dr. Kelley before you move out of state.  Continue methotrexate 20 mg weekly.  Continue folic acid 3 mg daily.  Continue lidocaine 5% patch as needed for pain.  Continue gabapentin 300 mg daily.    Follow-up: Ensure resolution of adalimumab access concern. Patient is moving out of state 5/2025 so writer will be unable to see her for virtual visits going forward.    It was great speaking with you today.  I value your experience and would be very thankful for your time in providing feedback in our clinic survey. In the next few days, you may receive an email or text message from [employer] with a link to a survey related to your  clinical pharmacist.\"     To schedule another MTM appointment, please call the clinic directly or you may call the MTM scheduling line at [phone].    My Clinical Pharmacist's contact information:                                                      Please feel free to contact me with any questions or concerns you have.      Elena Lewis, PharmD  Medication Therapy Management Pharmacist  [facility]    "

## 2025-03-26 NOTE — Clinical Note
Patient was switched to Hyrimoz 7/2024, was going fine but now says she has been off the Hyrimoz since 1/2025 as her pharmacy has not been able to fill it. I'm wondering if they require a different biosimilar now? Let me know and I can update the order, thanks!

## 2025-03-27 ENCOUNTER — TELEPHONE (OUTPATIENT)
Dept: RHEUMATOLOGY | Facility: CLINIC | Age: 60
End: 2025-03-27
Payer: COMMERCIAL

## 2025-03-27 RX ORDER — GABAPENTIN 300 MG/1
300 CAPSULE ORAL DAILY
Qty: 90 CAPSULE | Refills: 0 | Status: SHIPPED | OUTPATIENT
Start: 2025-03-27

## 2025-03-27 RX ORDER — METHOTREXATE 2.5 MG/1
20 TABLET ORAL
Qty: 96 TABLET | Refills: 0 | Status: SHIPPED | OUTPATIENT
Start: 2025-03-27

## 2025-03-27 RX ORDER — LIDOCAINE 50 MG/G
PATCH TOPICAL
Qty: 90 PATCH | Refills: 1 | Status: SHIPPED | OUTPATIENT
Start: 2025-03-27

## 2025-03-27 NOTE — LETTER
April 2, 2025  [facility] [address]  [address] [place] [address]  Phone: [phone]  Fax: [phone]    Date: 4/1/25  ATTN: Brigida Epstein  Re: Prior Authorization Request    Plan: [facility] Patient: Elizabeth Rosenthal   Member ID: [id number]  YOB: 1965                                                                                           To whom it may concern:     I am contacting you as the rheumatologist caring for Elizabeth Rosenthal with regard to the patient's diagnosis of rheumatoid arthritis (ICD-10 code: M06.09). I recently prescribed this patient lidocaine 5% patches, which required a prior authorization. Unfortunately, the prior authorization was denied and the patient is unable to fill their prescription. I have reviewed the patient's diagnosis, care plan, and clinical guidelines for treatment and request a formal appeal of your denial.    The reason given for denial is that there must be documentation patient has had a positive clinical response to the medication. Elizabeth uses these patches as needed for breakthrough pain associated with her arthritis. She has confirmed they are effective at reducing pain when used in addition to her disease modifying RA therapies.    On behalf of this patient, I would appreciate your prompt reconsideration of this denial. Please feel free to contact me for any additional information you may require. I look forward to receiving your response and approval of coverage for this medication.    Denilson Kelley MD

## 2025-03-28 NOTE — TELEPHONE ENCOUNTER
PRIOR AUTHORIZATION DENIED    Medication: LIDOCAINE 5 % EX PTCH  Insurance Company: [employer] ([facility]) - Phone [phone] Fax [phone]  Denial Date: 3/27/2025  Denial Reason(s):     Appeal Information:     Patient Notified: No

## 2025-04-03 NOTE — TELEPHONE ENCOUNTER
Medication Appeal Initiation    Medication: LIDOCAINE 5 % EX PTCH  Appeal Start Date:  4/3/2025  Insurance Company: [employer] ([place]) - Phone [phone] Fax [phone]   Insurance Phone:   Insurance Fax: [phone]  Comments:

## 2025-05-08 ENCOUNTER — MYC MEDICAL ADVICE (OUTPATIENT)
Dept: PHARMACY | Facility: CLINIC | Age: 60
End: 2025-05-08

## 2025-05-08 ENCOUNTER — LAB (OUTPATIENT)
Dept: LAB | Facility: CLINIC | Age: 60
End: 2025-05-08
Payer: COMMERCIAL

## 2025-05-08 ENCOUNTER — OFFICE VISIT (OUTPATIENT)
Dept: RHEUMATOLOGY | Facility: CLINIC | Age: 60
End: 2025-05-08
Payer: COMMERCIAL

## 2025-05-08 VITALS
WEIGHT: 293 LBS | RESPIRATION RATE: 22 BRPM | DIASTOLIC BLOOD PRESSURE: 85 MMHG | BODY MASS INDEX: 49.5 KG/M2 | SYSTOLIC BLOOD PRESSURE: 127 MMHG | HEART RATE: 90 BPM | OXYGEN SATURATION: 97 %

## 2025-05-08 DIAGNOSIS — M54.50 CHRONIC BILATERAL LOW BACK PAIN WITHOUT SCIATICA: ICD-10-CM

## 2025-05-08 DIAGNOSIS — M06.09 RHEUMATOID ARTHRITIS OF MULTIPLE SITES WITHOUT RHEUMATOID FACTOR (H): ICD-10-CM

## 2025-05-08 DIAGNOSIS — M13.0 POLYARTHRITIS: ICD-10-CM

## 2025-05-08 DIAGNOSIS — M79.7 FIBROMYALGIA: ICD-10-CM

## 2025-05-08 DIAGNOSIS — Z79.899 ENCOUNTER FOR LONG-TERM (CURRENT) USE OF MEDICATIONS: ICD-10-CM

## 2025-05-08 DIAGNOSIS — G89.29 CHRONIC BILATERAL LOW BACK PAIN WITHOUT SCIATICA: ICD-10-CM

## 2025-05-08 DIAGNOSIS — M06.09 RHEUMATOID ARTHRITIS OF MULTIPLE SITES WITHOUT RHEUMATOID FACTOR (H): Primary | ICD-10-CM

## 2025-05-08 LAB
ALBUMIN SERPL BCG-MCNC: 3.6 G/DL (ref 3.5–5.2)
ALT SERPL W P-5'-P-CCNC: 12 U/L (ref 0–50)
AST SERPL W P-5'-P-CCNC: 10 U/L (ref 0–45)
CREAT SERPL-MCNC: 1.01 MG/DL (ref 0.51–0.95)
CRP SERPL-MCNC: 9.14 MG/L
EGFRCR SERPLBLD CKD-EPI 2021: 63 ML/MIN/1.73M2
ERYTHROCYTE [DISTWIDTH] IN BLOOD BY AUTOMATED COUNT: 12.8 % (ref 10–15)
HCT VFR BLD AUTO: 40.9 % (ref 35–47)
HGB BLD-MCNC: 13.6 G/DL (ref 11.7–15.7)
MCH RBC QN AUTO: 30.3 PG (ref 26.5–33)
MCHC RBC AUTO-ENTMCNC: 33.3 G/DL (ref 31.5–36.5)
MCV RBC AUTO: 91 FL (ref 78–100)
PLATELET # BLD AUTO: 358 10E3/UL (ref 150–450)
RBC # BLD AUTO: 4.49 10E6/UL (ref 3.8–5.2)
WBC # BLD AUTO: 7.9 10E3/UL (ref 4–11)

## 2025-05-08 PROCEDURE — 82565 ASSAY OF CREATININE: CPT

## 2025-05-08 PROCEDURE — 84460 ALANINE AMINO (ALT) (SGPT): CPT

## 2025-05-08 PROCEDURE — 85041 AUTOMATED RBC COUNT: CPT

## 2025-05-08 PROCEDURE — 84450 TRANSFERASE (AST) (SGOT): CPT

## 2025-05-08 PROCEDURE — 36415 COLL VENOUS BLD VENIPUNCTURE: CPT

## 2025-05-08 PROCEDURE — 82040 ASSAY OF SERUM ALBUMIN: CPT

## 2025-05-08 PROCEDURE — 86140 C-REACTIVE PROTEIN: CPT

## 2025-05-08 RX ORDER — METHOTREXATE 2.5 MG/1
20 TABLET ORAL
Qty: 96 TABLET | Refills: 2 | Status: SHIPPED | OUTPATIENT
Start: 2025-05-08

## 2025-05-08 RX ORDER — GABAPENTIN 300 MG/1
300 CAPSULE ORAL DAILY
Qty: 90 CAPSULE | Refills: 2 | Status: SHIPPED | OUTPATIENT
Start: 2025-05-08

## 2025-05-08 RX ORDER — PREDNISONE 5 MG/1
5 TABLET ORAL DAILY
Qty: 50 TABLET | Refills: 1 | Status: SHIPPED | OUTPATIENT
Start: 2025-05-08

## 2025-05-08 RX ORDER — FOLIC ACID 1 MG/1
3000 TABLET ORAL DAILY
Qty: 270 TABLET | Refills: 3 | Status: SHIPPED | OUTPATIENT
Start: 2025-05-08

## 2025-05-08 ASSESSMENT — PAIN SCALES - GENERAL: PAINLEVEL_OUTOF10: MODERATE PAIN (4)

## 2025-05-08 NOTE — PATIENT INSTRUCTIONS
Diagnosis:   Inflammatory arthritis, worse joint pain in recent months, in association with stopping adalimumab.  Migraine headaches: Modest improvement with Humira, and with migraine prophylaxis.  osteoarthritis, knees, status post total knee replacement, left.    Plan  1.  Continue methotrexate 20 mg (total 8 tablets) once weekly. While on methotrexate:   -- Check labs every 3 months (AST/ALT, Albumin, CBC with platelets)   -- Limit alcohol intake to 2 drinks weekly; use folate 1 mg daily.  --Tylenol 500-1000 mg can be used as needed up to three times daily for nausea/headache associated with dosing.    2.  Continue adalimumab 40 mg subcu every other week; use gabapentin 300 mg up to 3 times daily; folic acid 1 mg daily.  If unable to immediately acquire adalimumab to restart, recommend short course of prednisone (20 mg daily for 1 week, 15 mg daily for 1 week) to alleviate symptoms short-term.  Prednisone courses could be safely used once every 4 months.  3.  Consider [place] [facility] for follow-up for inflammatory arthritis.

## 2025-05-08 NOTE — PROGRESS NOTES
[facility]  Rheumatology Clinic  Denilson Kelley MD  2025     Name: Elizabeth Rosenthal  MRN: [id number]  Age: 60 year old  : 1965  Referring provider: Referred Self    Assessment and Plan:  # Seronegative rheumatoid arthritis  Patient relates increase in widespread joint pain, associated with prolonged morning stiffness, worsened in the past 3 months.  Exam shows normal fist formation, mild changes of osteoarthritis in the hands, and preserved wrist, elbow, and shoulder range of motion.     Data: In  creatinine was elevated at 1.10, albumin, transaminases, and CBC were normal; CRP is minimally elevated at 9.1.    Discussion: inflammatory arthropathy is modestly worse with pain in hips, shoulders, elbows, hands and fingers.  Worsening as associated with discontinuation of adalimumab in early  due to insurance coverage.  I think that patient has benefited greatly from use of combination methotrexate and anti-TNF, used since May 2022 until 2024, when adalimumab was discontinued.  I recommend resuming adalimumab now, and maintaining methotrexate at moderate dose.  Use gabapentin for nighttime pain and relief of neuropathic symptoms in the knee and elsewhere.    # Bilateral knee pain: Significantly improved status post total knee replacement on the left.    #Shortness of breath: Did not discuss today. Cause of slowly progressive dyspnea remains unclear, but symptoms remain stable. Continue evaluation through primary care, pulmonary, and potentially cardiology to discern possible contributions from thoracic organs.    # CKD3: Decreased GFR is mild, stable, and comparable to measurements from 2019.  Recommend avoiding nonsteroidals and nephrotoxic medications.  Monitor blood pressure and discuss potential relationship between blood pressure and kidney disease with primary care.    # Migraine HA/dizziness/TIA symptoms    We discussed    Diagnosis:   Inflammatory arthritis, worse joint  pain in recent months, in association with stopping adalimumab.  Migraine headaches: Modest improvement with Humira, and with migraine prophylaxis.  osteoarthritis, knees, status post total knee replacement, left.    Plan  1.  Continue methotrexate 20 mg (total 8 tablets) once weekly. While on methotrexate:   -- Check labs every 3 months (AST/ALT, Albumin, CBC with platelets)   -- Limit alcohol intake to 2 drinks weekly; use folate 1 mg daily.  --Tylenol 500-1000 mg can be used as needed up to three times daily for nausea/headache associated with dosing.    2.  Continue adalimumab 40 mg subcu every other week; use gabapentin 300 mg up to 3 times daily; folic acid 1 mg daily.  If unable to immediately acquire adalimumab to restart, recommend short course of prednisone (20 mg daily for 1 week, 15 mg daily for 1 week) to alleviate symptoms short-term.  Prednisone courses could be safely used once every 4 months.  3.  Consider [place] [facility] for follow-up for inflammatory arthritis.    Follow-up: Return to clinic in 6 months.    Denilson Kelley MD  Staff Rheumatologist, [facility] orders of the defined types were placed in this encounter.    On the day of the encounter, a total of 32 minutes was spent in chart review, and in counseling and coordination of care, regarding the patient's complex medical problem of seronegative inflammatory arthritis, high risk medication.    The longitudinal plan of care for the diagnosis(es)/condition(s) as documented were addressed during this visit. Due to the added complexity in care, I will continue to support Elizabeth in the subsequent management and with ongoing continuity of care.      HPI:   Elizabeth Rosenthal presents for follow up of seronegative rheumatoid arthritis as well as fibromyalgia and osteoarthritis of knees and spine.  She was last seen in rheumatology in 11-24, when inflammatory arthritis was judged improved.  Recommendation was to continue combination Humira and  "methotrexate.    Interval history May 8, 2025    She has some increased joint pain, many joints, worse in the last 3 to 4 months.  She had bronchitis in 1-2025; she stopped adalimumab in order to shake the cold.  She has not restarted adlimumab, because insurance has not approved either humira or biosimilar.  Since then, pain in many joints has persisted and modestly worsened.  Early morning stiffness remains at > 1 hour.  [place] of [place] 2 [place], and seeks a dose of adalimumab.      Interval history November 5, 2024    Had knee surgery in . She stopped humira, and stayed off it due to recurrent infection.    She had increased HA and dizziness/TIA in 4-24; workup showed only hearing loss. She started migraine preventative, and HUMIRA, with improvement in migraine symptoms. Imitrex helps her but migraines are still frequent. Adjustment in neurontin and tylenol have helped.    Her joints are better since she started humira, especially shopulders, elbows, hips, feet, low back pain. Hands, shoulders remain painful, but better on medication. She continues injecting biosilimar every 14 days.  She is currently painting her house in stages.    Plantar fasciitis on the L has been flaring. She has changed shoegear.    Early morning stiffness remains ~ 1 hour.    Rx:   Adalimumab 40 mg every other week.  Methotrexate 8 tabs weekly; she reduced from 10 tabs due to GI symptoms. Using folic acid 3 tabs daily.    Interval history September 28, 2023    She had an episode of blurry vision, R > L eye, started suddenly 1 week ago.  No HA, no eye pain. She was working from home, suddenly could not read, she still could see colors/shapes.  Seen in urgent care, scanned for \"stroke\" syndrome--negative so she was discharged.  She contacted cataract physician and retinal specialist. She was not evaluated but She was told to put in eye drops. In 48 hours, she started to improve; by 72 hours, she had normal vision.    She " "also notes waxing waning tenderness in the cheek areas.   She denies dry mouth, but she notes occasional choking when eating.  She denies marked eye dryness/itching/redness.    Her joints are better since she started humira, especially shopulders, elbows, hips, feet, low back pain. Still with soft tissue pain along thighs.   Early morning stiffness remains ~ 1 hour.    Humira 40 mg every other week.  Methotrexate 8 tabs weekly; she reduced from 10 tabs due to GI symptoms. Using folic acid.    Interval history Dec 6, 2022    She feels mildly improved with addition of humira to methotrexate. Initially, she had more joint pain for 3 months after startign humira.  Former pain in her heels is much improved. Hands and wrists are \"a little better\".  Knees/shoulders/ankles are most often affected bu t not severely. She still has trouble with ascending stairs, raking leaves, standing for prolonged periods.  She is not sure whether reduced stamina is due to long COVID or incomplete control of rheumatic disease.  Early morning stiffness is negligible, but she has lingering stiffness for about 1 hour in the mornings. She does not use OTC.     Humira 40 mg every other week.  Methotrexate 8 tabs weekly; she reduced from 10 tabs due to GI symptoms. Using folic acid.    Interval history May 24, 2022    She wonders whether diet changes are affecting inflammatory burden. She cut back on dairy, gluten, processed sugar and was feeling better in October 2021. She had COVID19 in 2-2022 and had sore throat and cough, fatigue, sick for 3 weeks, and started eating ice cream. Her CRP shot up.  She did have mAb infusions for COVID19. She states that she has been \"sick for 18 months\" since probable COVID19 infection in 2-2020    Her sleep schedule and mood have stabilized with use buproprion and work reduction.     She has painful elbows, wrists, knees, andkles on a daily basis. +.- visible swelling    Exercise tolerance is low, but is better " than during the 18 months after presumed COVID19.    Sleep quality at night is improved, but sleep is still non-restorative. Early morning stiffness is ~ 1 hour, and she is fatigued.     Joint pain is generally worse with use, especially wrists with prolonged activity.    Gabapentin and lidocaine patches and rest give some relief from pain.    She does have increased stomach upset at methotrexate 10 mg weekly. She takes folate 3 mg daily.  Shortness of breath symptoms are stable.      Interval history 1-    Joints are doing well. After methotrexate dose increase 3 mos ago, she noted less pain, less stiffness since shortly after changing dose.  No early morning stiffness, no morning pain.   She has resumed more regular exercise and activity. Energy is improved for cleaning/chores.  Gabapentin 1-2 doses daily, not sure for what symptoms; fatigue and neuritic pain are less.  Recently increased buproprion to 300 mg.        Review of Systems:   Pertinent items are noted in HPI or as below, remainder of complete ROS is negative.      No recent problems with hearing or vision. No swallowing problems.   No breathing difficulty, shortness of breath, coughing, or wheezing  No chest pain or palpitations  No heart burn, indigestion, abdominal pain, nausea, vomiting, diarrhea  No urination problems, no bloody, cloudy urine, no dysuria  No numbing, tingling, weakness  No headaches or confusion  No rashes. No easy bleeding or bruising.     Active Medications:   Current Outpatient Medications   Medication Sig Dispense Refill    erenumab-aooe (AIMOVIG) 70 MG/ML injection Inject 70 mg subcutaneously.      adalimumab-adaz (HYRIMOZ) 40 MG/0.4ML auto-injector kit Inject 0.4 mLs (40 mg) subcutaneously every 14 days. (Patient not taking: Reported on 3/26/2025) 0.8 mL 7    albuterol (PROAIR HFA/PROVENTIL HFA/VENTOLIN HFA) 108 (90 Base) MCG/ACT inhaler Inhale 1-2 puffs into the lungs every 4 hours as needed for shortness of breath or  wheezing 18 g 11    aspirin 81 MG tablet Take 1 tablet (81 mg) by mouth daily 30 tablet OTC    bisacodyl (DULCOLAX) 5 MG EC tablet Two days prior to exam take two (2) tablets at 4pm. One day prior to exam take two (2) tablets at 4pm 4 tablet 0    buPROPion (WELLBUTRIN XL) 300 MG 24 hr tablet Take 1 tablet (300 mg) by mouth every morning 90 tablet 3    cetirizine (ZYRTEC) 10 MG tablet Take 10 mg by mouth daily      escitalopram (LEXAPRO) 5 MG tablet Take 3 tablets (15 mg) by mouth daily 270 tablet 1    folic acid (FOLVITE) 1 MG tablet Take 3 tablets (3,000 mcg) by mouth daily. 270 tablet 3    gabapentin (NEURONTIN) 300 MG capsule Take 1 capsule (300 mg) by mouth daily. 90 capsule 0    lidocaine (LIDODERM) 5 % patch APPLY 1 TO 3 PATCHES ONTO THE SKIN EVERY 24 HOURS AS NEEDED. ** PATCHES MAY BE LEFT ON UP TO 12 HOURS IN A 24  HOUR PERIOD** 90 patch 1    lisinopril (ZESTRIL) 10 MG tablet Take 1 tablet (10 mg) by mouth daily 90 tablet 3    meclizine (ANTIVERT) 25 MG tablet Take 1 tablet by mouth 3 times daily (Patient not taking: Reported on 11/5/2024)      methotrexate 2.5 MG tablet Take 8 tablets (20 mg) by mouth every 7 days. 96 tablet 0    polyethylene glycol (GOLYTELY) 236 g suspension Two days before procedure at 5PM fill first container with water. Mix and drink an 8 oz glass every 15 minutes until HALF of the container is gone. Place the remainder in the refrigerator. One day before procedure at 5PM drink second half of bowel prep. Drink an 8 oz glass every 15 minutes until it is gone. Day of procedure 6 hours before arrival time fill the 2nd container with water. Mix and drink an 8 oz glass every 15 minutes until HALF of the container is gone. Discard the remaining solution. 8000 mL 0    polyethylene glycol (GOLYTELY) 236 g suspension Two days before procedure at 5PM fill first container with water. Mix and drink an 8 oz glass every 15 minutes until HALF of the container is gone. Place the remainder in the  refrigerator. One day before procedure at 5PM drink second half of bowel prep. Drink an 8 oz glass every 15 minutes until it is gone. Day of procedure 6 hours before arrival time fill the 2nd container with water. Mix and drink an 8 oz glass every 15 minutes until HALF of the container is gone. Discard the remaining solution. 8000 mL 0    polyethylene glycol (GOLYTELY) 236 g suspension Two days before procedure at 5PM fill first container with water. Mix and drink an 8 oz glass every 15 minutes until HALF of the container is gone. Place the remainder in the refrigerator. One day before procedure at 5PM drink second half of bowel prep. Drink an 8 oz glass every 15 minutes until it is gone. Day of procedure 6 hours before arrival time fill the 2nd container with water. Mix and drink an 8 oz glass every 15 minutes until HALF of the container is gone. Discard the remaining solution. 8000 mL 0    SUMAtriptan (IMITREX) 50 MG tablet Take 1 tablet (50 mg) by mouth at onset of headache for migraine May repeat in 2 hours. Max 4 tablets/24 hours. (Patient not taking: Reported on 11/5/2024) 18 tablet 1    VITAMIN D, CHOLECALCIFEROL, PO Take 2,000 Units by mouth daily       No current facility-administered medications for this visit.         Allergies:   Nuts   Celery Oil   Codeine   Contrast Dye   Food   Potatoes  Peanuts  Cantaloupe  Lettuce  Oat   Penicillins   Rice   Shellfish-Derived Products   Wheat Bran   Yeast       Past Medical History:  Allergic rhinitis    Asthma   Chronic pelvic pain in female   Depression   Depressive disorder   Gastroesophageal reflux disease   Head injury   Hyperlipidemia   Hypertension   Immunosuppression    Migraines   Morbid obesity   Obstructive sleep apnea   Reduced vision   Transient ischemic attack  Vertebral artery dissection  Iliotibial band syndrome  Right knee pain  Lumbar radicular pain  Rheumatoid arthritis of multiple sites without rheumatoid factor  Fibromyalgia  Arthritis of knee,  left  Creatinine elevation     Past Surgical History:  Arthroscopy knee bilateral    Biopsy lymph node cervical    Colonoscopy 7/26/2017  Lymph node biopsy 2010  Genitourinary surgery, fallopian tube cyst 1994 and tubal ligation 1999  Lymph node removed from neck for biopsy 2011   Hysteroscopy    Tonsillectomy, bilateral 1/3/2018    Family History:    The patient's family history includes Asthma in her paternal grandmother; Breast Cancer in her mother; Cancer in her mother; Cerebrovascular Disease in her paternal grandmother; Heart Disease in her father; Mental Illness in her father; Migraines in her daughter and son; Substance Abuse in her father.    Social History:  The patient reports that she has never smoked. She has never used smokeless tobacco. She reports that she drinks alcohol. She reports that she does not use drugs.   PCP: Eveline Berry  Marital Status: Single     Physical Exam:   Blood pressure 127/85, pulse 90, resp. rate 22, weight (!) 139.7 kg (308 lb), SpO2 97%, not currently breastfeeding.  Wt Readings from Last 4 Encounters:   05/08/25 (!) 139.7 kg (308 lb)   11/05/24 138.8 kg (306 lb)   10/07/24 134.5 kg (296 lb 9.6 oz)   07/22/24 132.5 kg (292 lb)       Constitutional: severe obesity, appearing stated age; cooperative  Eyes: nl EOM, PERRLA, vision, conjunctiva, sclera  ENT: nl external ears, nose, hearing, lips, teeth, gums, throat; no parotid tenderness or enlargement.  Anterior salivary pool normal.  Neck: no mass or thyroid enlargement  Resp: Breathing unlabored  Lymph: no cervical, supraclavicular, inguinal or epitrochlear nodes  MS: Mild Osteoarthritis changes were present in the hands, but no inflammatory joint changes at MCPs, wrists, elbows, or shoulders.  Skin: no nail pitting, alopecia, rash, nodules or lesions  Neuro: nl cranial nerves  Psych: nl judgement, orientation, memory, affect.      Laboratory:       Latest Ref Rng & Units 7/22/2024     9:34 AM 10/7/2024     3:13 PM  5/8/2025    11:43 AM   RHEUM RESULTS   Albumin 3.5 - 5.2 g/dL 3.9  3.9  3.6    ALT 0 - 50 U/L 10  16  12    AST 0 - 45 U/L 18  22  10    Creatinine 0.51 - 0.95 mg/dL 1.06  1.10  1.01    CRP Inflammation <5.00 mg/L   9.14    GFR Estimate >60 mL/min/1.73m2 60  57  63    Hematocrit 35.0 - 47.0 % 38.5  39.4  40.9    Hemoglobin 11.7 - 15.7 g/dL 12.8  13.0  13.6    WBC 4.0 - 11.0 10e3/uL 5.1  6.2  7.9    RBC Count 3.80 - 5.20 10e6/uL 4.13  4.27  4.49    RDW 10.0 - 15.0 % 13.5  13.8  12.8    MCHC 31.5 - 36.5 g/dL 33.2  33.0  33.3    MCV 78 - 100 fL 93  92  91    Platelet Count 150 - 450 10e3/uL 317  334  358      AMAN interpretation   Date Value Ref Range Status   04/28/2018 Negative NEG^Negative Final     Comment:                                        Reference range:  <1:40  NEGATIVE  1:40 - 1:80  BORDERLINE POSITIVE  >1:80 POSITIVE     In February 2024, creatinine, albumin Eminase's, CBC were all normal.

## 2025-05-08 NOTE — NURSING NOTE
Chief Complaint   Patient presents with    RECHECK     rheumatoid arthritis       Vitals:    05/08/25 1254   BP: 127/85   BP Location: Left arm   Patient Position: Sitting   Cuff Size: Adult Large   Pulse: 90   Resp: 22   SpO2: 97%   Weight: (!) 139.7 kg (308 lb)       Body mass index is 49.5 kg/m .

## 2025-05-08 NOTE — TELEPHONE ENCOUNTER
Patient returned call. Provider had reported she was unable to fill her Hyrimoz. Writer reviewed notes and as of March 2025 liaison had reported medication ran through for test claim and was ready to fill. Patient was notified via [employer] at that time. She reports today she has not tried calling the pharmacy yet to fill and will do so now. If she runs into issues with [facility], it is now an option for her to fill with [facility]. Note she will be moving out of [place] in 2 weeks.

## 2025-05-12 ENCOUNTER — TELEPHONE (OUTPATIENT)
Dept: RHEUMATOLOGY | Facility: CLINIC | Age: 60
End: 2025-05-12
Payer: COMMERCIAL

## 2025-05-12 NOTE — TELEPHONE ENCOUNTER
Pharmacy is asking for clarification on how the patient is to be taking prescription on deltasone tab 5mg.    Take 1 tablet by mouth daily? Or  Take 4 tablets by mouth daily for 1 week, then take 3 tablets by mouth daily for 1 week?    SHARRON Olguin

## 2025-05-15 ENCOUNTER — E-VISIT (OUTPATIENT)
Dept: FAMILY MEDICINE | Facility: CLINIC | Age: 60
End: 2025-05-15
Payer: COMMERCIAL

## 2025-05-15 DIAGNOSIS — J45.41 MODERATE PERSISTENT ASTHMA WITH EXACERBATION: Primary | ICD-10-CM

## 2025-05-15 DIAGNOSIS — M06.09 RHEUMATOID ARTHRITIS OF MULTIPLE SITES WITHOUT RHEUMATOID FACTOR (H): ICD-10-CM

## 2025-05-15 RX ORDER — BUDESONIDE AND FORMOTEROL FUMARATE DIHYDRATE 80; 4.5 UG/1; UG/1
2 AEROSOL RESPIRATORY (INHALATION) 2 TIMES DAILY
Qty: 10.2 G | Refills: 0 | Status: SHIPPED | OUTPATIENT
Start: 2025-05-15

## 2025-05-15 ASSESSMENT — ASTHMA QUESTIONNAIRES: ACT_TOTALSCORE: 11

## 2025-06-02 ENCOUNTER — TELEPHONE (OUTPATIENT)
Dept: RHEUMATOLOGY | Facility: CLINIC | Age: 60
End: 2025-06-02
Payer: COMMERCIAL

## 2025-06-02 NOTE — TELEPHONE ENCOUNTER
PA Initiation    Medication: ADALIMUMAB-ADAZ 40 MG/0.4ML SC SOAJ  Insurance Company: [employer] ([facility]) - Phone [phone] Fax [phone]  Pharmacy Filling the Rx:    Filling Pharmacy Phone:    Filling Pharmacy Fax:    Start Date: 6/2/2025

## 2025-06-05 PROBLEM — R53.83 FATIGUE, UNSPECIFIED TYPE: Status: RESOLVED | Noted: 2021-12-01 | Resolved: 2025-06-05

## 2025-06-05 PROBLEM — F33.0 MAJOR DEPRESSIVE DISORDER, RECURRENT EPISODE, MILD: Status: RESOLVED | Noted: 2019-09-17 | Resolved: 2025-06-05

## 2025-06-05 PROBLEM — Z71.89 ACP (ADVANCE CARE PLANNING): Chronic | Status: RESOLVED | Noted: 2018-01-18 | Resolved: 2025-06-05

## 2025-06-23 ENCOUNTER — PATIENT OUTREACH (OUTPATIENT)
Dept: CARE COORDINATION | Facility: CLINIC | Age: 60
End: 2025-06-23
Payer: COMMERCIAL

## 2025-06-24 NOTE — TELEPHONE ENCOUNTER
Prior Authorization Approval    Medication: ADALIMUMAB-ADAZ 40 MG/0.4ML SC SOAJ  Authorization Effective Date: 6/24/2025  Authorization Expiration Date: 6/2/2026  Approved Dose/Quantity: 2  Reference #: [id number]   Insurance Company: [employer] ([facility]) - Phone [phone] Fax [phone]  Expected CoPay: $    CoPay Card Available: No    Financial Assistance Needed: no  Which Pharmacy is filling the prescription: [facility] - [address]  Pharmacy Notified: no renewal  Patient Notified: yes via ins

## 2025-07-07 ENCOUNTER — PATIENT OUTREACH (OUTPATIENT)
Dept: CARE COORDINATION | Facility: CLINIC | Age: 60
End: 2025-07-07
Payer: COMMERCIAL

## 2025-07-14 DIAGNOSIS — F33.1 MAJOR DEPRESSIVE DISORDER, RECURRENT, MODERATE (H): ICD-10-CM

## 2025-07-14 RX ORDER — ESCITALOPRAM OXALATE 5 MG/1
15 TABLET ORAL DAILY
Qty: 90 TABLET | Refills: 0 | Status: SHIPPED | OUTPATIENT
Start: 2025-07-14

## 2025-08-23 ENCOUNTER — HEALTH MAINTENANCE LETTER (OUTPATIENT)
Age: 60
End: 2025-08-23

## (undated) DEVICE — SPONGE TONSIL W/STRING MED 23275-680

## (undated) DEVICE — TUBING SUCTION 10'X3/16" N510

## (undated) DEVICE — SOL WATER IRRIG 1000ML BOTTLE 07139-09

## (undated) DEVICE — ESU ELEC BLADE 2.75" COATED/INSULATED E1455

## (undated) DEVICE — Device

## (undated) DEVICE — GLOVE PROTEXIS POWDER FREE SMT 6.5  2D72PT65X

## (undated) DEVICE — NDL 25GA 2"  8881200441

## (undated) DEVICE — ESU GROUND PAD ADULT W/CORD E7507

## (undated) DEVICE — TUBING SUCTION MEDI-VAC SOFT 3/16"X20' N520A

## (undated) DEVICE — LINEN TOWEL PACK X5 5464

## (undated) DEVICE — SYR 10ML FINGER CONTROL W/O NDL 309695

## (undated) DEVICE — ESU PENCIL W/COATED BLADE E2450H

## (undated) DEVICE — ESU SUCTION CAUTERY 10FR FOOT CONTROL E2505-10FR

## (undated) RX ORDER — ALBUTEROL SULFATE 90 UG/1
AEROSOL, METERED RESPIRATORY (INHALATION)
Status: DISPENSED
Start: 2018-01-03

## (undated) RX ORDER — ONDANSETRON 2 MG/ML
INJECTION INTRAMUSCULAR; INTRAVENOUS
Status: DISPENSED
Start: 2018-01-03

## (undated) RX ORDER — SIMETHICONE 40MG/0.6ML
SUSPENSION, DROPS(FINAL DOSAGE FORM)(ML) ORAL
Status: DISPENSED
Start: 2017-07-26

## (undated) RX ORDER — FENTANYL CITRATE 50 UG/ML
INJECTION, SOLUTION INTRAMUSCULAR; INTRAVENOUS
Status: DISPENSED
Start: 2018-01-03

## (undated) RX ORDER — FENTANYL CITRATE 50 UG/ML
INJECTION, SOLUTION INTRAMUSCULAR; INTRAVENOUS
Status: DISPENSED
Start: 2024-12-18

## (undated) RX ORDER — FENTANYL CITRATE 50 UG/ML
INJECTION, SOLUTION INTRAMUSCULAR; INTRAVENOUS
Status: DISPENSED
Start: 2017-07-26

## (undated) RX ORDER — PROPOFOL 10 MG/ML
INJECTION, EMULSION INTRAVENOUS
Status: DISPENSED
Start: 2018-01-03

## (undated) RX ORDER — OXYCODONE HCL 5 MG/5 ML
SOLUTION, ORAL ORAL
Status: DISPENSED
Start: 2018-01-03

## (undated) RX ORDER — CLINDAMYCIN PHOSPHATE 900 MG/50ML
INJECTION, SOLUTION INTRAVENOUS
Status: DISPENSED
Start: 2018-01-03

## (undated) RX ORDER — DIPHENHYDRAMINE HYDROCHLORIDE 50 MG/ML
INJECTION INTRAMUSCULAR; INTRAVENOUS
Status: DISPENSED
Start: 2017-07-26